# Patient Record
Sex: FEMALE | Race: BLACK OR AFRICAN AMERICAN | NOT HISPANIC OR LATINO
[De-identification: names, ages, dates, MRNs, and addresses within clinical notes are randomized per-mention and may not be internally consistent; named-entity substitution may affect disease eponyms.]

---

## 2017-01-10 ENCOUNTER — APPOINTMENT (OUTPATIENT)
Dept: WOUND CARE | Facility: HOSPITAL | Age: 67
End: 2017-01-10

## 2017-01-12 ENCOUNTER — OUTPATIENT (OUTPATIENT)
Dept: OUTPATIENT SERVICES | Facility: HOSPITAL | Age: 67
LOS: 1 days | Discharge: HOME | End: 2017-01-12

## 2017-01-12 ENCOUNTER — APPOINTMENT (OUTPATIENT)
Dept: WOUND CARE | Facility: HOSPITAL | Age: 67
End: 2017-01-12

## 2017-01-12 DIAGNOSIS — L89.90 PRESSURE ULCER OF UNSPECIFIED SITE, UNSPECIFIED STAGE: ICD-10-CM

## 2017-01-12 DIAGNOSIS — Z98.89 OTHER SPECIFIED POSTPROCEDURAL STATES: Chronic | ICD-10-CM

## 2017-01-12 DIAGNOSIS — Z98.84 BARIATRIC SURGERY STATUS: Chronic | ICD-10-CM

## 2017-01-13 DIAGNOSIS — Z95.820 PERIPHERAL VASCULAR ANGIOPLASTY STATUS WITH IMPLANTS AND GRAFTS: ICD-10-CM

## 2017-01-13 DIAGNOSIS — E11.621 TYPE 2 DIABETES MELLITUS WITH FOOT ULCER: ICD-10-CM

## 2017-01-13 DIAGNOSIS — I73.9 PERIPHERAL VASCULAR DISEASE, UNSPECIFIED: ICD-10-CM

## 2017-01-13 DIAGNOSIS — I10 ESSENTIAL (PRIMARY) HYPERTENSION: ICD-10-CM

## 2017-01-13 DIAGNOSIS — L89.90 PRESSURE ULCER OF UNSPECIFIED SITE, UNSPECIFIED STAGE: ICD-10-CM

## 2017-01-13 DIAGNOSIS — Z79.899 OTHER LONG TERM (CURRENT) DRUG THERAPY: ICD-10-CM

## 2017-01-13 DIAGNOSIS — I70.239 ATHEROSCLEROSIS OF NATIVE ARTERIES OF RIGHT LEG WITH ULCERATION OF UNSPECIFIED SITE: ICD-10-CM

## 2017-01-13 DIAGNOSIS — M79.674 PAIN IN RIGHT TOE(S): ICD-10-CM

## 2017-01-13 DIAGNOSIS — L97.514 NON-PRESSURE CHRONIC ULCER OF OTHER PART OF RIGHT FOOT WITH NECROSIS OF BONE: ICD-10-CM

## 2017-01-13 DIAGNOSIS — Z88.1 ALLERGY STATUS TO OTHER ANTIBIOTIC AGENTS STATUS: ICD-10-CM

## 2017-01-13 DIAGNOSIS — Z79.01 LONG TERM (CURRENT) USE OF ANTICOAGULANTS: ICD-10-CM

## 2017-01-13 DIAGNOSIS — D57.1 SICKLE-CELL DISEASE WITHOUT CRISIS: ICD-10-CM

## 2017-01-13 DIAGNOSIS — M19.90 UNSPECIFIED OSTEOARTHRITIS, UNSPECIFIED SITE: ICD-10-CM

## 2017-01-13 DIAGNOSIS — Z79.82 LONG TERM (CURRENT) USE OF ASPIRIN: ICD-10-CM

## 2017-01-30 ENCOUNTER — APPOINTMENT (OUTPATIENT)
Dept: WOUND CARE | Facility: HOSPITAL | Age: 67
End: 2017-01-30

## 2017-01-30 ENCOUNTER — OUTPATIENT (OUTPATIENT)
Dept: OUTPATIENT SERVICES | Facility: HOSPITAL | Age: 67
LOS: 1 days | Discharge: HOME | End: 2017-01-30

## 2017-01-30 DIAGNOSIS — Z98.84 BARIATRIC SURGERY STATUS: Chronic | ICD-10-CM

## 2017-01-30 DIAGNOSIS — Z98.89 OTHER SPECIFIED POSTPROCEDURAL STATES: Chronic | ICD-10-CM

## 2017-01-30 DIAGNOSIS — L89.90 PRESSURE ULCER OF UNSPECIFIED SITE, UNSPECIFIED STAGE: ICD-10-CM

## 2017-02-01 DIAGNOSIS — Z79.899 OTHER LONG TERM (CURRENT) DRUG THERAPY: ICD-10-CM

## 2017-02-01 DIAGNOSIS — M19.90 UNSPECIFIED OSTEOARTHRITIS, UNSPECIFIED SITE: ICD-10-CM

## 2017-02-01 DIAGNOSIS — Z95.820 PERIPHERAL VASCULAR ANGIOPLASTY STATUS WITH IMPLANTS AND GRAFTS: ICD-10-CM

## 2017-02-01 DIAGNOSIS — D57.1 SICKLE-CELL DISEASE WITHOUT CRISIS: ICD-10-CM

## 2017-02-01 DIAGNOSIS — M79.674 PAIN IN RIGHT TOE(S): ICD-10-CM

## 2017-02-01 DIAGNOSIS — Z79.01 LONG TERM (CURRENT) USE OF ANTICOAGULANTS: ICD-10-CM

## 2017-02-01 DIAGNOSIS — I70.239 ATHEROSCLEROSIS OF NATIVE ARTERIES OF RIGHT LEG WITH ULCERATION OF UNSPECIFIED SITE: ICD-10-CM

## 2017-02-01 DIAGNOSIS — E11.621 TYPE 2 DIABETES MELLITUS WITH FOOT ULCER: ICD-10-CM

## 2017-02-01 DIAGNOSIS — Z88.1 ALLERGY STATUS TO OTHER ANTIBIOTIC AGENTS STATUS: ICD-10-CM

## 2017-02-01 DIAGNOSIS — L97.514 NON-PRESSURE CHRONIC ULCER OF OTHER PART OF RIGHT FOOT WITH NECROSIS OF BONE: ICD-10-CM

## 2017-02-01 DIAGNOSIS — I73.9 PERIPHERAL VASCULAR DISEASE, UNSPECIFIED: ICD-10-CM

## 2017-02-01 DIAGNOSIS — L89.90 PRESSURE ULCER OF UNSPECIFIED SITE, UNSPECIFIED STAGE: ICD-10-CM

## 2017-02-01 DIAGNOSIS — I10 ESSENTIAL (PRIMARY) HYPERTENSION: ICD-10-CM

## 2017-02-01 DIAGNOSIS — Z79.82 LONG TERM (CURRENT) USE OF ASPIRIN: ICD-10-CM

## 2017-02-06 ENCOUNTER — APPOINTMENT (OUTPATIENT)
Dept: VASCULAR SURGERY | Facility: CLINIC | Age: 67
End: 2017-02-06

## 2017-02-06 VITALS
BODY MASS INDEX: 28.32 KG/M2 | DIASTOLIC BLOOD PRESSURE: 75 MMHG | RESPIRATION RATE: 16 BRPM | HEART RATE: 82 BPM | TEMPERATURE: 98.2 F | WEIGHT: 170 LBS | SYSTOLIC BLOOD PRESSURE: 152 MMHG | HEIGHT: 65 IN

## 2017-02-06 DIAGNOSIS — M25.472 EFFUSION, RIGHT ANKLE: ICD-10-CM

## 2017-02-06 DIAGNOSIS — E11.9 TYPE 2 DIABETES MELLITUS W/OUT COMPLICATIONS: ICD-10-CM

## 2017-02-06 DIAGNOSIS — M25.471 EFFUSION, RIGHT ANKLE: ICD-10-CM

## 2017-03-22 ENCOUNTER — INPATIENT (INPATIENT)
Facility: HOSPITAL | Age: 67
LOS: 1 days | Discharge: HOME | DRG: 379 | End: 2017-03-24
Attending: INTERNAL MEDICINE | Admitting: INTERNAL MEDICINE
Payer: COMMERCIAL

## 2017-03-22 VITALS
OXYGEN SATURATION: 98 % | TEMPERATURE: 98 F | DIASTOLIC BLOOD PRESSURE: 85 MMHG | HEIGHT: 65 IN | HEART RATE: 86 BPM | SYSTOLIC BLOOD PRESSURE: 193 MMHG | RESPIRATION RATE: 20 BRPM | WEIGHT: 169.98 LBS

## 2017-03-22 DIAGNOSIS — Z98.84 BARIATRIC SURGERY STATUS: Chronic | ICD-10-CM

## 2017-03-22 DIAGNOSIS — Z98.891 HISTORY OF UTERINE SCAR FROM PREVIOUS SURGERY: Chronic | ICD-10-CM

## 2017-03-22 DIAGNOSIS — Z98.89 OTHER SPECIFIED POSTPROCEDURAL STATES: Chronic | ICD-10-CM

## 2017-03-22 DIAGNOSIS — Z95.828 PRESENCE OF OTHER VASCULAR IMPLANTS AND GRAFTS: Chronic | ICD-10-CM

## 2017-03-22 DIAGNOSIS — Z98.890 OTHER SPECIFIED POSTPROCEDURAL STATES: Chronic | ICD-10-CM

## 2017-03-22 PROCEDURE — 99285 EMERGENCY DEPT VISIT HI MDM: CPT | Mod: 25

## 2017-03-22 NOTE — ED ADULT NURSE NOTE - OBJECTIVE STATEMENT
Pt is a 67 yo female coming in c.o having 4 episodes of bloody BM. Pt states that at first it started off as a small amount but the more she went she noticed the blood increasing to clots. Pt denies any pain with urination or BM. No CP or SOB no N/V/D. Pt denies dizziness and is able to ambulate with a steady gait. Pt has a pmh of HTN and HLD.

## 2017-03-23 DIAGNOSIS — K92.2 GASTROINTESTINAL HEMORRHAGE, UNSPECIFIED: ICD-10-CM

## 2017-03-23 DIAGNOSIS — K92.1 MELENA: ICD-10-CM

## 2017-03-23 DIAGNOSIS — I10 ESSENTIAL (PRIMARY) HYPERTENSION: ICD-10-CM

## 2017-03-23 DIAGNOSIS — I73.9 PERIPHERAL VASCULAR DISEASE, UNSPECIFIED: ICD-10-CM

## 2017-03-23 LAB
ALBUMIN SERPL ELPH-MCNC: 3.8 G/DL — SIGNIFICANT CHANGE UP (ref 3.3–5)
ALP SERPL-CCNC: 122 U/L — HIGH (ref 40–120)
ALT FLD-CCNC: 12 U/L RC — SIGNIFICANT CHANGE UP (ref 10–45)
ANION GAP SERPL CALC-SCNC: 12 MMOL/L — SIGNIFICANT CHANGE UP (ref 5–17)
APTT BLD: 31 SEC — SIGNIFICANT CHANGE UP (ref 27.5–37.4)
AST SERPL-CCNC: 19 U/L — SIGNIFICANT CHANGE UP (ref 10–40)
BASE EXCESS BLDV CALC-SCNC: 2 MMOL/L — SIGNIFICANT CHANGE UP (ref -2–2)
BASOPHILS # BLD AUTO: 0 K/UL — SIGNIFICANT CHANGE UP (ref 0–0.2)
BASOPHILS NFR BLD AUTO: 0.6 % — SIGNIFICANT CHANGE UP (ref 0–2)
BILIRUB SERPL-MCNC: 0.4 MG/DL — SIGNIFICANT CHANGE UP (ref 0.2–1.2)
BLD GP AB SCN SERPL QL: NEGATIVE — SIGNIFICANT CHANGE UP
BUN SERPL-MCNC: 14 MG/DL — SIGNIFICANT CHANGE UP (ref 7–23)
CA-I SERPL-SCNC: 1.24 MMOL/L — SIGNIFICANT CHANGE UP (ref 1.12–1.3)
CALCIUM SERPL-MCNC: 8.6 MG/DL — SIGNIFICANT CHANGE UP (ref 8.4–10.5)
CHLORIDE BLDV-SCNC: 106 MMOL/L — SIGNIFICANT CHANGE UP (ref 96–108)
CHLORIDE SERPL-SCNC: 108 MMOL/L — SIGNIFICANT CHANGE UP (ref 96–108)
CO2 BLDV-SCNC: 30 MMOL/L — SIGNIFICANT CHANGE UP (ref 22–30)
CO2 SERPL-SCNC: 27 MMOL/L — SIGNIFICANT CHANGE UP (ref 22–31)
CREAT SERPL-MCNC: 0.74 MG/DL — SIGNIFICANT CHANGE UP (ref 0.5–1.3)
EOSINOPHIL # BLD AUTO: 0.3 K/UL — SIGNIFICANT CHANGE UP (ref 0–0.5)
EOSINOPHIL NFR BLD AUTO: 3.6 % — SIGNIFICANT CHANGE UP (ref 0–6)
GAS PNL BLDV: 144 MMOL/L — SIGNIFICANT CHANGE UP (ref 136–145)
GAS PNL BLDV: SIGNIFICANT CHANGE UP
GAS PNL BLDV: SIGNIFICANT CHANGE UP
GLUCOSE BLDV-MCNC: 146 MG/DL — HIGH (ref 70–99)
GLUCOSE SERPL-MCNC: 154 MG/DL — HIGH (ref 70–99)
HCO3 BLDV-SCNC: 28 MMOL/L — SIGNIFICANT CHANGE UP (ref 21–29)
HCT VFR BLD CALC: 30.1 % — LOW (ref 34.5–45)
HCT VFR BLD CALC: 33.2 % — LOW (ref 34.5–45)
HCT VFR BLDA CALC: 34 % — LOW (ref 39–50)
HGB BLD CALC-MCNC: 11 G/DL — LOW (ref 11.5–15.5)
HGB BLD-MCNC: 10 G/DL — LOW (ref 11.5–15.5)
HGB BLD-MCNC: 11.1 G/DL — LOW (ref 11.5–15.5)
INR BLD: 0.98 RATIO — SIGNIFICANT CHANGE UP (ref 0.88–1.16)
LACTATE BLDV-MCNC: 1.9 MMOL/L — SIGNIFICANT CHANGE UP (ref 0.7–2)
LYMPHOCYTES # BLD AUTO: 2.8 K/UL — SIGNIFICANT CHANGE UP (ref 1–3.3)
LYMPHOCYTES # BLD AUTO: 34.9 % — SIGNIFICANT CHANGE UP (ref 13–44)
MCHC RBC-ENTMCNC: 27.8 PG — SIGNIFICANT CHANGE UP (ref 27–34)
MCHC RBC-ENTMCNC: 28 PG — SIGNIFICANT CHANGE UP (ref 27–34)
MCHC RBC-ENTMCNC: 33.2 GM/DL — SIGNIFICANT CHANGE UP (ref 32–36)
MCHC RBC-ENTMCNC: 33.3 GM/DL — SIGNIFICANT CHANGE UP (ref 32–36)
MCV RBC AUTO: 83.7 FL — SIGNIFICANT CHANGE UP (ref 80–100)
MCV RBC AUTO: 83.9 FL — SIGNIFICANT CHANGE UP (ref 80–100)
MONOCYTES # BLD AUTO: 0.5 K/UL — SIGNIFICANT CHANGE UP (ref 0–0.9)
MONOCYTES NFR BLD AUTO: 6.2 % — SIGNIFICANT CHANGE UP (ref 2–14)
NEUTROPHILS # BLD AUTO: 4.4 K/UL — SIGNIFICANT CHANGE UP (ref 1.8–7.4)
NEUTROPHILS NFR BLD AUTO: 54.8 % — SIGNIFICANT CHANGE UP (ref 43–77)
OTHER CELLS CSF MANUAL: 7 ML/DL — LOW (ref 18–22)
PCO2 BLDV: 56 MMHG — HIGH (ref 35–50)
PH BLDV: 7.33 — LOW (ref 7.35–7.45)
PLATELET # BLD AUTO: 211 K/UL — SIGNIFICANT CHANGE UP (ref 150–400)
PLATELET # BLD AUTO: 256 K/UL — SIGNIFICANT CHANGE UP (ref 150–400)
PO2 BLDV: 30 MMHG — SIGNIFICANT CHANGE UP (ref 25–45)
POTASSIUM BLDV-SCNC: 3.7 MMOL/L — SIGNIFICANT CHANGE UP (ref 3.5–5)
POTASSIUM SERPL-MCNC: 4 MMOL/L — SIGNIFICANT CHANGE UP (ref 3.5–5.3)
POTASSIUM SERPL-SCNC: 4 MMOL/L — SIGNIFICANT CHANGE UP (ref 3.5–5.3)
PROT SERPL-MCNC: 6.6 G/DL — SIGNIFICANT CHANGE UP (ref 6–8.3)
PROTHROM AB SERPL-ACNC: 10.7 SEC — SIGNIFICANT CHANGE UP (ref 9.8–12.7)
RBC # BLD: 3.59 M/UL — LOW (ref 3.8–5.2)
RBC # BLD: 3.95 M/UL — SIGNIFICANT CHANGE UP (ref 3.8–5.2)
RBC # FLD: 13.9 % — SIGNIFICANT CHANGE UP (ref 10.3–14.5)
RBC # FLD: 14 % — SIGNIFICANT CHANGE UP (ref 10.3–14.5)
RH IG SCN BLD-IMP: POSITIVE — SIGNIFICANT CHANGE UP
SAO2 % BLDV: 46 % — LOW (ref 67–88)
SODIUM SERPL-SCNC: 147 MMOL/L — HIGH (ref 135–145)
WBC # BLD: 7.9 K/UL — SIGNIFICANT CHANGE UP (ref 3.8–10.5)
WBC # BLD: 8 K/UL — SIGNIFICANT CHANGE UP (ref 3.8–10.5)
WBC # FLD AUTO: 7.9 K/UL — SIGNIFICANT CHANGE UP (ref 3.8–10.5)
WBC # FLD AUTO: 8 K/UL — SIGNIFICANT CHANGE UP (ref 3.8–10.5)

## 2017-03-23 PROCEDURE — 99222 1ST HOSP IP/OBS MODERATE 55: CPT | Mod: GC

## 2017-03-23 PROCEDURE — 99223 1ST HOSP IP/OBS HIGH 75: CPT

## 2017-03-23 RX ORDER — SOD SULF/SODIUM/NAHCO3/KCL/PEG
1000 SOLUTION, RECONSTITUTED, ORAL ORAL EVERY 4 HOURS
Qty: 0 | Refills: 0 | Status: COMPLETED | OUTPATIENT
Start: 2017-03-23 | End: 2017-03-23

## 2017-03-23 RX ORDER — DEXTROSE 50 % IN WATER 50 %
12.5 SYRINGE (ML) INTRAVENOUS ONCE
Qty: 0 | Refills: 0 | Status: DISCONTINUED | OUTPATIENT
Start: 2017-03-23 | End: 2017-03-24

## 2017-03-23 RX ORDER — DEXTROSE 50 % IN WATER 50 %
1 SYRINGE (ML) INTRAVENOUS ONCE
Qty: 0 | Refills: 0 | Status: DISCONTINUED | OUTPATIENT
Start: 2017-03-23 | End: 2017-03-24

## 2017-03-23 RX ORDER — INSULIN LISPRO 100/ML
VIAL (ML) SUBCUTANEOUS AT BEDTIME
Qty: 0 | Refills: 0 | Status: DISCONTINUED | OUTPATIENT
Start: 2017-03-23 | End: 2017-03-24

## 2017-03-23 RX ORDER — SODIUM CHLORIDE 9 MG/ML
1000 INJECTION INTRAMUSCULAR; INTRAVENOUS; SUBCUTANEOUS
Qty: 0 | Refills: 0 | Status: DISCONTINUED | OUTPATIENT
Start: 2017-03-23 | End: 2017-03-23

## 2017-03-23 RX ORDER — PANTOPRAZOLE SODIUM 20 MG/1
40 TABLET, DELAYED RELEASE ORAL
Qty: 0 | Refills: 0 | Status: DISCONTINUED | OUTPATIENT
Start: 2017-03-23 | End: 2017-03-24

## 2017-03-23 RX ORDER — ACETAMINOPHEN 500 MG
650 TABLET ORAL ONCE
Qty: 0 | Refills: 0 | Status: COMPLETED | OUTPATIENT
Start: 2017-03-23 | End: 2017-03-23

## 2017-03-23 RX ORDER — AMLODIPINE BESYLATE 2.5 MG/1
10 TABLET ORAL DAILY
Qty: 0 | Refills: 0 | Status: DISCONTINUED | OUTPATIENT
Start: 2017-03-23 | End: 2017-03-24

## 2017-03-23 RX ORDER — INSULIN LISPRO 100/ML
VIAL (ML) SUBCUTANEOUS
Qty: 0 | Refills: 0 | Status: DISCONTINUED | OUTPATIENT
Start: 2017-03-23 | End: 2017-03-24

## 2017-03-23 RX ORDER — SODIUM CHLORIDE 9 MG/ML
1000 INJECTION, SOLUTION INTRAVENOUS
Qty: 0 | Refills: 0 | Status: DISCONTINUED | OUTPATIENT
Start: 2017-03-23 | End: 2017-03-24

## 2017-03-23 RX ADMIN — SODIUM CHLORIDE 100 MILLILITER(S): 9 INJECTION INTRAMUSCULAR; INTRAVENOUS; SUBCUTANEOUS at 09:59

## 2017-03-23 RX ADMIN — PANTOPRAZOLE SODIUM 40 MILLIGRAM(S): 20 TABLET, DELAYED RELEASE ORAL at 18:18

## 2017-03-23 RX ADMIN — Medication 1000 MILLILITER(S): at 23:15

## 2017-03-23 RX ADMIN — SODIUM CHLORIDE 100 MILLILITER(S): 9 INJECTION INTRAMUSCULAR; INTRAVENOUS; SUBCUTANEOUS at 06:05

## 2017-03-23 RX ADMIN — PANTOPRAZOLE SODIUM 40 MILLIGRAM(S): 20 TABLET, DELAYED RELEASE ORAL at 06:00

## 2017-03-23 RX ADMIN — Medication 1000 MILLILITER(S): at 19:59

## 2017-03-23 RX ADMIN — AMLODIPINE BESYLATE 10 MILLIGRAM(S): 2.5 TABLET ORAL at 06:00

## 2017-03-23 NOTE — ED PROVIDER NOTE - PHYSICAL EXAMINATION
Physical Exam: elderly F in NAD, AAOx3, NCAT, MMM, PERRLA, CTAB, normal rate and regular rhythm, abdomen is soft and NTND, No edema, No deformity of extremities, No rashes, CN grossly intact, No focal motor or sensory deficits. Rectal w/ external hemorrhoid not thrombosed, gross dark red blood, no melena. ~ Davon Michelle MD

## 2017-03-23 NOTE — H&P ADULT. - PROBLEM SELECTOR PLAN 1
- will need GI consult in AM  - cbc q8h  - hold off platelet txf for now  - NPO + IVF  - unlikely upperGIB despite daily diclofenac. likely a lower GIB.  - pt type and screened x1 and consent for blood - will need GI consult in AM  - cbc q8h  - hold off platelet txf for now  - NPO + IVF  - unlikely upperGIB despite daily diclofenac. likely a lower GIB.  - pt type and screened x1 and consent for blood  - start protonix 40 IV BID for now, despitely less likely UGIB

## 2017-03-23 NOTE — ED PROVIDER NOTE - ATTENDING CONTRIBUTION TO CARE
patient with pvd on asa plavix presenting with 7 episodes of bright red blood per rectum since this evening. no abdominal pain. HD stable  plan to check blood work and admit for endoscopy.

## 2017-03-23 NOTE — H&P ADULT. - HISTORY OF PRESENT ILLNESS
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    Pt states this has never happened before. She last had colonoscopy 4-5 yrs ago which was reportedly normal without polyps or diverticular. Her last EGD was in 2001 before her gastric bypass. Last night at 10PM, she started to have large bloody BM. Since then, she's had 7 bloody BM. Pt stated to me that she took baclofen every day for pain, but pt's medication list stated diclofenac 100mg daily. Denies nausea or vomiting. Only symptom is some blurry vision when walking and mild abd tenderness. Otherwise denies dizziness, lightheadedness, chest pain, SOB, fevers, chills, URI.    VS: Tm 98, P 86, /85, R 20, 98% RA

## 2017-03-23 NOTE — H&P ADULT. - ASSESSMENT
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

## 2017-03-23 NOTE — ED PROVIDER NOTE - NS ED ROS FT
No fever, no chills, no change in vision, no change in hearing, no chest pain, no shortness of breath, + abdominal pain, no vomiting, no dysuria, no muscle pain, no rashes, no loss of consciousness. ~ Davon Michelle MD

## 2017-03-23 NOTE — ED PROVIDER NOTE - OBJECTIVE STATEMENT
BRBPR since 10 pm, first time, has had 4 episodes, once in ED, passing clots now.   On plavix and ASA for PAD s/p stenting. Last scope 3 yrs ago. Also has epigastric burning. No dark tarry stool, CP, SOB, vomiting blood.    PMD: Nia

## 2017-03-24 VITALS
TEMPERATURE: 98 F | DIASTOLIC BLOOD PRESSURE: 69 MMHG | SYSTOLIC BLOOD PRESSURE: 152 MMHG | HEART RATE: 68 BPM | RESPIRATION RATE: 18 BRPM | OXYGEN SATURATION: 98 %

## 2017-03-24 LAB
CHOLEST SERPL-MCNC: 181 MG/DL — SIGNIFICANT CHANGE UP (ref 10–199)
HBA1C BLD-MCNC: 7.2 % — HIGH (ref 4–5.6)
HCT VFR BLD CALC: 27.5 % — LOW (ref 34.5–45)
HCT VFR BLD CALC: 29.8 % — LOW (ref 34.5–45)
HCT VFR BLD CALC: 30.7 % — LOW (ref 34.5–45)
HDLC SERPL-MCNC: 43 MG/DL — SIGNIFICANT CHANGE UP (ref 40–125)
HGB BLD-MCNC: 9.3 G/DL — LOW (ref 11.5–15.5)
HGB BLD-MCNC: 9.7 G/DL — LOW (ref 11.5–15.5)
HGB BLD-MCNC: 9.8 G/DL — LOW (ref 11.5–15.5)
LIPID PNL WITH DIRECT LDL SERPL: 112 MG/DL — SIGNIFICANT CHANGE UP
MCHC RBC-ENTMCNC: 26.1 PG — LOW (ref 27–34)
MCHC RBC-ENTMCNC: 27.1 PG — SIGNIFICANT CHANGE UP (ref 27–34)
MCHC RBC-ENTMCNC: 28.4 PG — SIGNIFICANT CHANGE UP (ref 27–34)
MCHC RBC-ENTMCNC: 31.9 GM/DL — LOW (ref 32–36)
MCHC RBC-ENTMCNC: 32.5 GM/DL — SIGNIFICANT CHANGE UP (ref 32–36)
MCHC RBC-ENTMCNC: 33.8 GM/DL — SIGNIFICANT CHANGE UP (ref 32–36)
MCV RBC AUTO: 81.9 FL — SIGNIFICANT CHANGE UP (ref 80–100)
MCV RBC AUTO: 83.3 FL — SIGNIFICANT CHANGE UP (ref 80–100)
MCV RBC AUTO: 83.8 FL — SIGNIFICANT CHANGE UP (ref 80–100)
PLATELET # BLD AUTO: 215 K/UL — SIGNIFICANT CHANGE UP (ref 150–400)
PLATELET # BLD AUTO: 244 K/UL — SIGNIFICANT CHANGE UP (ref 150–400)
PLATELET # BLD AUTO: 284 K/UL — SIGNIFICANT CHANGE UP (ref 150–400)
RBC # BLD: 3.28 M/UL — LOW (ref 3.8–5.2)
RBC # BLD: 3.58 M/UL — LOW (ref 3.8–5.2)
RBC # BLD: 3.75 M/UL — LOW (ref 3.8–5.2)
RBC # FLD: 13.7 % — SIGNIFICANT CHANGE UP (ref 10.3–14.5)
RBC # FLD: 13.9 % — SIGNIFICANT CHANGE UP (ref 10.3–14.5)
RBC # FLD: 15.3 % — HIGH (ref 10.3–14.5)
TOTAL CHOLESTEROL/HDL RATIO MEASUREMENT: 4.2 RATIO — SIGNIFICANT CHANGE UP (ref 3.3–7.1)
TRIGL SERPL-MCNC: 132 MG/DL — SIGNIFICANT CHANGE UP (ref 10–149)
TSH SERPL-MCNC: 3.59 UIU/ML — SIGNIFICANT CHANGE UP (ref 0.27–4.2)
WBC # BLD: 5.9 K/UL — SIGNIFICANT CHANGE UP (ref 3.8–10.5)
WBC # BLD: 6.3 K/UL — SIGNIFICANT CHANGE UP (ref 3.8–10.5)
WBC # BLD: 7.47 K/UL — SIGNIFICANT CHANGE UP (ref 3.8–10.5)
WBC # FLD AUTO: 5.9 K/UL — SIGNIFICANT CHANGE UP (ref 3.8–10.5)
WBC # FLD AUTO: 6.3 K/UL — SIGNIFICANT CHANGE UP (ref 3.8–10.5)
WBC # FLD AUTO: 7.47 K/UL — SIGNIFICANT CHANGE UP (ref 3.8–10.5)

## 2017-03-24 PROCEDURE — 84132 ASSAY OF SERUM POTASSIUM: CPT

## 2017-03-24 PROCEDURE — 86901 BLOOD TYPING SEROLOGIC RH(D): CPT

## 2017-03-24 PROCEDURE — 85610 PROTHROMBIN TIME: CPT

## 2017-03-24 PROCEDURE — 85027 COMPLETE CBC AUTOMATED: CPT

## 2017-03-24 PROCEDURE — 86850 RBC ANTIBODY SCREEN: CPT

## 2017-03-24 PROCEDURE — 82435 ASSAY OF BLOOD CHLORIDE: CPT

## 2017-03-24 PROCEDURE — 99285 EMERGENCY DEPT VISIT HI MDM: CPT

## 2017-03-24 PROCEDURE — 85730 THROMBOPLASTIN TIME PARTIAL: CPT

## 2017-03-24 PROCEDURE — 83036 HEMOGLOBIN GLYCOSYLATED A1C: CPT

## 2017-03-24 PROCEDURE — 82947 ASSAY GLUCOSE BLOOD QUANT: CPT

## 2017-03-24 PROCEDURE — 84295 ASSAY OF SERUM SODIUM: CPT

## 2017-03-24 PROCEDURE — 84443 ASSAY THYROID STIM HORMONE: CPT

## 2017-03-24 PROCEDURE — 86900 BLOOD TYPING SEROLOGIC ABO: CPT

## 2017-03-24 PROCEDURE — 85014 HEMATOCRIT: CPT

## 2017-03-24 PROCEDURE — 83605 ASSAY OF LACTIC ACID: CPT

## 2017-03-24 PROCEDURE — 80061 LIPID PANEL: CPT

## 2017-03-24 PROCEDURE — 80053 COMPREHEN METABOLIC PANEL: CPT

## 2017-03-24 PROCEDURE — 82330 ASSAY OF CALCIUM: CPT

## 2017-03-24 PROCEDURE — 82803 BLOOD GASES ANY COMBINATION: CPT

## 2017-03-24 RX ORDER — PANTOPRAZOLE SODIUM 20 MG/1
1 TABLET, DELAYED RELEASE ORAL
Qty: 14 | Refills: 0 | OUTPATIENT
Start: 2017-03-24 | End: 2017-04-07

## 2017-03-24 RX ADMIN — SODIUM CHLORIDE 50 MILLILITER(S): 9 INJECTION, SOLUTION INTRAVENOUS at 08:26

## 2017-03-24 RX ADMIN — SODIUM CHLORIDE 50 MILLILITER(S): 9 INJECTION, SOLUTION INTRAVENOUS at 06:07

## 2017-03-24 RX ADMIN — PANTOPRAZOLE SODIUM 40 MILLIGRAM(S): 20 TABLET, DELAYED RELEASE ORAL at 06:09

## 2017-03-24 RX ADMIN — SODIUM CHLORIDE 50 MILLILITER(S): 9 INJECTION, SOLUTION INTRAVENOUS at 00:05

## 2017-03-24 NOTE — DISCHARGE NOTE ADULT - MEDICATION SUMMARY - MEDICATIONS TO TAKE
I will START or STAY ON the medications listed below when I get home from the hospital:    aspirin 81 mg oral delayed release tablet  -- 1 tab(s) by mouth once a day  -- Indication: For antiplatelet    ramipril 10 mg oral capsule  -- 1 cap(s) by mouth once a day  -- Indication: For Hypertension    metFORMIN 500 mg oral tablet  -- 1 tab(s) by mouth 2 times a day  -- Indication: For Diabetes    Plavix 75 mg oral tablet  -- 1 tab(s) by mouth once a day  -- Indication: For antiplatelet    amLODIPine 10 mg oral tablet  -- 1 tab(s) by mouth once a day  -- Indication: For Hypertension    hydroCHLOROthiazide 50 mg oral tablet  -- 1 tab(s) by mouth once a day  -- Indication: For Hypertension    pantoprazole 40 mg oral delayed release tablet  -- 1 tab(s) by mouth once a day  -- It is very important that you take or use this exactly as directed.  Do not skip doses or discontinue unless directed by your doctor.  Obtain medical advice before taking any non-prescription drugs as some may affect the action of this medication.  Swallow whole.  Do not crush.    -- Indication: For Gastric reflux

## 2017-03-24 NOTE — DISCHARGE NOTE ADULT - PATIENT PORTAL LINK FT
“You can access the [employer] Patient Portal, offered by [facility], by registering with the following website: [email or web address]”

## 2017-03-24 NOTE — DISCHARGE NOTE ADULT - MEDICATION SUMMARY - MEDICATIONS TO STOP TAKING
I will STOP taking the medications listed below when I get home from the hospital:    isosorbide  --    diclofenac sodium 100 mg oral tablet, extended release  -- 1 tab(s) by mouth once a day

## 2017-03-24 NOTE — DISCHARGE NOTE ADULT - CARE PLAN
Principal Discharge DX:	Gastrointestinal hemorrhage, unspecified gastrointestinal hemorrhage type  Goal:	Resolved  Instructions for follow-up, activity and diet:	You had colonoscopy while in the hospital    Colonoscopy :  Diverticulosis in the entire examined colon.                       - No specimens collected.  Encouraged to eat high fiber diet .  Your hemoglobin is stable enough for discharge.  Secondary Diagnosis:	Diabetes  Instructions for follow-up, activity and diet:	Continue with prescribed home medication  Your next appointment with endocrinologist ([facility] endocrine ph: [phone])/PMD is -   HgA1C this admission -7.2  Make sure you get your HgA1c checked every three months.  If you take oral diabetes medications, check your blood glucose two times a day.  If you take insulin, check your blood glucose before meals and at bedtime.  It's important not to skip any meals.  Keep a log of your blood glucose results and always take it with you to your doctor appointments.  Keep a list of your current medications including injectables and over the counter medications and bring this medication list with you to all your doctor appointments.  If you have not seen your ophthalmologist this year call for appointment.  Check your feet daily for redness, sores, or openings. Do not self treat. If no improvement in two days call your primary care physician for an appointment.  Low blood sugar (hypoglycemia) is a blood sugar below 70mg/dl. Check your blood sugar if you feel signs/symptoms of hypoglycemia. If your blood sugar is below 70 take 15 grams of carbohydrates (ex 4 oz of apple juice, 3-4 glucose tablets, or 4-6 oz of regular soda) wait 15 minutes and repeat blood sugar to make sure it comes up above 70.  If your blood sugar is above 70 and you are due for a meal, have a meal.  If you are not due for a meal have a snack.  This snack helps keeps your blood sugar at a safe range.  Secondary Diagnosis:	HTN (hypertension)  Instructions for follow-up, activity and diet:	Take medications for your blood pressure as recommended.  Eat a heart healthy diet that is low in saturated fats and salt, and includes whole grains, fruits, vegetables and lean protein   Exercise regularly (consult with your physician or cardiologist first); maintain a heart healthy weight.   If you smoke - quit (A resource to help you stop smoking is the [facility] – phone number [phone].). Continue to follow with your primary physician or cardiologist.   Seek medical help for dizziness, Lightheadedness, Blurry vision, Headache, Chest pain, Shortness of breath  Follow up with your medical doctor to establish long term blood pressure treatment goals. Principal Discharge DX:	Gastrointestinal hemorrhage, unspecified gastrointestinal hemorrhage type  Goal:	Resolved  Instructions for follow-up, activity and diet:	You had colonoscopy while in the hospital    Colonoscopy :  Diverticulosis in the entire examined colon.                       - No specimens collected.  Encouraged to eat high fiber diet .  Your hemoglobin is stable enough for discharge.  Secondary Diagnosis:	Diabetes  Instructions for follow-up, activity and diet:	Continue with prescribed home medication  Your next appointment with endocrinologist ([facility] endocrine ph: [phone])/PMD is -   HgA1C this admission -7.2  Make sure you get your HgA1c checked every three months.  If you take oral diabetes medications, check your blood glucose two times a day.  If you take insulin, check your blood glucose before meals and at bedtime.  It's important not to skip any meals.  Keep a log of your blood glucose results and always take it with you to your doctor appointments.  Keep a list of your current medications including injectables and over the counter medications and bring this medication list with you to all your doctor appointments.  If you have not seen your ophthalmologist this year call for appointment.  Check your feet daily for redness, sores, or openings. Do not self treat. If no improvement in two days call your primary care physician for an appointment.  Low blood sugar (hypoglycemia) is a blood sugar below 70mg/dl. Check your blood sugar if you feel signs/symptoms of hypoglycemia. If your blood sugar is below 70 take 15 grams of carbohydrates (ex 4 oz of apple juice, 3-4 glucose tablets, or 4-6 oz of regular soda) wait 15 minutes and repeat blood sugar to make sure it comes up above 70.  If your blood sugar is above 70 and you are due for a meal, have a meal.  If you are not due for a meal have a snack.  This snack helps keeps your blood sugar at a safe range.  Secondary Diagnosis:	HTN (hypertension)  Instructions for follow-up, activity and diet:	Take medications for your blood pressure as recommended.  Eat a heart healthy diet that is low in saturated fats and salt, and includes whole grains, fruits, vegetables and lean protein   Exercise regularly (consult with your physician or cardiologist first); maintain a heart healthy weight.   If you smoke - quit (A resource to help you stop smoking is the [facility] Tobacco [facility] – phone number [phone].). Continue to follow with your primary physician or cardiologist.   Seek medical help for dizziness, Lightheadedness, Blurry vision, Headache, Chest pain, Shortness of breath  Follow up with your medical doctor to establish long term blood pressure treatment goals. Principal Discharge DX:	Gastrointestinal hemorrhage, unspecified gastrointestinal hemorrhage type  Goal:	Resolved  Instructions for follow-up, activity and diet:	You had colonoscopy while in the hospital    Colonoscopy :  Diverticulosis in the entire examined colon.                       - No specimens collected.  Encouraged to eat high fiber diet .  Your hemoglobin is stable enough for discharge.  Secondary Diagnosis:	Diabetes  Instructions for follow-up, activity and diet:	Continue with prescribed home medication  Your next appointment with endocrinologist ([facility] endocrine ph: [phone])/PMD is -   HgA1C this admission -7.2  Make sure you get your HgA1c checked every three months.  If you take oral diabetes medications, check your blood glucose two times a day.  If you take insulin, check your blood glucose before meals and at bedtime.  It's important not to skip any meals.  Keep a log of your blood glucose results and always take it with you to your doctor appointments.  Keep a list of your current medications including injectables and over the counter medications and bring this medication list with you to all your doctor appointments.  If you have not seen your ophthalmologist this year call for appointment.  Check your feet daily for redness, sores, or openings. Do not self treat. If no improvement in two days call your primary care physician for an appointment.  Low blood sugar (hypoglycemia) is a blood sugar below 70mg/dl. Check your blood sugar if you feel signs/symptoms of hypoglycemia. If your blood sugar is below 70 take 15 grams of carbohydrates (ex 4 oz of apple juice, 3-4 glucose tablets, or 4-6 oz of regular soda) wait 15 minutes and repeat blood sugar to make sure it comes up above 70.  If your blood sugar is above 70 and you are due for a meal, have a meal.  If you are not due for a meal have a snack.  This snack helps keeps your blood sugar at a safe range.  Secondary Diagnosis:	HTN (hypertension)  Instructions for follow-up, activity and diet:	Take medications for your blood pressure as recommended.  Eat a heart healthy diet that is low in saturated fats and salt, and includes whole grains, fruits, vegetables and lean protein   Exercise regularly (consult with your physician or cardiologist first); maintain a heart healthy weight.   If you smoke - quit (A resource to help you stop smoking is the [facility] Tobacco [facility] – phone number [phone].). Continue to follow with your primary physician or cardiologist.   Seek medical help for dizziness, Lightheadedness, Blurry vision, Headache, Chest pain, Shortness of breath  Follow up with your medical doctor to establish long term blood pressure treatment goals.

## 2017-03-24 NOTE — DISCHARGE NOTE ADULT - HOSPITAL COURSE
To be completed by attending MD . 66F with  hx htn, dm2, hld, gastric bypass (2001), abdominoplasty (1997), PAD s/p right LE stent and R 2nd toe amputation, pw sudden onset brbpr  DX: GIB . hbg 11.1  hold off Plavix / ASA    3/24 : S/p  colonoscopy today .monitor H/H .    3/24 : Colonoscopy :  Diverticulosis in the entire examined colon.   No specimens collected.  D/C to home.

## 2017-03-24 NOTE — DISCHARGE NOTE ADULT - PLAN OF CARE
Resolved You had colonoscopy while in the hospital    Colonoscopy :  Diverticulosis in the entire examined colon.                       - No specimens collected.  Encouraged to eat high fiber diet .  Your hemoglobin is stable enough for discharge. Continue with prescribed home medication  Your next appointment with endocrinologist ([facility] endocrine ph: [phone])/PMD is -   HgA1C this admission -7.2  Make sure you get your HgA1c checked every three months.  If you take oral diabetes medications, check your blood glucose two times a day.  If you take insulin, check your blood glucose before meals and at bedtime.  It's important not to skip any meals.  Keep a log of your blood glucose results and always take it with you to your doctor appointments.  Keep a list of your current medications including injectables and over the counter medications and bring this medication list with you to all your doctor appointments.  If you have not seen your ophthalmologist this year call for appointment.  Check your feet daily for redness, sores, or openings. Do not self treat. If no improvement in two days call your primary care physician for an appointment.  Low blood sugar (hypoglycemia) is a blood sugar below 70mg/dl. Check your blood sugar if you feel signs/symptoms of hypoglycemia. If your blood sugar is below 70 take 15 grams of carbohydrates (ex 4 oz of apple juice, 3-4 glucose tablets, or 4-6 oz of regular soda) wait 15 minutes and repeat blood sugar to make sure it comes up above 70.  If your blood sugar is above 70 and you are due for a meal, have a meal.  If you are not due for a meal have a snack.  This snack helps keeps your blood sugar at a safe range. Take medications for your blood pressure as recommended.  Eat a heart healthy diet that is low in saturated fats and salt, and includes whole grains, fruits, vegetables and lean protein   Exercise regularly (consult with your physician or cardiologist first); maintain a heart healthy weight.   If you smoke - quit (A resource to help you stop smoking is the [facility] – phone number [phone].). Continue to follow with your primary physician or cardiologist.   Seek medical help for dizziness, Lightheadedness, Blurry vision, Headache, Chest pain, Shortness of breath  Follow up with your medical doctor to establish long term blood pressure treatment goals.

## 2017-05-08 ENCOUNTER — APPOINTMENT (OUTPATIENT)
Dept: VASCULAR SURGERY | Facility: CLINIC | Age: 67
End: 2017-05-08

## 2017-05-09 RX ORDER — DICLOFENAC SODIUM 75 MG/1
1 TABLET, DELAYED RELEASE ORAL
Qty: 0 | Refills: 0 | COMMUNITY

## 2017-05-09 RX ORDER — ASPIRIN/CALCIUM CARB/MAGNESIUM 324 MG
0 TABLET ORAL
Qty: 0 | Refills: 0 | COMMUNITY

## 2017-05-09 RX ORDER — AMLODIPINE BESYLATE 2.5 MG/1
0 TABLET ORAL
Qty: 0 | Refills: 0 | COMMUNITY

## 2017-05-09 RX ORDER — METFORMIN HYDROCHLORIDE 850 MG/1
0 TABLET ORAL
Qty: 0 | Refills: 0 | COMMUNITY

## 2017-05-09 RX ORDER — CLOPIDOGREL BISULFATE 75 MG/1
0 TABLET, FILM COATED ORAL
Qty: 0 | Refills: 0 | COMMUNITY

## 2017-05-09 RX ORDER — HYDROCHLOROTHIAZIDE 25 MG
0 TABLET ORAL
Qty: 0 | Refills: 0 | COMMUNITY

## 2017-05-09 RX ORDER — RAMIPRIL 5 MG
0 CAPSULE ORAL
Qty: 0 | Refills: 0 | COMMUNITY

## 2017-05-09 RX ORDER — ISOSORBIDE DINITRATE 5 MG/1
0 TABLET ORAL
Qty: 0 | Refills: 0 | COMMUNITY

## 2017-05-30 ENCOUNTER — APPOINTMENT (OUTPATIENT)
Age: 67
End: 2017-05-30

## 2017-05-30 PROBLEM — E78.00 PURE HYPERCHOLESTEROLEMIA, UNSPECIFIED: Chronic | Status: ACTIVE | Noted: 2017-03-22

## 2017-05-30 PROBLEM — E11.9 TYPE 2 DIABETES MELLITUS WITHOUT COMPLICATIONS: Chronic | Status: ACTIVE | Noted: 2017-03-22

## 2017-05-30 PROBLEM — I10 ESSENTIAL (PRIMARY) HYPERTENSION: Chronic | Status: ACTIVE | Noted: 2017-03-22

## 2017-06-12 ENCOUNTER — APPOINTMENT (OUTPATIENT)
Dept: VASCULAR SURGERY | Facility: CLINIC | Age: 67
End: 2017-06-12

## 2017-06-12 VITALS
RESPIRATION RATE: 16 BRPM | BODY MASS INDEX: 28.32 KG/M2 | HEART RATE: 80 BPM | SYSTOLIC BLOOD PRESSURE: 167 MMHG | WEIGHT: 170 LBS | TEMPERATURE: 98.6 F | DIASTOLIC BLOOD PRESSURE: 83 MMHG | HEIGHT: 65 IN

## 2017-07-24 ENCOUNTER — APPOINTMENT (OUTPATIENT)
Dept: VASCULAR SURGERY | Facility: CLINIC | Age: 67
End: 2017-07-24

## 2017-12-18 ENCOUNTER — APPOINTMENT (OUTPATIENT)
Dept: VASCULAR SURGERY | Facility: CLINIC | Age: 67
End: 2017-12-18
Payer: MEDICARE

## 2017-12-18 ENCOUNTER — APPOINTMENT (OUTPATIENT)
Dept: VASCULAR SURGERY | Facility: CLINIC | Age: 67
End: 2017-12-18

## 2017-12-18 DIAGNOSIS — I70.222 ATHEROSCLEROSIS OF NATIVE ARTERIES OF EXTREMITIES WITH REST PAIN, LEFT LEG: ICD-10-CM

## 2017-12-18 PROCEDURE — 99214 OFFICE O/P EST MOD 30 MIN: CPT

## 2017-12-18 PROCEDURE — 93922 UPR/L XTREMITY ART 2 LEVELS: CPT

## 2017-12-18 PROCEDURE — 93926 LOWER EXTREMITY STUDY: CPT

## 2017-12-18 PROCEDURE — 93971 EXTREMITY STUDY: CPT

## 2017-12-22 PROBLEM — I70.222 ATHEROSCLEROSIS OF NATIVE ARTERY OF LEFT LEG WITH REST PAIN: Status: ACTIVE | Noted: 2017-12-22

## 2018-03-09 ENCOUNTER — APPOINTMENT (OUTPATIENT)
Dept: CARDIOLOGY | Facility: CLINIC | Age: 68
End: 2018-03-09
Payer: MEDICARE

## 2018-03-09 VITALS
HEART RATE: 84 BPM | DIASTOLIC BLOOD PRESSURE: 82 MMHG | WEIGHT: 165 LBS | OXYGEN SATURATION: 99 % | HEIGHT: 65 IN | BODY MASS INDEX: 27.49 KG/M2 | SYSTOLIC BLOOD PRESSURE: 169 MMHG

## 2018-03-09 PROCEDURE — 99214 OFFICE O/P EST MOD 30 MIN: CPT

## 2018-03-12 ENCOUNTER — APPOINTMENT (OUTPATIENT)
Dept: VASCULAR SURGERY | Facility: CLINIC | Age: 68
End: 2018-03-12

## 2018-03-19 ENCOUNTER — FORM ENCOUNTER (OUTPATIENT)
Age: 68
End: 2018-03-19

## 2018-03-20 ENCOUNTER — APPOINTMENT (OUTPATIENT)
Dept: ULTRASOUND IMAGING | Facility: HOSPITAL | Age: 68
End: 2018-03-20
Payer: MEDICARE

## 2018-03-20 ENCOUNTER — OUTPATIENT (OUTPATIENT)
Dept: OUTPATIENT SERVICES | Facility: HOSPITAL | Age: 68
LOS: 1 days | End: 2018-03-20
Payer: COMMERCIAL

## 2018-03-20 DIAGNOSIS — Z95.828 PRESENCE OF OTHER VASCULAR IMPLANTS AND GRAFTS: Chronic | ICD-10-CM

## 2018-03-20 DIAGNOSIS — Z98.891 HISTORY OF UTERINE SCAR FROM PREVIOUS SURGERY: Chronic | ICD-10-CM

## 2018-03-20 DIAGNOSIS — Z00.00 ENCOUNTER FOR GENERAL ADULT MEDICAL EXAMINATION WITHOUT ABNORMAL FINDINGS: ICD-10-CM

## 2018-03-20 DIAGNOSIS — Z98.890 OTHER SPECIFIED POSTPROCEDURAL STATES: Chronic | ICD-10-CM

## 2018-03-20 DIAGNOSIS — Z98.89 OTHER SPECIFIED POSTPROCEDURAL STATES: Chronic | ICD-10-CM

## 2018-03-20 DIAGNOSIS — Z98.84 BARIATRIC SURGERY STATUS: Chronic | ICD-10-CM

## 2018-03-20 DIAGNOSIS — I73.9 PERIPHERAL VASCULAR DISEASE, UNSPECIFIED: ICD-10-CM

## 2018-03-20 PROCEDURE — 93925 LOWER EXTREMITY STUDY: CPT

## 2018-03-20 PROCEDURE — 93925 LOWER EXTREMITY STUDY: CPT | Mod: 26

## 2018-03-22 ENCOUNTER — INPATIENT (INPATIENT)
Facility: HOSPITAL | Age: 68
LOS: 0 days | Discharge: HOME | DRG: 254 | End: 2018-03-23
Attending: INTERNAL MEDICINE | Admitting: INTERNAL MEDICINE
Payer: COMMERCIAL

## 2018-03-22 ENCOUNTER — TRANSCRIPTION ENCOUNTER (OUTPATIENT)
Age: 68
End: 2018-03-22

## 2018-03-22 VITALS
HEART RATE: 85 BPM | OXYGEN SATURATION: 100 % | RESPIRATION RATE: 16 BRPM | HEIGHT: 65 IN | DIASTOLIC BLOOD PRESSURE: 65 MMHG | WEIGHT: 162.04 LBS | SYSTOLIC BLOOD PRESSURE: 140 MMHG | TEMPERATURE: 98 F

## 2018-03-22 DIAGNOSIS — Z98.84 BARIATRIC SURGERY STATUS: Chronic | ICD-10-CM

## 2018-03-22 DIAGNOSIS — I73.9 PERIPHERAL VASCULAR DISEASE, UNSPECIFIED: Chronic | ICD-10-CM

## 2018-03-22 DIAGNOSIS — Z95.828 PRESENCE OF OTHER VASCULAR IMPLANTS AND GRAFTS: Chronic | ICD-10-CM

## 2018-03-22 DIAGNOSIS — Z98.89 OTHER SPECIFIED POSTPROCEDURAL STATES: Chronic | ICD-10-CM

## 2018-03-22 DIAGNOSIS — Z98.891 HISTORY OF UTERINE SCAR FROM PREVIOUS SURGERY: Chronic | ICD-10-CM

## 2018-03-22 DIAGNOSIS — I73.9 PERIPHERAL VASCULAR DISEASE, UNSPECIFIED: ICD-10-CM

## 2018-03-22 DIAGNOSIS — Z98.890 OTHER SPECIFIED POSTPROCEDURAL STATES: Chronic | ICD-10-CM

## 2018-03-22 LAB
ALBUMIN SERPL ELPH-MCNC: 4.2 G/DL — SIGNIFICANT CHANGE UP (ref 3.3–5)
ALP SERPL-CCNC: 139 U/L — HIGH (ref 40–120)
ALT FLD-CCNC: 12 U/L RC — SIGNIFICANT CHANGE UP (ref 10–45)
ANION GAP SERPL CALC-SCNC: 12 MMOL/L — SIGNIFICANT CHANGE UP (ref 5–17)
AST SERPL-CCNC: 19 U/L — SIGNIFICANT CHANGE UP (ref 10–40)
BILIRUB SERPL-MCNC: 0.8 MG/DL — SIGNIFICANT CHANGE UP (ref 0.2–1.2)
BUN SERPL-MCNC: 13 MG/DL — SIGNIFICANT CHANGE UP (ref 7–23)
CALCIUM SERPL-MCNC: 10 MG/DL — SIGNIFICANT CHANGE UP (ref 8.4–10.5)
CHLORIDE SERPL-SCNC: 99 MMOL/L — SIGNIFICANT CHANGE UP (ref 96–108)
CO2 SERPL-SCNC: 28 MMOL/L — SIGNIFICANT CHANGE UP (ref 22–31)
CREAT SERPL-MCNC: 0.92 MG/DL — SIGNIFICANT CHANGE UP (ref 0.5–1.3)
GLUCOSE SERPL-MCNC: 145 MG/DL — HIGH (ref 70–99)
HCT VFR BLD CALC: 35.3 % — SIGNIFICANT CHANGE UP (ref 34.5–45)
HGB BLD-MCNC: 11.4 G/DL — LOW (ref 11.5–15.5)
MCHC RBC-ENTMCNC: 25.6 PG — LOW (ref 27–34)
MCHC RBC-ENTMCNC: 32.3 GM/DL — SIGNIFICANT CHANGE UP (ref 32–36)
MCV RBC AUTO: 79.2 FL — LOW (ref 80–100)
PLATELET # BLD AUTO: 346 K/UL — SIGNIFICANT CHANGE UP (ref 150–400)
POTASSIUM SERPL-MCNC: 3.7 MMOL/L — SIGNIFICANT CHANGE UP (ref 3.5–5.3)
POTASSIUM SERPL-SCNC: 3.7 MMOL/L — SIGNIFICANT CHANGE UP (ref 3.5–5.3)
PROT SERPL-MCNC: 7.9 G/DL — SIGNIFICANT CHANGE UP (ref 6–8.3)
RBC # BLD: 4.45 M/UL — SIGNIFICANT CHANGE UP (ref 3.8–5.2)
RBC # FLD: 17.1 % — HIGH (ref 10.3–14.5)
SODIUM SERPL-SCNC: 139 MMOL/L — SIGNIFICANT CHANGE UP (ref 135–145)
WBC # BLD: 9.1 K/UL — SIGNIFICANT CHANGE UP (ref 3.8–10.5)
WBC # FLD AUTO: 9.1 K/UL — SIGNIFICANT CHANGE UP (ref 3.8–10.5)

## 2018-03-22 PROCEDURE — 93010 ELECTROCARDIOGRAM REPORT: CPT

## 2018-03-22 RX ORDER — METFORMIN HYDROCHLORIDE 850 MG/1
1 TABLET ORAL
Qty: 0 | Refills: 0 | COMMUNITY

## 2018-03-22 RX ORDER — CILOSTAZOL 100 MG/1
50 TABLET ORAL
Qty: 0 | Refills: 0 | Status: DISCONTINUED | OUTPATIENT
Start: 2018-03-22 | End: 2018-03-23

## 2018-03-22 RX ORDER — TICAGRELOR 90 MG/1
1 TABLET ORAL
Qty: 60 | Refills: 0 | OUTPATIENT
Start: 2018-03-22 | End: 2018-04-20

## 2018-03-22 RX ORDER — BACLOFEN 100 %
10 POWDER (GRAM) MISCELLANEOUS DAILY
Qty: 0 | Refills: 0 | Status: DISCONTINUED | OUTPATIENT
Start: 2018-03-22 | End: 2018-03-23

## 2018-03-22 RX ORDER — INSULIN LISPRO 100/ML
VIAL (ML) SUBCUTANEOUS
Qty: 0 | Refills: 0 | Status: DISCONTINUED | OUTPATIENT
Start: 2018-03-22 | End: 2018-03-23

## 2018-03-22 RX ORDER — DEXTROSE 50 % IN WATER 50 %
1 SYRINGE (ML) INTRAVENOUS ONCE
Qty: 0 | Refills: 0 | Status: DISCONTINUED | OUTPATIENT
Start: 2018-03-22 | End: 2018-03-23

## 2018-03-22 RX ORDER — DEXTROSE 50 % IN WATER 50 %
25 SYRINGE (ML) INTRAVENOUS ONCE
Qty: 0 | Refills: 0 | Status: DISCONTINUED | OUTPATIENT
Start: 2018-03-22 | End: 2018-03-23

## 2018-03-22 RX ORDER — POTASSIUM CHLORIDE 20 MEQ
40 PACKET (EA) ORAL ONCE
Qty: 0 | Refills: 0 | Status: COMPLETED | OUTPATIENT
Start: 2018-03-22 | End: 2018-03-22

## 2018-03-22 RX ORDER — TICAGRELOR 90 MG/1
90 TABLET ORAL
Qty: 0 | Refills: 0 | Status: DISCONTINUED | OUTPATIENT
Start: 2018-03-22 | End: 2018-03-23

## 2018-03-22 RX ORDER — ATORVASTATIN CALCIUM 80 MG/1
40 TABLET, FILM COATED ORAL AT BEDTIME
Qty: 0 | Refills: 0 | Status: DISCONTINUED | OUTPATIENT
Start: 2018-03-22 | End: 2018-03-23

## 2018-03-22 RX ORDER — INSULIN LISPRO 100/ML
VIAL (ML) SUBCUTANEOUS AT BEDTIME
Qty: 0 | Refills: 0 | Status: DISCONTINUED | OUTPATIENT
Start: 2018-03-22 | End: 2018-03-23

## 2018-03-22 RX ORDER — SODIUM CHLORIDE 9 MG/ML
400 INJECTION, SOLUTION INTRAVENOUS
Qty: 0 | Refills: 0 | Status: DISCONTINUED | OUTPATIENT
Start: 2018-03-22 | End: 2018-03-23

## 2018-03-22 RX ORDER — HYDROCHLOROTHIAZIDE 25 MG
25 TABLET ORAL DAILY
Qty: 0 | Refills: 0 | Status: DISCONTINUED | OUTPATIENT
Start: 2018-03-22 | End: 2018-03-23

## 2018-03-22 RX ORDER — OXYCODONE AND ACETAMINOPHEN 5; 325 MG/1; MG/1
1 TABLET ORAL ONCE
Qty: 0 | Refills: 0 | Status: DISCONTINUED | OUTPATIENT
Start: 2018-03-22 | End: 2018-03-23

## 2018-03-22 RX ORDER — TICAGRELOR 90 MG/1
1 TABLET ORAL
Qty: 60 | Refills: 10 | OUTPATIENT
Start: 2018-03-22 | End: 2019-02-14

## 2018-03-22 RX ORDER — ATORVASTATIN CALCIUM 80 MG/1
1 TABLET, FILM COATED ORAL
Qty: 30 | Refills: 0 | OUTPATIENT
Start: 2018-03-22 | End: 2018-04-20

## 2018-03-22 RX ORDER — RAMIPRIL 5 MG
1 CAPSULE ORAL
Qty: 0 | Refills: 0 | COMMUNITY

## 2018-03-22 RX ORDER — LISINOPRIL 2.5 MG/1
40 TABLET ORAL DAILY
Qty: 0 | Refills: 0 | Status: DISCONTINUED | OUTPATIENT
Start: 2018-03-22 | End: 2018-03-23

## 2018-03-22 RX ORDER — AMLODIPINE BESYLATE 2.5 MG/1
10 TABLET ORAL DAILY
Qty: 0 | Refills: 0 | Status: DISCONTINUED | OUTPATIENT
Start: 2018-03-22 | End: 2018-03-23

## 2018-03-22 RX ORDER — DEXTROSE 50 % IN WATER 50 %
12.5 SYRINGE (ML) INTRAVENOUS ONCE
Qty: 0 | Refills: 0 | Status: DISCONTINUED | OUTPATIENT
Start: 2018-03-22 | End: 2018-03-23

## 2018-03-22 RX ORDER — SODIUM CHLORIDE 9 MG/ML
1000 INJECTION INTRAMUSCULAR; INTRAVENOUS; SUBCUTANEOUS
Qty: 0 | Refills: 0 | Status: DISCONTINUED | OUTPATIENT
Start: 2018-03-22 | End: 2018-03-23

## 2018-03-22 RX ORDER — CLOPIDOGREL BISULFATE 75 MG/1
1 TABLET, FILM COATED ORAL
Qty: 0 | Refills: 0 | COMMUNITY

## 2018-03-22 RX ORDER — ASPIRIN/CALCIUM CARB/MAGNESIUM 324 MG
81 TABLET ORAL DAILY
Qty: 0 | Refills: 0 | Status: DISCONTINUED | OUTPATIENT
Start: 2018-03-22 | End: 2018-03-23

## 2018-03-22 RX ORDER — GLUCAGON INJECTION, SOLUTION 0.5 MG/.1ML
1 INJECTION, SOLUTION SUBCUTANEOUS ONCE
Qty: 0 | Refills: 0 | Status: DISCONTINUED | OUTPATIENT
Start: 2018-03-22 | End: 2018-03-23

## 2018-03-22 RX ORDER — SODIUM CHLORIDE 9 MG/ML
1000 INJECTION, SOLUTION INTRAVENOUS
Qty: 0 | Refills: 0 | Status: DISCONTINUED | OUTPATIENT
Start: 2018-03-22 | End: 2018-03-23

## 2018-03-22 RX ADMIN — Medication 40 MILLIEQUIVALENT(S): at 21:52

## 2018-03-22 RX ADMIN — TICAGRELOR 90 MILLIGRAM(S): 90 TABLET ORAL at 22:01

## 2018-03-22 RX ADMIN — SODIUM CHLORIDE 75 MILLILITER(S): 9 INJECTION INTRAMUSCULAR; INTRAVENOUS; SUBCUTANEOUS at 19:15

## 2018-03-22 RX ADMIN — ATORVASTATIN CALCIUM 40 MILLIGRAM(S): 80 TABLET, FILM COATED ORAL at 21:52

## 2018-03-22 NOTE — DISCHARGE NOTE ADULT - SECONDARY DIAGNOSIS.
High cholesterol Type 2 diabetes mellitus with other circulatory complication, unspecified long term insulin use status

## 2018-03-22 NOTE — H&P CARDIOLOGY - HISTORY OF PRESENT ILLNESS
66 y/o AA female with PMHx of PAD, s/p RT. SFA stent, HTN, HLD, DM2, presents with c/o worsening left leg claudication. Patient states she has B/L leg pain, but left is much worse than right. The pain usually begins at night and interferes with her sleep. DP pulse is absent on left leg, PT is audible with Doppler. Denies cheat pian, SON, palpitation dizziness/ syncope. Patient had [facility] duplex in 3/18 ( result below). Seen & evaluated by Dr. Vogt ( podiatrist ) and referred for Peripheral angiogram.       [facility] Duplex on 3/30/18: Impression: There is multisegmental arterial vascular disease bilaterally.  On the left, the superficial femoral artery popliteal artery   posterotibial peroneal trunk and the anterior tibial artery are occluded.  The left posterior tibial artery is occluded in the proximal calf.  Although occluded at its origin, the peroneal artery is then intact   through the calf. 68 y/o AA female with PMHx of PAD, s/p RT. SFA stent, HTN, HLD, DM2, presents with c/o worsening left leg claudication. Patient states she has B/L leg pain, but left is much worse than right. The pain usually begins at night and interferes with her sleep. DP pulse is absent on left leg, PT is audible with Doppler. Denies cheat pian, SOB, palpitation dizziness/ syncope. Patient had VA duplex on 3/18 ( result below). Seen & evaluated by Dr. Vogt ( podiatrist ) and referred for Peripheral angiogram.       VA Duplex on 3/30/18: Impression: There is multisegmental arterial vascular disease bilaterally.  On the left, the superficial femoral artery popliteal artery   posterotibial peroneal trunk and the anterior tibial artery are occluded.  The left posterior tibial artery is occluded in the proximal calf.  Although occluded at its origin, the peroneal artery is then intact   through the calf. 68 y/o AA female with PMHx of PAD, s/p RT. SFA stent, HTN, HLD, DM2 ( Controlled, Uncomplicated), presents with c/o worsening left leg claudication. Patient states she has B/L leg pain, but left is much worse than right. The pain usually begins at night and interferes with her sleep. DP pulse is absent on left leg, PT is audible with Doppler. Denies cheat pian, SOB, palpitation dizziness/ syncope. Patient had VA duplex on 3/18 ( result below). Seen & evaluated by Dr. Vogt ( podiatrist ) and referred for Peripheral angiogram.       VA Duplex on 3/30/18: Impression: There is multisegmental arterial vascular disease bilaterally.  On the left, the superficial femoral artery popliteal artery   posterotibial peroneal trunk and the anterior tibial artery are occluded.  The left posterior tibial artery is occluded in the proximal calf.  Although occluded at its origin, the peroneal artery is then intact   through the calf. 66 y/o AA female with PMHx of PAD, s/p RT. SFA stent, HTN, HLD, DM2 ( Controlled, Uncomplicated, A1c:.2), presents with c/o worsening left leg claudication. Patient states she has B/L leg pain, but left is much worse than right. The pain usually begins at night and interferes with her sleep. DP pulse is absent on left leg, PT is audible with Doppler. Denies cheat pian, SOB, palpitation dizziness/ syncope. Patient had VA duplex on 3/18 ( result below). Seen & evaluated by Dr. Vogt ( podiatrist ) and referred for Peripheral angiogram.       VA Duplex on 3/30/18: Impression: There is multisegmental arterial vascular disease bilaterally.  On the left, the superficial femoral artery popliteal artery   posterotibial peroneal trunk and the anterior tibial artery are occluded.  The left posterior tibial artery is occluded in the proximal calf.  Although occluded at its origin, the peroneal artery is then intact   through the calf.

## 2018-03-22 NOTE — DISCHARGE NOTE ADULT - HOSPITAL COURSE
68 y/o AA female with PMHx of PAD, s/p RT. SFA stent, HTN, HLD, DM2 ( Controlled, Uncomplicated, A1c:.2), presents with c/o worsening left leg claudication. Patient states she has B/L leg pain, but left is much worse than right. The pain usually begins at night and interferes with her sleep. DP pulse is absent on left leg, PT is audible with Doppler. Denies cheat pian, SOB, palpitation dizziness/ syncope. Patient had VA duplex on 3/18 ( result below). Seen & evaluated by Dr. Vogt ( podiatrist ) and referred for Peripheral angiogram.    VA Duplex on 3/30/18: Impression: There is multisegmental arterial vascular disease bilaterally.  On the left, the superficial femoral artery popliteal artery posterotibial peroneal trunk and the anterior tibial artery are occluded.  The left posterior tibial artery is occluded in the proximal calf. Although occluded at its origin, the peroneal artery is then intact through the calf.  Pt s/p PIC: PANTERA x1 to Lt SFA and PANTERA x2 to Lt peroneal via L groin and L tibial. Pt tolerated procedure well and overnight remained uneventful. No c/o chest pain or SOB. Pt is hemodynamically stable, EKG and all lab results reviewed. Insertion/incision site benign, no bleeding or hematoma, and cath site dressing changed. Discharge teaching provided to Pt/caretaker and verbalized understanding the instruction. Pt is stable for discharge home as per attending.

## 2018-03-22 NOTE — H&P CARDIOLOGY - PMH
Diabetes    DM (diabetes mellitus)    High cholesterol    HTN (hypertension)    HTN (hypertension)    LEILANI (obstructive sleep apnea)  does not use CPAP since gastric bypass surgery  Ulcer of toe of right foot  2nd

## 2018-03-22 NOTE — DISCHARGE NOTE ADULT - PATIENT PORTAL LINK FT
You can access the [facility] Patient Portal, offered by [facility], by registering with the following website: [email or web address]

## 2018-03-22 NOTE — DISCHARGE NOTE ADULT - CARE PLAN
Principal Discharge DX:	Peripheral arterial disease  Goal:	Your leg pain will be decreased.  Assessment and plan of treatment:	Continue your medications. Do not stop Aspirin or Plavix unless instructed by your cardiologist. No heavy lifting, strenuous activity, bending, straining or unnecessary stair climbing for 2 weeks. No sex for 1 week. No driving for 2 days. You may shower 24 hours following procedure but avoid baths and swimming for 1 week. Check groin site for bleeding and/or swelling daily following procedure. Call your doctor/cardiologist immediately for bleeding or swelling or if you have increased/persistent pain, fever/chills, or drainage at the site. Follow up with your cardiologist in 1- 2 weeks. You may call [facility] at [phone] or [phone] after office hours and weekends with any questions or concerns following our procedure.  Secondary Diagnosis:	High cholesterol  Goal:	Your LDL cholesterol will be less than 70mg/dL  Assessment and plan of treatment:	Continue with your cholesterol medications. Eat a heart healthy diet that is low in saturated fats and salt, and includes whole grains, fruits, vegetables and lean protein; exercise regularly (consult with your physician or cardiologist first); maintain a heart healthy weight; if you smoke - quit (A resource to help you stop smoking is the [facility] – phone number [phone].). Continue to follow with your primary physician or cardiologist.  Secondary Diagnosis:	Type 2 diabetes mellitus with other circulatory complication, unspecified long term insulin use status  Goal:	Your hemoglobin A1C will be less than 7  Assessment and plan of treatment:	Continue to follow with your primary care MD or your endocrinologist.  Follow a heart healthy diabetic diet. If you check your fingerstick glucose at home, call your MD if it is greater than 250mg/dL on 2 occasions or less than 100mg/dL on 2 occasions. Know signs of low blood sugar, such as: dizziness, shakiness, sweating, confusion, hunger, nervousness-drink 4 ounces apple juice if occurs and call your doctor. Know early signs of high blood sugar, such as: frequent urination, increased thirst, blurry vision, fatigue, headache - call your doctor if this occurs. Follow with other practitioners to care for your diabetes, such as ophthamologist and podiatrist.

## 2018-03-22 NOTE — H&P CARDIOLOGY - PSH
H/O abdominoplasty    H/O bilateral breast reduction surgery    H/O  section    H/O gastric bypass    History of intravascular stent placement    Peripheral arterial disease  s/p Right leg stent  S/P  section    S/P gastric bypass

## 2018-03-22 NOTE — DISCHARGE NOTE ADULT - CARE PROVIDER_API CALL
Mark Anthony rBooke (MD), Cardiovascular Disease; Endovascular Medicine; Internal Medicine; Interventional Cardiology; Vascular Medicine  [address] [place] [address]  Phone: [phone]  Fax: [phone]

## 2018-03-22 NOTE — DISCHARGE NOTE ADULT - PLAN OF CARE
Your leg pain will be decreased. Continue your medications. Do not stop Aspirin or Plavix unless instructed by your cardiologist. No heavy lifting, strenuous activity, bending, straining or unnecessary stair climbing for 2 weeks. No sex for 1 week. No driving for 2 days. You may shower 24 hours following procedure but avoid baths and swimming for 1 week. Check groin site for bleeding and/or swelling daily following procedure. Call your doctor/cardiologist immediately for bleeding or swelling or if you have increased/persistent pain, fever/chills, or drainage at the site. Follow up with your cardiologist in 1- 2 weeks. You may call [facility] at [phone] or [phone] after office hours and weekends with any questions or concerns following our procedure. Your LDL cholesterol will be less than 70mg/dL Continue with your cholesterol medications. Eat a heart healthy diet that is low in saturated fats and salt, and includes whole grains, fruits, vegetables and lean protein; exercise regularly (consult with your physician or cardiologist first); maintain a heart healthy weight; if you smoke - quit (A resource to help you stop smoking is the [facility] – phone number [phone].). Continue to follow with your primary physician or cardiologist. Your hemoglobin A1C will be less than 7 Continue to follow with your primary care MD or your endocrinologist.  Follow a heart healthy diabetic diet. If you check your fingerstick glucose at home, call your MD if it is greater than 250mg/dL on 2 occasions or less than 100mg/dL on 2 occasions. Know signs of low blood sugar, such as: dizziness, shakiness, sweating, confusion, hunger, nervousness-drink 4 ounces apple juice if occurs and call your doctor. Know early signs of high blood sugar, such as: frequent urination, increased thirst, blurry vision, fatigue, headache - call your doctor if this occurs. Follow with other practitioners to care for your diabetes, such as ophthamologist and podiatrist.

## 2018-03-22 NOTE — DISCHARGE NOTE ADULT - MEDICATION SUMMARY - MEDICATIONS TO TAKE
I will START or STAY ON the medications listed below when I get home from the hospital:    aspirin 81 mg oral delayed release tablet  -- 1 tab(s) by mouth once a day  -- Indication: For Peripheral arterial disease    ramipril 10 mg oral capsule  -- 1 cap(s) by mouth once a day  -- Indication: For hypertension    metFORMIN 500 mg oral tablet  -- 1 tab(s) by mouth 2 times a day  Restart on 3/25/18  -- Indication: For diabetes    atorvastatin 40 mg oral tablet  -- 1 tab(s) by mouth once a day (at bedtime) MDD:1  -- Indication: For high lipids    Brilinta (ticagrelor) 90 mg oral tablet  -- 1 tab(s) by mouth 2 times a day MDD:2  -- It is very important that you take or use this exactly as directed.  Do not skip doses or discontinue unless directed by your doctor.  Obtain medical advice before taking any non-prescription drugs as some may affect the action of this medication.    -- Indication: For Peripheral arterial disease    amLODIPine 10 mg oral tablet  -- 1 tab(s) by mouth once a day  -- Indication: For hypertension    hydroCHLOROthiazide 25 mg oral tablet  -- 1 tab(s) by mouth once a day  -- Indication: For hypertension    cilostazol 50 mg oral tablet  -- 1 tab(s) by mouth 2 times a day  -- Indication: For Peripheral arterial disease    baclofen  -- 1 tab(s) by mouth once a day  -- Indication: For Peripheral arterial disease

## 2018-03-23 VITALS
OXYGEN SATURATION: 97 % | DIASTOLIC BLOOD PRESSURE: 59 MMHG | SYSTOLIC BLOOD PRESSURE: 124 MMHG | HEART RATE: 80 BPM | TEMPERATURE: 98 F | RESPIRATION RATE: 16 BRPM

## 2018-03-23 LAB
ANION GAP SERPL CALC-SCNC: 12 MMOL/L — SIGNIFICANT CHANGE UP (ref 5–17)
BASOPHILS # BLD AUTO: 0 K/UL — SIGNIFICANT CHANGE UP (ref 0–0.2)
BASOPHILS NFR BLD AUTO: 0.1 % — SIGNIFICANT CHANGE UP (ref 0–2)
BUN SERPL-MCNC: 14 MG/DL — SIGNIFICANT CHANGE UP (ref 7–23)
CALCIUM SERPL-MCNC: 9.2 MG/DL — SIGNIFICANT CHANGE UP (ref 8.4–10.5)
CHLORIDE SERPL-SCNC: 105 MMOL/L — SIGNIFICANT CHANGE UP (ref 96–108)
CHOLEST SERPL-MCNC: 201 MG/DL — HIGH (ref 10–199)
CO2 SERPL-SCNC: 26 MMOL/L — SIGNIFICANT CHANGE UP (ref 22–31)
CREAT SERPL-MCNC: 0.85 MG/DL — SIGNIFICANT CHANGE UP (ref 0.5–1.3)
EOSINOPHIL # BLD AUTO: 0.3 K/UL — SIGNIFICANT CHANGE UP (ref 0–0.5)
EOSINOPHIL NFR BLD AUTO: 2.8 % — SIGNIFICANT CHANGE UP (ref 0–6)
GLUCOSE SERPL-MCNC: 102 MG/DL — HIGH (ref 70–99)
HCT VFR BLD CALC: 26.8 % — LOW (ref 34.5–45)
HCT VFR BLD CALC: 27.7 % — LOW (ref 34.5–45)
HCT VFR BLD CALC: 28.7 % — LOW (ref 34.5–45)
HDLC SERPL-MCNC: 52 MG/DL — SIGNIFICANT CHANGE UP (ref 40–125)
HGB BLD-MCNC: 8.8 G/DL — LOW (ref 11.5–15.5)
HGB BLD-MCNC: 9 G/DL — LOW (ref 11.5–15.5)
HGB BLD-MCNC: 9.5 G/DL — LOW (ref 11.5–15.5)
LIPID PNL WITH DIRECT LDL SERPL: 128 MG/DL — SIGNIFICANT CHANGE UP
LYMPHOCYTES # BLD AUTO: 2.3 K/UL — SIGNIFICANT CHANGE UP (ref 1–3.3)
LYMPHOCYTES # BLD AUTO: 24.1 % — SIGNIFICANT CHANGE UP (ref 13–44)
MCHC RBC-ENTMCNC: 25.7 PG — LOW (ref 27–34)
MCHC RBC-ENTMCNC: 25.9 PG — LOW (ref 27–34)
MCHC RBC-ENTMCNC: 26.1 PG — LOW (ref 27–34)
MCHC RBC-ENTMCNC: 32.6 GM/DL — SIGNIFICANT CHANGE UP (ref 32–36)
MCHC RBC-ENTMCNC: 33 GM/DL — SIGNIFICANT CHANGE UP (ref 32–36)
MCHC RBC-ENTMCNC: 33.1 GM/DL — SIGNIFICANT CHANGE UP (ref 32–36)
MCV RBC AUTO: 78.5 FL — LOW (ref 80–100)
MCV RBC AUTO: 78.7 FL — LOW (ref 80–100)
MCV RBC AUTO: 78.8 FL — LOW (ref 80–100)
MONOCYTES # BLD AUTO: 0.7 K/UL — SIGNIFICANT CHANGE UP (ref 0–0.9)
MONOCYTES NFR BLD AUTO: 7.3 % — SIGNIFICANT CHANGE UP (ref 2–14)
NEUTROPHILS # BLD AUTO: 6.2 K/UL — SIGNIFICANT CHANGE UP (ref 1.8–7.4)
NEUTROPHILS NFR BLD AUTO: 65.8 % — SIGNIFICANT CHANGE UP (ref 43–77)
PLATELET # BLD AUTO: 270 K/UL — SIGNIFICANT CHANGE UP (ref 150–400)
PLATELET # BLD AUTO: 278 K/UL — SIGNIFICANT CHANGE UP (ref 150–400)
PLATELET # BLD AUTO: 294 K/UL — SIGNIFICANT CHANGE UP (ref 150–400)
POTASSIUM SERPL-MCNC: 4.1 MMOL/L — SIGNIFICANT CHANGE UP (ref 3.5–5.3)
POTASSIUM SERPL-SCNC: 4.1 MMOL/L — SIGNIFICANT CHANGE UP (ref 3.5–5.3)
RBC # BLD: 3.41 M/UL — LOW (ref 3.8–5.2)
RBC # BLD: 3.52 M/UL — LOW (ref 3.8–5.2)
RBC # BLD: 3.64 M/UL — LOW (ref 3.8–5.2)
RBC # FLD: 17 % — HIGH (ref 10.3–14.5)
RBC # FLD: 17.1 % — HIGH (ref 10.3–14.5)
RBC # FLD: 17.1 % — HIGH (ref 10.3–14.5)
SODIUM SERPL-SCNC: 143 MMOL/L — SIGNIFICANT CHANGE UP (ref 135–145)
TOTAL CHOLESTEROL/HDL RATIO MEASUREMENT: 3.9 RATIO — SIGNIFICANT CHANGE UP (ref 3.3–7.1)
TRIGL SERPL-MCNC: 106 MG/DL — SIGNIFICANT CHANGE UP (ref 10–149)
WBC # BLD: 9.2 K/UL — SIGNIFICANT CHANGE UP (ref 3.8–10.5)
WBC # BLD: 9.4 K/UL — SIGNIFICANT CHANGE UP (ref 3.8–10.5)
WBC # BLD: 9.4 K/UL — SIGNIFICANT CHANGE UP (ref 3.8–10.5)
WBC # FLD AUTO: 9.2 K/UL — SIGNIFICANT CHANGE UP (ref 3.8–10.5)
WBC # FLD AUTO: 9.4 K/UL — SIGNIFICANT CHANGE UP (ref 3.8–10.5)
WBC # FLD AUTO: 9.4 K/UL — SIGNIFICANT CHANGE UP (ref 3.8–10.5)

## 2018-03-23 PROCEDURE — C1894: CPT

## 2018-03-23 PROCEDURE — C1876: CPT

## 2018-03-23 PROCEDURE — 80061 LIPID PANEL: CPT

## 2018-03-23 PROCEDURE — C1725: CPT

## 2018-03-23 PROCEDURE — C1887: CPT

## 2018-03-23 PROCEDURE — 37230: CPT

## 2018-03-23 PROCEDURE — 99232 SBSQ HOSP IP/OBS MODERATE 35: CPT

## 2018-03-23 PROCEDURE — 80048 BASIC METABOLIC PNL TOTAL CA: CPT

## 2018-03-23 PROCEDURE — 80053 COMPREHEN METABOLIC PANEL: CPT

## 2018-03-23 PROCEDURE — 85027 COMPLETE CBC AUTOMATED: CPT

## 2018-03-23 PROCEDURE — 75710 ARTERY X-RAYS ARM/LEG: CPT | Mod: XU

## 2018-03-23 PROCEDURE — 82962 GLUCOSE BLOOD TEST: CPT

## 2018-03-23 PROCEDURE — 93005 ELECTROCARDIOGRAM TRACING: CPT

## 2018-03-23 PROCEDURE — 37226: CPT

## 2018-03-23 PROCEDURE — C2623: CPT

## 2018-03-23 PROCEDURE — C1874: CPT

## 2018-03-23 PROCEDURE — 83036 HEMOGLOBIN GLYCOSYLATED A1C: CPT

## 2018-03-23 PROCEDURE — C1769: CPT

## 2018-03-23 RX ORDER — ATORVASTATIN CALCIUM 80 MG/1
1 TABLET, FILM COATED ORAL
Qty: 30 | Refills: 0 | OUTPATIENT
Start: 2018-03-23 | End: 2018-04-21

## 2018-03-23 RX ORDER — TICAGRELOR 90 MG/1
1 TABLET ORAL
Qty: 60 | Refills: 10 | OUTPATIENT
Start: 2018-03-23 | End: 2019-02-15

## 2018-03-23 RX ADMIN — Medication 25 MILLIGRAM(S): at 07:24

## 2018-03-23 RX ADMIN — Medication 81 MILLIGRAM(S): at 07:24

## 2018-03-23 RX ADMIN — CILOSTAZOL 50 MILLIGRAM(S): 100 TABLET ORAL at 07:24

## 2018-03-23 RX ADMIN — LISINOPRIL 40 MILLIGRAM(S): 2.5 TABLET ORAL at 07:24

## 2018-03-23 RX ADMIN — TICAGRELOR 90 MILLIGRAM(S): 90 TABLET ORAL at 07:24

## 2018-03-23 RX ADMIN — AMLODIPINE BESYLATE 10 MILLIGRAM(S): 2.5 TABLET ORAL at 07:24

## 2018-03-23 NOTE — CHART NOTE - NSCHARTNOTEFT_GEN_A_CORE
[facility] NP (LATE ENTRY)    CC: drop in HGB and urinary retention    HPI:  66 y/o AA female with PMHx of PAD, s/p RT. SFA stent, HTN, HLD, DM2 ( Controlled, Uncomplicated, A1c:.2), presents with c/o worsening left leg claudication. Patient states she has B/L leg pain, but left is much worse than right. The pain usually begins at night and interferes with her sleep. DP pulse is absent on left leg, PT is audible with Doppler. Denies cheat pian, SOB, palpitation dizziness/ syncope. Patient had [facility] on 3/18 ( result below). Seen & evaluated by Dr. Vogt ( podiatrist ) and referred for Peripheral angiogram.       [facility] Duplex on 3/30/18: Impression: There is multisegmental arterial vascular disease bilaterally.  On the left, the superficial femoral artery popliteal artery   posterotibial peroneal trunk and the anterior tibial artery are occluded.  The left posterior tibial artery is occluded in the proximal calf.  Although occluded at its origin, the peroneal artery is then intact   through the calf. (22 Mar 2018 07:37)                          9.0    9.4   )-----------( 270      ( 23 Mar 2018 09:48 )             27.7   03-23    143  |  105  |  14  ----------------------------<  102<H>  4.1   |  26  |  0.85    Ca    9.2      23 Mar 2018 02:32    TPro  7.9  /  Alb  4.2  /  TBili  0.8  /  DBili  x   /  AST  19  /  ALT  12  /  AlkPhos  139<H>  03-22      Plan:    D/W Dr. Brooke  repeat CBC in 1 hour from morning CBC with HGB improved.    Pt DTV s/p hitchcock removal at 630 for urine retention  Site with good hemostasis   c/w Brilinta as d/w Dr. Brooke Meds to [facility] (Pt states family picked it up)  For d/c today if voids and HGB stable and asymptomatic.    Ariadna Mata, NP-C  Cardiology

## 2018-03-23 NOTE — PROGRESS NOTE ADULT - ASSESSMENT
66 y/o AA female with PMHx of PAD, s/p RT. SFA stent, HTN, HLD, DM2 ( Controlled, Uncomplicated, A1c:.2), presents with c/o worsening left leg claudication. Patient states she has B/L leg pain, but left is much worse than right.    - continue DAPT with aspirin and brillinta  - please recheck CBC in 1 hour to trend hemoglobin  - continue high intensity statin therapy  - if CBC is stable/improved then plan for discharge with close followup with Dr. Brooke.       Please call me with any questions at [phone]    Thanks    Blaine Garcia

## 2018-03-23 NOTE — PROGRESS NOTE ADULT - SUBJECTIVE AND OBJECTIVE BOX
Subjective/Objective:  Patient is a 67y old  Female who presents with a chief complaint of Peripheral angiogram (22 Mar 2018 19:59)    Allergies    Cipro (Headache)  Cipro (Other)    Intolerances      Medications:  amLODIPine   Tablet 10 milliGRAM(s) Oral daily  aspirin enteric coated 81 milliGRAM(s) Oral daily  atorvastatin 40 milliGRAM(s) Oral at bedtime  baclofen 10 milliGRAM(s) Oral daily  cilostazol 50 milliGRAM(s) Oral two times a day  dextrose 5%. 400 milliLiter(s) IV Continuous <Continuous>  dextrose 5%. 1000 milliLiter(s) IV Continuous <Continuous>  dextrose 50% Injectable 12.5 Gram(s) IV Push once  dextrose 50% Injectable 25 Gram(s) IV Push once  dextrose 50% Injectable 25 Gram(s) IV Push once  dextrose Gel 1 Dose(s) Oral once PRN  glucagon  Injectable 1 milliGRAM(s) IntraMuscular once PRN  hydrochlorothiazide 25 milliGRAM(s) Oral daily  insulin lispro (HumaLOG) corrective regimen sliding scale   SubCutaneous three times a day before meals  insulin lispro (HumaLOG) corrective regimen sliding scale   SubCutaneous at bedtime  lisinopril 40 milliGRAM(s) Oral daily  oxyCODONE    5 mG/acetaminophen 325 mG 1 Tablet(s) Oral once  sodium chloride 0.9%. 1000 milliLiter(s) IV Continuous <Continuous>  ticagrelor 90 milliGRAM(s) Oral two times a day      Review of Systems:   No c/o chest pain or SOB, and all others negative.    Vitals:  T(C): 36.9 (18 @ 05:30), Max: 36.9 (18 @ 05:30)  HR: 82 (18 @ 05:30) (57 - 85)  BP: 128/59 (18 @ 05:30) (110/86 - 177/64)  BP(mean): 90 (18 @ 07:37) (90 - 90)  RR: 16 (18 @ 05:30) (16 - 18)  SpO2: 93% (18 @ 05:30) (93% - 100%)  Wt(kg): --  Daily Height in cm: 165.1 (22 Mar 2018 07:37)    Daily Weight in k.5 (22 Mar 2018 07:37)  I&O's Summary    22 Mar 2018 07:01  -  23 Mar 2018 06:59  --------------------------------------------------------  IN: 380 mL / OUT: 2600 mL / NET: -2220 mL      Physical Exam:  Appearance: Pt in NAD, non-toxic  Cardiovascular: S1 S2  Cath Site: No evidence of bleeding or hematoma, Non-tender to palpation, 2+ distal pulses  Respiratory: Clear to auscultation bilaterally  Gastrointestinal: Soft, NT/ND, Bowel Sounds +  Neurologic: Non-focal                          9.5    9.4   )-----------( 294      ( 23 Mar 2018 02:32 )             28.7         143  |  105  |  14  ----------------------------<  102<H>  4.1   |  26  |  0.85    Ca    9.2      23 Mar 2018 02:32    TPro  7.9  /  Alb  4.2  /  TBili  0.8  /  DBili  x   /  AST  19  /  ALT  12  /  AlkPhos  139<H>              Lipid panel Total Cholesterol: 201  LDL: 128  HDL: 52  T      Hgb A1c Hemoglobin A1C, Whole Blood: 6.5 % ( @ 18:57)      Interpretation of Telemetry: SR at HR 70-90's with PVC's.  No special event over night.    Assessment/Plan:   S/P PCI: PANTERA x1 to Lt SFA and PANTERA x2 to Lt peroneal via L groin. Pt tolerated procedure well and overnight remained uneventful. No c/o chest pain or SOB. Pt is hemodynamically stable, EKG and all lab results reviewed. Insertion/incision site benign, no bleeding or hematoma, and cath site dressing changed. Discharge teaching provided to Pt/caretaker and verbalized understanding the instruction. Pt is stable for discharge home as per attending.   F/U with cardiologist in 1-2 weeks.  Plan to d/c home in Am if stable. Subjective/Objective:  Patient is a 67y old  Female who presents with a chief complaint of Peripheral angiogram (22 Mar 2018 19:59)    Allergies    Cipro (Headache)  Cipro (Other)    Intolerances      Medications:  amLODIPine   Tablet 10 milliGRAM(s) Oral daily  aspirin enteric coated 81 milliGRAM(s) Oral daily  atorvastatin 40 milliGRAM(s) Oral at bedtime  baclofen 10 milliGRAM(s) Oral daily  cilostazol 50 milliGRAM(s) Oral two times a day  dextrose 5%. 400 milliLiter(s) IV Continuous <Continuous>  dextrose 5%. 1000 milliLiter(s) IV Continuous <Continuous>  dextrose 50% Injectable 12.5 Gram(s) IV Push once  dextrose 50% Injectable 25 Gram(s) IV Push once  dextrose 50% Injectable 25 Gram(s) IV Push once  dextrose Gel 1 Dose(s) Oral once PRN  glucagon  Injectable 1 milliGRAM(s) IntraMuscular once PRN  hydrochlorothiazide 25 milliGRAM(s) Oral daily  insulin lispro (HumaLOG) corrective regimen sliding scale   SubCutaneous three times a day before meals  insulin lispro (HumaLOG) corrective regimen sliding scale   SubCutaneous at bedtime  lisinopril 40 milliGRAM(s) Oral daily  oxyCODONE    5 mG/acetaminophen 325 mG 1 Tablet(s) Oral once  sodium chloride 0.9%. 1000 milliLiter(s) IV Continuous <Continuous>  ticagrelor 90 milliGRAM(s) Oral two times a day      Review of Systems:   No c/o chest pain or SOB, and all others negative.    Vitals:  T(C): 36.9 (18 @ 05:30), Max: 36.9 (18 @ 05:30)  HR: 82 (18 @ 05:30) (57 - 85)  BP: 128/59 (18 @ 05:30) (110/86 - 177/64)  BP(mean): 90 (18 @ 07:37) (90 - 90)  RR: 16 (18 @ 05:30) (16 - 18)  SpO2: 93% (18 @ 05:30) (93% - 100%)  Wt(kg): --  Daily Height in cm: 165.1 (22 Mar 2018 07:37)    Daily Weight in k.5 (22 Mar 2018 07:37)  I&O's Summary    22 Mar 2018 07:01  -  23 Mar 2018 06:59  --------------------------------------------------------  IN: 380 mL / OUT: 2600 mL / NET: -2220 mL      Physical Exam:  Appearance: Pt in NAD, non-toxic  Cardiovascular: S1 S2  Cath Site: No evidence of bleeding or hematoma, Non-tender to palpation, 2+ distal pulses  Respiratory: Clear to auscultation bilaterally  Gastrointestinal: Soft, NT/ND, Bowel Sounds +  Neurologic: Non-focal                          9.5    9.4   )-----------( 294      ( 23 Mar 2018 02:32 )             28.7         143  |  105  |  14  ----------------------------<  102<H>  4.1   |  26  |  0.85    Ca    9.2      23 Mar 2018 02:32    TPro  7.9  /  Alb  4.2  /  TBili  0.8  /  DBili  x   /  AST  19  /  ALT  12  /  AlkPhos  139<H>              Lipid panel Total Cholesterol: 201  LDL: 128  HDL: 52  T      Hgb A1c Hemoglobin A1C, Whole Blood: 6.5 % ( @ 18:57)      Interpretation of Telemetry: SR at HR 70-90's with PVC's.  No special event over night.    Assessment/Plan:   S/P PCI: PANTERA x1 to Lt SFA and PANTERA x2 to Lt peroneal via L groin. Pt tolerated procedure well and overnight remained uneventful. No c/o chest pain or SOB. Pt is hemodynamically stable, EKG and all lab results reviewed. Noted Hgb down to 9.5 from 11.4, but no s/s of active bleeding. Recheck H/H this morning. Insertion/incision site benign, no bleeding or hematoma, and cath site dressing changed. Discharge teaching provided to Pt/caretaker and verbalized understanding the instruction. Pt is stable.   F/U with cardiologist in 1-2 weeks.  D/C'd Walter at 6Am, but no U.O. yet.  need to F/U with H/H result.  Plan to see attending this morning, then may d/c home if stable.  cont assess and monitor.

## 2018-03-23 NOTE — PROGRESS NOTE ADULT - SUBJECTIVE AND OBJECTIVE BOX
PAGER:  [phone]               [facility] [address]              EMAIL [email or web address]   OFFICE [phone]                              ********VASCULAR MEDICINE & CARDIOLOGY PROGRESS NOTE********                            CC:  PAD    INTERVAL HISTORY:  s/p L sided intervention with PANTERA x1 to Lt SFA and PANTERA x2 to Lt peroneal via L groin.  No CP or SOB.  L groin mild tender but stable.  Hemoglobin decreased this morning - awaiting repeat in 1 hour.      HISTORY OF PRESENT ILLNESS:  HPI:  68 y/o AA female with PMHx of PAD, s/p RT. SFA stent, HTN, HLD, DM2 ( Controlled, Uncomplicated, A1c:.2), presents with c/o worsening left leg claudication. Patient states she has B/L leg pain, but left is much worse than right. The pain usually begins at night and interferes with her sleep. DP pulse is absent on left leg, PT is audible with Doppler. Denies cheat pian, SOB, palpitation dizziness/ syncope. Patient had [facility] duplex on 3/18 ( result below). Seen & evaluated by Dr. Vogt ( podiatrist ) and referred for Peripheral angiogram.       [facility] Duplex on 3/30/18: Impression: There is multisegmental arterial vascular disease bilaterally.  On the left, the superficial femoral artery popliteal artery   posterotibial peroneal trunk and the anterior tibial artery are occluded.  The left posterior tibial artery is occluded in the proximal calf.  Although occluded at its origin, the peroneal artery is then intact   through the calf. (22 Mar 2018 07:37)          Allergies    Cipro (Headache)  Cipro (Other)    Intolerances    	    MEDICATIONS:  amLODIPine   Tablet 10 milliGRAM(s) Oral daily  aspirin enteric coated 81 milliGRAM(s) Oral daily  cilostazol 50 milliGRAM(s) Oral two times a day  hydrochlorothiazide 25 milliGRAM(s) Oral daily  lisinopril 40 milliGRAM(s) Oral daily  ticagrelor 90 milliGRAM(s) Oral two times a day        baclofen 10 milliGRAM(s) Oral daily  oxyCODONE    5 mG/acetaminophen 325 mG 1 Tablet(s) Oral once      atorvastatin 40 milliGRAM(s) Oral at bedtime  dextrose 50% Injectable 12.5 Gram(s) IV Push once  dextrose 50% Injectable 25 Gram(s) IV Push once  dextrose 50% Injectable 25 Gram(s) IV Push once  dextrose Gel 1 Dose(s) Oral once PRN  glucagon  Injectable 1 milliGRAM(s) IntraMuscular once PRN  insulin lispro (HumaLOG) corrective regimen sliding scale   SubCutaneous three times a day before meals  insulin lispro (HumaLOG) corrective regimen sliding scale   SubCutaneous at bedtime    dextrose 5%. 400 milliLiter(s) IV Continuous <Continuous>  dextrose 5%. 1000 milliLiter(s) IV Continuous <Continuous>  sodium chloride 0.9%. 1000 milliLiter(s) IV Continuous <Continuous>      PAST MEDICAL & SURGICAL HISTORY:  High cholesterol  HTN (hypertension)  Diabetes  LEILANI (obstructive sleep apnea): does not use CPAP since gastric bypass surgery  Ulcer of toe of right foot: 2nd  DM (diabetes mellitus)  HTN (hypertension)  Peripheral arterial disease: s/p Right leg stent  H/O gastric bypass  H/O  section  History of intravascular stent placement  S/P  section  H/O abdominoplasty  H/O bilateral breast reduction surgery  S/P gastric bypass      FAMILY HISTORY:  No pertinent family history in first degree relatives  No pertinent family history in first degree relatives      SOCIAL HISTORY:  unchanged    REVIEW OF SYSTEMS:  CONSTITUTIONAL: No fever, weight loss, or fatigue  EYES: No eye pain, visual disturbances, or discharge  ENMT:  No difficulty hearing, tinnitus, vertigo; No sinus or throat pain  NECK: No pain or stiffness  RESPIRATORY: No cough, wheezing, chills or hemoptysis; No Shortness of Breath  CARDIOVASCULAR: No chest pain, palpitations, passing out, dizziness, or leg swelling  GASTROINTESTINAL: No abdominal or epigastric pain. No nausea, vomiting, or hematemesis; No diarrhea or constipation. No melena or hematochezia.  GENITOURINARY: No dysuria, frequency, hematuria, or incontinence  NEUROLOGICAL: No headaches, memory loss, loss of strength, numbness, or tremors  SKIN: No itching, burning, rashes, or lesions   LYMPH Nodes: No enlarged glands  ENDOCRINE: No heat or cold intolerance; No hair loss  MUSCULOSKELETAL: No joint pain or swelling; No muscle, back, or extremity pain  PSYCHIATRIC: No depression, anxiety, mood swings, or difficulty sleeping  HEME/LYMPH: No easy bruising, or bleeding gums  ALLERY AND IMMUNOLOGIC: No hives or eczema	    [ x] All others negative	  [ ] Unable to obtain    PHYSICAL EXAM:  T(C): 36.9 (18 @ 05:30), Max: 36.9 (18 @ 05:30)  HR: 87 (18 @ 07:22) (57 - 87)  BP: 145/77 (18 @ 07:22) (110/86 - 177/64)  RR: 16 (18 @ 05:30) (16 - 18)  SpO2: 93% (18 @ 05:30) (93% - 100%)  Wt(kg): --  I&O's Summary    22 Mar 2018 07:01  -  23 Mar 2018 07:00  --------------------------------------------------------  IN: 380 mL / OUT: 2600 mL / NET: -2220 mL    23 Mar 2018 07:  -  23 Mar 2018 09:04  --------------------------------------------------------  IN: 360 mL / OUT: 0 mL / NET: 360 mL        Appearance: Normal	  HEENT:   Normal oral mucosa, PERRL, EOMI	  Lymphatic: No lymphadenopathy  Cardiovascular: Normal S1 S2, No JVD, No murmurs, No edema  Respiratory: Lungs clear to auscultation	  Psychiatry: A & O x 3, Mood & affect appropriate  Gastrointestinal:  Soft, Non-tender, + BS	  Skin: No rashes, No ecchymoses, No cyanosis	  Neurologic: Non-focal  Extremities: Normal range of motion, No clubbing, cyanosis or edema  Vascular: L groin no hematoma, no eccymosis.  L DP and PT audible.  Foot is warm.       LABS:	 	    CBC Full  -  ( 23 Mar 2018 07:38 )  WBC Count : 9.2 K/uL  Hemoglobin : 8.8 g/dL  Hematocrit : 26.8 %  Platelet Count - Automated : 278 K/uL  Mean Cell Volume : 78.5 fl  Mean Cell Hemoglobin : 25.9 pg  Mean Cell Hemoglobin Concentration : 33.0 gm/dL  Auto Neutrophil # : x  Auto Lymphocyte # : x  Auto Monocyte # : x  Auto Eosinophil # : x  Auto Basophil # : x  Auto Neutrophil % : x  Auto Lymphocyte % : x  Auto Monocyte % : x  Auto Eosinophil % : x  Auto Basophil % : x        143  |  105  |  14  ----------------------------<  102<H>  4.1   |  26  |  0.85      139  |  99  |  13  ----------------------------<  145<H>  3.7   |  28  |  0.92    Ca    9.2      23 Mar 2018 02:32  Ca    10.0      22 Mar 2018 07:57    TPro  7.9  /  Alb  4.2  /  TBili  0.8  /  DBili  x   /  AST  19  /  ALT  12  /  AlkPhos  139<H>

## 2018-03-29 ENCOUNTER — APPOINTMENT (OUTPATIENT)
Dept: CARDIOLOGY | Facility: CLINIC | Age: 68
End: 2018-03-29
Payer: MEDICARE

## 2018-03-29 VITALS
HEART RATE: 102 BPM | SYSTOLIC BLOOD PRESSURE: 126 MMHG | DIASTOLIC BLOOD PRESSURE: 74 MMHG | HEIGHT: 65 IN | WEIGHT: 157 LBS | OXYGEN SATURATION: 99 % | BODY MASS INDEX: 26.16 KG/M2

## 2018-03-29 PROCEDURE — 99214 OFFICE O/P EST MOD 30 MIN: CPT

## 2018-03-30 ENCOUNTER — INPATIENT (INPATIENT)
Facility: HOSPITAL | Age: 68
LOS: 6 days | Discharge: HOME | DRG: 254 | End: 2018-04-06
Attending: STUDENT IN AN ORGANIZED HEALTH CARE EDUCATION/TRAINING PROGRAM | Admitting: STUDENT IN AN ORGANIZED HEALTH CARE EDUCATION/TRAINING PROGRAM
Payer: COMMERCIAL

## 2018-03-30 VITALS
RESPIRATION RATE: 16 BRPM | HEART RATE: 112 BPM | SYSTOLIC BLOOD PRESSURE: 191 MMHG | OXYGEN SATURATION: 99 % | WEIGHT: 154.1 LBS | TEMPERATURE: 99 F | DIASTOLIC BLOOD PRESSURE: 82 MMHG

## 2018-03-30 DIAGNOSIS — Z95.828 PRESENCE OF OTHER VASCULAR IMPLANTS AND GRAFTS: Chronic | ICD-10-CM

## 2018-03-30 DIAGNOSIS — I99.8 OTHER DISORDER OF CIRCULATORY SYSTEM: ICD-10-CM

## 2018-03-30 DIAGNOSIS — Z98.89 OTHER SPECIFIED POSTPROCEDURAL STATES: Chronic | ICD-10-CM

## 2018-03-30 DIAGNOSIS — Z98.890 OTHER SPECIFIED POSTPROCEDURAL STATES: Chronic | ICD-10-CM

## 2018-03-30 DIAGNOSIS — Z98.891 HISTORY OF UTERINE SCAR FROM PREVIOUS SURGERY: Chronic | ICD-10-CM

## 2018-03-30 DIAGNOSIS — I73.9 PERIPHERAL VASCULAR DISEASE, UNSPECIFIED: Chronic | ICD-10-CM

## 2018-03-30 DIAGNOSIS — Z98.84 BARIATRIC SURGERY STATUS: Chronic | ICD-10-CM

## 2018-03-30 LAB
ALBUMIN SERPL ELPH-MCNC: 4.2 G/DL — SIGNIFICANT CHANGE UP (ref 3.3–5)
ALP SERPL-CCNC: 136 U/L — HIGH (ref 40–120)
ALT FLD-CCNC: 11 U/L RC — SIGNIFICANT CHANGE UP (ref 10–45)
ANION GAP SERPL CALC-SCNC: 15 MMOL/L — SIGNIFICANT CHANGE UP (ref 5–17)
APTT BLD: 30.7 SEC — SIGNIFICANT CHANGE UP (ref 27.5–37.4)
AST SERPL-CCNC: 14 U/L — SIGNIFICANT CHANGE UP (ref 10–40)
BASOPHILS # BLD AUTO: 0.1 K/UL — SIGNIFICANT CHANGE UP (ref 0–0.2)
BASOPHILS NFR BLD AUTO: 0.7 % — SIGNIFICANT CHANGE UP (ref 0–2)
BILIRUB SERPL-MCNC: 0.7 MG/DL — SIGNIFICANT CHANGE UP (ref 0.2–1.2)
BLD GP AB SCN SERPL QL: NEGATIVE — SIGNIFICANT CHANGE UP
BUN SERPL-MCNC: 19 MG/DL — SIGNIFICANT CHANGE UP (ref 7–23)
CALCIUM SERPL-MCNC: 10 MG/DL — SIGNIFICANT CHANGE UP (ref 8.4–10.5)
CHLORIDE SERPL-SCNC: 104 MMOL/L — SIGNIFICANT CHANGE UP (ref 96–108)
CO2 SERPL-SCNC: 26 MMOL/L — SIGNIFICANT CHANGE UP (ref 22–31)
CREAT SERPL-MCNC: 1.28 MG/DL — SIGNIFICANT CHANGE UP (ref 0.5–1.3)
EOSINOPHIL # BLD AUTO: 0.3 K/UL — SIGNIFICANT CHANGE UP (ref 0–0.5)
EOSINOPHIL NFR BLD AUTO: 2.9 % — SIGNIFICANT CHANGE UP (ref 0–6)
GAS PNL BLDV: SIGNIFICANT CHANGE UP
GLUCOSE SERPL-MCNC: 127 MG/DL — HIGH (ref 70–99)
HCT VFR BLD CALC: 29.8 % — LOW (ref 34.5–45)
HGB BLD-MCNC: 9.6 G/DL — LOW (ref 11.5–15.5)
INR BLD: 1.02 RATIO — SIGNIFICANT CHANGE UP (ref 0.88–1.16)
LYMPHOCYTES # BLD AUTO: 2.2 K/UL — SIGNIFICANT CHANGE UP (ref 1–3.3)
LYMPHOCYTES # BLD AUTO: 23.8 % — SIGNIFICANT CHANGE UP (ref 13–44)
MCHC RBC-ENTMCNC: 25.7 PG — LOW (ref 27–34)
MCHC RBC-ENTMCNC: 32.4 GM/DL — SIGNIFICANT CHANGE UP (ref 32–36)
MCV RBC AUTO: 79.3 FL — LOW (ref 80–100)
MONOCYTES # BLD AUTO: 0.7 K/UL — SIGNIFICANT CHANGE UP (ref 0–0.9)
MONOCYTES NFR BLD AUTO: 7.4 % — SIGNIFICANT CHANGE UP (ref 2–14)
NEUTROPHILS # BLD AUTO: 6.1 K/UL — SIGNIFICANT CHANGE UP (ref 1.8–7.4)
NEUTROPHILS NFR BLD AUTO: 65.2 % — SIGNIFICANT CHANGE UP (ref 43–77)
PLATELET # BLD AUTO: 417 K/UL — HIGH (ref 150–400)
POTASSIUM SERPL-MCNC: 4 MMOL/L — SIGNIFICANT CHANGE UP (ref 3.5–5.3)
POTASSIUM SERPL-SCNC: 4 MMOL/L — SIGNIFICANT CHANGE UP (ref 3.5–5.3)
PROT SERPL-MCNC: 8 G/DL — SIGNIFICANT CHANGE UP (ref 6–8.3)
PROTHROM AB SERPL-ACNC: 11.1 SEC — SIGNIFICANT CHANGE UP (ref 9.8–12.7)
RBC # BLD: 3.76 M/UL — LOW (ref 3.8–5.2)
RBC # FLD: 17.4 % — HIGH (ref 10.3–14.5)
RH IG SCN BLD-IMP: POSITIVE — SIGNIFICANT CHANGE UP
SODIUM SERPL-SCNC: 145 MMOL/L — SIGNIFICANT CHANGE UP (ref 135–145)
WBC # BLD: 9.3 K/UL — SIGNIFICANT CHANGE UP (ref 3.8–10.5)
WBC # FLD AUTO: 9.3 K/UL — SIGNIFICANT CHANGE UP (ref 3.8–10.5)

## 2018-03-30 PROCEDURE — 99285 EMERGENCY DEPT VISIT HI MDM: CPT

## 2018-03-30 RX ORDER — INSULIN LISPRO 100/ML
VIAL (ML) SUBCUTANEOUS AT BEDTIME
Qty: 0 | Refills: 0 | Status: DISCONTINUED | OUTPATIENT
Start: 2018-03-30 | End: 2018-04-06

## 2018-03-30 RX ORDER — GLUCAGON INJECTION, SOLUTION 0.5 MG/.1ML
1 INJECTION, SOLUTION SUBCUTANEOUS ONCE
Qty: 0 | Refills: 0 | Status: DISCONTINUED | OUTPATIENT
Start: 2018-03-30 | End: 2018-04-06

## 2018-03-30 RX ORDER — OXYCODONE HYDROCHLORIDE 5 MG/1
5 TABLET ORAL ONCE
Qty: 0 | Refills: 0 | Status: DISCONTINUED | OUTPATIENT
Start: 2018-03-30 | End: 2018-03-30

## 2018-03-30 RX ORDER — DEXTROSE 50 % IN WATER 50 %
25 SYRINGE (ML) INTRAVENOUS ONCE
Qty: 0 | Refills: 0 | Status: DISCONTINUED | OUTPATIENT
Start: 2018-03-30 | End: 2018-04-06

## 2018-03-30 RX ORDER — HEPARIN SODIUM 5000 [USP'U]/ML
5500 INJECTION INTRAVENOUS; SUBCUTANEOUS EVERY 6 HOURS
Qty: 0 | Refills: 0 | Status: DISCONTINUED | OUTPATIENT
Start: 2018-03-30 | End: 2018-04-02

## 2018-03-30 RX ORDER — DEXTROSE 50 % IN WATER 50 %
1 SYRINGE (ML) INTRAVENOUS ONCE
Qty: 0 | Refills: 0 | Status: DISCONTINUED | OUTPATIENT
Start: 2018-03-30 | End: 2018-04-06

## 2018-03-30 RX ORDER — ACETAMINOPHEN 500 MG
325 TABLET ORAL ONCE
Qty: 0 | Refills: 0 | Status: COMPLETED | OUTPATIENT
Start: 2018-03-30 | End: 2018-03-30

## 2018-03-30 RX ORDER — OXYCODONE AND ACETAMINOPHEN 5; 325 MG/1; MG/1
2 TABLET ORAL ONCE
Qty: 0 | Refills: 0 | Status: DISCONTINUED | OUTPATIENT
Start: 2018-03-30 | End: 2018-03-30

## 2018-03-30 RX ORDER — DEXTROSE 50 % IN WATER 50 %
12.5 SYRINGE (ML) INTRAVENOUS ONCE
Qty: 0 | Refills: 0 | Status: DISCONTINUED | OUTPATIENT
Start: 2018-03-30 | End: 2018-04-06

## 2018-03-30 RX ORDER — HEPARIN SODIUM 5000 [USP'U]/ML
5500 INJECTION INTRAVENOUS; SUBCUTANEOUS ONCE
Qty: 0 | Refills: 0 | Status: COMPLETED | OUTPATIENT
Start: 2018-03-30 | End: 2018-03-30

## 2018-03-30 RX ORDER — HEPARIN SODIUM 5000 [USP'U]/ML
INJECTION INTRAVENOUS; SUBCUTANEOUS
Qty: 25000 | Refills: 0 | Status: DISCONTINUED | OUTPATIENT
Start: 2018-03-30 | End: 2018-03-31

## 2018-03-30 RX ORDER — INSULIN LISPRO 100/ML
VIAL (ML) SUBCUTANEOUS
Qty: 0 | Refills: 0 | Status: DISCONTINUED | OUTPATIENT
Start: 2018-03-30 | End: 2018-04-06

## 2018-03-30 RX ORDER — HEPARIN SODIUM 5000 [USP'U]/ML
2500 INJECTION INTRAVENOUS; SUBCUTANEOUS EVERY 6 HOURS
Qty: 0 | Refills: 0 | Status: DISCONTINUED | OUTPATIENT
Start: 2018-03-30 | End: 2018-04-02

## 2018-03-30 RX ORDER — SODIUM CHLORIDE 9 MG/ML
1000 INJECTION, SOLUTION INTRAVENOUS
Qty: 0 | Refills: 0 | Status: DISCONTINUED | OUTPATIENT
Start: 2018-03-30 | End: 2018-04-06

## 2018-03-30 RX ADMIN — OXYCODONE HYDROCHLORIDE 5 MILLIGRAM(S): 5 TABLET ORAL at 22:00

## 2018-03-30 RX ADMIN — HEPARIN SODIUM 5500 UNIT(S): 5000 INJECTION INTRAVENOUS; SUBCUTANEOUS at 21:20

## 2018-03-30 RX ADMIN — OXYCODONE HYDROCHLORIDE 5 MILLIGRAM(S): 5 TABLET ORAL at 20:33

## 2018-03-30 RX ADMIN — Medication 325 MILLIGRAM(S): at 20:33

## 2018-03-30 RX ADMIN — HEPARIN SODIUM 1200 UNIT(S)/HR: 5000 INJECTION INTRAVENOUS; SUBCUTANEOUS at 21:21

## 2018-03-30 NOTE — ED PROVIDER NOTE - LOWER EXTREMITY EXAM, LEFT
left foot and ankle cold to touch, no palpable pulses to dorsalis pedis or tibalis posterior and no pulses on that level with doppler. sensation and strength intact to lle

## 2018-03-30 NOTE — ED ADULT NURSE NOTE - CHIEF COMPLAINT QUOTE
left foot/leg pain and cold sensation s/p stent March 22, 2018 here at [facility].

## 2018-03-30 NOTE — H&P ADULT - PROBLEM SELECTOR PLAN 1
Patient is s/p RSFA stent and recent LSFA stent, popliteal (balloon angioplasty), and peroneal (stent)  - Continue ASA, Brilinta, Pletal  - Continue Heparin drip  - ABR/PVR and LE artery duplex in am  - Follow Vascular recs Patient is s/p RSFA stent and recent LSFA stent, popliteal (balloon angioplasty), and peroneal (stent)  - Continue ASA, Brilinta, Pletal  - Continue Heparin drip  - Neurovascular checks every hour x 4 hours, then every 2 hours, then every 4 hours  - ABR/PVR and LE artery duplex in am  - Follow Vascular recs Patient is s/p RSFA stent and recent LSFA stent, popliteal (balloon angioplasty), and peroneal (stent)  - Continue ASA, Brilinta, Pletal  - Continue Heparin drip  - Neurovascular checks every hour x 4 hours, then every 2 hours, then every 4 hours  - DAVONTE/PVR and LE artery duplex in am  - Follow Vascular recs

## 2018-03-30 NOTE — H&P ADULT - ASSESSMENT
This is a 67 y/o female with HTN, DM2, severed PAD s/p RLE PCI and recent PCI on LLE (see HPI) presented to the ED with neurovascular symptoms of LLE.

## 2018-03-30 NOTE — ED PROVIDER NOTE - MEDICAL DECISION MAKING DETAILS
pt is a 68 yo f with pmhx of pad, htn, hld, dm type 2, s/p recent griselda to left sfa and griselda x2 to left peroneal artery by Dr. Brooke who presents with sudden onset of left foot pain starting at 2:30 pm today and cold left foot. pt has been compliant with aspirin and Brilinta denies cp, sob, fevers, chills or trauma. ddx includes thromboembolic event, worsening peripheral arterial disease, in stent stenosis. plan: stat vascular/cardiology consultation, pre-op labs, EKG.

## 2018-03-30 NOTE — ED ADULT NURSE NOTE - OBJECTIVE STATEMENT
66 y/o female presented to the ED c/o pain in L leg with numbness and tingling that started today at 1400. pt had stent placement x1 week ago in L leg. pt stated that she started having pain and felt L foot was cold. pt has history of HTN DM.  pt on assessment is A&Ox3. on room air with no signs of distress. S1S2. Lungs CTAB. BS +, no pain on palpation pt has sensation in bilateral feet. able to move all extremities without difficulties. + pulses felt, L foot cold, unable to fell pulse. pt c/o numbness and tingling. pt has skin intact. will cont to monitor. 68 y/o female presented to the ED c/o pain in L leg with numbness and tingling that started today at 1400. pt had stent placement x1 week ago in L leg. pt stated that she started having pain and felt L foot was cold. pt has history of HTN DM.  pt on assessment is A&Ox3. on room air with no signs of distress. S1S2. Lungs CTAB. BS +, no pain on palpation pt has sensation in bilateral feet. able to move all extremities without difficulties. + pulses felt in R foot, unable to feel pulse in L foot. L foot cold. pt c/o numbness and tingling In L foot. pt has skin intact. has + sensations. pt undressed and fully assessed head to toe. Daughter next to bedside.

## 2018-03-30 NOTE — H&P ADULT - RS GEN PE MLT RESP DETAILS PC
good air movement/breath sounds equal/no rhonchi/respirations non-labored/no rales/clear to auscultation bilaterally

## 2018-03-30 NOTE — ED PROVIDER NOTE - PROGRESS NOTE DETAILS
Received phone call from dr Brooke, pt's vascular surgeon who recommends starting heparin drip with bolus. he states no cta necessary at this time given that pt sensation and motor function ar intact. symptoms likely due to mild peripheral arterial disease vs thromboembolic event. he recommends admitting to his service and a member of his team will be down shortly to see pt.

## 2018-03-30 NOTE — ED PROVIDER NOTE - OBJECTIVE STATEMENT
66 yo f with pmhx of htn, hdl, and type 2 dm s/p stents (placed by Dr. Brooke on 3/22/18) presents with left foot/leg pain and cold sensation in left foot. Pt states her leg was warm and she could feel her pulse bounding in her leg right after placement of stents. Pain in leg started all of a sudden earlier today and is worse when walking. Also has some sob, but denies chest pain and abdominal pain. Pt is taking aspirin and Brilinta.

## 2018-03-30 NOTE — H&P ADULT - HISTORY OF PRESENT ILLNESS
68 y/o AA female with PMHx of PAD, s/p RT. SFA stent, HTN, HLD, DM2, LEILANI (not on CPAP) s/p recent LLE (PANTERA to Lt SFA, balloon angioplasty on Lt pop, & PANTERA to Lt peroneal artery (3/22) presented to the ED c/o worsening leg symptoms.  Patient states that at 230 pm yesterday, she felt "pins and needles" and pain 10/10 on her left leg.  claims to have been taking all her antiplatelets (ASA, Brilinta, Celostazol) as prescribed.    In ED, Heparin drip started.  Given Oxycodone IR 5 mg with total relief of symptoms. 66 y/o AA female with PMHx of PAD, s/p RT. SFA stent, HTN, HLD, DM2, LEILANI (not on CPAP) s/p recent LLE (PANTERA to Lt SFA, balloon angioplasty on Lt pop, & PANTERA to Lt peroneal artery (3/22) presented to the ED c/o worsening leg symptoms.  Patient states that at 230 pm yesterday, she felt "pins and needles" and pain 10/10 on her left leg.  Claims to have been taking all her antiplatelets (ASA, Brilinta, Celostazol) as prescribed.  Denies numbness or loss of sensation.    In ED, Heparin drip started.  Given Oxycodone IR 5 mg with total relief of symptoms.

## 2018-03-31 DIAGNOSIS — I73.9 PERIPHERAL VASCULAR DISEASE, UNSPECIFIED: ICD-10-CM

## 2018-03-31 DIAGNOSIS — I99.8 OTHER DISORDER OF CIRCULATORY SYSTEM: ICD-10-CM

## 2018-03-31 DIAGNOSIS — Z29.9 ENCOUNTER FOR PROPHYLACTIC MEASURES, UNSPECIFIED: ICD-10-CM

## 2018-03-31 DIAGNOSIS — I10 ESSENTIAL (PRIMARY) HYPERTENSION: ICD-10-CM

## 2018-03-31 DIAGNOSIS — E78.00 PURE HYPERCHOLESTEROLEMIA, UNSPECIFIED: ICD-10-CM

## 2018-03-31 DIAGNOSIS — E11.59 TYPE 2 DIABETES MELLITUS WITH OTHER CIRCULATORY COMPLICATIONS: ICD-10-CM

## 2018-03-31 LAB
ALBUMIN SERPL ELPH-MCNC: 3.7 G/DL — SIGNIFICANT CHANGE UP (ref 3.3–5)
ALP SERPL-CCNC: 123 U/L — HIGH (ref 40–120)
ALT FLD-CCNC: 10 U/L RC — SIGNIFICANT CHANGE UP (ref 10–45)
ANION GAP SERPL CALC-SCNC: 11 MMOL/L — SIGNIFICANT CHANGE UP (ref 5–17)
APTT BLD: 126.3 SEC — CRITICAL HIGH (ref 27.5–37.4)
APTT BLD: 81 SEC — HIGH (ref 27.5–37.4)
APTT BLD: > 200 SEC (ref 27.5–37.4)
AST SERPL-CCNC: 17 U/L — SIGNIFICANT CHANGE UP (ref 10–40)
BILIRUB SERPL-MCNC: 0.7 MG/DL — SIGNIFICANT CHANGE UP (ref 0.2–1.2)
BUN SERPL-MCNC: 17 MG/DL — SIGNIFICANT CHANGE UP (ref 7–23)
CALCIUM SERPL-MCNC: 9.6 MG/DL — SIGNIFICANT CHANGE UP (ref 8.4–10.5)
CHLORIDE SERPL-SCNC: 105 MMOL/L — SIGNIFICANT CHANGE UP (ref 96–108)
CHOLEST SERPL-MCNC: 221 MG/DL — HIGH (ref 10–199)
CK MB CFR SERPL CALC: 1.7 NG/ML — SIGNIFICANT CHANGE UP (ref 0–3.8)
CK SERPL-CCNC: 75 U/L — SIGNIFICANT CHANGE UP (ref 25–170)
CO2 SERPL-SCNC: 26 MMOL/L — SIGNIFICANT CHANGE UP (ref 22–31)
CREAT SERPL-MCNC: 0.93 MG/DL — SIGNIFICANT CHANGE UP (ref 0.5–1.3)
GLUCOSE BLDC GLUCOMTR-MCNC: 134 MG/DL — HIGH (ref 70–99)
GLUCOSE SERPL-MCNC: 143 MG/DL — HIGH (ref 70–99)
GLUCOSE SERPL-MCNC: 152 MG/DL — HIGH (ref 70–99)
HBA1C BLD-MCNC: 5.8 % — HIGH (ref 4–5.6)
HCT VFR BLD CALC: 27.7 % — LOW (ref 34.5–45)
HDLC SERPL-MCNC: 54 MG/DL — SIGNIFICANT CHANGE UP (ref 40–125)
HGB BLD-MCNC: 9.1 G/DL — LOW (ref 11.5–15.5)
LACTATE SERPL-SCNC: 1.1 MMOL/L — SIGNIFICANT CHANGE UP (ref 0.7–2)
LIPID PNL WITH DIRECT LDL SERPL: 151 MG/DL — HIGH
MAGNESIUM SERPL-MCNC: 1.5 MG/DL — LOW (ref 1.6–2.6)
MCHC RBC-ENTMCNC: 26 PG — LOW (ref 27–34)
MCHC RBC-ENTMCNC: 32.8 GM/DL — SIGNIFICANT CHANGE UP (ref 32–36)
MCV RBC AUTO: 79.2 FL — LOW (ref 80–100)
PHOSPHATE SERPL-MCNC: 3.7 MG/DL — SIGNIFICANT CHANGE UP (ref 2.5–4.5)
PLATELET # BLD AUTO: 380 K/UL — SIGNIFICANT CHANGE UP (ref 150–400)
POTASSIUM SERPL-MCNC: 3.7 MMOL/L — SIGNIFICANT CHANGE UP (ref 3.5–5.3)
POTASSIUM SERPL-SCNC: 3.7 MMOL/L — SIGNIFICANT CHANGE UP (ref 3.5–5.3)
PROT SERPL-MCNC: 7.4 G/DL — SIGNIFICANT CHANGE UP (ref 6–8.3)
RBC # BLD: 3.49 M/UL — LOW (ref 3.8–5.2)
RBC # FLD: 17.2 % — HIGH (ref 10.3–14.5)
SODIUM SERPL-SCNC: 142 MMOL/L — SIGNIFICANT CHANGE UP (ref 135–145)
TOTAL CHOLESTEROL/HDL RATIO MEASUREMENT: 4.1 RATIO — SIGNIFICANT CHANGE UP (ref 3.3–7.1)
TRIGL SERPL-MCNC: 79 MG/DL — SIGNIFICANT CHANGE UP (ref 10–149)
TROPONIN T SERPL-MCNC: <0.01 NG/ML — SIGNIFICANT CHANGE UP (ref 0–0.06)
TSH SERPL-MCNC: 4.76 UIU/ML — HIGH (ref 0.27–4.2)
WBC # BLD: 9.6 K/UL — SIGNIFICANT CHANGE UP (ref 3.8–10.5)
WBC # FLD AUTO: 9.6 K/UL — SIGNIFICANT CHANGE UP (ref 3.8–10.5)

## 2018-03-31 PROCEDURE — 93010 ELECTROCARDIOGRAM REPORT: CPT

## 2018-03-31 PROCEDURE — 99233 SBSQ HOSP IP/OBS HIGH 50: CPT | Mod: GC

## 2018-03-31 PROCEDURE — 93926 LOWER EXTREMITY STUDY: CPT | Mod: 26,LT

## 2018-03-31 PROCEDURE — 93306 TTE W/DOPPLER COMPLETE: CPT | Mod: 26

## 2018-03-31 PROCEDURE — 93923 UPR/LXTR ART STDY 3+ LVLS: CPT | Mod: 26

## 2018-03-31 PROCEDURE — 99223 1ST HOSP IP/OBS HIGH 75: CPT

## 2018-03-31 RX ORDER — CILOSTAZOL 100 MG/1
50 TABLET ORAL
Qty: 0 | Refills: 0 | Status: DISCONTINUED | OUTPATIENT
Start: 2018-03-31 | End: 2018-04-03

## 2018-03-31 RX ORDER — ACETAMINOPHEN 500 MG
650 TABLET ORAL ONCE
Qty: 0 | Refills: 0 | Status: COMPLETED | OUTPATIENT
Start: 2018-03-31 | End: 2018-03-31

## 2018-03-31 RX ORDER — LISINOPRIL 2.5 MG/1
20 TABLET ORAL DAILY
Qty: 0 | Refills: 0 | Status: DISCONTINUED | OUTPATIENT
Start: 2018-03-31 | End: 2018-03-31

## 2018-03-31 RX ORDER — OXYCODONE AND ACETAMINOPHEN 5; 325 MG/1; MG/1
1 TABLET ORAL ONCE
Qty: 0 | Refills: 0 | Status: DISCONTINUED | OUTPATIENT
Start: 2018-03-31 | End: 2018-03-31

## 2018-03-31 RX ORDER — HYDROCHLOROTHIAZIDE 25 MG
25 TABLET ORAL DAILY
Qty: 0 | Refills: 0 | Status: DISCONTINUED | OUTPATIENT
Start: 2018-03-31 | End: 2018-03-31

## 2018-03-31 RX ORDER — OXYCODONE HYDROCHLORIDE 5 MG/1
5 TABLET ORAL ONCE
Qty: 0 | Refills: 0 | Status: DISCONTINUED | OUTPATIENT
Start: 2018-03-31 | End: 2018-03-31

## 2018-03-31 RX ORDER — HEPARIN SODIUM 5000 [USP'U]/ML
1000 INJECTION INTRAVENOUS; SUBCUTANEOUS
Qty: 25000 | Refills: 0 | Status: DISCONTINUED | OUTPATIENT
Start: 2018-03-31 | End: 2018-04-02

## 2018-03-31 RX ORDER — ASPIRIN/CALCIUM CARB/MAGNESIUM 324 MG
81 TABLET ORAL DAILY
Qty: 0 | Refills: 0 | Status: DISCONTINUED | OUTPATIENT
Start: 2018-03-30 | End: 2018-04-03

## 2018-03-31 RX ORDER — MAGNESIUM SULFATE 500 MG/ML
2 VIAL (ML) INJECTION ONCE
Qty: 0 | Refills: 0 | Status: COMPLETED | OUTPATIENT
Start: 2018-03-31 | End: 2018-03-31

## 2018-03-31 RX ORDER — LISINOPRIL 2.5 MG/1
40 TABLET ORAL DAILY
Qty: 0 | Refills: 0 | Status: DISCONTINUED | OUTPATIENT
Start: 2018-03-31 | End: 2018-03-31

## 2018-03-31 RX ORDER — ATORVASTATIN CALCIUM 80 MG/1
20 TABLET, FILM COATED ORAL AT BEDTIME
Qty: 0 | Refills: 0 | Status: DISCONTINUED | OUTPATIENT
Start: 2018-03-31 | End: 2018-04-01

## 2018-03-31 RX ORDER — LISINOPRIL 2.5 MG/1
20 TABLET ORAL DAILY
Qty: 0 | Refills: 0 | Status: DISCONTINUED | OUTPATIENT
Start: 2018-03-31 | End: 2018-04-06

## 2018-03-31 RX ORDER — POTASSIUM CHLORIDE 20 MEQ
40 PACKET (EA) ORAL ONCE
Qty: 0 | Refills: 0 | Status: COMPLETED | OUTPATIENT
Start: 2018-03-31 | End: 2018-03-31

## 2018-03-31 RX ORDER — TICAGRELOR 90 MG/1
90 TABLET ORAL
Qty: 0 | Refills: 0 | Status: DISCONTINUED | OUTPATIENT
Start: 2018-03-31 | End: 2018-04-03

## 2018-03-31 RX ORDER — ATORVASTATIN CALCIUM 80 MG/1
10 TABLET, FILM COATED ORAL AT BEDTIME
Qty: 0 | Refills: 0 | Status: DISCONTINUED | OUTPATIENT
Start: 2018-03-31 | End: 2018-03-31

## 2018-03-31 RX ORDER — AMLODIPINE BESYLATE 2.5 MG/1
10 TABLET ORAL DAILY
Qty: 0 | Refills: 0 | Status: DISCONTINUED | OUTPATIENT
Start: 2018-03-31 | End: 2018-04-06

## 2018-03-31 RX ADMIN — OXYCODONE HYDROCHLORIDE 5 MILLIGRAM(S): 5 TABLET ORAL at 20:00

## 2018-03-31 RX ADMIN — OXYCODONE HYDROCHLORIDE 5 MILLIGRAM(S): 5 TABLET ORAL at 19:30

## 2018-03-31 RX ADMIN — HEPARIN SODIUM 1000 UNIT(S)/HR: 5000 INJECTION INTRAVENOUS; SUBCUTANEOUS at 06:02

## 2018-03-31 RX ADMIN — OXYCODONE AND ACETAMINOPHEN 1 TABLET(S): 5; 325 TABLET ORAL at 04:04

## 2018-03-31 RX ADMIN — HEPARIN SODIUM 800 UNIT(S)/HR: 5000 INJECTION INTRAVENOUS; SUBCUTANEOUS at 20:29

## 2018-03-31 RX ADMIN — OXYCODONE AND ACETAMINOPHEN 1 TABLET(S): 5; 325 TABLET ORAL at 04:37

## 2018-03-31 RX ADMIN — Medication 25 MILLIGRAM(S): at 08:35

## 2018-03-31 RX ADMIN — HEPARIN SODIUM 800 UNIT(S)/HR: 5000 INJECTION INTRAVENOUS; SUBCUTANEOUS at 14:00

## 2018-03-31 RX ADMIN — AMLODIPINE BESYLATE 10 MILLIGRAM(S): 2.5 TABLET ORAL at 21:42

## 2018-03-31 RX ADMIN — Medication 650 MILLIGRAM(S): at 19:30

## 2018-03-31 RX ADMIN — CILOSTAZOL 50 MILLIGRAM(S): 100 TABLET ORAL at 12:03

## 2018-03-31 RX ADMIN — Medication 50 GRAM(S): at 05:46

## 2018-03-31 RX ADMIN — CILOSTAZOL 50 MILLIGRAM(S): 100 TABLET ORAL at 21:42

## 2018-03-31 RX ADMIN — LISINOPRIL 20 MILLIGRAM(S): 2.5 TABLET ORAL at 00:55

## 2018-03-31 RX ADMIN — TICAGRELOR 90 MILLIGRAM(S): 90 TABLET ORAL at 17:18

## 2018-03-31 RX ADMIN — ATORVASTATIN CALCIUM 20 MILLIGRAM(S): 80 TABLET, FILM COATED ORAL at 21:42

## 2018-03-31 RX ADMIN — AMLODIPINE BESYLATE 10 MILLIGRAM(S): 2.5 TABLET ORAL at 00:55

## 2018-03-31 RX ADMIN — Medication 81 MILLIGRAM(S): at 12:03

## 2018-03-31 RX ADMIN — Medication 40 MILLIEQUIVALENT(S): at 08:35

## 2018-03-31 RX ADMIN — HEPARIN SODIUM 0 UNIT(S)/HR: 5000 INJECTION INTRAVENOUS; SUBCUTANEOUS at 04:43

## 2018-03-31 RX ADMIN — TICAGRELOR 90 MILLIGRAM(S): 90 TABLET ORAL at 08:35

## 2018-03-31 NOTE — CONSULT NOTE ADULT - ASSESSMENT
Assessment:  1.  Acute Limb Ischemia  - improved symptoms currently   - on heparin gtt and antiplatelet  thereapy  - mid SFA/pop and TPT occlusion  2.  PAD  3.  HTN  4.  HLD    Plan  1.  Continue CCU monitoring.    2.  Continue with heparin gtt for full dose anticoagulation  3.  Continue antiplatelet therapy and statin  4.   D/C Hydrochlorothiazide.  Want her BP slightly higher than normal to increase perfusion to leg  5.  Limit activity to laying or sitting for now  6.  Will discuss with Dr. Brooke re: timing/need for intervention of left leg.  For now continue heparin.      Will follow closely    Thanks    Blaine Garcia  [id number] Assessment:  1.  Acute Limb Ischemia  - improved symptoms currently   - on heparin gtt and antiplatelet  thereapy  - mid SFA/pop and TPT occlusion  2.  PAD  3.  HTN  4.  HLD    Plan  1.  Continue CCU monitoring.    2.  Continue with heparin gtt for full dose anticoagulation  3.  Continue antiplatelet therapy and statin  4.   D/C Hydrochlorothiazide.  Want her BP slightly higher than normal to increase perfusion to leg  5.  Limit activity to laying or sitting for now  6.  Will discuss with Dr. Brooke re: timing/need for intervention of left leg.  For now continue heparin.        Will follow closely    Thanks    Blaine Garcia  [id number]

## 2018-03-31 NOTE — PROGRESS NOTE ADULT - ASSESSMENT
HPI:  66 y/o AA female with PMHx of PAD, s/p RT. SFA stent, HTN, HLD, DM2, LEILANI (not on CPAP) s/p recent LLE (PANTERA to Lt SFA, balloon angioplasty on Lt pop, & PANTERA to Lt peroneal artery (3/22) presented to the ED LLE PAD now awaiting urgent DAVONTE/PVR

## 2018-03-31 NOTE — PROGRESS NOTE ADULT - SUBJECTIVE AND OBJECTIVE BOX
Patient is a 67y old  Female who presents with a chief complaint of left leg pain with feeling of pins and needles (30 Mar 2018 23:50)      Overnight Event:    REVIEW OF SYSTEMS:  	    MEDICATIONS  (STANDING):  amLODIPine   Tablet 10 milliGRAM(s) Oral daily  aspirin enteric coated 81 milliGRAM(s) Oral daily  atorvastatin 10 milliGRAM(s) Oral at bedtime  cilostazol 50 milliGRAM(s) Oral two times a day  dextrose 5%. 1000 milliLiter(s) (50 mL/Hr) IV Continuous <Continuous>  dextrose 50% Injectable 12.5 Gram(s) IV Push once  dextrose 50% Injectable 25 Gram(s) IV Push once  dextrose 50% Injectable 25 Gram(s) IV Push once  heparin  Infusion. 1000 Unit(s)/Hr (10 mL/Hr) IV Continuous <Continuous>  hydrochlorothiazide 25 milliGRAM(s) Oral daily  insulin lispro (HumaLOG) corrective regimen sliding scale   SubCutaneous three times a day before meals  insulin lispro (HumaLOG) corrective regimen sliding scale   SubCutaneous at bedtime  lisinopril 20 milliGRAM(s) Oral daily  potassium chloride    Tablet ER 40 milliEquivalent(s) Oral once  ticagrelor 90 milliGRAM(s) Oral two times a day    MEDICATIONS  (PRN):  dextrose Gel 1 Dose(s) Oral once PRN Blood Glucose LESS THAN 70 milliGRAM(s)/deciliter  glucagon  Injectable 1 milliGRAM(s) IntraMuscular once PRN Glucose LESS THAN 70 milligrams/deciliter  heparin  Injectable 5500 Unit(s) IV Push every 6 hours PRN For aPTT less than 40  heparin  Injectable 2500 Unit(s) IV Push every 6 hours PRN For aPTT between 40 - 57        PHYSICAL EXAM:  Vital Signs Last 24 Hrs  T(C): 36.7 (31 Mar 2018 05:00), Max: 37.2 (30 Mar 2018 19:08)  T(F): 98 (31 Mar 2018 05:00), Max: 98.9 (30 Mar 2018 19:08)  HR: 72 (31 Mar 2018 06:00) (72 - 112)  BP: 160/59 (31 Mar 2018 06:00) (127/56 - 191/82)  BP(mean): 86 (31 Mar 2018 06:00) (78 - 114)  RR: 14 (31 Mar 2018 06:00) (10 - 26)  SpO2: 97% (31 Mar 2018 06:00) (97% - 100%)  I&O's Summary    30 Mar 2018 07:01  -  31 Mar 2018 07:00  --------------------------------------------------------  IN: 190 mL / OUT: 100 mL / NET: 90 mL        Appearance: Normal	  HEENT:   Normal oral mucosa, PERRL, EOMI	  Lymphatic: No lymphadenopathy  Cardiovascular: Normal S1 S2, No JVD, No murmurs, No edema  Respiratory: Lungs clear to auscultation	  Psychiatry: A & O x 3, Mood & affect appropriate  Gastrointestinal:  Soft, Non-tender, + BS	  Skin: No rashes, No ecchymoses, No cyanosis	  Neurologic: Non-focal  Extremities: Normal range of motion, No clubbing, cyanosis or edema  Vascular: Peripheral pulses palpable 2+ bilaterally    LABS:	 	                        9.1    9.6   )-----------( 380      ( 31 Mar 2018 04:06 )             27.7     Auto Eosinophil # x     / Auto Eosinophil % x     / Auto Neutrophil # x     / Auto Neutrophil % x     / BANDS % x                            9.6    9.3   )-----------( 417      ( 30 Mar 2018 21:20 )             29.8     Auto Eosinophil # 0.3   / Auto Eosinophil % 2.9   / Auto Neutrophil # 6.1   / Auto Neutrophil % 65.2  / BANDS % x        INR: 1.02 ratio (03-30 @ 21:20)    03-31    142  |  105  |  17  ----------------------------<  143<H>  3.7   |  26  |  0.93  03-30    145  |  104  |  19  ----------------------------<  127<H>  4.0   |  26  |  1.28    Ca    9.6      31 Mar 2018 04:06  Mg     1.5     03-31  Phos  3.7     03-31  TPro  7.4  /  Alb  3.7  /  TBili  0.7  /  DBili  x   /  AST  17  /  ALT  10  /  AlkPhos  123<H>  03-31  TPro  8.0  /  Alb  4.2  /  TBili  0.7  /  DBili  x   /  AST  14  /  ALT  11  /  AlkPhos  136<H>  03-30        proBNP:   Lipid Profile: 03-31 Chol 221<H> <H> HDL 54 Trig 79, 03-23 Chol 201<H>  HDL 52 Trig 106  HgA1c: 5.8 % (03-31 @ 05:21)    TSH: Thyroid Stimulating Hormone, Serum: 4.76 uIU/mL (03-31 @ 05:21)      CARDIAC MARKERS:        TELEMETRY: 	    ECG:  	  RADIOLOGY:  OTHER: 	    PREVIOUS DIAGNOSTIC TESTING:    [ ] Echocardiogram:  [ ]  Catheterization:  [ ] Stress Test:  	  	  THOMPSON Dupree  Contact #

## 2018-03-31 NOTE — PROGRESS NOTE ADULT - SUBJECTIVE AND OBJECTIVE BOX
HPI:  66 y/o AA female with PMHx of PAD, s/p RT. SFA stent, HTN, HLD, DM2, LEILANI (not on CPAP) s/p recent LLE (PANTERA to Lt SFA, balloon angioplasty on Lt pop, & PANTERA to Lt peroneal artery (3/22) presented to the ED c/o worsening leg symptoms.  Patient states that at 230 pm yesterday, she felt "pins and needles" and pain 10/10 on her left leg.  Claims to have been taking all her antiplatelets (ASA, Brilinta, Celostazol) as prescribed.  Denies numbness or loss of sensation.    Overnight Event: No complaints slept well sitting in bed     REVIEW OF SYSTEMS:    CONSTITUTIONAL: No weakness, fevers or chills  Eyes/ENT: No visual changes: No vertigo or throat pain  NECK: No pain or stiffness  Resp: No cough, wheezing, hemoptysis; No shortness of breath  Cardiovascular: No chest pain or palpitations  GI: No abdominal or epigastric pain. NO nausea, vomiting, or hematemesis: No diarrhea or constipation. No melena or hematochezia.    : No dysuria, frequent or hematuria.  Neuro: No numbness or weakness  Skin: No itching or rashes	  	    MEDICATIONS  (STANDING):  amLODIPine   Tablet 10 milliGRAM(s) Oral daily  aspirin enteric coated 81 milliGRAM(s) Oral daily  atorvastatin 10 milliGRAM(s) Oral at bedtime  cilostazol 50 milliGRAM(s) Oral two times a day  dextrose 5%. 1000 milliLiter(s) (50 mL/Hr) IV Continuous <Continuous>  dextrose 50% Injectable 12.5 Gram(s) IV Push once  dextrose 50% Injectable 25 Gram(s) IV Push once  dextrose 50% Injectable 25 Gram(s) IV Push once  heparin  Infusion. 1000 Unit(s)/Hr (10 mL/Hr) IV Continuous <Continuous>  hydrochlorothiazide 25 milliGRAM(s) Oral daily  insulin lispro (HumaLOG) corrective regimen sliding scale   SubCutaneous three times a day before meals  insulin lispro (HumaLOG) corrective regimen sliding scale   SubCutaneous at bedtime  lisinopril 20 milliGRAM(s) Oral daily  potassium chloride    Tablet ER 40 milliEquivalent(s) Oral once  ticagrelor 90 milliGRAM(s) Oral two times a day    MEDICATIONS  (PRN):  dextrose Gel 1 Dose(s) Oral once PRN Blood Glucose LESS THAN 70 milliGRAM(s)/deciliter  glucagon  Injectable 1 milliGRAM(s) IntraMuscular once PRN Glucose LESS THAN 70 milligrams/deciliter  heparin  Injectable 5500 Unit(s) IV Push every 6 hours PRN For aPTT less than 40  heparin  Injectable 2500 Unit(s) IV Push every 6 hours PRN For aPTT between 40 - 57        PHYSICAL EXAM:  Vital Signs Last 24 Hrs  T(C): 36.6 (31 Mar 2018 07:00), Max: 37.2 (30 Mar 2018 19:08)  T(F): 97.8 (31 Mar 2018 07:00), Max: 98.9 (30 Mar 2018 19:08)  HR: 73 (31 Mar 2018 07:00) (72 - 112)  BP: 154/65 (31 Mar 2018 07:00) (127/56 - 191/82)  BP(mean): 88 (31 Mar 2018 07:00) (78 - 114)  RR: 15 (31 Mar 2018 07:00) (10 - 26)  SpO2: 98% (31 Mar 2018 07:00) (97% - 100%)  I&O's Summary    30 Mar 2018 07:01  -  31 Mar 2018 07:00  --------------------------------------------------------  IN: 190 mL / OUT: 100 mL / NET: 90 mL        Appearance: Normal	  HEENT:   Normal oral mucosa, PERRL, EOMI	  Lymphatic: No lymphadenopathy  Cardiovascular: Normal S1 S2, No JVD, No murmurs, No edema  Respiratory: Lungs clear to auscultation	  Psychiatry: A & O x 3, Mood & affect appropriate  Gastrointestinal:  Soft, Non-tender, + BS	  Skin: No rashes, No ecchymoses, No cyanosis	  Neurologic: Non-focal  Extremities: Normal range of motion, No clubbing, cyanosis or edema  Vascular: Peripheral pulses palpable 2+DP/ PT RLE  + doppler signal PT LLE no signal DP    LABS:	 	                        9.1    9.6   )-----------( 380      ( 31 Mar 2018 04:06 )             27.7     Auto Eosinophil # x     / Auto Eosinophil % x     / Auto Neutrophil # x     / Auto Neutrophil % x     / BANDS % x                            9.6    9.3   )-----------( 417      ( 30 Mar 2018 21:20 )             29.8     Auto Eosinophil # 0.3   / Auto Eosinophil % 2.9   / Auto Neutrophil # 6.1   / Auto Neutrophil % 65.2  / BANDS % x        INR: 1.02 ratio (03-30 @ 21:20)    03-31    142  |  105  |  17  ----------------------------<  143<H>  3.7   |  26  |  0.93  03-30    145  |  104  |  19  ----------------------------<  127<H>  4.0   |  26  |  1.28    Ca    9.6      31 Mar 2018 04:06  Mg     1.5     03-31  Phos  3.7     03-31  TPro  7.4  /  Alb  3.7  /  TBili  0.7  /  DBili  x   /  AST  17  /  ALT  10  /  AlkPhos  123<H>  03-31  TPro  8.0  /  Alb  4.2  /  TBili  0.7  /  DBili  x   /  AST  14  /  ALT  11  /  AlkPhos  136<H>  03-30        proBNP:   Lipid Profile: 03-31 Chol 221<H> <H> HDL 54 Trig 79, 03-23 Chol 201<H>  HDL 52 Trig 106  HgA1c: 5.8 % (03-31 @ 05:21)    TSH: Thyroid Stimulating Hormone, Serum: 4.76 uIU/mL (03-31 @ 05:21)      CARDIAC MARKERS: P    Lactate: p        TELEMETRY: 	  NSR  ECG:  	NSR  RADIOLOGY:  OTHER: 	    PREVIOUS DIAGNOSTIC TESTING:    [ ] Echocardiogram: P  [ ]  Catheterization:  [ ] Stress Test:  	  	  Amanda Navas ANP-c  Contact #

## 2018-03-31 NOTE — CHART NOTE - NSCHARTNOTEFT_GEN_A_CORE
====================  CCU MIDNIGHT ROUNDS  ====================    HARVEY RIZZO  [id number]  Patient is a 67y old  Female who presents with a chief complaint of left leg pain with feeling of pins and needles (30 Mar 2018 23:50)  ====================  SUMMARY:  ====================  66 y/o AA female with PMHx of PAD, s/p Rt. SFA stent, HTN, HLD, DM2, LEILANI (not on CPAP) s/p recent LLE (PANTERA to Lt SFA, balloon angioplasty on Lt pop, & PANTERA to Lt peroneal artery) 3/22 presented to the ED c/o worsening leg symptoms.  Started on Heparin.    ====================  NEW EVENTS:  ====================  LLE arterial doppler revealed occlusion of previously intervened LLE.  c/o left leg pain, medicated with Tylenol and Oxycodone.  Claimed Percocet did not agree with her.    MEDICATIONS  (STANDING):  amLODIPine   Tablet 10 milliGRAM(s) Oral daily  aspirin enteric coated 81 milliGRAM(s) Oral daily  atorvastatin 20 milliGRAM(s) Oral at bedtime  cilostazol 50 milliGRAM(s) Oral two times a day  dextrose 5%. 1000 milliLiter(s) (50 mL/Hr) IV Continuous <Continuous>  dextrose 50% Injectable 12.5 Gram(s) IV Push once  dextrose 50% Injectable 25 Gram(s) IV Push once  dextrose 50% Injectable 25 Gram(s) IV Push once  heparin  Infusion. 1000 Unit(s)/Hr (10 mL/Hr) IV Continuous <Continuous>  insulin lispro (HumaLOG) corrective regimen sliding scale   SubCutaneous three times a day before meals  insulin lispro (HumaLOG) corrective regimen sliding scale   SubCutaneous at bedtime  lisinopril 20 milliGRAM(s) Oral daily  ticagrelor 90 milliGRAM(s) Oral two times a day    MEDICATIONS  (PRN):  dextrose Gel 1 Dose(s) Oral once PRN Blood Glucose LESS THAN 70 milliGRAM(s)/deciliter  glucagon  Injectable 1 milliGRAM(s) IntraMuscular once PRN Glucose LESS THAN 70 milligrams/deciliter  heparin  Injectable 5500 Unit(s) IV Push every 6 hours PRN For aPTT less than 40  heparin  Injectable 2500 Unit(s) IV Push every 6 hours PRN For aPTT between 40 - 57    ====================  VITALS (Last 12 hrs):  ====================    T(C): 36.8 (03-31-18 @ 16:00), Max: 37.2 (03-31-18 @ 13:00)  T(F): 98.3 (03-31-18 @ 16:00), Max: 99 (03-31-18 @ 13:00)  HR: 89 (03-31-18 @ 21:05) (78 - 94)  BP: 154/80 (03-31-18 @ 21:05) (128/56 - 165/84)  BP(mean): 96 (03-31-18 @ 21:05) (76 - 104)  ABP: --  ABP(mean): --  RR: 18 (03-31-18 @ 18:00) (15 - 18)  SpO2: 99% (03-31-18 @ 21:05) (98% - 100%)  Wt(kg): --  CVP(mm Hg): --  CVP(cm H2O): --  CO: --  CI: --  PA: --  PA(mean): --  PCWP: --  SVR: --  PVR: --    I&O's Summary    30 Mar 2018 07:01  -  31 Mar 2018 07:00  --------------------------------------------------------  IN: 200 mL / OUT: 450 mL / NET: -250 mL    31 Mar 2018 07:01  -  31 Mar 2018 22:09  --------------------------------------------------------  IN: 484 mL / OUT: 600 mL / NET: -116 mL    ====================  NEW LABS:  ====================    03-31    x   |  x   |  x   ----------------------------<  152<H>  x    |  x   |  x     Ca    9.6      31 Mar 2018 04:06  Phos  3.7     03-31  Mg     1.5     03-31    TPro  7.4  /  Alb  3.7  /  TBili  0.7  /  DBili  x   /  AST  17  /  ALT  10  /  AlkPhos  123<H>  03-31    PT/INR - ( 30 Mar 2018 21:20 )   PT: 11.1 sec;   INR: 1.02 ratio      PTT - ( 31 Mar 2018 19:58 )  PTT:81.0 sec  Creatine Kinase, Serum: 75 U/L (03-31-18 @ 08:25)  Troponin T, Serum: <0.01 ng/mL (03-31-18 @ 08:25)    CKMB Units: 1.7 ng/mL (03-31 @ 08:25)  ====================  PLAN:  ====================  66 y/o AA female with PMHx of PAD, s/p RT. SFA stent, HTN, HLD, DM2, LEILANI (not on CPAP) s/p recent LLE (PANTERA to Lt SFA, balloon angioplasty on Lt pop, & PANTERA to Lt peroneal artery (3/22) presented with acute limb ischemia of LLE  - LLE arterial doppler revealed occlusion of prior intervention  - DAVONTE/PVR showed markedly reduced flow of the left lower extremity at femoral-popliteal level   - TTE showed mitral annular calcification, otherwise normal mitral valve; concentric LV remodeling; hyperdynamic LV; mild diastolic dysfunction   - Continue Heparin drip  - Continue ASA, Brilinta, and Pletal  - Continue Lipitor and titrate up to home dose of 40 mg.  Patient claimed to have stopped taking statin for a long time stating, she did not like the way she felt with it  - Off Hydralazine.  Will keep lower dose of ACEI to allow higher BP for adequate peripheral circulation  - Continue to monitor neurovascular status every 3-4 hours  - Limit activity up to standing at bedside only  - Await Vascular recs as to timing of intervention    Lila Smith CCU NP  Beeper #[id number]  Spectra # [id number]

## 2018-03-31 NOTE — CONSULT NOTE ADULT - SUBJECTIVE AND OBJECTIVE BOX
PAGER:  [phone]               [facility] [id number]              EMAIL [email or web address]   OFFICE [phone]                              ********VASCULAR MEDICINE CONSULT NOTE********                         CC:  Left leg pain      HISTORY OF PRESENT ILLNESS:  HPI:  68 y/o AA female with PMHx of PAD, s/p RT. SFA stent, HTN, HLD, DM2, LEILANI (not on CPAP) s/p recent LLE (PANTERA to Lt SFA, balloon angioplasty on Lt pop, & PANTERA to Lt peroneal artery (3/22) presented to the ED c/o worsening leg symptoms.  Patient states that at 230 pm yesterday, she felt "pins and needles" and pain 10/10 on her left leg.  Claims to have been taking all her antiplatelets (ASA, Brilinta, Celostazol) as prescribed.  Denies numbness or loss of sensation.  In ED, Heparin drip started.  Given Oxycodone IR 5 mg with total relief of symptoms. (30 Mar 2018 23:50).  The recently had intervention with Dr. Brooke.  Is presenting with 10/10 leg pain yesterday.  Was started on heparin gtt yesterday and now her pain today is improved to 5/10.  Duplex today shows occluded mid SFA, pop and TPT segments with flow in the peroneal and DP.  Her DAVONTE is reduced.  Lactate is normal.  No pain in her right leg.  No CP or SOB.  Accompanied by her .      Allergies    Cipro (Headache)  Cipro (Other)    Intolerances    	    MEDICATIONS:  amLODIPine   Tablet 10 milliGRAM(s) Oral daily  aspirin enteric coated 81 milliGRAM(s) Oral daily  cilostazol 50 milliGRAM(s) Oral two times a day  heparin  Infusion. 1000 Unit(s)/Hr IV Continuous <Continuous>  heparin  Injectable 5500 Unit(s) IV Push every 6 hours PRN  heparin  Injectable 2500 Unit(s) IV Push every 6 hours PRN  hydrochlorothiazide 25 milliGRAM(s) Oral daily  lisinopril 20 milliGRAM(s) Oral daily  ticagrelor 90 milliGRAM(s) Oral two times a day            atorvastatin 10 milliGRAM(s) Oral at bedtime  dextrose 50% Injectable 12.5 Gram(s) IV Push once  dextrose 50% Injectable 25 Gram(s) IV Push once  dextrose 50% Injectable 25 Gram(s) IV Push once  dextrose Gel 1 Dose(s) Oral once PRN  glucagon  Injectable 1 milliGRAM(s) IntraMuscular once PRN  insulin lispro (HumaLOG) corrective regimen sliding scale   SubCutaneous three times a day before meals  insulin lispro (HumaLOG) corrective regimen sliding scale   SubCutaneous at bedtime    dextrose 5%. 1000 milliLiter(s) IV Continuous <Continuous>      PAST MEDICAL & SURGICAL HISTORY:  High cholesterol  HTN (hypertension)  Diabetes  LEILANI (obstructive sleep apnea): does not use CPAP since gastric bypass surgery  Ulcer of toe of right foot: 2nd  DM (diabetes mellitus)  HTN (hypertension)  Peripheral arterial disease: s/p Right leg stent  H/O gastric bypass  H/O  section  History of intravascular stent placement  S/P  section  H/O abdominoplasty  H/O bilateral breast reduction surgery  S/P gastric bypass      FAMILY HISTORY:  No pertinent family history in first degree relatives  No pertinent family history in first degree relatives      SOCIAL HISTORY:  unchanged    REVIEW OF SYSTEMS:  CONSTITUTIONAL: No fever, weight loss, or fatigue  EYES: No eye pain, visual disturbances, or discharge  ENMT:  No difficulty hearing, tinnitus, vertigo; No sinus or throat pain  NECK: No pain or stiffness  RESPIRATORY: No cough, wheezing, chills or hemoptysis; No Shortness of Breath  CARDIOVASCULAR: No chest pain, palpitations, passing out, dizziness, or leg swelling  GASTROINTESTINAL: No abdominal or epigastric pain. No nausea, vomiting, or hematemesis; No diarrhea or constipation. No melena or hematochezia.  GENITOURINARY: No dysuria, frequency, hematuria, or incontinence  NEUROLOGICAL: No headaches, memory loss, loss of strength, numbness, or tremors  SKIN: No itching, burning, rashes, or lesions   LYMPH Nodes: No enlarged glands  ENDOCRINE: No heat or cold intolerance; No hair loss  MUSCULOSKELETAL: No joint pain or swelling; No muscle, back, or extremity pain  PSYCHIATRIC: No depression, anxiety, mood swings, or difficulty sleeping  HEME/LYMPH: No easy bruising, or bleeding gums  ALLERY AND IMMUNOLOGIC: No hives or eczema	    [x] All others negative	  [ ] Unable to obtain    PHYSICAL EXAM:  T(C): 36.6 (18 @ 07:00), Max: 37.2 (18 @ 19:08)  HR: 82 (18 @ 09:00) (72 - 112)  BP: 119/78 (18 @ 09:00) (119/78 - 191/82)  RR: 13 (18 @ 09:00) (10 - 26)  SpO2: 99% (18 @ 09:00) (97% - 100%)  Wt(kg): --  I&O's Summary    30 Mar 2018 07:  -  31 Mar 2018 07:00  --------------------------------------------------------  IN: 200 mL / OUT: 450 mL / NET: -250 mL    31 Mar 2018 07:  -  31 Mar 2018 12:01  --------------------------------------------------------  IN: 90 mL / OUT: 200 mL / NET: -110 mL        Appearance: Normal	  HEENT:   Normal oral mucosa, PERRL, EOMI	  Lymphatic: No lymphadenopathy  Cardiovascular: Normal S1 S2, No JVD, No murmurs, No edema  Respiratory: Lungs clear to auscultation	  Psychiatry: A & O x 3, Mood & affect appropriate  Gastrointestinal:  Soft, Non-tender, + BS	  Skin: No rashes, No ecchymoses, No cyanosis	  Neurologic: Non-focal  Extremities: Normal range of motion, No clubbing, cyanosis or edema.  Sensation is intact. There is motion intact.  no ulceration or gangrene.  DP and PT non palp.  DP is faint audible       LABS:	 	    CBC Full  -  ( 31 Mar 2018 04:06 )  WBC Count : 9.6 K/uL  Hemoglobin : 9.1 g/dL  Hematocrit : 27.7 %  Platelet Count - Automated : 380 K/uL  Mean Cell Volume : 79.2 fl  Mean Cell Hemoglobin : 26.0 pg  Mean Cell Hemoglobin Concentration : 32.8 gm/dL  Auto Neutrophil # : x  Auto Lymphocyte # : x  Auto Monocyte # : x  Auto Eosinophil # : x  Auto Basophil # : x  Auto Neutrophil % : x  Auto Lymphocyte % : x  Auto Monocyte % : x  Auto Eosinophil % : x  Auto Basophil % : x        x   |  x   |  x   ----------------------------<  152<H>  x    |  x   |  x       142  |  105  |  17  ----------------------------<  143<H>  3.7   |  26  |  0.93    Ca    9.6      31 Mar 2018 04:06  Ca    10.0      30 Mar 2018 21:20  Phos  3.7       Mg     1.5         TPro  7.4  /  Alb  3.7  /  TBili  0.7  /  DBili  x   /  AST  17  /  ALT  10  /  AlkPhos  123<H>    TPro  8.0  /  Alb  4.2  /  TBili  0.7  /  DBili  x   /  AST  14  /  ALT  11  /  AlkPhos  136<H>

## 2018-04-01 LAB
ALBUMIN SERPL ELPH-MCNC: 3.8 G/DL — SIGNIFICANT CHANGE UP (ref 3.3–5)
ALP SERPL-CCNC: 110 U/L — SIGNIFICANT CHANGE UP (ref 40–120)
ALT FLD-CCNC: 9 U/L RC — LOW (ref 10–45)
ANION GAP SERPL CALC-SCNC: 14 MMOL/L — SIGNIFICANT CHANGE UP (ref 5–17)
APTT BLD: 80.3 SEC — HIGH (ref 27.5–37.4)
AST SERPL-CCNC: 19 U/L — SIGNIFICANT CHANGE UP (ref 10–40)
BILIRUB SERPL-MCNC: 0.8 MG/DL — SIGNIFICANT CHANGE UP (ref 0.2–1.2)
BUN SERPL-MCNC: 16 MG/DL — SIGNIFICANT CHANGE UP (ref 7–23)
CALCIUM SERPL-MCNC: 9.3 MG/DL — SIGNIFICANT CHANGE UP (ref 8.4–10.5)
CHLORIDE SERPL-SCNC: 102 MMOL/L — SIGNIFICANT CHANGE UP (ref 96–108)
CK SERPL-CCNC: 72 U/L — SIGNIFICANT CHANGE UP (ref 25–170)
CO2 SERPL-SCNC: 24 MMOL/L — SIGNIFICANT CHANGE UP (ref 22–31)
CREAT SERPL-MCNC: 0.89 MG/DL — SIGNIFICANT CHANGE UP (ref 0.5–1.3)
GLUCOSE BLDC GLUCOMTR-MCNC: 166 MG/DL — HIGH (ref 70–99)
GLUCOSE BLDC GLUCOMTR-MCNC: 235 MG/DL — HIGH (ref 70–99)
GLUCOSE SERPL-MCNC: 144 MG/DL — HIGH (ref 70–99)
HCT VFR BLD CALC: 28.1 % — LOW (ref 34.5–45)
HGB BLD-MCNC: 9.3 G/DL — LOW (ref 11.5–15.5)
INR BLD: 1.12 RATIO — SIGNIFICANT CHANGE UP (ref 0.88–1.16)
MAGNESIUM SERPL-MCNC: 1.6 MG/DL — SIGNIFICANT CHANGE UP (ref 1.6–2.6)
MCHC RBC-ENTMCNC: 26 PG — LOW (ref 27–34)
MCHC RBC-ENTMCNC: 33 GM/DL — SIGNIFICANT CHANGE UP (ref 32–36)
MCV RBC AUTO: 78.9 FL — LOW (ref 80–100)
PHOSPHATE SERPL-MCNC: 3.3 MG/DL — SIGNIFICANT CHANGE UP (ref 2.5–4.5)
PLATELET # BLD AUTO: 359 K/UL — SIGNIFICANT CHANGE UP (ref 150–400)
POTASSIUM SERPL-MCNC: 3.9 MMOL/L — SIGNIFICANT CHANGE UP (ref 3.5–5.3)
POTASSIUM SERPL-SCNC: 3.9 MMOL/L — SIGNIFICANT CHANGE UP (ref 3.5–5.3)
PROT SERPL-MCNC: 7.1 G/DL — SIGNIFICANT CHANGE UP (ref 6–8.3)
PROTHROM AB SERPL-ACNC: 12.1 SEC — SIGNIFICANT CHANGE UP (ref 9.8–12.7)
RBC # BLD: 3.56 M/UL — LOW (ref 3.8–5.2)
RBC # FLD: 17.5 % — HIGH (ref 10.3–14.5)
SODIUM SERPL-SCNC: 140 MMOL/L — SIGNIFICANT CHANGE UP (ref 135–145)
WBC # BLD: 8.9 K/UL — SIGNIFICANT CHANGE UP (ref 3.8–10.5)
WBC # FLD AUTO: 8.9 K/UL — SIGNIFICANT CHANGE UP (ref 3.8–10.5)

## 2018-04-01 PROCEDURE — 93010 ELECTROCARDIOGRAM REPORT: CPT

## 2018-04-01 PROCEDURE — 99233 SBSQ HOSP IP/OBS HIGH 50: CPT | Mod: GC

## 2018-04-01 RX ORDER — POTASSIUM CHLORIDE 20 MEQ
20 PACKET (EA) ORAL ONCE
Qty: 0 | Refills: 0 | Status: COMPLETED | OUTPATIENT
Start: 2018-04-01 | End: 2018-04-01

## 2018-04-01 RX ORDER — MAGNESIUM SULFATE 500 MG/ML
2 VIAL (ML) INJECTION ONCE
Qty: 0 | Refills: 0 | Status: COMPLETED | OUTPATIENT
Start: 2018-04-01 | End: 2018-04-01

## 2018-04-01 RX ORDER — OXYCODONE HYDROCHLORIDE 5 MG/1
5 TABLET ORAL ONCE
Qty: 0 | Refills: 0 | Status: DISCONTINUED | OUTPATIENT
Start: 2018-04-01 | End: 2018-04-01

## 2018-04-01 RX ORDER — SENNA PLUS 8.6 MG/1
2 TABLET ORAL AT BEDTIME
Qty: 0 | Refills: 0 | Status: DISCONTINUED | OUTPATIENT
Start: 2018-04-01 | End: 2018-04-03

## 2018-04-01 RX ORDER — ACETAMINOPHEN 500 MG
650 TABLET ORAL ONCE
Qty: 0 | Refills: 0 | Status: COMPLETED | OUTPATIENT
Start: 2018-04-01 | End: 2018-04-02

## 2018-04-01 RX ORDER — ACETAMINOPHEN 500 MG
650 TABLET ORAL ONCE
Qty: 0 | Refills: 0 | Status: DISCONTINUED | OUTPATIENT
Start: 2018-04-01 | End: 2018-04-01

## 2018-04-01 RX ORDER — OXYCODONE AND ACETAMINOPHEN 5; 325 MG/1; MG/1
1 TABLET ORAL ONCE
Qty: 0 | Refills: 0 | Status: DISCONTINUED | OUTPATIENT
Start: 2018-04-01 | End: 2018-04-01

## 2018-04-01 RX ORDER — ATORVASTATIN CALCIUM 80 MG/1
40 TABLET, FILM COATED ORAL AT BEDTIME
Qty: 0 | Refills: 0 | Status: DISCONTINUED | OUTPATIENT
Start: 2018-04-01 | End: 2018-04-06

## 2018-04-01 RX ORDER — DOCUSATE SODIUM 100 MG
100 CAPSULE ORAL THREE TIMES A DAY
Qty: 0 | Refills: 0 | Status: DISCONTINUED | OUTPATIENT
Start: 2018-04-01 | End: 2018-04-03

## 2018-04-01 RX ORDER — ACETAMINOPHEN 500 MG
650 TABLET ORAL ONCE
Qty: 0 | Refills: 0 | Status: COMPLETED | OUTPATIENT
Start: 2018-04-01 | End: 2018-04-01

## 2018-04-01 RX ADMIN — LISINOPRIL 20 MILLIGRAM(S): 2.5 TABLET ORAL at 09:21

## 2018-04-01 RX ADMIN — Medication 50 GRAM(S): at 06:26

## 2018-04-01 RX ADMIN — TICAGRELOR 90 MILLIGRAM(S): 90 TABLET ORAL at 09:21

## 2018-04-01 RX ADMIN — OXYCODONE HYDROCHLORIDE 5 MILLIGRAM(S): 5 TABLET ORAL at 21:00

## 2018-04-01 RX ADMIN — Medication 650 MILLIGRAM(S): at 03:12

## 2018-04-01 RX ADMIN — CILOSTAZOL 50 MILLIGRAM(S): 100 TABLET ORAL at 18:49

## 2018-04-01 RX ADMIN — CILOSTAZOL 50 MILLIGRAM(S): 100 TABLET ORAL at 12:47

## 2018-04-01 RX ADMIN — TICAGRELOR 90 MILLIGRAM(S): 90 TABLET ORAL at 18:49

## 2018-04-01 RX ADMIN — AMLODIPINE BESYLATE 10 MILLIGRAM(S): 2.5 TABLET ORAL at 20:31

## 2018-04-01 RX ADMIN — OXYCODONE HYDROCHLORIDE 5 MILLIGRAM(S): 5 TABLET ORAL at 20:30

## 2018-04-01 RX ADMIN — HEPARIN SODIUM 800 UNIT(S)/HR: 5000 INJECTION INTRAVENOUS; SUBCUTANEOUS at 03:47

## 2018-04-01 RX ADMIN — OXYCODONE AND ACETAMINOPHEN 1 TABLET(S): 5; 325 TABLET ORAL at 16:48

## 2018-04-01 RX ADMIN — OXYCODONE AND ACETAMINOPHEN 1 TABLET(S): 5; 325 TABLET ORAL at 18:46

## 2018-04-01 RX ADMIN — Medication 20 MILLIEQUIVALENT(S): at 09:21

## 2018-04-01 RX ADMIN — Medication 81 MILLIGRAM(S): at 12:47

## 2018-04-01 RX ADMIN — ATORVASTATIN CALCIUM 40 MILLIGRAM(S): 80 TABLET, FILM COATED ORAL at 20:31

## 2018-04-01 NOTE — CHART NOTE - NSCHARTNOTEFT_GEN_A_CORE
====================  CCU MIDNIGHT ROUNDS  ====================    HARVEY RIZZO  [id number]  Patient is a 67y old  Female who presents with a chief complaint of left leg pain with feeling of pins and needles (30 Mar 2018 23:50)    ====================  SUMMARY:  ====================  66 y/o AA female with PMHx of PAD, s/p Rt. SFA stent, HTN, HLD, DM2, LEILANI (not on CPAP) s/p recent LLE (PANTERA to Lt SFA, balloon angioplasty on Lt pop, & PANETRA to Lt peroneal artery) 3/22 presented to the ED c/o worsening leg symptoms.  Started on Heparin.  ====================  NEW EVENTS:  ====================  No neurovascular changes on LLE.  For angiogram with intervention in am    MEDICATIONS  (STANDING):  acetaminophen   Tablet 650 milliGRAM(s) Oral once  amLODIPine   Tablet 10 milliGRAM(s) Oral daily  aspirin enteric coated 81 milliGRAM(s) Oral daily  atorvastatin 40 milliGRAM(s) Oral at bedtime  cilostazol 50 milliGRAM(s) Oral two times a day  dextrose 5%. 1000 milliLiter(s) (50 mL/Hr) IV Continuous <Continuous>  dextrose 50% Injectable 12.5 Gram(s) IV Push once  dextrose 50% Injectable 25 Gram(s) IV Push once  dextrose 50% Injectable 25 Gram(s) IV Push once  docusate sodium 100 milliGRAM(s) Oral three times a day  heparin  Infusion. 1000 Unit(s)/Hr (10 mL/Hr) IV Continuous <Continuous>  insulin lispro (HumaLOG) corrective regimen sliding scale   SubCutaneous three times a day before meals  insulin lispro (HumaLOG) corrective regimen sliding scale   SubCutaneous at bedtime  lisinopril 20 milliGRAM(s) Oral daily  senna 2 Tablet(s) Oral at bedtime  ticagrelor 90 milliGRAM(s) Oral two times a day    MEDICATIONS  (PRN):  dextrose Gel 1 Dose(s) Oral once PRN Blood Glucose LESS THAN 70 milliGRAM(s)/deciliter  glucagon  Injectable 1 milliGRAM(s) IntraMuscular once PRN Glucose LESS THAN 70 milligrams/deciliter  heparin  Injectable 5500 Unit(s) IV Push every 6 hours PRN For aPTT less than 40  heparin  Injectable 2500 Unit(s) IV Push every 6 hours PRN For aPTT between 40 - 57    ====================  VITALS (Last 12 hrs):  ====================    T(C): 36.8 (04-01-18 @ 16:00), Max: 36.8 (04-01-18 @ 16:00)  T(F): 98.2 (04-01-18 @ 16:00), Max: 98.2 (04-01-18 @ 16:00)  HR: 83 (04-01-18 @ 21:00) (83 - 105)  BP: 166/76 (04-01-18 @ 21:00) (99/49 - 166/76)  BP(mean): 102 (04-01-18 @ 21:00) (62 - 148)  ABP: --  ABP(mean): --  RR: 21 (04-01-18 @ 21:00) (18 - 21)  SpO2: 97% (04-01-18 @ 21:00) (96% - 100%)  Wt(kg): --  CVP(mm Hg): --  CVP(cm H2O): --  CO: --  CI: --  PA: --  PA(mean): --  PCWP: --  SVR: --  PVR: --    I&O's Summary    31 Mar 2018 07:01  -  01 Apr 2018 07:00  --------------------------------------------------------  IN: 556 mL / OUT: 1800 mL / NET: -1244 mL    01 Apr 2018 07:01  -  01 Apr 2018 21:10  --------------------------------------------------------  IN: 619 mL / OUT: 0 mL / NET: 619 mL    ====================  NEW LABS:  ====================    04-01    140  |  102  |  16  ----------------------------<  144<H>  3.9   |  24  |  0.89    Ca    9.3      01 Apr 2018 02:36  Phos  3.3     04-01  Mg     1.6     04-01    TPro  7.1  /  Alb  3.8  /  TBili  0.8  /  DBili  x   /  AST  19  /  ALT  9<L>  /  AlkPhos  110  04-01    PT/INR - ( 01 Apr 2018 02:35 )   PT: 12.1 sec;   INR: 1.12 ratio      PTT - ( 01 Apr 2018 02:35 )  PTT:80.3 sec  Creatine Kinase, Serum: 72 U/L (04-01-18 @ 02:36)    ====================  PLAN:  ====================  -       Lila Smith [facility] NP  Beeper #[id number]  Spectra # [phone] ====================  CCU MIDNIGHT ROUNDS  ====================    HARVEY RIZZO  [id number]  Patient is a 67y old  Female who presents with a chief complaint of left leg pain with feeling of pins and needles (30 Mar 2018 23:50)    ====================  SUMMARY:  ====================  66 y/o AA female with PMHx of PAD, s/p Rt. SFA stent, HTN, HLD, DM2, LEILANI (not on CPAP) s/p recent LLE (PANTERA to Lt SFA, balloon angioplasty on Lt pop, & PANTERA to Lt peroneal artery) 3/22 presented to the ED c/o worsening leg symptoms.  Started on Heparin.  ====================  NEW EVENTS:  ====================  No neurovascular changes on LLE.  For angiogram with intervention in am    MEDICATIONS  (STANDING):  acetaminophen   Tablet 650 milliGRAM(s) Oral once  amLODIPine   Tablet 10 milliGRAM(s) Oral daily  aspirin enteric coated 81 milliGRAM(s) Oral daily  atorvastatin 40 milliGRAM(s) Oral at bedtime  cilostazol 50 milliGRAM(s) Oral two times a day  dextrose 5%. 1000 milliLiter(s) (50 mL/Hr) IV Continuous <Continuous>  dextrose 50% Injectable 12.5 Gram(s) IV Push once  dextrose 50% Injectable 25 Gram(s) IV Push once  dextrose 50% Injectable 25 Gram(s) IV Push once  docusate sodium 100 milliGRAM(s) Oral three times a day  heparin  Infusion. 1000 Unit(s)/Hr (10 mL/Hr) IV Continuous <Continuous>  insulin lispro (HumaLOG) corrective regimen sliding scale   SubCutaneous three times a day before meals  insulin lispro (HumaLOG) corrective regimen sliding scale   SubCutaneous at bedtime  lisinopril 20 milliGRAM(s) Oral daily  senna 2 Tablet(s) Oral at bedtime  ticagrelor 90 milliGRAM(s) Oral two times a day    MEDICATIONS  (PRN):  dextrose Gel 1 Dose(s) Oral once PRN Blood Glucose LESS THAN 70 milliGRAM(s)/deciliter  glucagon  Injectable 1 milliGRAM(s) IntraMuscular once PRN Glucose LESS THAN 70 milligrams/deciliter  heparin  Injectable 5500 Unit(s) IV Push every 6 hours PRN For aPTT less than 40  heparin  Injectable 2500 Unit(s) IV Push every 6 hours PRN For aPTT between 40 - 57    ====================  VITALS (Last 12 hrs):  ====================    T(C): 36.8 (04-01-18 @ 16:00), Max: 36.8 (04-01-18 @ 16:00)  T(F): 98.2 (04-01-18 @ 16:00), Max: 98.2 (04-01-18 @ 16:00)  HR: 83 (04-01-18 @ 21:00) (83 - 105)  BP: 166/76 (04-01-18 @ 21:00) (99/49 - 166/76)  BP(mean): 102 (04-01-18 @ 21:00) (62 - 148)  ABP: --  ABP(mean): --  RR: 21 (04-01-18 @ 21:00) (18 - 21)  SpO2: 97% (04-01-18 @ 21:00) (96% - 100%)  Wt(kg): --  CVP(mm Hg): --  CVP(cm H2O): --  CO: --  CI: --  PA: --  PA(mean): --  PCWP: --  SVR: --  PVR: --    I&O's Summary    31 Mar 2018 07:01  -  01 Apr 2018 07:00  --------------------------------------------------------  IN: 556 mL / OUT: 1800 mL / NET: -1244 mL    01 Apr 2018 07:01  -  01 Apr 2018 21:10  --------------------------------------------------------  IN: 619 mL / OUT: 0 mL / NET: 619 mL    ====================  NEW LABS:  ====================    04-01    140  |  102  |  16  ----------------------------<  144<H>  3.9   |  24  |  0.89    Ca    9.3      01 Apr 2018 02:36  Phos  3.3     04-01  Mg     1.6     04-01    TPro  7.1  /  Alb  3.8  /  TBili  0.8  /  DBili  x   /  AST  19  /  ALT  9<L>  /  AlkPhos  110  04-01    PT/INR - ( 01 Apr 2018 02:35 )   PT: 12.1 sec;   INR: 1.12 ratio      PTT - ( 01 Apr 2018 02:35 )  PTT:80.3 sec  Creatine Kinase, Serum: 72 U/L (04-01-18 @ 02:36)    ====================  PLAN:  ====================  66 y/o AA female with PMHx of PAD, s/p RT. SFA stent, HTN, HLD, DM2, LEILANI (not on CPAP) s/p recent LLE (PANTERA to Lt SFA, balloon angioplasty on Lt pop, & PANTERA to Lt peroneal artery (3/22) presented with acute limb ischemia of LLE  - LLE arterial doppler revealed occlusion of prior intervention  - DAVONTE/PVR showed markedly reduced flow of the left lower extremity at femoral-popliteal level   - TTE showed mitral annular calcification, otherwise normal mitral valve; concentric LV remodeling; hyperdynamic LV; mild diastolic dysfunction   - Continue Heparin drip  - Continue ASA, Brilinta, and Pletal  - Continue Lipitor and titrate up to home dose of 40 mg.  Patient claimed to have stopped taking statin for a long time stating, she did not like the way she felt with it  - Off Hydralazine.  Will keep lower dose of ACEI to allow higher BP (SBP of 160 per Vascular) for adequate peripheral circulation  - Continue to monitor neurovascular status every 3-4 hours  - Limit activity up to standing at bedside only  - For LLE PCI in am.  Keep NPO after breakfast    Lila Smith [facility] NP  Beeper #[id number]  Spectra # [phone]

## 2018-04-01 NOTE — PROGRESS NOTE ADULT - ASSESSMENT
Assessment:  1.  Acute Limb Ischemia  - improved symptoms currently   - on heparin gtt and antiplatelet  thereapy  - mid SFA/pop and TPT occlusion  2.  PAD  3.  HTN  4.  HLD    Plan  1.  Continue CCU monitoring.    2.  Continue with heparin gtt for full dose anticoagulation  3.  Continue antiplatelet therapy and statin  4.   Continue off HCTZ Want her BP slightly higher than normal to increase perfusion to leg  5.  Limit activity to laying or sitting for now  6.  Plan for peripheral angiogram late afternoon tomorrow.  She may have breakfast, then NPO      Will follow closely    Thanks    Blaine Garcia  [id number]

## 2018-04-01 NOTE — PROGRESS NOTE ADULT - SUBJECTIVE AND OBJECTIVE BOX
PAGER:  [phone]               [facility] [id number]              EMAIL [email or web address]   OFFICE [phone]                              ********VASCULAR MEDICINE PROGRESS NOTE********                            CC:  Left leg pain      INTERVAL HISTORY:  with 5/10 left leg pain.  sitting in chair.  no other complaints.        HISTORY OF PRESENT ILLNESS:  HPI:  66 y/o AA female with PMHx of PAD, s/p RT. SFA stent, HTN, HLD, DM2, LEILANI (not on CPAP) s/p recent LLE (PANTERA to Lt SFA, balloon angioplasty on Lt pop, & PANTERA to Lt peroneal artery (3/22) presented to the ED c/o worsening leg symptoms.  Patient states that at 230 pm yesterday, she felt "pins and needles" and pain 10/10 on her left leg.  Claims to have been taking all her antiplatelets (ASA, Brilinta, Celostazol) as prescribed.  Denies numbness or loss of sensation.    In ED, Heparin drip started.  Given Oxycodone IR 5 mg with total relief of symptoms. (30 Mar 2018 23:50)          Allergies    Cipro (Headache)  Cipro (Other)    Intolerances    	    MEDICATIONS:  amLODIPine   Tablet 10 milliGRAM(s) Oral daily  aspirin enteric coated 81 milliGRAM(s) Oral daily  cilostazol 50 milliGRAM(s) Oral two times a day  heparin  Infusion. 1000 Unit(s)/Hr IV Continuous <Continuous>  heparin  Injectable 5500 Unit(s) IV Push every 6 hours PRN  heparin  Injectable 2500 Unit(s) IV Push every 6 hours PRN  lisinopril 20 milliGRAM(s) Oral daily  ticagrelor 90 milliGRAM(s) Oral two times a day          docusate sodium 100 milliGRAM(s) Oral three times a day  senna 2 Tablet(s) Oral at bedtime    atorvastatin 20 milliGRAM(s) Oral at bedtime  dextrose 50% Injectable 12.5 Gram(s) IV Push once  dextrose 50% Injectable 25 Gram(s) IV Push once  dextrose 50% Injectable 25 Gram(s) IV Push once  dextrose Gel 1 Dose(s) Oral once PRN  glucagon  Injectable 1 milliGRAM(s) IntraMuscular once PRN  insulin lispro (HumaLOG) corrective regimen sliding scale   SubCutaneous three times a day before meals  insulin lispro (HumaLOG) corrective regimen sliding scale   SubCutaneous at bedtime    dextrose 5%. 1000 milliLiter(s) IV Continuous <Continuous>      PAST MEDICAL & SURGICAL HISTORY:  High cholesterol  HTN (hypertension)  Diabetes  LEILANI (obstructive sleep apnea): does not use CPAP since gastric bypass surgery  Ulcer of toe of right foot: 2nd  DM (diabetes mellitus)  HTN (hypertension)  Peripheral arterial disease: s/p Right leg stent  H/O gastric bypass  H/O  section  History of intravascular stent placement  S/P  section  H/O abdominoplasty  H/O bilateral breast reduction surgery  S/P gastric bypass      FAMILY HISTORY:  No pertinent family history in first degree relatives  No pertinent family history in first degree relatives      SOCIAL HISTORY:  unchanged    REVIEW OF SYSTEMS:  CONSTITUTIONAL: No fever, weight loss, or fatigue  EYES: No eye pain, visual disturbances, or discharge  ENMT:  No difficulty hearing, tinnitus, vertigo; No sinus or throat pain  NECK: No pain or stiffness  RESPIRATORY: No cough, wheezing, chills or hemoptysis; No Shortness of Breath  CARDIOVASCULAR: No chest pain, palpitations, passing out, dizziness, or leg swelling  GASTROINTESTINAL: No abdominal or epigastric pain. No nausea, vomiting, or hematemesis; No diarrhea or constipation. No melena or hematochezia.  GENITOURINARY: No dysuria, frequency, hematuria, or incontinence  NEUROLOGICAL: No headaches, memory loss, loss of strength, numbness, or tremors  SKIN: No itching, burning, rashes, or lesions   LYMPH Nodes: No enlarged glands  ENDOCRINE: No heat or cold intolerance; No hair loss  MUSCULOSKELETAL: Left leg pain   PSYCHIATRIC: No depression, anxiety, mood swings, or difficulty sleeping  HEME/LYMPH: No easy bruising, or bleeding gums  ALLERY AND IMMUNOLOGIC: No hives or eczema	    [x] All others negative	  [ ] Unable to obtain    PHYSICAL EXAM:  T(C): 36.9 (18 @ 08:00), Max: 37.2 (18 @ 13:00)  HR: 92 (18 @ 11:00) (69 - 99)  BP: 99/49 (18 @ 11:00) (99/49 - 181/68)  RR: 20 (18 @ 08:00) (15 - 20)  SpO2: 99% (18 @ 11:00) (96% - 100%)  Wt(kg): --  I&O's Summary    31 Mar 2018 07:  -  2018 07:00  --------------------------------------------------------  IN: 556 mL / OUT: 1800 mL / NET: -1244 mL    2018 07:  -  2018 12:05  --------------------------------------------------------  IN: 24 mL / OUT: 0 mL / NET: 24 mL        Appearance: Normal	  HEENT:   Normal oral mucosa, PERRL, EOMI	  Lymphatic: No lymphadenopathy  Cardiovascular: Normal S1 S2, No JVD, No murmurs, No edema  Respiratory: Lungs clear to auscultation	  Psychiatry: A & O x 3, Mood & affect appropriate  Gastrointestinal:  Soft, Non-tender, + BS	  Skin: No rashes, No ecchymoses, No cyanosis	  Neurologic: Non-focal  Extremities: Normal range of motion, No clubbing, cyanosis or edema  Vascular:Left  leg ROM intact.  Sensation intact.  Non  palp pedal pulses.       LABS:	 	    CBC Full  -  ( 2018 02:35 )  WBC Count : 8.9 K/uL  Hemoglobin : 9.3 g/dL  Hematocrit : 28.1 %  Platelet Count - Automated : 359 K/uL  Mean Cell Volume : 78.9 fl  Mean Cell Hemoglobin : 26.0 pg  Mean Cell Hemoglobin Concentration : 33.0 gm/dL  Auto Neutrophil # : x  Auto Lymphocyte # : x  Auto Monocyte # : x  Auto Eosinophil # : x  Auto Basophil # : x  Auto Neutrophil % : x  Auto Lymphocyte % : x  Auto Monocyte % : x  Auto Eosinophil % : x  Auto Basophil % : x    04    140  |  102  |  16  ----------------------------<  144<H>  3.9   |  24  |  0.89  03-31    x   |  x   |  x   ----------------------------<  152<H>  x    |  x   |  x     Ca    9.3      2018 02:36  Ca    9.6      31 Mar 2018 04:06  Phos  3.3       Phos  3.7       Mg     1.6       Mg     1.5         TPro  7.1  /  Alb  3.8  /  TBili  0.8  /  DBili  x   /  AST  19  /  ALT  9<L>  /  AlkPhos  110    TPro  7.4  /  Alb  3.7  /  TBili  0.7  /  DBili  x   /  AST  17  /  ALT  10  /  AlkPhos  123<H>

## 2018-04-01 NOTE — PROGRESS NOTE ADULT - SUBJECTIVE AND OBJECTIVE BOX
CHIEF COMPLAINT::  ====================  INTERVAL HISTORY:  ====================  No acute events O/N    ====================  REVIEW OF SYSTEMS: all others negative  ====================    ====================  MEDICATIONS:  ====================  MEDICATIONS  (STANDING):  amLODIPine   Tablet 10 milliGRAM(s) Oral daily  aspirin enteric coated 81 milliGRAM(s) Oral daily  atorvastatin 20 milliGRAM(s) Oral at bedtime  cilostazol 50 milliGRAM(s) Oral two times a day  dextrose 5%. 1000 milliLiter(s) (50 mL/Hr) IV Continuous <Continuous>  dextrose 50% Injectable 12.5 Gram(s) IV Push once  dextrose 50% Injectable 25 Gram(s) IV Push once  dextrose 50% Injectable 25 Gram(s) IV Push once  docusate sodium 100 milliGRAM(s) Oral three times a day  heparin  Infusion. 1000 Unit(s)/Hr (10 mL/Hr) IV Continuous <Continuous>  insulin lispro (HumaLOG) corrective regimen sliding scale   SubCutaneous three times a day before meals  insulin lispro (HumaLOG) corrective regimen sliding scale   SubCutaneous at bedtime  lisinopril 20 milliGRAM(s) Oral daily  potassium chloride    Tablet ER 20 milliEquivalent(s) Oral once  senna 2 Tablet(s) Oral at bedtime  ticagrelor 90 milliGRAM(s) Oral two times a day    MEDICATIONS  (PRN):  dextrose Gel 1 Dose(s) Oral once PRN Blood Glucose LESS THAN 70 milliGRAM(s)/deciliter  glucagon  Injectable 1 milliGRAM(s) IntraMuscular once PRN Glucose LESS THAN 70 milligrams/deciliter  heparin  Injectable 5500 Unit(s) IV Push every 6 hours PRN For aPTT less than 40  heparin  Injectable 2500 Unit(s) IV Push every 6 hours PRN For aPTT between 40 - 57      ====================  OBJECTIVE:  ====================  Vital Signs Last 24 Hrs  T(C): 36.7 (31 Mar 2018 19:00), Max: 37.2 (31 Mar 2018 13:00)  T(F): 98.1 (31 Mar 2018 19:00), Max: 99 (31 Mar 2018 13:00)  HR: 87 (2018 06:00) (69 - 94)  BP: 151/77 (2018 06:00) (119/78 - 181/68)  BP(mean): 95 (2018 06:00) (76 - 108)  RR: 20 (2018 06:00) (13 - 20)  SpO2: 100% (:00) (98% - 100%)  ICU Vital Signs Last 24 Hrs  T(C): 36.7 (31 Mar 2018 19:00), Max: 37.2 (31 Mar 2018 13:00)  T(F): 98.1 (31 Mar 2018 19:00), Max: 99 (31 Mar 2018 13:00)  HR: 87 (2018 06:00) (69 - 94)  BP: 151/77 (2018 06:00) (119/78 - 181/68)  BP(mean): 95 (2018 06:00) (76 - 108)  ABP: --  ABP(mean): --  RR: 20 (2018 06:00) (13 - 20)  SpO2: 100% (2018 06:00) (98% - 100%)      Adult Advanced Hemodynamics Last 24 Hrs  CVP(mm Hg): --  CVP(cm H2O): --  CO: --  CI: --  PA: --  PA(mean): --  PCWP: --  SVR: --  SVRI: --  PVR: --  PVRI: --       @ :  -   @ 07:00  --------------------------------------------------------  IN: 200 mL / OUT: 450 mL / NET: -250 mL    03-31 @ 07:01  -   @ 06:42  --------------------------------------------------------  IN: 532 mL / OUT: 1600 mL / NET: -1068 mL      Daily     Daily     PHYSICAL EXAM:      Constitutional:    Eyes:    ENMT:    Neck:    Breasts:    Back:    Respiratory:    Cardiovascular:    Gastrointestinal:    Genitourinary:    Rectal:    Extremities:    Vascular:    Neurological:    Skin:    Lymph Nodes:    Musculoskeletal:    Psychiatric:        TELEMETRY:     EKG:     IMAGIN.3    8.9   )-----------( 359      ( 2018 02:35 )             28.1         140  |  102  |  16  ----------------------------<  144<H>  3.9   |  24  |  0.89    Ca    9.3      2018 02:36  Phos  3.3       Mg     1.6         TPro  7.1  /  Alb  3.8  /  TBili  0.8  /  DBili  x   /  AST  19  /  ALT  9<L>  /  AlkPhos  110  01    LIVER FUNCTIONS - ( 2018 02:36 )  Alb: 3.8 g/dL / Pro: 7.1 g/dL / ALK PHOS: 110 U/L / ALT: 9 U/L RC / AST: 19 U/L / GGT: x           PT/INR - ( 2018 02:35 )   PT: 12.1 sec;   INR: 1.12 ratio         PTT - ( 2018 02:35 )  PTT:80.3 sec  Creatine Kinase, Serum: 72 U/L ( @ 02:36)  Creatine Kinase, Serum: 75 U/L ( @ 08:25)  CKMB Units: 1.7 ng/mL ( @ 08:25)  Troponin T, Serum: <0.01 ng/mL ( @ 08:25)        HEALTH ISSUES - PROBLEM Dx:  High cholesterol: High cholesterol  Limb ischemia: Limb ischemia  Need for prophylactic measure: Need for prophylactic measure  Essential hypertension: Essential hypertension  Type 2 diabetes mellitus with other circulatory complication, unspecified long term insulin use status: Type 2 diabetes mellitus with other circulatory complication, unspecified long term insulin use status  PAD (peripheral artery disease): PAD (peripheral artery disease)        HEALTH ISSUES - R/O PROBLEM Dx: CHIEF COMPLAINT::  ====================  INTERVAL HISTORY:  ====================  No acute events O/N. Patient complains of headache, lack of sleep and restlessness 2/2 disturbances from other patient in room    ====================  REVIEW OF SYSTEMS:   ====================  Constitutional: [ ] Fever, [ ] Chills, [ ] Fatigue, [ ]Weight change   HEENT: [ ]Blurred vision,  [x]Headache, [ ] Vision Changes    Respiratory: [ ]Cough, [ ]Wheezing, [ ] Shortness of breath, [ ] Hemoptysis   Cardiovascular: [ ]Chest Pain, [ ]Palpitations, [ ]DOUGLAS, [ ]PND, [ ] Orthopnea  Gastrointestinal: [ ]Abdominal Pain, [ ]Diarrhea, [ ]Constipation, [ ] Nausea, [ ] Vomiting  Extremities: [] Swelling, [x] Leg Pain.  Neurologic: [ ]Focal deficit, [ ]Paresthesias, [ ]Syncope  Skin: [ ]Rash, [ ]Ecchymoses, [ ] Wounds, [ ]Lesions  [x] All others negative    ====================  MEDICATIONS:  ====================  MEDICATIONS  (STANDING):  amLODIPine   Tablet 10 milliGRAM(s) Oral daily  aspirin enteric coated 81 milliGRAM(s) Oral daily  atorvastatin 20 milliGRAM(s) Oral at bedtime  cilostazol 50 milliGRAM(s) Oral two times a day  dextrose 5%. 1000 milliLiter(s) (50 mL/Hr) IV Continuous <Continuous>  dextrose 50% Injectable 12.5 Gram(s) IV Push once  dextrose 50% Injectable 25 Gram(s) IV Push once  dextrose 50% Injectable 25 Gram(s) IV Push once  docusate sodium 100 milliGRAM(s) Oral three times a day  heparin  Infusion. 1000 Unit(s)/Hr (10 mL/Hr) IV Continuous <Continuous>  insulin lispro (HumaLOG) corrective regimen sliding scale   SubCutaneous three times a day before meals  insulin lispro (HumaLOG) corrective regimen sliding scale   SubCutaneous at bedtime  lisinopril 20 milliGRAM(s) Oral daily  potassium chloride    Tablet ER 20 milliEquivalent(s) Oral once  senna 2 Tablet(s) Oral at bedtime  ticagrelor 90 milliGRAM(s) Oral two times a day    MEDICATIONS  (PRN):  dextrose Gel 1 Dose(s) Oral once PRN Blood Glucose LESS THAN 70 milliGRAM(s)/deciliter  glucagon  Injectable 1 milliGRAM(s) IntraMuscular once PRN Glucose LESS THAN 70 milligrams/deciliter  heparin  Injectable 5500 Unit(s) IV Push every 6 hours PRN For aPTT less than 40  heparin  Injectable 2500 Unit(s) IV Push every 6 hours PRN For aPTT between 40 - 57      ====================  OBJECTIVE:  ====================  Vital Signs Last 24 Hrs  T(C): 36.7 (31 Mar 2018 19:00), Max: 37.2 (31 Mar 2018 13:00)  T(F): 98.1 (31 Mar 2018 19:00), Max: 99 (31 Mar 2018 13:00)  HR: 87 (2018 06:00) (69 - 94)  BP: 151/77 (2018 06:00) (119/78 - 181/68)  BP(mean): 95 (2018 06:00) (76 - 108)  RR: 20 (2018 06:00) (13 - 20)  SpO2: 100% (2018 06:00) (98% - 100%)  ICU Vital Signs Last 24 Hrs  T(C): 36.7 (31 Mar 2018 19:00), Max: 37.2 (31 Mar 2018 13:00)  T(F): 98.1 (31 Mar 2018 19:00), Max: 99 (31 Mar 2018 13:00)  HR: 87 (2018 06:00) (69 - 94)  BP: 151/77 (2018 06:00) (119/78 - 181/68)  BP(mean): 95 (2018 06:00) (76 - 108)  ABP: --  ABP(mean): --  RR: 20 (2018 06:00) (13 - 20)  SpO2: 100% (2018 06:00) (98% - 100%)      Adult Advanced Hemodynamics Last 24 Hrs  CVP(mm Hg): --  CVP(cm H2O): --  CO: --  CI: --  PA: --  PA(mean): --  PCWP: --  SVR: --  SVRI: --  PVR: --  PVRI: --       @ 07:01  -   @ 07:00  --------------------------------------------------------  IN: 200 mL / OUT: 450 mL / NET: -250 mL     @ 07:01  -  0401 @ 06:42  --------------------------------------------------------  IN: 532 mL / OUT: 1600 mL / NET: -1068 mL      Daily     Daily     PHYSICAL EXAM  Appearance: Normal, NAD  Eyes: PERRL, EOMI  HENT: Normal oral muscosa, NC/AT  Cardiovascular:  S1/S2, RRR, No edema, JVP, Murmur  Respiratory: Clear to auscultation bilaterally  Gastrointestinal:  Soft, Non-tender, Non-distended, BS+  Musculoskeletal:  No clubbing No joint deformity   Vascular: No palpable Right DP, PT. Doppler Right PT  Neurologic: Non-focal  Lymphatic: No lymphadenopathy  Psychiatry: AAOx3, Mood & affect appropriate  Skin: No rashes, No ecchymoses, No cyanosis      TELEMETRY: NSR 80s,   EKG: NSR  IMAGIN.3    8.9   )-----------( 359      ( 2018 02:35 )             28.1     04-01    140  |  102  |  16  ----------------------------<  144<H>  3.9   |  24  |  0.89    Ca    9.3      2018 02:36  Phos  3.3     04-01  Mg     1.6         TPro  7.1  /  Alb  3.8  /  TBili  0.8  /  DBili  x   /  AST  19  /  ALT  9<L>  /  AlkPhos  110      LIVER FUNCTIONS - ( 2018 02:36 )  Alb: 3.8 g/dL / Pro: 7.1 g/dL / ALK PHOS: 110 U/L / ALT: 9 U/L RC / AST: 19 U/L / GGT: x           PT/INR - ( 2018 02:35 )   PT: 12.1 sec;   INR: 1.12 ratio         PTT - ( 2018 02:35 )  PTT:80.3 sec  Creatine Kinase, Serum: 72 U/L ( @ 02:36)  Creatine Kinase, Serum: 75 U/L ( @ 08:25)  CKMB Units: 1.7 ng/mL ( @ 08:25)  Troponin T, Serum: <0.01 ng/mL ( @ 08:25)        HEALTH ISSUES - PROBLEM Dx:  High cholesterol: High cholesterol  Limb ischemia: Limb ischemia  Need for prophylactic measure: Need for prophylactic measure  Essential hypertension: Essential hypertension  Type 2 diabetes mellitus with other circulatory complication, unspecified long term insulin use status: Type 2 diabetes mellitus with other circulatory complication, unspecified long term insulin use status  PAD (peripheral artery disease): PAD (peripheral artery disease)        HEALTH ISSUES - R/O PROBLEM Dx: CHIEF COMPLAINT::  ====================  INTERVAL HISTORY:  ====================  No acute events O/N. Patient complains of headache, lack of sleep and restlessness 2/2 disturbances from other patient in room    ====================  HOSPITAL STAY:  ====================  68 y/o AA female with PMHx of PAD, s/p Rt. SFA stent, HTN, HLD, DM2, LEILANI (not on CPAP) s/p recent LLE (PANTERA to Lt SFA, balloon angioplasty on Lt pop, & PANTERA to Lt peroneal artery) 3/22 presented to the ED c/o worsening leg symptoms, pending vascular intervention.    ====================  REVIEW OF SYSTEMS:   ====================  Constitutional: [ ] Fever, [ ] Chills, [ ] Fatigue, [ ]Weight change   HEENT: [ ]Blurred vision,  [x]Headache, [ ] Vision Changes    Respiratory: [ ]Cough, [ ]Wheezing, [ ] Shortness of breath, [ ] Hemoptysis   Cardiovascular: [ ]Chest Pain, [ ]Palpitations, [ ]DOUGLAS, [ ]PND, [ ] Orthopnea  Gastrointestinal: [ ]Abdominal Pain, [ ]Diarrhea, [ ]Constipation, [ ] Nausea, [ ] Vomiting  Extremities: [] Swelling, [x] Leg Pain.  Neurologic: [ ]Focal deficit, [ ]Paresthesias, [ ]Syncope  Skin: [ ]Rash, [ ]Ecchymoses, [ ] Wounds, [ ]Lesions  [x] All others negative    ====================  MEDICATIONS:  ====================  MEDICATIONS  (STANDING):  amLODIPine   Tablet 10 milliGRAM(s) Oral daily  aspirin enteric coated 81 milliGRAM(s) Oral daily  atorvastatin 20 milliGRAM(s) Oral at bedtime  cilostazol 50 milliGRAM(s) Oral two times a day  dextrose 5%. 1000 milliLiter(s) (50 mL/Hr) IV Continuous <Continuous>  dextrose 50% Injectable 12.5 Gram(s) IV Push once  dextrose 50% Injectable 25 Gram(s) IV Push once  dextrose 50% Injectable 25 Gram(s) IV Push once  docusate sodium 100 milliGRAM(s) Oral three times a day  heparin  Infusion. 1000 Unit(s)/Hr (10 mL/Hr) IV Continuous <Continuous>  insulin lispro (HumaLOG) corrective regimen sliding scale   SubCutaneous three times a day before meals  insulin lispro (HumaLOG) corrective regimen sliding scale   SubCutaneous at bedtime  lisinopril 20 milliGRAM(s) Oral daily  potassium chloride    Tablet ER 20 milliEquivalent(s) Oral once  senna 2 Tablet(s) Oral at bedtime  ticagrelor 90 milliGRAM(s) Oral two times a day    MEDICATIONS  (PRN):  dextrose Gel 1 Dose(s) Oral once PRN Blood Glucose LESS THAN 70 milliGRAM(s)/deciliter  glucagon  Injectable 1 milliGRAM(s) IntraMuscular once PRN Glucose LESS THAN 70 milligrams/deciliter  heparin  Injectable 5500 Unit(s) IV Push every 6 hours PRN For aPTT less than 40  heparin  Injectable 2500 Unit(s) IV Push every 6 hours PRN For aPTT between 40 - 57      ====================  OBJECTIVE:  ====================  Vital Signs Last 24 Hrs  T(C): 36.7 (31 Mar 2018 19:00), Max: 37.2 (31 Mar 2018 13:00)  T(F): 98.1 (31 Mar 2018 19:00), Max: 99 (31 Mar 2018 13:00)  HR: 87 (2018 06:00) (69 - 94)  BP: 151/77 (2018 06:00) (119/78 - 181/68)  BP(mean): 95 (2018 06:00) (76 - 108)  RR: 20 (2018 06:00) (13 - 20)  SpO2: 100% (2018 06:00) (98% - 100%)  ICU Vital Signs Last 24 Hrs  T(C): 36.7 (31 Mar 2018 19:00), Max: 37.2 (31 Mar 2018 13:00)  T(F): 98.1 (31 Mar 2018 19:00), Max: 99 (31 Mar 2018 13:00)  HR: 87 (2018 06:00) (69 - 94)  BP: 151/77 (2018 06:00) (119/78 - 181/68)  BP(mean): 95 (2018 06:00) (76 - 108)  ABP: --  ABP(mean): --  RR: 20 (2018 06:00) (13 - 20)  SpO2: 100% (2018 06:00) (98% - 100%)      Adult Advanced Hemodynamics Last 24 Hrs  CVP(mm Hg): --  CVP(cm H2O): --  CO: --  CI: --  PA: --  PA(mean): --  PCWP: --  SVR: --  SVRI: --  PVR: --  PVRI: --       @ 07:01  -   @ 07:00  --------------------------------------------------------  IN: 200 mL / OUT: 450 mL / NET: -250 mL    0331 @ 07:01  -  04-01 @ 06:42  --------------------------------------------------------  IN: 532 mL / OUT: 1600 mL / NET: -1068 mL      Daily     Daily     PHYSICAL EXAM  Appearance: Normal, NAD  Eyes: PERRL, EOMI  HENT: Normal oral muscosa, NC/AT  Cardiovascular:  S1/S2, RRR, No edema, JVP, Murmur  Respiratory: Clear to auscultation bilaterally  Gastrointestinal:  Soft, Non-tender, Non-distended, BS+  Musculoskeletal:  No clubbing No joint deformity   Vascular: No palpable Right DP, PT. Doppler Right PT  Neurologic: Non-focal  Lymphatic: No lymphadenopathy  Psychiatry: AAOx3, Mood & affect appropriate  Skin: No rashes, No ecchymoses, No cyanosis      TELEMETRY: NSR 80s,   EKG: NSR  IMAGIN.3    8.9   )-----------( 359      ( 2018 02:35 )             28.1     -    140  |  102  |  16  ----------------------------<  144<H>  3.9   |  24  |  0.89    Ca    9.3      2018 02:36  Phos  3.3     -  Mg     1.6         TPro  7.1  /  Alb  3.8  /  TBili  0.8  /  DBili  x   /  AST  19  /  ALT  9<L>  /  AlkPhos  110      LIVER FUNCTIONS - ( 2018 02:36 )  Alb: 3.8 g/dL / Pro: 7.1 g/dL / ALK PHOS: 110 U/L / ALT: 9 U/L RC / AST: 19 U/L / GGT: x           PT/INR - ( 2018 02:35 )   PT: 12.1 sec;   INR: 1.12 ratio         PTT - ( 2018 02:35 )  PTT:80.3 sec  Creatine Kinase, Serum: 72 U/L ( @ 02:36)  Creatine Kinase, Serum: 75 U/L ( @ 08:25)  CKMB Units: 1.7 ng/mL ( @ 08:25)  Troponin T, Serum: <0.01 ng/mL ( @ 08:25)        HEALTH ISSUES - PROBLEM Dx:  High cholesterol: High cholesterol  Limb ischemia: Limb ischemia  Need for prophylactic measure: Need for prophylactic measure  Essential hypertension: Essential hypertension  Type 2 diabetes mellitus with other circulatory complication, unspecified long term insulin use status: Type 2 diabetes mellitus with other circulatory complication, unspecified long term insulin use status  PAD (peripheral artery disease): PAD (peripheral artery disease)        HEALTH ISSUES - R/O PROBLEM Dx:

## 2018-04-01 NOTE — PROGRESS NOTE ADULT - ASSESSMENT
68 y/o AA female with PMHx of PAD, s/p RT. SFA stent, HTN, HLD, DM2, LEILANI (not on CPAP) s/p recent LLE (PANTERA to Lt SFA, balloon angioplasty on Lt pop, & PANTERA to Lt peroneal artery (3/22) presented with acute limb ischemia of LLE    CARDS  #PVD  - c/w hep gtt, ASA, Brillina, [place]  - f/u w/ Vascular re: Intervention     #CAD  -c/w ASA, Brillinta,     #HTN  - allow permissive HTN to allow for limb perfusion 68 y/o AA female with PMHx of PAD, s/p RT. SFA stent, HTN, HLD, DM2, LEILANI (not on CPAP) s/p recent LLE (PANTERA to Lt SFA, balloon angioplasty on Lt pop, & PANTERA to Lt peroneal artery (3/22) presented with acute limb ischemia of LLE    NEURO  No acute issues    CARDS  #PVD  - c/w hep gtt, ASA, Brillina, Petal, Statin, Lisinopril  - f/u w/ Vascular re: Intervention     #CAD  -c/w ASA, Brillinta, Lipitor    #HTN  - allow slight HTN to allow for limb perfusion.     PULM  No acute issues    RENAL  No acute issues  #I/O: -1200    GI  #Diet- DASH      DISPO  Post intervention

## 2018-04-02 LAB
ALBUMIN SERPL ELPH-MCNC: 3.8 G/DL — SIGNIFICANT CHANGE UP (ref 3.3–5)
ALP SERPL-CCNC: 124 U/L — HIGH (ref 40–120)
ALT FLD-CCNC: 12 U/L RC — SIGNIFICANT CHANGE UP (ref 10–45)
ANION GAP SERPL CALC-SCNC: 14 MMOL/L — SIGNIFICANT CHANGE UP (ref 5–17)
APTT BLD: 37.9 SEC — HIGH (ref 27.5–37.4)
APTT BLD: 72.1 SEC — HIGH (ref 27.5–37.4)
AST SERPL-CCNC: 16 U/L — SIGNIFICANT CHANGE UP (ref 10–40)
BILIRUB SERPL-MCNC: 0.9 MG/DL — SIGNIFICANT CHANGE UP (ref 0.2–1.2)
BLD GP AB SCN SERPL QL: NEGATIVE — SIGNIFICANT CHANGE UP
BUN SERPL-MCNC: 17 MG/DL — SIGNIFICANT CHANGE UP (ref 7–23)
CALCIUM SERPL-MCNC: 9.3 MG/DL — SIGNIFICANT CHANGE UP (ref 8.4–10.5)
CHLORIDE SERPL-SCNC: 102 MMOL/L — SIGNIFICANT CHANGE UP (ref 96–108)
CO2 SERPL-SCNC: 22 MMOL/L — SIGNIFICANT CHANGE UP (ref 22–31)
CREAT SERPL-MCNC: 0.89 MG/DL — SIGNIFICANT CHANGE UP (ref 0.5–1.3)
FIBRINOGEN PPP-MCNC: 413 MG/DL — SIGNIFICANT CHANGE UP (ref 310–510)
GLUCOSE BLDC GLUCOMTR-MCNC: 119 MG/DL — HIGH (ref 70–99)
GLUCOSE BLDC GLUCOMTR-MCNC: 164 MG/DL — HIGH (ref 70–99)
GLUCOSE BLDC GLUCOMTR-MCNC: 170 MG/DL — HIGH (ref 70–99)
GLUCOSE BLDC GLUCOMTR-MCNC: 311 MG/DL — HIGH (ref 70–99)
GLUCOSE SERPL-MCNC: 271 MG/DL — HIGH (ref 70–99)
HCT VFR BLD CALC: 26 % — LOW (ref 34.5–45)
HCT VFR BLD CALC: 27.6 % — LOW (ref 34.5–45)
HGB BLD-MCNC: 8.6 G/DL — LOW (ref 11.5–15.5)
HGB BLD-MCNC: 9.4 G/DL — LOW (ref 11.5–15.5)
INR BLD: 1.11 RATIO — SIGNIFICANT CHANGE UP (ref 0.88–1.16)
INR BLD: 1.15 RATIO — SIGNIFICANT CHANGE UP (ref 0.88–1.16)
MAGNESIUM SERPL-MCNC: 1.6 MG/DL — SIGNIFICANT CHANGE UP (ref 1.6–2.6)
MCHC RBC-ENTMCNC: 26.2 PG — LOW (ref 27–34)
MCHC RBC-ENTMCNC: 26.7 PG — LOW (ref 27–34)
MCHC RBC-ENTMCNC: 33 GM/DL — SIGNIFICANT CHANGE UP (ref 32–36)
MCHC RBC-ENTMCNC: 33.9 GM/DL — SIGNIFICANT CHANGE UP (ref 32–36)
MCV RBC AUTO: 78.7 FL — LOW (ref 80–100)
MCV RBC AUTO: 79.5 FL — LOW (ref 80–100)
PHOSPHATE SERPL-MCNC: 3.4 MG/DL — SIGNIFICANT CHANGE UP (ref 2.5–4.5)
PLATELET # BLD AUTO: 296 K/UL — SIGNIFICANT CHANGE UP (ref 150–400)
PLATELET # BLD AUTO: 403 K/UL — HIGH (ref 150–400)
POTASSIUM SERPL-MCNC: 3.9 MMOL/L — SIGNIFICANT CHANGE UP (ref 3.5–5.3)
POTASSIUM SERPL-SCNC: 3.9 MMOL/L — SIGNIFICANT CHANGE UP (ref 3.5–5.3)
PROT SERPL-MCNC: 7.4 G/DL — SIGNIFICANT CHANGE UP (ref 6–8.3)
PROTHROM AB SERPL-ACNC: 12 SEC — SIGNIFICANT CHANGE UP (ref 9.8–12.7)
PROTHROM AB SERPL-ACNC: 12.6 SEC — SIGNIFICANT CHANGE UP (ref 9.8–12.7)
RBC # BLD: 3.28 M/UL — LOW (ref 3.8–5.2)
RBC # BLD: 3.5 M/UL — LOW (ref 3.8–5.2)
RBC # FLD: 17.7 % — HIGH (ref 10.3–14.5)
RBC # FLD: 18 % — HIGH (ref 10.3–14.5)
RH IG SCN BLD-IMP: POSITIVE — SIGNIFICANT CHANGE UP
SODIUM SERPL-SCNC: 138 MMOL/L — SIGNIFICANT CHANGE UP (ref 135–145)
WBC # BLD: 13.7 K/UL — HIGH (ref 3.8–10.5)
WBC # BLD: 9.3 K/UL — SIGNIFICANT CHANGE UP (ref 3.8–10.5)
WBC # FLD AUTO: 13.7 K/UL — HIGH (ref 3.8–10.5)
WBC # FLD AUTO: 9.3 K/UL — SIGNIFICANT CHANGE UP (ref 3.8–10.5)

## 2018-04-02 PROCEDURE — 99233 SBSQ HOSP IP/OBS HIGH 50: CPT

## 2018-04-02 PROCEDURE — 37211 THROMBOLYTIC ART THERAPY: CPT

## 2018-04-02 PROCEDURE — 75716 ARTERY X-RAYS ARMS/LEGS: CPT | Mod: 26,XU

## 2018-04-02 PROCEDURE — 36247 INS CATH ABD/L-EXT ART 3RD: CPT

## 2018-04-02 PROCEDURE — 93010 ELECTROCARDIOGRAM REPORT: CPT

## 2018-04-02 RX ORDER — POTASSIUM CHLORIDE 20 MEQ
20 PACKET (EA) ORAL ONCE
Qty: 0 | Refills: 0 | Status: COMPLETED | OUTPATIENT
Start: 2018-04-02 | End: 2018-04-02

## 2018-04-02 RX ORDER — HEPARIN SODIUM 5000 [USP'U]/ML
300 INJECTION INTRAVENOUS; SUBCUTANEOUS
Qty: 25000 | Refills: 0 | Status: DISCONTINUED | OUTPATIENT
Start: 2018-04-02 | End: 2018-04-04

## 2018-04-02 RX ORDER — NITROGLYCERIN 6.5 MG
5 CAPSULE, EXTENDED RELEASE ORAL
Qty: 50 | Refills: 0 | Status: DISCONTINUED | OUTPATIENT
Start: 2018-04-02 | End: 2018-04-02

## 2018-04-02 RX ORDER — ALTEPLASE 100 MG
0.5 KIT INTRAVENOUS
Qty: 10 | Refills: 0 | Status: DISCONTINUED | OUTPATIENT
Start: 2018-04-02 | End: 2018-04-03

## 2018-04-02 RX ORDER — HYDROMORPHONE HYDROCHLORIDE 2 MG/ML
0.5 INJECTION INTRAMUSCULAR; INTRAVENOUS; SUBCUTANEOUS
Qty: 0 | Refills: 0 | Status: DISCONTINUED | OUTPATIENT
Start: 2018-04-02 | End: 2018-04-06

## 2018-04-02 RX ORDER — ACETAMINOPHEN 500 MG
650 TABLET ORAL EVERY 6 HOURS
Qty: 0 | Refills: 0 | Status: DISCONTINUED | OUTPATIENT
Start: 2018-04-02 | End: 2018-04-06

## 2018-04-02 RX ORDER — SODIUM CHLORIDE 9 MG/ML
1000 INJECTION, SOLUTION INTRAVENOUS
Qty: 0 | Refills: 0 | Status: DISCONTINUED | OUTPATIENT
Start: 2018-04-02 | End: 2018-04-03

## 2018-04-02 RX ORDER — FENTANYL CITRATE 50 UG/ML
50 INJECTION INTRAVENOUS EVERY 4 HOURS
Qty: 0 | Refills: 0 | Status: DISCONTINUED | OUTPATIENT
Start: 2018-04-02 | End: 2018-04-02

## 2018-04-02 RX ORDER — LABETALOL HCL 100 MG
10 TABLET ORAL ONCE
Qty: 0 | Refills: 0 | Status: COMPLETED | OUTPATIENT
Start: 2018-04-02 | End: 2018-04-02

## 2018-04-02 RX ORDER — OXYCODONE HYDROCHLORIDE 5 MG/1
5 TABLET ORAL EVERY 4 HOURS
Qty: 0 | Refills: 0 | Status: DISCONTINUED | OUTPATIENT
Start: 2018-04-02 | End: 2018-04-06

## 2018-04-02 RX ORDER — FENTANYL CITRATE 50 UG/ML
50 INJECTION INTRAVENOUS ONCE
Qty: 0 | Refills: 0 | Status: DISCONTINUED | OUTPATIENT
Start: 2018-04-02 | End: 2018-04-02

## 2018-04-02 RX ORDER — NITROGLYCERIN 6.5 MG
5 CAPSULE, EXTENDED RELEASE ORAL
Qty: 50 | Refills: 0 | Status: DISCONTINUED | OUTPATIENT
Start: 2018-04-02 | End: 2018-04-03

## 2018-04-02 RX ORDER — MAGNESIUM SULFATE 500 MG/ML
2 VIAL (ML) INJECTION ONCE
Qty: 0 | Refills: 0 | Status: COMPLETED | OUTPATIENT
Start: 2018-04-02 | End: 2018-04-02

## 2018-04-02 RX ORDER — ALTEPLASE 100 MG
1 KIT INTRAVENOUS
Qty: 10 | Refills: 0 | Status: DISCONTINUED | OUTPATIENT
Start: 2018-04-02 | End: 2018-04-02

## 2018-04-02 RX ORDER — ALPRAZOLAM 0.25 MG
0.5 TABLET ORAL EVERY 6 HOURS
Qty: 0 | Refills: 0 | Status: DISCONTINUED | OUTPATIENT
Start: 2018-04-02 | End: 2018-04-06

## 2018-04-02 RX ORDER — CEFAZOLIN SODIUM 1 G
1000 VIAL (EA) INJECTION EVERY 8 HOURS
Qty: 0 | Refills: 0 | Status: DISCONTINUED | OUTPATIENT
Start: 2018-04-02 | End: 2018-04-04

## 2018-04-02 RX ORDER — HYDROMORPHONE HYDROCHLORIDE 2 MG/ML
0.5 INJECTION INTRAMUSCULAR; INTRAVENOUS; SUBCUTANEOUS ONCE
Qty: 0 | Refills: 0 | Status: DISCONTINUED | OUTPATIENT
Start: 2018-04-02 | End: 2018-04-02

## 2018-04-02 RX ORDER — SODIUM CHLORIDE 9 MG/ML
200 INJECTION INTRAMUSCULAR; INTRAVENOUS; SUBCUTANEOUS ONCE
Qty: 0 | Refills: 0 | Status: COMPLETED | OUTPATIENT
Start: 2018-04-02 | End: 2018-04-02

## 2018-04-02 RX ORDER — FENTANYL CITRATE 50 UG/ML
25 INJECTION INTRAVENOUS ONCE
Qty: 0 | Refills: 0 | Status: DISCONTINUED | OUTPATIENT
Start: 2018-04-02 | End: 2018-04-02

## 2018-04-02 RX ADMIN — FENTANYL CITRATE 25 MICROGRAM(S): 50 INJECTION INTRAVENOUS at 18:37

## 2018-04-02 RX ADMIN — OXYCODONE HYDROCHLORIDE 5 MILLIGRAM(S): 5 TABLET ORAL at 10:29

## 2018-04-02 RX ADMIN — HYDROMORPHONE HYDROCHLORIDE 0.5 MILLIGRAM(S): 2 INJECTION INTRAMUSCULAR; INTRAVENOUS; SUBCUTANEOUS at 20:35

## 2018-04-02 RX ADMIN — Medication 2: at 22:08

## 2018-04-02 RX ADMIN — Medication 100 MILLIGRAM(S): at 21:49

## 2018-04-02 RX ADMIN — Medication 10 MILLIGRAM(S): at 19:08

## 2018-04-02 RX ADMIN — Medication 50 GRAM(S): at 06:23

## 2018-04-02 RX ADMIN — HYDROMORPHONE HYDROCHLORIDE 0.5 MILLIGRAM(S): 2 INJECTION INTRAMUSCULAR; INTRAVENOUS; SUBCUTANEOUS at 19:23

## 2018-04-02 RX ADMIN — SODIUM CHLORIDE 50 MILLILITER(S): 9 INJECTION, SOLUTION INTRAVENOUS at 21:30

## 2018-04-02 RX ADMIN — Medication 20 MILLIEQUIVALENT(S): at 06:22

## 2018-04-02 RX ADMIN — Medication 1.5 MICROGRAM(S)/MIN: at 22:05

## 2018-04-02 RX ADMIN — ATORVASTATIN CALCIUM 40 MILLIGRAM(S): 80 TABLET, FILM COATED ORAL at 21:49

## 2018-04-02 RX ADMIN — HEPARIN SODIUM 800 UNIT(S)/HR: 5000 INJECTION INTRAVENOUS; SUBCUTANEOUS at 06:32

## 2018-04-02 RX ADMIN — Medication 650 MILLIGRAM(S): at 02:30

## 2018-04-02 RX ADMIN — HYDROMORPHONE HYDROCHLORIDE 0.5 MILLIGRAM(S): 2 INJECTION INTRAMUSCULAR; INTRAVENOUS; SUBCUTANEOUS at 20:50

## 2018-04-02 RX ADMIN — SENNA PLUS 2 TABLET(S): 8.6 TABLET ORAL at 21:49

## 2018-04-02 RX ADMIN — FENTANYL CITRATE 50 MICROGRAM(S): 50 INJECTION INTRAVENOUS at 18:37

## 2018-04-02 RX ADMIN — FENTANYL CITRATE 25 MICROGRAM(S): 50 INJECTION INTRAVENOUS at 18:22

## 2018-04-02 RX ADMIN — ALTEPLASE 25 MG/HR: KIT at 22:00

## 2018-04-02 RX ADMIN — HYDROMORPHONE HYDROCHLORIDE 0.5 MILLIGRAM(S): 2 INJECTION INTRAMUSCULAR; INTRAVENOUS; SUBCUTANEOUS at 19:08

## 2018-04-02 RX ADMIN — SODIUM CHLORIDE 100 MILLILITER(S): 9 INJECTION INTRAMUSCULAR; INTRAVENOUS; SUBCUTANEOUS at 23:27

## 2018-04-02 RX ADMIN — ALTEPLASE 50 MG/HR: KIT at 20:00

## 2018-04-02 RX ADMIN — TICAGRELOR 90 MILLIGRAM(S): 90 TABLET ORAL at 05:30

## 2018-04-02 RX ADMIN — OXYCODONE HYDROCHLORIDE 5 MILLIGRAM(S): 5 TABLET ORAL at 10:59

## 2018-04-02 RX ADMIN — CILOSTAZOL 50 MILLIGRAM(S): 100 TABLET ORAL at 05:30

## 2018-04-02 RX ADMIN — LISINOPRIL 20 MILLIGRAM(S): 2.5 TABLET ORAL at 05:30

## 2018-04-02 RX ADMIN — CILOSTAZOL 50 MILLIGRAM(S): 100 TABLET ORAL at 19:06

## 2018-04-02 RX ADMIN — OXYCODONE HYDROCHLORIDE 5 MILLIGRAM(S): 5 TABLET ORAL at 18:01

## 2018-04-02 RX ADMIN — OXYCODONE HYDROCHLORIDE 5 MILLIGRAM(S): 5 TABLET ORAL at 17:31

## 2018-04-02 RX ADMIN — FENTANYL CITRATE 50 MICROGRAM(S): 50 INJECTION INTRAVENOUS at 18:22

## 2018-04-02 RX ADMIN — TICAGRELOR 90 MILLIGRAM(S): 90 TABLET ORAL at 19:06

## 2018-04-02 RX ADMIN — Medication 81 MILLIGRAM(S): at 12:36

## 2018-04-02 NOTE — PROGRESS NOTE ADULT - SUBJECTIVE AND OBJECTIVE BOX
Vascular Medicine and Peripheral Intervention Progress Note    S/24 Events: No acute events overnight.     O:  T(C): 36.4 (04-02-18 @ 08:00), Max: 36.8 (04-01-18 @ 16:00)  HR: 93 (04-02-18 @ 08:00) (77 - 112)  BP: 123/88 (04-02-18 @ 08:00) (99/49 - 174/68)  RR: 18 (04-02-18 @ 08:00) (18 - 22)  SpO2: 97% (04-02-18 @ 08:00) (96% - 100%)      O/E:  Gen: NAD  HEENT: EOMI  CV: RRR, normal S1 + S2, no m/r/g  Lungs: CTAB  Abd: soft, non-tender  Ext: No edema    Labs:                        9.4    9.3   )-----------( 403      ( 02 Apr 2018 05:30 )             27.6     04-02    138  |  102  |  17  ----------------------------<  271<H>  3.9   |  22  |  0.89    Ca    9.3      02 Apr 2018 05:30  Phos  3.4     04-02  Mg     1.6     04-02    TPro  7.4  /  Alb  3.8  /  TBili  0.9  /  DBili  x   /  AST  16  /  ALT  12  /  AlkPhos  124<H>  04-02    PT/INR - ( 02 Apr 2018 05:30 )   PT: 12.0 sec;   INR: 1.11 ratio         PTT - ( 02 Apr 2018 05:30 )  PTT:72.1 sec  CARDIAC MARKERS ( 01 Apr 2018 02:36 )  x     / x     / 72 U/L / x     / x              Meds:  MEDICATIONS  (STANDING):  amLODIPine   Tablet 10 milliGRAM(s) Oral daily  aspirin enteric coated 81 milliGRAM(s) Oral daily  atorvastatin 40 milliGRAM(s) Oral at bedtime  cilostazol 50 milliGRAM(s) Oral two times a day  dextrose 5%. 1000 milliLiter(s) (50 mL/Hr) IV Continuous <Continuous>  dextrose 50% Injectable 12.5 Gram(s) IV Push once  dextrose 50% Injectable 25 Gram(s) IV Push once  dextrose 50% Injectable 25 Gram(s) IV Push once  docusate sodium 100 milliGRAM(s) Oral three times a day  heparin  Infusion. 1000 Unit(s)/Hr (10 mL/Hr) IV Continuous <Continuous>  insulin lispro (HumaLOG) corrective regimen sliding scale   SubCutaneous three times a day before meals  insulin lispro (HumaLOG) corrective regimen sliding scale   SubCutaneous at bedtime  lisinopril 20 milliGRAM(s) Oral daily  senna 2 Tablet(s) Oral at bedtime  ticagrelor 90 milliGRAM(s) Oral two times a day    Peripheral Angiogram 3/22/18  Diagnostic Summary:  Left distal SFA occlusion.  Left popliteal artery occlusion.  Left AT, TPT, PT occlusion.  The peroneal artery reconstitutes distally via faint collaterals and provides distal run off.    Interventional Summary:  1. Complex intervention requiring dual access via antegrade sheath and retrograde peroneal access.  2. PTA, drug eluting balloon and Supera stenting of the left superficialfemoral artery.  3. PTA and drug eluting balloon of the left popliteal artery with 30%recoil. This was not stented due to location.  4. Successful PTA and stenting of the left tibioperoneal trunk (single PANTERA placed).  5. Successful PTA and stenting of the left proximal peroneal artery (single PANTERA placed-overlapping with the TPT stent).    A/P:  68 y/o AA female with PMHx of HTN, HLD, DM2, LEILANI (not on CPAP), PAD/CLI s/p complex LLE intervention as detailed above on 3/22/2018 now admitted with acute LLE limb ischemia (mid SFA/pop and TPT occlusion).    - Continue asa, ticagrelor, cilostazol, heparin gtt  - Continue statin  - Will discuss timing of angiogram with Dr. Garcia and Dr. Brooke      [facility]  Cardiology Fellow

## 2018-04-02 NOTE — PROGRESS NOTE ADULT - ASSESSMENT
68 y/o AA female with PMHx of PAD, s/p RT. SFA stent, HTN, HLD, DM2, LEILANI (not on CPAP) s/p recent LLE (PANTERA to Lt SFA, balloon angioplasty on Lt pop, & PANTERA to Lt peroneal artery (3/22) presented with acute limb ischemia of LLE.  Plan for PCI today.     Neuro: pain control  -Tylenol prn  -Oxycodone prn    Pulm: h/o LEILANI not on CPAP  No acute issues    CV:  #PVD  - c/w hep gtt, ASA, Brillina, Petal, Statin, Lisinopril  - Plan for PCI today     #CAD  -c/w ASA, Brillinta, Lipitor    #HTN  - allow slight HTN to allow for limb perfusion.     GI  - DASH diet, NPO after lunch   - Colace, Senna    Renal: Monitor BUN/CR    ID: No acute issues, culture if febrile    Heme: limb ischemia  - Hep gtt     Endo: h/o DM  -ISS    Dispo: CCU monitoring, plan for PCI today. Case discussed with Dr. Garcia.    Barbara Greenwood PA-C #[id number]

## 2018-04-02 NOTE — PROGRESS NOTE ADULT - SUBJECTIVE AND OBJECTIVE BOX
Admission date: 3/30/18  CC: LLE pain  HPI:  68 y/o AA female with PMHx of PAD, s/p RT. SFA stent, HTN, HLD, DM2, LEILANI (not on CPAP) s/p recent LLE (PANTERA to Lt SFA, balloon angioplasty on Lt pop, & PANTERA to Lt peroneal artery (3/22) presented to the ED c/o worsening leg symptoms.  Patient states that at 230 pm yesterday, she felt "pins and needles" and pain 10/10 on her left leg.  Claims to have been taking all her antiplatelets (ASA, Brilinta, Celostazol) as prescribed.  Denies numbness or loss of sensation.  In ED, Heparin drip started.  Given Oxycodone IR 5 mg with total relief of symptoms. (30 Mar 2018 23:50)    24hr events: No acute events. Pt continues on anticoagulation with Heparin gtt.  Plan for PCI this afternoon.       OBJECTIVE:  Review Of Systems:   Constitutional: [ ] Fever [ ] Chills [ ] Fatigue [ ] Weight change   HEENT: [ ] Blurred vision [ ] Eye Pain [ ] Headache [ ] Runny nose [ ] Sore Throat   Respiratory: [ ] Cough [ ] Wheezing [ ] Shortness of breath  Cardiovascular: [ ] Chest Pain [ ] Palpitations [ ] DOUGLAS [ ] PND [ ] Orthopnea  Gastrointestinal: [ ] Abdominal Pain [ ] Diarrhea [ ] Constipation [ ] Hemorrhoids [ ] Nausea [ ] Vomiting  Genitourinary: [ ] Nocturia [ ] Dysuria [ ] Incontinence  Extremities: [ ] Swelling [ ] Joint Pain +5/10 LLE "dull, achy" pain, worse with movement  Neurologic: [ ] Focal deficit [ ] Paresthesias [ ] Syncope  Lymphatic: [ ] Swelling [ ] Lymphadenopathy   Skin: [ ] Rash [ ] Ecchymoses [ ] Wounds [ ] Lesions  Psychiatry: [ ] Depression [ ] Suicidal/Homicidal Ideation [ ] Anxiety [ ] Sleep Disturbances  [x ] 10 point review of systems is otherwise negative except as mentioned above            [ ]Unable to obtain    Allergy: Cipro      Medications:  MEDICATIONS  (STANDING):  amLODIPine   Tablet 10 milliGRAM(s) Oral daily  aspirin enteric coated 81 milliGRAM(s) Oral daily  atorvastatin 40 milliGRAM(s) Oral at bedtime  cilostazol 50 milliGRAM(s) Oral two times a day  dextrose 5%. 1000 milliLiter(s) (50 mL/Hr) IV Continuous <Continuous>  dextrose 50% Injectable 12.5 Gram(s) IV Push once  dextrose 50% Injectable 25 Gram(s) IV Push once  dextrose 50% Injectable 25 Gram(s) IV Push once  docusate sodium 100 milliGRAM(s) Oral three times a day  heparin  Infusion. 1000 Unit(s)/Hr (10 mL/Hr) IV Continuous <Continuous>  insulin lispro (HumaLOG) corrective regimen sliding scale   SubCutaneous three times a day before meals  insulin lispro (HumaLOG) corrective regimen sliding scale   SubCutaneous at bedtime  lisinopril 20 milliGRAM(s) Oral daily  senna 2 Tablet(s) Oral at bedtime  ticagrelor 90 milliGRAM(s) Oral two times a day    MEDICATIONS  (PRN):  dextrose Gel 1 Dose(s) Oral once PRN Blood Glucose LESS THAN 70 milliGRAM(s)/deciliter  glucagon  Injectable 1 milliGRAM(s) IntraMuscular once PRN Glucose LESS THAN 70 milligrams/deciliter  heparin  Injectable 5500 Unit(s) IV Push every 6 hours PRN For aPTT less than 40  heparin  Injectable 2500 Unit(s) IV Push every 6 hours PRN For aPTT between 40 - 57      PMH/PSH/FH/SH: [x ] Unchanged    Vitals:  T(C): 36.4 (04-02-18 @ 08:00), Max: 36.8 (04-01-18 @ 16:00)  HR: 117 (04-02-18 @ 09:00) (77 - 117)  BP: 144/60 (04-02-18 @ 09:00) (99/49 - 174/68)  BP(mean): 84 (04-02-18 @ 09:00) (62 - 148)  RR: 20 (04-02-18 @ 09:00) (18 - 22)  SpO2: 100% (04-02-18 @ 09:00) (96% - 100%)  Wt(kg): --  Daily     Daily   I&O's Summary    01 Apr 2018 07:01  -  02 Apr 2018 07:00  --------------------------------------------------------  IN: 683 mL / OUT: 0 mL / NET: 683 mL    Labs:                        9.4    9.3   )-----------( 403      ( 02 Apr 2018 05:30 )             27.6     04-02    138  |  102  |  17  ----------------------------<  271<H>  3.9   |  22  |  0.89    Ca    9.3      02 Apr 2018 05:30  Phos  3.4     04-02  Mg     1.6     04-02    TPro  7.4  /  Alb  3.8  /  TBili  0.9  /  DBili  x   /  AST  16  /  ALT  12  /  AlkPhos  124<H>  04-02    PT/INR - ( 02 Apr 2018 05:30 )   PT: 12.0 sec;   INR: 1.11 ratio         PTT - ( 02 Apr 2018 05:30 )  PTT:72.1 sec  CARDIAC MARKERS ( 01 Apr 2018 02:36 )  x     / x     / 72 U/L / x     / x          Magnesium, Serum: 1.6 mg/dL (04-02 @ 05:30)  Phosphorus Level, Serum: 3.4 mg/dL (04-02 @ 05:30)      ECG: NSR @ 85 bpm. Flipped T-waves in leads I, aVL, unchanged from previous study. No other acute changes.    Echo: 3/31/18: EF 68% Hyperdynamic LV. Stage I diastolic dysfunction. Normal RV size and function.    Interpretation of Telemetry: Sinus       Physical Exam:  Appearance: [x ] Normal  [ ] abnormal [ ] NAD   Eyes: [x ] PERRL [ ] EOMI  HENT: [x ] Normal [ ] Abnormal oral muscosa [ ]NC/AT  Cardiovascular: [x ] S1 [x ] S2 [x ] RRR [ ] m/r/g [ ]edema [ ] JVP  Procedural Access Site: [ ]  hematoma [ ] tender to palpation [ ] 2+ pulse [ ] bruit [ ] Ecchymosis  Respiratory: [x ] Clear to auscultation bilaterally  Gastrointestinal: [x ] Soft [ ] tenderness[ ] distension [ ] BS  Neurologic: [x ] Non-focal  Lymphatic: [ ] lymphadenopathy  Psychiatry: [x ] AAOx3  [ ] confused [ ] disoriented [ ] Mood & affect appropriate  Skin: [ ]  rashes [ ] ecchymoses [ ] cyanosis  Ext: RLE +1 DP/PT, LLE PT via doppler. Warm, sensation to light touch intact. +5/5 strength B/L.

## 2018-04-03 ENCOUNTER — TRANSCRIPTION ENCOUNTER (OUTPATIENT)
Age: 68
End: 2018-04-03

## 2018-04-03 DIAGNOSIS — I10 ESSENTIAL (PRIMARY) HYPERTENSION: ICD-10-CM

## 2018-04-03 LAB
ALBUMIN SERPL ELPH-MCNC: 3.6 G/DL — SIGNIFICANT CHANGE UP (ref 3.3–5)
ALP SERPL-CCNC: 106 U/L — SIGNIFICANT CHANGE UP (ref 40–120)
ALT FLD-CCNC: 8 U/L RC — LOW (ref 10–45)
ANION GAP SERPL CALC-SCNC: 14 MMOL/L — SIGNIFICANT CHANGE UP (ref 5–17)
APTT BLD: 29.3 SEC — SIGNIFICANT CHANGE UP (ref 27.5–37.4)
APTT BLD: 52.1 SEC — HIGH (ref 27.5–37.4)
AST SERPL-CCNC: 14 U/L — SIGNIFICANT CHANGE UP (ref 10–40)
BILIRUB SERPL-MCNC: 0.9 MG/DL — SIGNIFICANT CHANGE UP (ref 0.2–1.2)
BUN SERPL-MCNC: 16 MG/DL — SIGNIFICANT CHANGE UP (ref 7–23)
CALCIUM SERPL-MCNC: 9 MG/DL — SIGNIFICANT CHANGE UP (ref 8.4–10.5)
CHLORIDE SERPL-SCNC: 104 MMOL/L — SIGNIFICANT CHANGE UP (ref 96–108)
CO2 SERPL-SCNC: 21 MMOL/L — LOW (ref 22–31)
CREAT SERPL-MCNC: 0.69 MG/DL — SIGNIFICANT CHANGE UP (ref 0.5–1.3)
FIBRINOGEN PPP-MCNC: 427 MG/DL — SIGNIFICANT CHANGE UP (ref 310–510)
GLUCOSE BLDC GLUCOMTR-MCNC: 145 MG/DL — HIGH (ref 70–99)
GLUCOSE BLDC GLUCOMTR-MCNC: 282 MG/DL — HIGH (ref 70–99)
GLUCOSE SERPL-MCNC: 136 MG/DL — HIGH (ref 70–99)
HCT VFR BLD CALC: 24.1 % — LOW (ref 34.5–45)
HGB BLD-MCNC: 8.2 G/DL — LOW (ref 11.5–15.5)
INR BLD: 1.2 RATIO — HIGH (ref 0.88–1.16)
MAGNESIUM SERPL-MCNC: 1.4 MG/DL — LOW (ref 1.6–2.6)
MCHC RBC-ENTMCNC: 26.8 PG — LOW (ref 27–34)
MCHC RBC-ENTMCNC: 33.8 GM/DL — SIGNIFICANT CHANGE UP (ref 32–36)
MCV RBC AUTO: 79.4 FL — LOW (ref 80–100)
PHOSPHATE SERPL-MCNC: 3.4 MG/DL — SIGNIFICANT CHANGE UP (ref 2.5–4.5)
PLATELET # BLD AUTO: 309 K/UL — SIGNIFICANT CHANGE UP (ref 150–400)
POTASSIUM SERPL-MCNC: 3.9 MMOL/L — SIGNIFICANT CHANGE UP (ref 3.5–5.3)
POTASSIUM SERPL-SCNC: 3.9 MMOL/L — SIGNIFICANT CHANGE UP (ref 3.5–5.3)
PROT SERPL-MCNC: 6.7 G/DL — SIGNIFICANT CHANGE UP (ref 6–8.3)
PROTHROM AB SERPL-ACNC: 13.1 SEC — HIGH (ref 9.8–12.7)
RBC # BLD: 3.04 M/UL — LOW (ref 3.8–5.2)
RBC # FLD: 17.9 % — HIGH (ref 10.3–14.5)
SODIUM SERPL-SCNC: 139 MMOL/L — SIGNIFICANT CHANGE UP (ref 135–145)
WBC # BLD: 11.7 K/UL — HIGH (ref 3.8–10.5)
WBC # FLD AUTO: 11.7 K/UL — HIGH (ref 3.8–10.5)

## 2018-04-03 PROCEDURE — 93010 ELECTROCARDIOGRAM REPORT: CPT

## 2018-04-03 PROCEDURE — 37213 THROMBLYTIC ART/VEN THERAPY: CPT

## 2018-04-03 PROCEDURE — 37226: CPT

## 2018-04-03 PROCEDURE — 99233 SBSQ HOSP IP/OBS HIGH 50: CPT

## 2018-04-03 PROCEDURE — 37228: CPT

## 2018-04-03 PROCEDURE — 93321 DOPPLER ECHO F-UP/LMTD STD: CPT | Mod: 26

## 2018-04-03 PROCEDURE — 93308 TTE F-UP OR LMTD: CPT | Mod: 26

## 2018-04-03 RX ORDER — NITROGLYCERIN 6.5 MG
5 CAPSULE, EXTENDED RELEASE ORAL
Qty: 50 | Refills: 0 | Status: DISCONTINUED | OUTPATIENT
Start: 2018-04-03 | End: 2018-04-03

## 2018-04-03 RX ORDER — CLOPIDOGREL BISULFATE 75 MG/1
300 TABLET, FILM COATED ORAL ONCE
Qty: 0 | Refills: 0 | Status: COMPLETED | OUTPATIENT
Start: 2018-04-03 | End: 2018-04-03

## 2018-04-03 RX ORDER — MAGNESIUM SULFATE 500 MG/ML
2 VIAL (ML) INJECTION ONCE
Qty: 0 | Refills: 0 | Status: COMPLETED | OUTPATIENT
Start: 2018-04-03 | End: 2018-04-03

## 2018-04-03 RX ORDER — CLOPIDOGREL BISULFATE 75 MG/1
75 TABLET, FILM COATED ORAL DAILY
Qty: 0 | Refills: 0 | Status: DISCONTINUED | OUTPATIENT
Start: 2018-04-04 | End: 2018-04-06

## 2018-04-03 RX ADMIN — TICAGRELOR 90 MILLIGRAM(S): 90 TABLET ORAL at 05:15

## 2018-04-03 RX ADMIN — AMLODIPINE BESYLATE 10 MILLIGRAM(S): 2.5 TABLET ORAL at 05:15

## 2018-04-03 RX ADMIN — LISINOPRIL 20 MILLIGRAM(S): 2.5 TABLET ORAL at 05:15

## 2018-04-03 RX ADMIN — Medication 0.5 MILLIGRAM(S): at 21:57

## 2018-04-03 RX ADMIN — CLOPIDOGREL BISULFATE 300 MILLIGRAM(S): 75 TABLET, FILM COATED ORAL at 11:42

## 2018-04-03 RX ADMIN — ATORVASTATIN CALCIUM 40 MILLIGRAM(S): 80 TABLET, FILM COATED ORAL at 21:57

## 2018-04-03 RX ADMIN — Medication 50 GRAM(S): at 06:27

## 2018-04-03 RX ADMIN — Medication 1.5 MICROGRAM(S)/MIN: at 05:23

## 2018-04-03 RX ADMIN — HEPARIN SODIUM 8 UNIT(S)/HR: 5000 INJECTION INTRAVENOUS; SUBCUTANEOUS at 11:43

## 2018-04-03 RX ADMIN — CILOSTAZOL 50 MILLIGRAM(S): 100 TABLET ORAL at 05:21

## 2018-04-03 RX ADMIN — Medication 100 MILLIGRAM(S): at 15:04

## 2018-04-03 RX ADMIN — OXYCODONE HYDROCHLORIDE 5 MILLIGRAM(S): 5 TABLET ORAL at 21:00

## 2018-04-03 RX ADMIN — OXYCODONE HYDROCHLORIDE 5 MILLIGRAM(S): 5 TABLET ORAL at 20:00

## 2018-04-03 RX ADMIN — Medication 100 MILLIGRAM(S): at 21:57

## 2018-04-03 RX ADMIN — HEPARIN SODIUM 7 UNIT(S)/HR: 5000 INJECTION INTRAVENOUS; SUBCUTANEOUS at 21:58

## 2018-04-03 RX ADMIN — Medication 100 MILLIGRAM(S): at 05:16

## 2018-04-03 RX ADMIN — Medication 81 MILLIGRAM(S): at 07:45

## 2018-04-03 NOTE — CHART NOTE - NSCHARTNOTEFT_GEN_A_CORE
====================  CCU MIDNIGHT ROUNDS  ====================    HARVEY RIZZO  [id number]    ====================  SUMMARY: HPI:  68 y/o AA female with PMHx of PAD, s/p RT. SFA stent, HTN, HLD, DM2, LEILANI (not on CPAP) s/p recent LLE (PANTERA to Lt SFA, balloon angioplasty on Lt pop, & PANTERA to Lt peroneal artery (3/22) presented to the ED c/o worsening leg symptoms.  Patient states that at 230 pm yesterday, she felt "pins and needles" and pain 10/10 on her left leg.  Claims to have been taking all her antiplatelets (ASA, Brilinta, Celostazol) as prescribed.  Denies numbness or loss of sensation.    In ED, Heparin drip started.  Given Oxycodone IR 5 mg with total relief of symptoms. (30 Mar 2018 23:50)    ====================        ====================  NEW EVENTS: s/p catheter directed lysis, on tpa via sheath and systemic heparin.   ====================        ====================  VITALS (Last 12 hrs):  ====================    T(C): 37.2 (04-03-18 @ 01:00), Max: 37.2 (04-03-18 @ 01:00)  HR: 97 (04-03-18 @ 01:00) (82 - 109)  BP: 204/152 (04-02-18 @ 18:30) (131/58 - 204/152)  BP(mean): 164 (04-02-18 @ 18:30) (78 - 164)  ABP: 146/90 (04-03-18 @ 01:00) (102/61 - 210/97)  ABP(mean): 115 (04-03-18 @ 01:00) (77 - 156)  RR: 15 (04-03-18 @ 01:00) (10 - 20)  SpO2: 100% (04-03-18 @ 01:00) (94% - 100%)      TELEMETRY: sinus        I&O's Summary    01 Apr 2018 07:01  -  02 Apr 2018 07:00  --------------------------------------------------------  IN: 683 mL / OUT: 0 mL / NET: 683 mL    02 Apr 2018 07:01  -  03 Apr 2018 01:42  --------------------------------------------------------  IN: 884 mL / OUT: 630 mL / NET: 254 mL        ====================  PLAN:  #Neuro: Pain control w/ dilaudid, oxycodone, tylenol, xanax PRN.  #PAD: Reoccluded distal SFA and popliteal artery now s/p angiogram w/ catheter directed lysis. On tpa @ .5 via sheath and systemic heparin @300U w/ PTT goal 30-40. 11PM CBC, coags, fibrinogen repeated and at goal, no changed made, will repeat w/ AM labs. SBP goal is <140, pt initially on nitro gtt which has since been weened off w/ BP now 120s. NPO after MN for repeat angiogram in AM. On DAPT, pletal, statin, and home BP meds. Periop abx. Plan discussed w/ Dr. Brooke overnight.   ====================    HEALTH ISSUES - PROBLEM Dx:  High cholesterol: High cholesterol  Limb ischemia: Limb ischemia  Need for prophylactic measure: Need for prophylactic measure  Essential hypertension: Essential hypertension  Type 2 diabetes mellitus with other circulatory complication, unspecified long term insulin use status: Type 2 diabetes mellitus with other circulatory complication, unspecified long term insulin use status  PAD (peripheral artery disease): PAD (peripheral artery disease)        Fabiola Dill, CCU PA #[id number] ====================  CCU MIDNIGHT ROUNDS  ====================    HARVEY RIZZO  [id number]    ====================  SUMMARY: HPI:  66 y/o AA female with PMHx of PAD, s/p RT. SFA stent, HTN, HLD, DM2, LEILANI (not on CPAP) s/p recent LLE (PANTERA to Lt SFA, balloon angioplasty on Lt pop, & PANTERA to Lt peroneal artery (3/22) presented to the ED c/o worsening leg symptoms.  Patient states that at 230 pm yesterday, she felt "pins and needles" and pain 10/10 on her left leg.  Claims to have been taking all her antiplatelets (ASA, Brilinta, Celostazol) as prescribed.  Denies numbness or loss of sensation.    In ED, Heparin drip started.  Given Oxycodone IR 5 mg with total relief of symptoms. (30 Mar 2018 23:50)    ====================        ====================  NEW EVENTS: s/p catheter directed lysis, on tpa via sheath and systemic heparin. Nitro gtt weened off BP 110s-120s systolic.     ADD: Overnight pt became hypertensive approx 215AM upon awakening from sleep to 210 systolic. Nitro gtt put back on at 50 with good response. Will actively ween as tolerated. Pt c/o 0/10 pain overnight. LLE is now warm with audible PT and DP pulses w/ doppler.  ====================        ====================  VITALS (Last 12 hrs):  ====================    T(C): 37.2 (04-03-18 @ 01:00), Max: 37.2 (04-03-18 @ 01:00)  HR: 97 (04-03-18 @ 01:00) (82 - 109)  BP: 204/152 (04-02-18 @ 18:30) (131/58 - 204/152)  BP(mean): 164 (04-02-18 @ 18:30) (78 - 164)  ABP: 146/90 (04-03-18 @ 01:00) (102/61 - 210/97)  ABP(mean): 115 (04-03-18 @ 01:00) (77 - 156)  RR: 15 (04-03-18 @ 01:00) (10 - 20)  SpO2: 100% (04-03-18 @ 01:00) (94% - 100%)      TELEMETRY: sinus        I&O's Summary    01 Apr 2018 07:01  -  02 Apr 2018 07:00  --------------------------------------------------------  IN: 683 mL / OUT: 0 mL / NET: 683 mL    02 Apr 2018 07:01  -  03 Apr 2018 01:42  --------------------------------------------------------  IN: 884 mL / OUT: 630 mL / NET: 254 mL        ====================  PLAN:  #Neuro: Pain control w/ dilaudid, oxycodone, tylenol, xanax PRN.  #PAD: Reoccluded distal SFA and popliteal artery now s/p angiogram w/ catheter directed lysis. On tpa @ .5 via sheath and systemic heparin @300U w/ PTT goal 30-40. 11PM CBC, coags, fibrinogen repeated and at goal, no changed made, will repeat w/ AM labs. SBP goal is <140, pt initially on nitro gtt which has since been weened off w/ BP now 120s. NPO after MN for repeat angiogram in AM. On DAPT, pletal, statin, and home BP meds. Periop abx. Plan discussed w/ Dr. Brooke overnight.   ====================    HEALTH ISSUES - PROBLEM Dx:  High cholesterol: High cholesterol  Limb ischemia: Limb ischemia  Need for prophylactic measure: Need for prophylactic measure  Essential hypertension: Essential hypertension  Type 2 diabetes mellitus with other circulatory complication, unspecified long term insulin use status: Type 2 diabetes mellitus with other circulatory complication, unspecified long term insulin use status  PAD (peripheral artery disease): PAD (peripheral artery disease)        Fabiola Dill CCU PA #[id number]

## 2018-04-03 NOTE — CHART NOTE - NSCHARTNOTEFT_GEN_A_CORE
====================  CCU MIDNIGHT ROUNDS  ====================    HARVEY RIZZO  [id number]    ====================  SUMMARY: HPI:  66 y/o AA female with PMHx of PAD, s/p RT. SFA stent, HTN, HLD, DM2, LEILANI (not on CPAP) s/p recent LLE (PANTERA to Lt SFA, balloon angioplasty on Lt pop, & PANTERA to Lt peroneal artery (3/22) presented to the ED c/o worsening leg symptoms.  Patient states that at 230 pm yesterday, she felt "pins and needles" and pain 10/10 on her left leg.  Claims to have been taking all her antiplatelets (ASA, Brilinta, Celostazol) as prescribed.  Denies numbness or loss of sensation.    In ED, Heparin drip started.  Given Oxycodone IR 5 mg with total relief of symptoms. (30 Mar 2018 23:50)    ====================        ====================  NEW EVENTS: s/p stents to L SFA and popliteal, POBA to dorsalis pedis. Loaded w/ plavix today and ambulated. L peripheral pulses palpable and biphasic w/ doppler.  ====================        ====================  VITALS (Last 12 hrs):  ====================    T(C): 37.8 (04-03-18 @ 19:00), Max: 37.8 (04-03-18 @ 19:00)  HR: 129 (04-03-18 @ 21:00) (78 - 129)  BP: 127/59 (04-03-18 @ 21:00) (106/86 - 155/62)  BP(mean): 78 (04-03-18 @ 21:00) (75 - 118)  RR: 19 (04-03-18 @ 21:00) (13 - 24)  SpO2: 97% (04-03-18 @ 21:00) (95% - 99%)      TELEMETRY: sinus        I&O's Summary    02 Apr 2018 07:01  -  03 Apr 2018 07:00  --------------------------------------------------------  IN: 1548 mL / OUT: 835 mL / NET: 713 mL    03 Apr 2018 07:01  -  03 Apr 2018 23:20  --------------------------------------------------------  IN: 501 mL / OUT: 1195 mL / NET: -694 mL        ====================  PLAN:  #Neuro: Pain control w/ dilaudid, oxycodone, tylenol, xanax PRN.  #PAD: Reoccluded distal SFA and popliteal artery now s/p angiogram w/ catheter directed lysis and s/p stenting today. On heparin gtt with PTT goal 30-40. Pt loaded w/ plavix today and will start eliquis 5mg BID in AM and will d/c heparin gtt. C/w statin and BP control. TTE done. Pt tachycardic on ambulation. Plan discussed w/ Dr. Brooke overnight.   ====================    HEALTH ISSUES - PROBLEM Dx:  HTN (hypertension): HTN (hypertension)  High cholesterol: High cholesterol  Limb ischemia: Limb ischemia  Need for prophylactic measure: Need for prophylactic measure  Essential hypertension: Essential hypertension  Type 2 diabetes mellitus with other circulatory complication, unspecified long term insulin use status: Type 2 diabetes mellitus with other circulatory complication, unspecified long term insulin use status  PAD (peripheral artery disease): PAD (peripheral artery disease)        Fabiola Dill, CCU PA #[phone]

## 2018-04-03 NOTE — PROGRESS NOTE ADULT - ASSESSMENT
This is a 67 year old woman with past medical history of PAD, s/p RT. SFA stent, HTN, HLD, DM2, LEILANI (not on CPAP) s/p recent LLE (PANTERA to Lt SFA, balloon angioplasty on Lt pop, & PANTERA to Lt peroneal artery (3/22) presented to the ED c/o worsening leg symptoms.  Patient states that at 230 pm yesterday, she felt "pins and needles" and pain 10/10 on her left leg.  Claims to have been taking all her antiplatelets (ASA, Brilinta, Celostazol) as prescribed. S/P direct thrombolytic therapy to left leg, tPA now complete.

## 2018-04-03 NOTE — PROGRESS NOTE ADULT - SUBJECTIVE AND OBJECTIVE BOX
Vascular Medicine and Peripheral Intervention Progress Note    S/24 Events: Overnight pt became hypertensive approx 215AM upon awakening from sleep to 210 systolic. Restarted on nitro gtt.     O:  T(C): 37.4 (04-03-18 @ 07:00), Max: 37.4 (04-03-18 @ 04:00)  HR: 87 (04-03-18 @ 07:00) (82 - 109)  BP: 204/152 (04-02-18 @ 18:30) (118/55 - 204/152)  RR: 17 (04-03-18 @ 07:00) (10 - 23)  SpO2: 95% (04-03-18 @ 07:00) (94% - 100%)  Wt(kg): --      Tele:    O/E:  Gen: NAD  HEENT: EOMI  CV: RRR, normal S1 + S2, no m/r/g  Lungs: CTAB  Abd: soft, non-tender  Ext:       Labs:                        8.2    11.7  )-----------( 309      ( 03 Apr 2018 05:03 )             24.1     04-03    139  |  104  |  16  ----------------------------<  136<H>  3.9   |  21<L>  |  0.69    Ca    9.0      03 Apr 2018 05:03  Phos  3.4     04-03  Mg     1.4     04-03    TPro  6.7  /  Alb  3.6  /  TBili  0.9  /  DBili  x   /  AST  14  /  ALT  8<L>  /  AlkPhos  106  04-03    PT/INR - ( 03 Apr 2018 05:03 )   PT: 13.1 sec;   INR: 1.20 ratio         PTT - ( 03 Apr 2018 05:03 )  PTT:29.3 sec          Meds:  MEDICATIONS  (STANDING):  alteplase    Infusion 0.5 mG/Hr (25 mL/Hr) IV Continuous <Continuous>  amLODIPine   Tablet 10 milliGRAM(s) Oral daily  aspirin enteric coated 81 milliGRAM(s) Oral daily  atorvastatin 40 milliGRAM(s) Oral at bedtime  ceFAZolin   IVPB 1000 milliGRAM(s) IV Intermittent every 8 hours  cilostazol 50 milliGRAM(s) Oral two times a day  dextrose 5%. 1000 milliLiter(s) (50 mL/Hr) IV Continuous <Continuous>  dextrose 50% Injectable 12.5 Gram(s) IV Push once  dextrose 50% Injectable 25 Gram(s) IV Push once  dextrose 50% Injectable 25 Gram(s) IV Push once  docusate sodium 100 milliGRAM(s) Oral three times a day  heparin  Infusion 300 Unit(s)/Hr (8 mL/Hr) IV Continuous <Continuous>  insulin lispro (HumaLOG) corrective regimen sliding scale   SubCutaneous three times a day before meals  insulin lispro (HumaLOG) corrective regimen sliding scale   SubCutaneous at bedtime  lactated ringers. 1000 milliLiter(s) (50 mL/Hr) IV Continuous <Continuous>  lisinopril 20 milliGRAM(s) Oral daily  nitroglycerin  Infusion 5 MICROgram(s)/Min (1.5 mL/Hr) IV Continuous <Continuous>  senna 2 Tablet(s) Oral at bedtime  ticagrelor 90 milliGRAM(s) Oral two times a day        A/P:  68 y/o AA female with PMHx of HTN, HLD, DM2, LEILANI (not on CPAP), PAD/CLI s/p complex LLE intervention as detailed above on 3/22/2018 now admitted with acute LLE limb ischemia (mid SFA/pop and TPT occlusion) s/p catheter directed lytics 4/2.     - continue aspirin, ticagrelor, cilostazol  - continue heparin gtt at 300 U/Hr  - keep SBP <140  - keep NPO  - analgesics and anxiolytics PRN.  - relook angiogram today - if failure to recanalize will need to discuss with Dr. Joe's regarding bypass surgery.       Corey Ed  Cardiology Fellow Vascular Medicine and Peripheral Intervention Progress Note    S/24 Events: Overnight pt became hypertensive approx 215AM upon awakening from sleep to 210 systolic. Restarted on nitro gtt.     O:  T(C): 37.4 (04-03-18 @ 07:00), Max: 37.4 (04-03-18 @ 04:00)  HR: 87 (04-03-18 @ 07:00) (82 - 109)  BP: 204/152 (04-02-18 @ 18:30) (118/55 - 204/152)  RR: 17 (04-03-18 @ 07:00) (10 - 23)  SpO2: 95% (04-03-18 @ 07:00) (94% - 100%)      O/E:  Gen: NAD  HEENT: EOMI  CV: RRR, normal S1 + S2, no m/r/g  Lungs: CTAB  Abd: soft, non-tender  Ext: LLE warm after reintervention, palpable DP, dopplerable PT      Labs:                        8.2    11.7  )-----------( 309      ( 03 Apr 2018 05:03 )             24.1     04-03    139  |  104  |  16  ----------------------------<  136<H>  3.9   |  21<L>  |  0.69    Ca    9.0      03 Apr 2018 05:03  Phos  3.4     04-03  Mg     1.4     04-03    TPro  6.7  /  Alb  3.6  /  TBili  0.9  /  DBili  x   /  AST  14  /  ALT  8<L>  /  AlkPhos  106  04-03    PT/INR - ( 03 Apr 2018 05:03 )   PT: 13.1 sec;   INR: 1.20 ratio         PTT - ( 03 Apr 2018 05:03 )  PTT:29.3 sec          Meds:  MEDICATIONS  (STANDING):  alteplase    Infusion 0.5 mG/Hr (25 mL/Hr) IV Continuous <Continuous>  amLODIPine   Tablet 10 milliGRAM(s) Oral daily  aspirin enteric coated 81 milliGRAM(s) Oral daily  atorvastatin 40 milliGRAM(s) Oral at bedtime  ceFAZolin   IVPB 1000 milliGRAM(s) IV Intermittent every 8 hours  cilostazol 50 milliGRAM(s) Oral two times a day  dextrose 5%. 1000 milliLiter(s) (50 mL/Hr) IV Continuous <Continuous>  dextrose 50% Injectable 12.5 Gram(s) IV Push once  dextrose 50% Injectable 25 Gram(s) IV Push once  dextrose 50% Injectable 25 Gram(s) IV Push once  docusate sodium 100 milliGRAM(s) Oral three times a day  heparin  Infusion 300 Unit(s)/Hr (8 mL/Hr) IV Continuous <Continuous>  insulin lispro (HumaLOG) corrective regimen sliding scale   SubCutaneous three times a day before meals  insulin lispro (HumaLOG) corrective regimen sliding scale   SubCutaneous at bedtime  lactated ringers. 1000 milliLiter(s) (50 mL/Hr) IV Continuous <Continuous>  lisinopril 20 milliGRAM(s) Oral daily  nitroglycerin  Infusion 5 MICROgram(s)/Min (1.5 mL/Hr) IV Continuous <Continuous>  senna 2 Tablet(s) Oral at bedtime  ticagrelor 90 milliGRAM(s) Oral two times a day        A/P:  66 y/o AA female with PMHx of HTN, HLD, DM2, LEILANI (not on CPAP), PAD/CLI s/p complex LLE intervention as detailed above on 3/22/2018 now admitted with acute LLE limb ischemia (mid SFA/pop and TPT occlusion) s/p catheter directed lytics 4/2 and reintervention on SFA/pop/peroneal 4/3.     - load with plavix 300mg today-->75mg daily  - continue heparin gtt today  - plan to switch eliquis 5mg BID tomorrow   - discontinue ticagrelor, cilostazol  - analgesics and anxiolytics PRN.      Corey Ward  Cardiology Fellow

## 2018-04-03 NOTE — PROGRESS NOTE ADULT - SUBJECTIVE AND OBJECTIVE BOX
CHIEF COMPLAINT::    INTERVAL HISTORY:    REVIEW OF SYSTEMS: all others negative    MEDICATIONS  (STANDING):  alteplase    Infusion 0.5 mG/Hr (25 mL/Hr) IV Continuous <Continuous>  amLODIPine   Tablet 10 milliGRAM(s) Oral daily  aspirin enteric coated 81 milliGRAM(s) Oral daily  atorvastatin 40 milliGRAM(s) Oral at bedtime  ceFAZolin   IVPB 1000 milliGRAM(s) IV Intermittent every 8 hours  cilostazol 50 milliGRAM(s) Oral two times a day  dextrose 5%. 1000 milliLiter(s) (50 mL/Hr) IV Continuous <Continuous>  dextrose 50% Injectable 12.5 Gram(s) IV Push once  dextrose 50% Injectable 25 Gram(s) IV Push once  dextrose 50% Injectable 25 Gram(s) IV Push once  docusate sodium 100 milliGRAM(s) Oral three times a day  heparin  Infusion 300 Unit(s)/Hr (3 mL/Hr) IV Continuous <Continuous>  insulin lispro (HumaLOG) corrective regimen sliding scale   SubCutaneous three times a day before meals  insulin lispro (HumaLOG) corrective regimen sliding scale   SubCutaneous at bedtime  lactated ringers. 1000 milliLiter(s) (50 mL/Hr) IV Continuous <Continuous>  lisinopril 20 milliGRAM(s) Oral daily  nitroglycerin  Infusion 5 MICROgram(s)/Min (1.5 mL/Hr) IV Continuous <Continuous>  senna 2 Tablet(s) Oral at bedtime  ticagrelor 90 milliGRAM(s) Oral two times a day    MEDICATIONS  (PRN):  acetaminophen   Tablet. 650 milliGRAM(s) Oral every 6 hours PRN Mild Pain (1 - 3)  ALPRAZolam 0.5 milliGRAM(s) Oral every 6 hours PRN anxiety  dextrose Gel 1 Dose(s) Oral once PRN Blood Glucose LESS THAN 70 milliGRAM(s)/deciliter  glucagon  Injectable 1 milliGRAM(s) IntraMuscular once PRN Glucose LESS THAN 70 milligrams/deciliter  HYDROmorphone  Injectable 0.5 milliGRAM(s) IV Push every 3 hours PRN Severe Pain (7 - 10)  oxyCODONE    IR 5 milliGRAM(s) Oral every 4 hours PRN Moderate Pain (4 - 6)      Objective:  Vital Signs Last 24 Hrs  T(C): 37.4 (2018 04:00), Max: 37.4 (2018 04:00)  T(F): 99.4 (2018 04:00), Max: 99.4 (2018 04:00)  HR: 90 (2018 06:00) (77 - 117)  BP: 204/152 (2018 18:30) (118/55 - 204/152)  BP(mean): 164 (2018 18:30) (75 - 164)  RR: 16 (2018 06:00) (10 - 23)  SpO2: 97% (2018 06:00) (94% - 100%)  ICU Vital Signs Last 24 Hrs  T(C): 37.4 (2018 04:00), Max: 37.4 (2018 04:00)  T(F): 99.4 (2018 04:00), Max: 99.4 (2018 04:00)  HR: 90 (2018 06:00) (77 - 117)  BP: 204/152 (2018 18:30) (118/55 - 204/152)  BP(mean): 164 (2018 18:30) (75 - 164)  ABP: 160/70 (2018 06:00) (102/61 - 210/97)  ABP(mean): 101 (2018 06:00) (77 - 156)  RR: 16 (2018 06:00) (10 - 23)  SpO2: 97% (2018 06:00) (94% - 100%)      Adult Advanced Hemodynamics Last 24 Hrs  CVP(mm Hg): --  CVP(cm H2O): --  CO: --  CI: --  PA: --  PA(mean): --  PCWP: --  SVR: --  SVRI: --  PVR: --  PVRI: --       @ 07:  -  -02 @ 07:00  --------------------------------------------------------  IN: 683 mL / OUT: 0 mL / NET: 683 mL     @ 07:01  -  04-03 @ 06:37  --------------------------------------------------------  IN: 1467 mL / OUT: 835 mL / NET: 632 mL      Daily     Daily     PHYSICAL EXAM:      Constitutional:    Eyes:    ENMT:    Neck:    Breasts:    Back:    Respiratory:    Cardiovascular:    Gastrointestinal:    Genitourinary:    Rectal:    Extremities:    Vascular:    Neurological:    Skin:    Lymph Nodes:    Musculoskeletal:    Psychiatric:        TELEMETRY:     EKG:     CARDIAC CATH:     ECHO:    IMAGIN.2    11.7  )-----------( 309      ( 2018 05:03 )             24.1     04-03    139  |  104  |  16  ----------------------------<  136<H>  3.9   |  21<L>  |  0.69    Ca    9.0      2018 05:03  Phos  3.4     04-03  Mg     1.4     04-03    TPro  6.7  /  Alb  3.6  /  TBili  0.9  /  DBili  x   /  AST  14  /  ALT  8<L>  /  AlkPhos  106  04-03    LIVER FUNCTIONS - ( 2018 05:03 )  Alb: 3.6 g/dL / Pro: 6.7 g/dL / ALK PHOS: 106 U/L / ALT: 8 U/L RC / AST: 14 U/L / GGT: x           PT/INR - ( 2018 05:03 )   PT: 13.1 sec;   INR: 1.20 ratio         PTT - ( 2018 05:03 )  PTT:29.3 sec      *BLOOD GAS/ARTERIAL/MIXED/VENOUS  *LACTATE      HEALTH ISSUES - PROBLEM Dx:  High cholesterol: High cholesterol  Limb ischemia: Limb ischemia  Need for prophylactic measure: Need for prophylactic measure  Essential hypertension: Essential hypertension  Type 2 diabetes mellitus with other circulatory complication, unspecified long term insulin use status: Type 2 diabetes mellitus with other circulatory complication, unspecified long term insulin use status  PAD (peripheral artery disease): PAD (peripheral artery disease)        HEALTH ISSUES - R/O PROBLEM Dx:      CHERI NickU NP   # CHIEF COMPLAINT: Acute Limb Ischemia    INTERVAL HISTORY:  This is a 67 year old woman with past medical history of PAD, s/p RT. SFA stent, HTN, HLD, DM2, LEILANI (not on CPAP) s/p recent LLE (PANTERA to Lt SFA, balloon angioplasty on Lt pop, & PANTERA to Lt peroneal artery (3/22) presented to the ED c/o worsening leg symptoms.  Patient states that at 230 pm yesterday, she felt "pins and needles" and pain 10/10 on her left leg.  Claims to have been taking all her antiplatelets (ASA, Brilinta, Celostazol) as prescribed. S/P direct thrombolytic therapy to left leg, tPA now complete.    REVIEW OF SYSTEMS: all others negative    MEDICATIONS  (STANDING):  alteplase    Infusion 0.5 mG/Hr (25 mL/Hr) IV Continuous <Continuous>  amLODIPine   Tablet 10 milliGRAM(s) Oral daily  aspirin enteric coated 81 milliGRAM(s) Oral daily  atorvastatin 40 milliGRAM(s) Oral at bedtime  ceFAZolin   IVPB 1000 milliGRAM(s) IV Intermittent every 8 hours  cilostazol 50 milliGRAM(s) Oral two times a day  dextrose 5%. 1000 milliLiter(s) (50 mL/Hr) IV Continuous <Continuous>  dextrose 50% Injectable 12.5 Gram(s) IV Push once  dextrose 50% Injectable 25 Gram(s) IV Push once  dextrose 50% Injectable 25 Gram(s) IV Push once  docusate sodium 100 milliGRAM(s) Oral three times a day  heparin  Infusion 300 Unit(s)/Hr (3 mL/Hr) IV Continuous <Continuous>  insulin lispro (HumaLOG) corrective regimen sliding scale   SubCutaneous three times a day before meals  insulin lispro (HumaLOG) corrective regimen sliding scale   SubCutaneous at bedtime  lactated ringers. 1000 milliLiter(s) (50 mL/Hr) IV Continuous <Continuous>  lisinopril 20 milliGRAM(s) Oral daily  nitroglycerin  Infusion 5 MICROgram(s)/Min (1.5 mL/Hr) IV Continuous <Continuous>  senna 2 Tablet(s) Oral at bedtime  ticagrelor 90 milliGRAM(s) Oral two times a day    MEDICATIONS  (PRN):  acetaminophen   Tablet. 650 milliGRAM(s) Oral every 6 hours PRN Mild Pain (1 - 3)  ALPRAZolam 0.5 milliGRAM(s) Oral every 6 hours PRN anxiety  dextrose Gel 1 Dose(s) Oral once PRN Blood Glucose LESS THAN 70 milliGRAM(s)/deciliter  glucagon  Injectable 1 milliGRAM(s) IntraMuscular once PRN Glucose LESS THAN 70 milligrams/deciliter  HYDROmorphone  Injectable 0.5 milliGRAM(s) IV Push every 3 hours PRN Severe Pain (7 - 10)  oxyCODONE    IR 5 milliGRAM(s) Oral every 4 hours PRN Moderate Pain (4 - 6)      Objective:  Vital Signs Last 24 Hrs  T(C): 37.4 (2018 04:00), Max: 37.4 (2018 04:00)  T(F): 99.4 (2018 04:00), Max: 99.4 (2018 04:00)  HR: 90 (2018 06:00) (77 - 117)  BP: 204/152 (2018 18:30) (118/55 - 204/152)  BP(mean): 164 (2018 18:30) (75 - 164)  RR: 16 (2018 06:00) (10 - 23)  SpO2: 97% (2018 06:00) (94% - 100%)  ICU Vital Signs Last 24 Hrs  T(C): 37.4 (2018 04:00), Max: 37.4 (2018 04:00)  T(F): 99.4 (2018 04:00), Max: 99.4 (2018 04:00)  HR: 90 (2018 06:00) (77 - 117)  BP: 204/152 (2018 18:30) (118/55 - 204/152)  BP(mean): 164 (2018 18:30) (75 - 164)  ABP: 160/70 (2018 06:00) (102/61 - 210/97)  ABP(mean): 101 (2018 06:00) (77 - 156)  RR: 16 (2018 06:00) (10 - 23)  SpO2: 97% (2018 06:00) (94% - 100%)      Adult Advanced Hemodynamics Last 24 Hrs  CVP(mm Hg): --  CVP(cm H2O): --  CO: --  CI: --  PA: --  PA(mean): --  PCWP: --  SVR: --  SVRI: --  PVR: --  PVRI: --       @ 07:01   @ 07:00  --------------------------------------------------------  IN: 683 mL / OUT: 0 mL / NET: 683 mL     @ 07:01   @ 06:37  --------------------------------------------------------  IN: 1467 mL / OUT: 835 mL / NET: 632 mL      Daily     Daily     PHYSICAL EXAM:      Constitutional:    Eyes:    ENMT:    Neck:    Breasts:    Back:    Respiratory:    Cardiovascular:    Gastrointestinal:    Genitourinary:    Rectal:    Extremities:    Vascular:    Neurological:    Skin:    Lymph Nodes:    Musculoskeletal:    Psychiatric:        TELEMETRY:     EKG:     CARDIAC CATH:     ECHO:    IMAGIN.2    11.7  )-----------( 309      ( 2018 05:03 )             24.1     04-03    139  |  104  |  16  ----------------------------<  136<H>  3.9   |  21<L>  |  0.69    Ca    9.0      2018 05:03  Phos  3.4     04-03  Mg     1.4     04-03    TPro  6.7  /  Alb  3.6  /  TBili  0.9  /  DBili  x   /  AST  14  /  ALT  8<L>  /  AlkPhos  106  04-03    LIVER FUNCTIONS - ( 2018 05:03 )  Alb: 3.6 g/dL / Pro: 6.7 g/dL / ALK PHOS: 106 U/L / ALT: 8 U/L RC / AST: 14 U/L / GGT: x           PT/INR - ( 2018 05:03 )   PT: 13.1 sec;   INR: 1.20 ratio         PTT - ( 2018 05:03 )  PTT:29.3 sec      *BLOOD GAS/ARTERIAL/MIXED/VENOUS  *LACTATE      HEALTH ISSUES - PROBLEM Dx:  High cholesterol: High cholesterol  Limb ischemia: Limb ischemia  Need for prophylactic measure: Need for prophylactic measure  Essential hypertension: Essential hypertension  Type 2 diabetes mellitus with other circulatory complication, unspecified long term insulin use status: Type 2 diabetes mellitus with other circulatory complication, unspecified long term insulin use status  PAD (peripheral artery disease): PAD (peripheral artery disease)        HEALTH ISSUES - R/O PROBLEM Dx:      SHANTAL Nick NP   # CHIEF COMPLAINT: Acute Limb Ischemia    INTERVAL HISTORY:  This is a 67 year old woman with past medical history of PAD, s/p RT. SFA stent, HTN, HLD, DM2, LEILANI (not on CPAP) s/p recent LLE (PANTERA to Lt SFA, balloon angioplasty on Lt pop, & PANTERA to Lt peroneal artery (3/22) presented to the ED c/o worsening leg symptoms.  Patient states that at 230 pm yesterday, she felt "pins and needles" and pain 10/10 on her left leg.  Claims to have been taking all her antiplatelets (ASA, Brilinta, Celostazol) as prescribed. S/P direct thrombolytic therapy to left leg, tPA now complete.    REVIEW OF SYSTEMS: all others negative    MEDICATIONS  (STANDING):  alteplase    Infusion 0.5 mG/Hr (25 mL/Hr) IV Continuous <Continuous>  amLODIPine   Tablet 10 milliGRAM(s) Oral daily  aspirin enteric coated 81 milliGRAM(s) Oral daily  atorvastatin 40 milliGRAM(s) Oral at bedtime  ceFAZolin   IVPB 1000 milliGRAM(s) IV Intermittent every 8 hours  cilostazol 50 milliGRAM(s) Oral two times a day  dextrose 5%. 1000 milliLiter(s) (50 mL/Hr) IV Continuous <Continuous>  dextrose 50% Injectable 12.5 Gram(s) IV Push once  dextrose 50% Injectable 25 Gram(s) IV Push once  dextrose 50% Injectable 25 Gram(s) IV Push once  docusate sodium 100 milliGRAM(s) Oral three times a day  heparin  Infusion 300 Unit(s)/Hr (3 mL/Hr) IV Continuous <Continuous>  insulin lispro (HumaLOG) corrective regimen sliding scale   SubCutaneous three times a day before meals  insulin lispro (HumaLOG) corrective regimen sliding scale   SubCutaneous at bedtime  lactated ringers. 1000 milliLiter(s) (50 mL/Hr) IV Continuous <Continuous>  lisinopril 20 milliGRAM(s) Oral daily  nitroglycerin  Infusion 5 MICROgram(s)/Min (1.5 mL/Hr) IV Continuous <Continuous>  senna 2 Tablet(s) Oral at bedtime  ticagrelor 90 milliGRAM(s) Oral two times a day    MEDICATIONS  (PRN):  acetaminophen   Tablet. 650 milliGRAM(s) Oral every 6 hours PRN Mild Pain (1 - 3)  ALPRAZolam 0.5 milliGRAM(s) Oral every 6 hours PRN anxiety  dextrose Gel 1 Dose(s) Oral once PRN Blood Glucose LESS THAN 70 milliGRAM(s)/deciliter  glucagon  Injectable 1 milliGRAM(s) IntraMuscular once PRN Glucose LESS THAN 70 milligrams/deciliter  HYDROmorphone  Injectable 0.5 milliGRAM(s) IV Push every 3 hours PRN Severe Pain (7 - 10)  oxyCODONE    IR 5 milliGRAM(s) Oral every 4 hours PRN Moderate Pain (4 - 6)      Objective:  Vital Signs Last 24 Hrs  T(C): 37.4 (2018 04:00), Max: 37.4 (2018 04:00)  T(F): 99.4 (2018 04:00), Max: 99.4 (2018 04:00)  HR: 90 (2018 06:00) (77 - 117)  BP: 204/152 (2018 18:30) (118/55 - 204/152)  BP(mean): 164 (2018 18:30) (75 - 164)  RR: 16 (2018 06:00) (10 - 23)  SpO2: 97% (2018 06:00) (94% - 100%)  ICU Vital Signs Last 24 Hrs  T(C): 37.4 (2018 04:00), Max: 37.4 (2018 04:00)  T(F): 99.4 (2018 04:00), Max: 99.4 (2018 04:00)  HR: 90 (2018 06:00) (77 - 117)  BP: 204/152 (2018 18:30) (118/55 - 204/152)  BP(mean): 164 (2018 18:30) (75 - 164)  ABP: 160/70 (2018 06:00) (102/61 - 210/97)  ABP(mean): 101 (2018 06:00) (77 - 156)  RR: 16 (2018 06:00) (10 - 23)  SpO2: 97% (2018 06:00) (94% - 100%)      Adult Advanced Hemodynamics Last 24 Hrs  CVP(mm Hg): --  CVP(cm H2O): --  CO: --  CI: --  PA: --  PA(mean): --  PCWP: --  SVR: --  SVRI: --  PVR: --  PVRI: --       @ 07:01  -   @ 07:00  --------------------------------------------------------  IN: 683 mL / OUT: 0 mL / NET: 683 mL     @ 07:01   @ 06:37  --------------------------------------------------------  IN: 1467 mL / OUT: 835 mL / NET: 632 mL      Daily     Daily     PHYSICAL EXAM:    Appearance: [x ] Normal  [ ] abnormal [ ] NAD   Eyes: [x ] PERRL [ ] EOMI  HENT: [x ] Normal [ ] Abnormal oral muscosa [ ]NC/AT  Cardiovascular: [x ] S1 [x ] S2 [x ] RRR [ ] m/r/g [ ]edema [ ] JVP  Procedural Access Site: [ ]  hematoma [ ] tender to palpation [ ] 2+ pulse [ ] bruit [ ] Ecchymosis  Respiratory: [x ] Clear to auscultation bilaterally  Gastrointestinal: [x ] Soft [ ] tenderness[ ] distension [ ] BS  Neurologic: [x ] Non-focal  Lymphatic: [ ] lymphadenopathy  Psychiatry: [x ] AAOx3  [ ] confused [ ] disoriented [ ] Mood & affect appropriate  Skin: [ ]  rashes [ ] ecchymoses [ ] cyanosis  Ext: RLE +1 DP/PT, LLE PT via doppler. Warm, sensation to light touch intact. +5/5 strength B/L.      TELEMETRY:     EKG:     CARDIAC CATH:     ECHO:    IMAGIN.2    11.7  )-----------( 309      ( 2018 05:03 )             24.1     04-03    139  |  104  |  16  ----------------------------<  136<H>  3.9   |  21<L>  |  0.69    Ca    9.0      2018 05:03  Phos  3.4     04-03  Mg     1.4     04-03    TPro  6.7  /  Alb  3.6  /  TBili  0.9  /  DBili  x   /  AST  14  /  ALT  8<L>  /  AlkPhos  106  04-03    LIVER FUNCTIONS - ( 2018 05:03 )  Alb: 3.6 g/dL / Pro: 6.7 g/dL / ALK PHOS: 106 U/L / ALT: 8 U/L RC / AST: 14 U/L / GGT: x           PT/INR - ( 2018 05:03 )   PT: 13.1 sec;   INR: 1.20 ratio         PTT - ( 2018 05:03 )  PTT:29.3 sec      *BLOOD GAS/ARTERIAL/MIXED/VENOUS  *LACTATE      HEALTH ISSUES - PROBLEM Dx:  High cholesterol: High cholesterol  Limb ischemia: Limb ischemia  Need for prophylactic measure: Need for prophylactic measure  Essential hypertension: Essential hypertension  Type 2 diabetes mellitus with other circulatory complication, unspecified long term insulin use status: Type 2 diabetes mellitus with other circulatory complication, unspecified long term insulin use status  PAD (peripheral artery disease): PAD (peripheral artery disease)        HEALTH ISSUES - R/O PROBLEM Dx:      SHANTAL Nick NP   #

## 2018-04-04 LAB
ALBUMIN SERPL ELPH-MCNC: 3.1 G/DL — LOW (ref 3.3–5)
ALP SERPL-CCNC: 96 U/L — SIGNIFICANT CHANGE UP (ref 40–120)
ALT FLD-CCNC: 10 U/L RC — SIGNIFICANT CHANGE UP (ref 10–45)
ANION GAP SERPL CALC-SCNC: 14 MMOL/L — SIGNIFICANT CHANGE UP (ref 5–17)
APTT BLD: 43.8 SEC — HIGH (ref 27.5–37.4)
AST SERPL-CCNC: 16 U/L — SIGNIFICANT CHANGE UP (ref 10–40)
BILIRUB SERPL-MCNC: 0.9 MG/DL — SIGNIFICANT CHANGE UP (ref 0.2–1.2)
BUN SERPL-MCNC: 9 MG/DL — SIGNIFICANT CHANGE UP (ref 7–23)
CALCIUM SERPL-MCNC: 8.9 MG/DL — SIGNIFICANT CHANGE UP (ref 8.4–10.5)
CHLORIDE SERPL-SCNC: 103 MMOL/L — SIGNIFICANT CHANGE UP (ref 96–108)
CO2 SERPL-SCNC: 23 MMOL/L — SIGNIFICANT CHANGE UP (ref 22–31)
CREAT SERPL-MCNC: 0.74 MG/DL — SIGNIFICANT CHANGE UP (ref 0.5–1.3)
GLUCOSE BLDC GLUCOMTR-MCNC: 124 MG/DL — HIGH (ref 70–99)
GLUCOSE BLDC GLUCOMTR-MCNC: 146 MG/DL — HIGH (ref 70–99)
GLUCOSE BLDC GLUCOMTR-MCNC: 181 MG/DL — HIGH (ref 70–99)
GLUCOSE BLDC GLUCOMTR-MCNC: 188 MG/DL — HIGH (ref 70–99)
GLUCOSE SERPL-MCNC: 143 MG/DL — HIGH (ref 70–99)
HCT VFR BLD CALC: 23.3 % — LOW (ref 34.5–45)
HGB BLD-MCNC: 7.7 G/DL — LOW (ref 11.5–15.5)
MAGNESIUM SERPL-MCNC: 1.5 MG/DL — LOW (ref 1.6–2.6)
MCHC RBC-ENTMCNC: 26.2 PG — LOW (ref 27–34)
MCHC RBC-ENTMCNC: 33.1 GM/DL — SIGNIFICANT CHANGE UP (ref 32–36)
MCV RBC AUTO: 79.3 FL — LOW (ref 80–100)
PHOSPHATE SERPL-MCNC: 3.3 MG/DL — SIGNIFICANT CHANGE UP (ref 2.5–4.5)
PLATELET # BLD AUTO: 255 K/UL — SIGNIFICANT CHANGE UP (ref 150–400)
POTASSIUM SERPL-MCNC: 3.8 MMOL/L — SIGNIFICANT CHANGE UP (ref 3.5–5.3)
POTASSIUM SERPL-SCNC: 3.8 MMOL/L — SIGNIFICANT CHANGE UP (ref 3.5–5.3)
PROT SERPL-MCNC: 6.2 G/DL — SIGNIFICANT CHANGE UP (ref 6–8.3)
RBC # BLD: 2.93 M/UL — LOW (ref 3.8–5.2)
RBC # FLD: 17.6 % — HIGH (ref 10.3–14.5)
SODIUM SERPL-SCNC: 140 MMOL/L — SIGNIFICANT CHANGE UP (ref 135–145)
WBC # BLD: 9.8 K/UL — SIGNIFICANT CHANGE UP (ref 3.8–10.5)
WBC # FLD AUTO: 9.8 K/UL — SIGNIFICANT CHANGE UP (ref 3.8–10.5)

## 2018-04-04 PROCEDURE — 99233 SBSQ HOSP IP/OBS HIGH 50: CPT

## 2018-04-04 RX ORDER — MAGNESIUM SULFATE 500 MG/ML
2 VIAL (ML) INJECTION ONCE
Qty: 0 | Refills: 0 | Status: COMPLETED | OUTPATIENT
Start: 2018-04-04 | End: 2018-04-04

## 2018-04-04 RX ORDER — APIXABAN 2.5 MG/1
5 TABLET, FILM COATED ORAL EVERY 12 HOURS
Qty: 0 | Refills: 0 | Status: DISCONTINUED | OUTPATIENT
Start: 2018-04-04 | End: 2018-04-06

## 2018-04-04 RX ORDER — CARVEDILOL PHOSPHATE 80 MG/1
3.12 CAPSULE, EXTENDED RELEASE ORAL EVERY 12 HOURS
Qty: 0 | Refills: 0 | Status: DISCONTINUED | OUTPATIENT
Start: 2018-04-04 | End: 2018-04-06

## 2018-04-04 RX ORDER — RIVAROXABAN 15 MG-20MG
20 KIT ORAL
Qty: 0 | Refills: 0 | Status: DISCONTINUED | OUTPATIENT
Start: 2018-04-04 | End: 2018-04-04

## 2018-04-04 RX ORDER — RIVAROXABAN 15 MG-20MG
5 KIT ORAL
Qty: 0 | Refills: 0 | Status: DISCONTINUED | OUTPATIENT
Start: 2018-04-04 | End: 2018-04-04

## 2018-04-04 RX ORDER — SODIUM CHLORIDE 9 MG/ML
1000 INJECTION INTRAMUSCULAR; INTRAVENOUS; SUBCUTANEOUS
Qty: 0 | Refills: 0 | Status: DISCONTINUED | OUTPATIENT
Start: 2018-04-04 | End: 2018-04-04

## 2018-04-04 RX ADMIN — LISINOPRIL 20 MILLIGRAM(S): 2.5 TABLET ORAL at 08:38

## 2018-04-04 RX ADMIN — APIXABAN 5 MILLIGRAM(S): 2.5 TABLET, FILM COATED ORAL at 17:54

## 2018-04-04 RX ADMIN — Medication 100 MILLIGRAM(S): at 05:33

## 2018-04-04 RX ADMIN — Medication 50 GRAM(S): at 06:39

## 2018-04-04 RX ADMIN — Medication 100 MILLIGRAM(S): at 14:25

## 2018-04-04 RX ADMIN — APIXABAN 5 MILLIGRAM(S): 2.5 TABLET, FILM COATED ORAL at 08:39

## 2018-04-04 RX ADMIN — CLOPIDOGREL BISULFATE 75 MILLIGRAM(S): 75 TABLET, FILM COATED ORAL at 08:39

## 2018-04-04 RX ADMIN — OXYCODONE HYDROCHLORIDE 5 MILLIGRAM(S): 5 TABLET ORAL at 09:20

## 2018-04-04 RX ADMIN — OXYCODONE HYDROCHLORIDE 5 MILLIGRAM(S): 5 TABLET ORAL at 08:38

## 2018-04-04 RX ADMIN — OXYCODONE HYDROCHLORIDE 5 MILLIGRAM(S): 5 TABLET ORAL at 22:10

## 2018-04-04 RX ADMIN — ATORVASTATIN CALCIUM 40 MILLIGRAM(S): 80 TABLET, FILM COATED ORAL at 22:11

## 2018-04-04 RX ADMIN — CARVEDILOL PHOSPHATE 3.12 MILLIGRAM(S): 80 CAPSULE, EXTENDED RELEASE ORAL at 17:54

## 2018-04-04 RX ADMIN — SODIUM CHLORIDE 75 MILLILITER(S): 9 INJECTION INTRAMUSCULAR; INTRAVENOUS; SUBCUTANEOUS at 01:49

## 2018-04-04 RX ADMIN — AMLODIPINE BESYLATE 10 MILLIGRAM(S): 2.5 TABLET ORAL at 08:39

## 2018-04-04 RX ADMIN — OXYCODONE HYDROCHLORIDE 5 MILLIGRAM(S): 5 TABLET ORAL at 22:58

## 2018-04-04 NOTE — DISCHARGE NOTE ADULT - MEDICATION SUMMARY - MEDICATIONS TO STOP TAKING
I will STOP taking the medications listed below when I get home from the hospital:    aspirin 81 mg oral delayed release tablet  -- 1 tab(s) by mouth once a day    cilostazol 50 mg oral tablet  -- 1 tab(s) by mouth 2 times a day    Brilinta (ticagrelor) 90 mg oral tablet  -- 1 tab(s) by mouth 2 times a day MDD:2  -- It is very important that you take or use this exactly as directed.  Do not skip doses or discontinue unless directed by your doctor.  Obtain medical advice before taking any non-prescription drugs as some may affect the action of this medication.

## 2018-04-04 NOTE — DIETITIAN INITIAL EVALUATION ADULT. - OTHER INFO
Pt seen for LOS. Pt seen, reports a fair appetite in house, but states she is always hungry as she can only eat small amounts of food at a time. RD reviewed possible snack options, and encouraged to keep snack in unit fridge to consume at her leisure. Pt willing to try. Pt denies need for PO supplements. RD provided menu selection assistance. Pt states she checks her BG levels a few times a week, states results are always good. Pt's Hgba1c was 5.8% indicating good control. Pt takes Metformin for BG control. Pt denies a consistent CHO diet in house. Pt was amenable to limited low NA low cholesterol diet education. Pt takes MVI PTA. Denies chewing/swallowing difficulty. Denies GI distress. NKFA

## 2018-04-04 NOTE — DIETITIAN INITIAL EVALUATION ADULT. - ADHERENCE
fair/Pt drinks regular soda often at home but states her DM is well managed. Pt does use salt and will barclay foods at times.

## 2018-04-04 NOTE — PROGRESS NOTE ADULT - SUBJECTIVE AND OBJECTIVE BOX
CHIEF COMPLAINT::  ====================  INTERVAL HISTORY:  ====================      ====================  REVIEW OF SYSTEMS: all others negative  ====================    ====================  MEDICATIONS:  ====================  MEDICATIONS  (STANDING):  amLODIPine   Tablet 10 milliGRAM(s) Oral daily  apixaban 5 milliGRAM(s) Oral every 12 hours  atorvastatin 40 milliGRAM(s) Oral at bedtime  ceFAZolin   IVPB 1000 milliGRAM(s) IV Intermittent every 8 hours  clopidogrel Tablet 75 milliGRAM(s) Oral daily  dextrose 5%. 1000 milliLiter(s) (50 mL/Hr) IV Continuous <Continuous>  dextrose 50% Injectable 12.5 Gram(s) IV Push once  dextrose 50% Injectable 25 Gram(s) IV Push once  dextrose 50% Injectable 25 Gram(s) IV Push once  heparin  Infusion 300 Unit(s)/Hr (7 mL/Hr) IV Continuous <Continuous>  insulin lispro (HumaLOG) corrective regimen sliding scale   SubCutaneous three times a day before meals  insulin lispro (HumaLOG) corrective regimen sliding scale   SubCutaneous at bedtime  lisinopril 20 milliGRAM(s) Oral daily  sodium chloride 0.9%. 1000 milliLiter(s) (75 mL/Hr) IV Continuous <Continuous>    MEDICATIONS  (PRN):  acetaminophen   Tablet. 650 milliGRAM(s) Oral every 6 hours PRN Mild Pain (1 - 3)  ALPRAZolam 0.5 milliGRAM(s) Oral every 6 hours PRN anxiety  dextrose Gel 1 Dose(s) Oral once PRN Blood Glucose LESS THAN 70 milliGRAM(s)/deciliter  glucagon  Injectable 1 milliGRAM(s) IntraMuscular once PRN Glucose LESS THAN 70 milligrams/deciliter  HYDROmorphone  Injectable 0.5 milliGRAM(s) IV Push every 3 hours PRN Severe Pain (7 - 10)  oxyCODONE    IR 5 milliGRAM(s) Oral every 4 hours PRN Moderate Pain (4 - 6)      ====================  OBJECTIVE:  ====================  Vital Signs Last 24 Hrs  T(C): 37.2 (2018 04:00), Max: 37.8 (2018 19:00)  T(F): 99 (2018 04:00), Max: 100 (2018 19:00)  HR: 103 (2018 07:00) (78 - 129)  BP: 138/58 (2018 07:00) (106/86 - 166/67)  BP(mean): 80 (2018 07:00) (75 - 118)  RR: 19 (2018 06:00) (13 - )  SpO2: 98% (:00) (95% - 100%)  ICU Vital Signs Last 24 Hrs  T(C): 37.2 (2018 04:00), Max: 37.8 (2018 19:00)  T(F): 99 (2018 04:00), Max: 100 (2018 19:00)  HR: 103 (2018 07:00) (78 - 129)  BP: 138/58 (2018 07:00) (106/86 - 166/67)  BP(mean): 80 (2018 07:00) (75 - 118)  ABP: --  ABP(mean): --  RR: 19 (2018 06:00) (13 - )  SpO2: 98% (2018 06:00) (95% - 100%)      Adult Advanced Hemodynamics Last 24 Hrs  CVP(mm Hg): --  CVP(cm H2O): --  CO: --  CI: --  PA: --  PA(mean): --  PCWP: --  SVR: --  SVRI: --  PVR: --  PVRI: --       @ 07:01  -   @ 07:00  --------------------------------------------------------  IN: 1366 mL / OUT: 1595 mL / NET: -229 mL      Daily     Daily Weight in k.2 (2018 05:00)    PHYSICAL EXAM:  Appearance: Normal, NAD  Eyes: PERRL, EOMI  HENT: Normal oral muscosa, NC/AT  Cardiovascular:  S1/S2, RRR, No edema, JVP, Murmur  Respiratory: Clear to auscultation bilaterally  Gastrointestinal:  Soft, Non-tender, Non-distended, BS+  Musculoskeletal:  No clubbing [ ] No joint deformity   Neurologic: Non-focal  Lymphatic: No lymphadenopathy  Psychiatry: AAOx3, Mood & affect appropriate  Skin: No rashes, No ecchymoses, No cyanosis        TELEMETRY:     EKG:     IMAGIN.7    9.8   )-----------( 255      ( 2018 05:45 )             23.3     04-04    140  |  103  |  9   ----------------------------<  143<H>  3.8   |  23  |  0.74    Ca    8.9      2018 05:46  Phos  3.3     04-04  Mg     1.5     04-04    TPro  6.2  /  Alb  3.1<L>  /  TBili  0.9  /  DBili  x   /  AST  16  /  ALT  10  /  AlkPhos  96  04-04    LIVER FUNCTIONS - ( 2018 05:46 )  Alb: 3.1 g/dL / Pro: 6.2 g/dL / ALK PHOS: 96 U/L / ALT: 10 U/L RC / AST: 16 U/L / GGT: x           PT/INR - ( 2018 05:03 )   PT: 13.1 sec;   INR: 1.20 ratio         PTT - ( 2018 19:22 )  PTT:52.1 sec        HEALTH ISSUES - PROBLEM Dx:  HTN (hypertension): HTN (hypertension)  High cholesterol: High cholesterol  Limb ischemia: Limb ischemia  Need for prophylactic measure: Need for prophylactic measure  Essential hypertension: Essential hypertension  Type 2 diabetes mellitus with other circulatory complication, unspecified long term insulin use status: Type 2 diabetes mellitus with other circulatory complication, unspecified long term insulin use status  PAD (peripheral artery disease): PAD (peripheral artery disease)        HEALTH ISSUES - R/O PROBLEM Dx: CHIEF COMPLAINT::  ====================  INTERVAL HISTORY:  ====================  Pt had an "okay" night. Complains of L discomfort on ambulation, L lower leg up to heel. DP, PT pulses palpable, extremities warm     ====================  REVIEW OF SYSTEMS:   ====================  Appearance: Normal, NAD  Eyes: PERRL, EOMI  HENT: Normal oral muscosa, NC/AT  Cardiovascular:  S1/S2, RRR, No edema, JVP, Murmur  Procedural Access Site: No hematoma, Non-tender to palpation, 2+ pulse, No bruit, No Ecchymosis  Respiratory: Clear to auscultation bilaterally  Gastrointestinal:  Soft, Non-tender, Non-distended, BS+  Musculoskeletal:  No clubbing [ ] No joint deformity   Neurologic: Non-focal  Lymphatic: No lymphadenopathy  Psychiatry: AAOx3, Mood & affect appropriate  Skin: No rashes, No ecchymoses, No cyanosis  ====================  MEDICATIONS:  ====================  MEDICATIONS  (STANDING):  amLODIPine   Tablet 10 milliGRAM(s) Oral daily  apixaban 5 milliGRAM(s) Oral every 12 hours  atorvastatin 40 milliGRAM(s) Oral at bedtime  ceFAZolin   IVPB 1000 milliGRAM(s) IV Intermittent every 8 hours  clopidogrel Tablet 75 milliGRAM(s) Oral daily  dextrose 5%. 1000 milliLiter(s) (50 mL/Hr) IV Continuous <Continuous>  dextrose 50% Injectable 12.5 Gram(s) IV Push once  dextrose 50% Injectable 25 Gram(s) IV Push once  dextrose 50% Injectable 25 Gram(s) IV Push once  heparin  Infusion 300 Unit(s)/Hr (7 mL/Hr) IV Continuous <Continuous>  insulin lispro (HumaLOG) corrective regimen sliding scale   SubCutaneous three times a day before meals  insulin lispro (HumaLOG) corrective regimen sliding scale   SubCutaneous at bedtime  lisinopril 20 milliGRAM(s) Oral daily  sodium chloride 0.9%. 1000 milliLiter(s) (75 mL/Hr) IV Continuous <Continuous>    MEDICATIONS  (PRN):  acetaminophen   Tablet. 650 milliGRAM(s) Oral every 6 hours PRN Mild Pain (1 - 3)  ALPRAZolam 0.5 milliGRAM(s) Oral every 6 hours PRN anxiety  dextrose Gel 1 Dose(s) Oral once PRN Blood Glucose LESS THAN 70 milliGRAM(s)/deciliter  glucagon  Injectable 1 milliGRAM(s) IntraMuscular once PRN Glucose LESS THAN 70 milligrams/deciliter  HYDROmorphone  Injectable 0.5 milliGRAM(s) IV Push every 3 hours PRN Severe Pain (7 - 10)  oxyCODONE    IR 5 milliGRAM(s) Oral every 4 hours PRN Moderate Pain (4 - 6)      ====================  OBJECTIVE:  ====================  Vital Signs Last 24 Hrs  T(C): 37.2 (2018 04:00), Max: 37.8 (2018 19:00)  T(F): 99 (2018 04:00), Max: 100 (2018 19:00)  HR: 103 (2018 07:00) (78 - 129)  BP: 138/58 (2018 07:00) (106/86 - 166/67)  BP(mean): 80 (2018 07:00) (75 - 118)  RR: 19 (2018 06:00) (13 - 27)  SpO2: 98% (2018 06:00) (95% - 100%)  ICU Vital Signs Last 24 Hrs  T(C): 37.2 (2018 04:00), Max: 37.8 (2018 19:00)  T(F): 99 (2018 04:00), Max: 100 (2018 19:00)  HR: 103 (2018 07:00) (78 - 129)  BP: 138/58 (2018 07:00) (106/86 - 166/67)  BP(mean): 80 (2018 07:00) (75 - 118)  ABP: --  ABP(mean): --  RR: 19 (2018 06:00) (13 - 27)  SpO2: 98% (2018 06:00) (95% - 100%)      Adult Advanced Hemodynamics Last 24 Hrs  CVP(mm Hg): --  CVP(cm H2O): --  CO: --  CI: --  PA: --  PA(mean): --  PCWP: --  SVR: --  SVRI: --  PVR: --  PVRI: --       @ 07:01  -   @ 07:00  --------------------------------------------------------  IN: 1366 mL / OUT: 1595 mL / NET: -229 mL      Daily     Daily Weight in k.2 (2018 05:00)    PHYSICAL EXAM:  Appearance: Normal, NAD  Eyes: PERRL, EOMI  HENT: Normal oral muscosa, NC/AT  Cardiovascular:  S1/S2, RRR, No edema, JVP, Murmur  Respiratory: Clear to auscultation bilaterally  Gastrointestinal:  Soft, Non-tender, Non-distended, BS+  Musculoskeletal:  No clubbing [ ] No joint deformity   Neurologic: Non-focal  Lymphatic: No lymphadenopathy  Psychiatry: AAOx3, Mood & affect appropriate  Skin: No rashes, No ecchymoses, No cyanosis      TELEMETRY: NSR  EKG:   IMAGIN.7    9.8   )-----------( 255      ( 2018 05:45 )             23.3     04-04    140  |  103  |  9   ----------------------------<  143<H>  3.8   |  23  |  0.74    Ca    8.9      2018 05:46  Phos  3.3     04-04  Mg     1.5     04-04    TPro  6.2  /  Alb  3.1<L>  /  TBili  0.9  /  DBili  x   /  AST  16  /  ALT  10  /  AlkPhos  96  04-04    LIVER FUNCTIONS - ( 2018 05:46 )  Alb: 3.1 g/dL / Pro: 6.2 g/dL / ALK PHOS: 96 U/L / ALT: 10 U/L RC / AST: 16 U/L / GGT: x           PT/INR - ( 2018 05:03 )   PT: 13.1 sec;   INR: 1.20 ratio         PTT - ( 2018 19:22 )  PTT:52.1 sec        HEALTH ISSUES - PROBLEM Dx:  HTN (hypertension): HTN (hypertension)  High cholesterol: High cholesterol  Limb ischemia: Limb ischemia  Need for prophylactic measure: Need for prophylactic measure  Essential hypertension: Essential hypertension  Type 2 diabetes mellitus with other circulatory complication, unspecified long term insulin use status: Type 2 diabetes mellitus with other circulatory complication, unspecified long term insulin use status  PAD (peripheral artery disease): PAD (peripheral artery disease)        HEALTH ISSUES - R/O PROBLEM Dx: CHIEF COMPLAINT::  ====================  INTERVAL HISTORY:  ====================  Pt had an "okay" night. Complains of L discomfort on ambulation, L lower leg up to heel. DP, PT pulses palpable, extremities warm     ====================  REVIEW OF SYSTEMS:   ====================  Constitutional: [ ] Fever, [ ] Chills, [ ] Fatigue, [ ]Weight change   HEENT: [ ]Blurred vision,  [ ]Headache, [ ] Vision Changes    Respiratory: [ ]Cough, [ ]Wheezing, [ ] Shortness of breath, [ ] Hemoptysis   Cardiovascular: [ ]Chest Pain, [ ]Palpitations, [ ]DOUGLAS, [ ]PND, [ ] Orthopnea  Gastrointestinal: [ ]Abdominal Pain, [ ]Diarrhea, [ ]Constipation, [ ] Nausea, [ ] Vomiting  Extremities: [ ]Swelling, [x] Left leg Pain, [x] L leg swelling,   Neurologic: [ ]Focal deficit, [ ]Paresthesias, [ ]Syncope  Skin: [ ]Rash, [ ]Ecchymoses, [ ] Wounds, [ ]Lesions  ====================  MEDICATIONS:  ====================  MEDICATIONS  (STANDING):  amLODIPine   Tablet 10 milliGRAM(s) Oral daily  apixaban 5 milliGRAM(s) Oral every 12 hours  atorvastatin 40 milliGRAM(s) Oral at bedtime  ceFAZolin   IVPB 1000 milliGRAM(s) IV Intermittent every 8 hours  clopidogrel Tablet 75 milliGRAM(s) Oral daily  dextrose 5%. 1000 milliLiter(s) (50 mL/Hr) IV Continuous <Continuous>  dextrose 50% Injectable 12.5 Gram(s) IV Push once  dextrose 50% Injectable 25 Gram(s) IV Push once  dextrose 50% Injectable 25 Gram(s) IV Push once  heparin  Infusion 300 Unit(s)/Hr (7 mL/Hr) IV Continuous <Continuous>  insulin lispro (HumaLOG) corrective regimen sliding scale   SubCutaneous three times a day before meals  insulin lispro (HumaLOG) corrective regimen sliding scale   SubCutaneous at bedtime  lisinopril 20 milliGRAM(s) Oral daily  sodium chloride 0.9%. 1000 milliLiter(s) (75 mL/Hr) IV Continuous <Continuous>    MEDICATIONS  (PRN):  acetaminophen   Tablet. 650 milliGRAM(s) Oral every 6 hours PRN Mild Pain (1 - 3)  ALPRAZolam 0.5 milliGRAM(s) Oral every 6 hours PRN anxiety  dextrose Gel 1 Dose(s) Oral once PRN Blood Glucose LESS THAN 70 milliGRAM(s)/deciliter  glucagon  Injectable 1 milliGRAM(s) IntraMuscular once PRN Glucose LESS THAN 70 milligrams/deciliter  HYDROmorphone  Injectable 0.5 milliGRAM(s) IV Push every 3 hours PRN Severe Pain (7 - 10)  oxyCODONE    IR 5 milliGRAM(s) Oral every 4 hours PRN Moderate Pain (4 - 6)      ====================  OBJECTIVE:  ====================  Vital Signs Last 24 Hrs  T(C): 37.2 (2018 04:00), Max: 37.8 (2018 19:00)  T(F): 99 (2018 04:00), Max: 100 (2018 19:00)  HR: 103 (2018 07:00) (78 - 129)  BP: 138/58 (2018 07:00) (106/86 - 166/67)  BP(mean): 80 (2018 07:00) (75 - 118)  RR: 19 (2018 06:00) (13 - 27)  SpO2: 98% (2018 06:00) (95% - 100%)  ICU Vital Signs Last 24 Hrs  T(C): 37.2 (2018 04:00), Max: 37.8 (2018 19:00)  T(F): 99 (2018 04:00), Max: 100 (2018 19:00)  HR: 103 (2018 07:00) (78 - 129)  BP: 138/58 (2018 07:00) (106/86 - 166/67)  BP(mean): 80 (2018 07:00) (75 - 118)  ABP: --  ABP(mean): --  RR: 19 (2018 06:00) (13 - 27)  SpO2: 98% (2018 06:00) (95% - 100%)      Adult Advanced Hemodynamics Last 24 Hrs  CVP(mm Hg): --  CVP(cm H2O): --  CO: --  CI: --  PA: --  PA(mean): --  PCWP: --  SVR: --  SVRI: --  PVR: --  PVRI: --       @ 07:01  -   @ 07:00  --------------------------------------------------------  IN: 1366 mL / OUT: 1595 mL / NET: -229 mL      Daily     Daily Weight in k.2 (2018 05:00)    PHYSICAL EXAM:  Appearance: Normal, NAD  Eyes: PERRL, EOMI  HENT: Normal oral muscosa, NC/AT  Cardiovascular:  S1/S2, RRR, No edema, JVP, Murmur  Respiratory: Clear to auscultation bilaterally  Gastrointestinal:  Soft, Non-tender, Non-distended, BS+  Musculoskeletal:  No clubbing. No joint deformity. DP pulses palpable, PT dopplerable. Leg warm  Neurologic: Non-focal  Lymphatic: No lymphadenopathy  Psychiatry: AAOx3, Mood & affect appropriate  Skin: No rashes, No ecchymoses, No cyanosis      TELEMETRY: NSR  EKG: NSR                             7.7    9.8   )-----------( 255      ( 2018 05:45 )             23.3     04-04    140  |  103  |  9   ----------------------------<  143<H>  3.8   |  23  |  0.74    Ca    8.9      2018 05:46  Phos  3.3     04-04  Mg     1.5     04-04    TPro  6.2  /  Alb  3.1<L>  /  TBili  0.9  /  DBili  x   /  AST  16  /  ALT  10  /  AlkPhos  96  04-04    LIVER FUNCTIONS - ( 2018 05:46 )  Alb: 3.1 g/dL / Pro: 6.2 g/dL / ALK PHOS: 96 U/L / ALT: 10 U/L RC / AST: 16 U/L / GGT: x           PT/INR - ( 2018 05:03 )   PT: 13.1 sec;   INR: 1.20 ratio         PTT - ( 2018 19:22 )  PTT:52.1 sec        HEALTH ISSUES - PROBLEM Dx:  HTN (hypertension): HTN (hypertension)  High cholesterol: High cholesterol  Limb ischemia: Limb ischemia  Need for prophylactic measure: Need for prophylactic measure  Essential hypertension: Essential hypertension  Type 2 diabetes mellitus with other circulatory complication, unspecified long term insulin use status: Type 2 diabetes mellitus with other circulatory complication, unspecified long term insulin use status  PAD (peripheral artery disease): PAD (peripheral artery disease)        HEALTH ISSUES - R/O PROBLEM Dx:

## 2018-04-04 NOTE — DISCHARGE NOTE ADULT - PLAN OF CARE
Your leg pain will be controlled. Continue your medications. Do not stop Plavix or Eliquis unless instructed by your cardiologist. No heavy lifting, strenuous activity, bending, straining or unnecessary stair climbing for 2 weeks. No sex for 1 week. No driving for 2 days. You may shower 24 hours following procedure but avoid baths and swimming for 1 week. Check groin site for bleeding and/or swelling daily following procedure. Call your doctor/cardiologist immediately for bleeding or swelling or if you have increased/persistent pain, fever/chills, or drainage at the site. Follow up with your cardiologist in 1- 2 weeks. You may call [facility] at [phone] or [phone] after office hours and weekends with any questions or concerns following our procedure. BP will be maintained within normal limits Continue with your blood pressure medications; eat a heart healthy diet with low salt diet; exercise regularly (consult with your physician or cardiologist first); maintain a heart healthy weight; include healthy ways to manage stress. Continue to follow with your primary care physician or cardiologist. Glucose will remain within normal limits Continue to follow with your primary care MD or your endocrinologist.  Follow a heart healthy diabetic diet. If you check your fingerstick glucose at home, call your MD if it is greater than 250mg/dL on 2 occasions or less than 100mg/dL on 2 occasions. Know signs of low blood sugar, such as: dizziness, shakiness, sweating, confusion, hunger, nervousness-drink 4 ounces apple juice if occurs and call your doctor. Know early signs of high blood sugar, such as: frequent urination, increased thirst, blurry vision, fatigue, headache - call your doctor if this occurs. Follow with other practitioners to care for your diabetes, such as ophthamologist and podiatrist.

## 2018-04-04 NOTE — DIETITIAN INITIAL EVALUATION ADULT. - NS AS NUTRI INTERV ED CONTENT
Nutrition relationship to health/disease/Recommended modifications/Discussed DASH diet education. Reviewed foods high in Na and cholesterol to avoid. Reviewed ways to decrease Na in your diet, discussed meal and snack options, tips for eating out. Encouraged more frequent BG monitoring and encouraged diet beverages. Pt aware RD remains available to monitor PO intake, wt, labs and diet education review/Purpose of the nutrition education

## 2018-04-04 NOTE — DIETITIAN INITIAL EVALUATION ADULT. - NS FNS REASON FOR WEIGHT CHANG
dislike of inhouse foods. Of note prior to Bypass, Pt was 225lbs and states she has maintained her weight around 165lbs/decreased po intake

## 2018-04-04 NOTE — DISCHARGE NOTE ADULT - PATIENT PORTAL LINK FT
You can access the [facility] Portal, offered by [facility], by registering with the following website: [email or web address]

## 2018-04-04 NOTE — DIETITIAN INITIAL EVALUATION ADULT. - ENERGY NEEDS
Ht: 5'5', Wt: 159.1lbs, BMI: 26.4kg/m2, IBW: 125lbs(+/-10%), 127%IBW  Pertinent information: Pt admitted for left leg pain s/p recent left leg PCI with PANTERA x3. Per chart.   Edema Skin: Ht: 5'5', Wt: 159.1lbs, BMI: 26.4kg/m2, IBW: 125lbs(+/-10%), 127%IBW  Pertinent information: Pt admitted for left leg pain s/p recent left leg PCI with PANTERA x3. Per chart Pt with essential HTN, limb ischemia s/p thrombolytic therapy.   No Edema, Skin intact

## 2018-04-04 NOTE — DISCHARGE NOTE ADULT - ADDITIONAL INSTRUCTIONS
Follow up with your cardiologist and PCP within 2 weeks from discharge. Take all medications as directed. Follow up with your cardiologist Dr Brooke call to schedule an appointment for one week from discharge.    Call to schedule appointment with your primary care physician within 2-4 weeks from discharge. Take all medications as directed.

## 2018-04-04 NOTE — DISCHARGE NOTE ADULT - SECONDARY DIAGNOSIS.
Essential hypertension Type 2 diabetes mellitus with other circulatory complication, unspecified long term insulin use status

## 2018-04-04 NOTE — DISCHARGE NOTE ADULT - HOME CARE AGENCY
[facility] [phone]  Visiting RN will contact you on 4/7/18 to schedule first visit

## 2018-04-04 NOTE — DISCHARGE NOTE ADULT - HOSPITAL COURSE
66 y/o female with PMHx of PAD, s/p Rt. SFA stent, HTN, HLD, DM2, LEILANI, s/p recent LLE (PANTERA to Lt SFA, balloon angioplasty on Lt pop, & PANTERA to Lt peroneal artery) 3/22 presented to the ED c/o worsening leg symptoms at rest found on Arterial duplex with reoccluded distal SFA and popliteal artery. 4/2 s/p catheter directed lysis w/ tpa infusion and 4/3 s/p Stents to LSFA and Pop. Pt ambulating. 68 y/o female with PMHx of PAD, s/p Rt. SFA stent, HTN, HLD, DM2, LEILANI, s/p recent LLE (PANTERA to Lt SFA, balloon angioplasty on Lt pop, & PANTERA to Lt peroneal artery) 3/22. Presented to the ED c/o worsening leg symptoms at rest found on Arterial duplex with re-occluded distal SFA and popliteal artery. 3/31 VA duplex LE: Occlusion of the LSFA  including the L SFA stent. Thrombosis of the L popliteal artery& tibial peroneal.  4/2 s/p catheter directed lysis w/ tpa infusion and 4/3 s/p Stents to LSFA and Pop. Pt ambulating with walker; PT eval home with home PT. Discharged home in stable condition to f/u with Dr Brooke within one week.

## 2018-04-04 NOTE — DIETITIAN INITIAL EVALUATION ADULT. - ORAL INTAKE PTA
Pt reports a good PO intake PTA. 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 Breakfast: coffee, bagel with cream cheese. Lunch and dinner: Chicken, vegetables. drinks water, juice and diet soda./good

## 2018-04-04 NOTE — DISCHARGE NOTE ADULT - MEDICATION SUMMARY - MEDICATIONS TO TAKE
I will START or STAY ON the medications listed below when I get home from the hospital:    oxyCODONE 5 mg oral tablet  -- 1 tab(s) by mouth once a day (at bedtime) MDD:1  -- Caution federal law prohibits the transfer of this drug to any person other  than the person for whom it was prescribed.  It is very important that you take or use this exactly as directed.  Do not skip doses or discontinue unless directed by your doctor.  May cause drowsiness.  Alcohol may intensify this effect.  Use care when operating dangerous machinery.  This prescription cannot be refilled.  Using more of this medication than prescribed may cause serious breathing problems.    -- Indication: For Left lower leg pain    ramipril 10 mg oral capsule  -- 1 cap(s) by mouth once a day  -- Indication: For Essential hypertension    apixaban 5 mg oral tablet  -- 1 tab(s) by mouth every 12 hours  -- Indication: For PAD (peripheral artery disease)    gabapentin 100 mg oral capsule  -- 1 cap(s) by mouth 3 times a day  -- Indication: For Left lower leg pain    metFORMIN 500 mg oral tablet  -- 1 tab(s) by mouth 2 times a day  Restart on 3/25/18  -- Indication: For Type 2 diabetes mellitus with other circulatory complication, unspecified long term insulin use status    atorvastatin 40 mg oral tablet  -- 1 tab(s) by mouth once a day (at bedtime) MDD:1  -- Indication: For High cholesterol    clopidogrel 75 mg oral tablet  -- 1 tab(s) by mouth once a day  -- Indication: For PAD (peripheral artery disease)    carvedilol 3.125 mg oral tablet  -- 1 tab(s) by mouth every 12 hours  -- Indication: For HTN (hypertension)    amLODIPine 10 mg oral tablet  -- 1 tab(s) by mouth once a day  -- Indication: For Essential hypertension    hydroCHLOROthiazide 25 mg oral tablet  -- 1 tab(s) by mouth once a day  -- Indication: For Essential hypertension    baclofen  -- 1 tab(s) by mouth once a day  -- Indication: For muscle relaxant

## 2018-04-04 NOTE — DISCHARGE NOTE ADULT - CARE PROVIDER_API CALL
Mark Anthony Brooke (MD), Cardiovascular Disease; Endovascular Medicine; Interventional Cardiology; Vascular Medicine  [address] [place] [address]  Phone: [phone]  Fax: [phone]

## 2018-04-04 NOTE — CHART NOTE - NSCHARTNOTEFT_GEN_A_CORE
====================  CCU MIDNIGHT ROUNDS  ====================    HARVEY RIZZO  [id number]    ====================  SUMMARY: HPI:  66 y/o AA female with PMHx of PAD, s/p RT. SFA stent, HTN, HLD, DM2, LEILANI (not on CPAP) s/p recent LLE (PANTERA to Lt SFA, balloon angioplasty on Lt pop, & PANTERA to Lt peroneal artery (3/22) presented to the ED c/o worsening leg symptoms.  Patient states that at 230 pm yesterday, she felt "pins and needles" and pain 10/10 on her left leg.  Claims to have been taking all her antiplatelets (ASA, Brilinta, Celostazol) as prescribed.  Denies numbness or loss of sensation.    In ED, Heparin drip started.  Given Oxycodone IR 5 mg with total relief of symptoms. (30 Mar 2018 23:50)    ====================        ====================  NEW EVENTS: Pt OOB ambulating, No acute events.  ====================        ====================  VITALS (Last 12 hrs):  ====================    T(C): 37.6 (04-04-18 @ 19:00), Max: 37.6 (04-04-18 @ 19:00)  HR: 89 (04-04-18 @ 22:00) (85 - 102)  BP: 141/51 (04-04-18 @ 21:00) (108/48 - 142/75)  BP(mean): 76 (04-04-18 @ 21:00) (67 - 89)  RR: 18 (04-04-18 @ 22:00) (16 - 18)  SpO2: 98% (04-04-18 @ 22:00) (97% - 99%)      TELEMETRY: sinus          I&O's Summary    03 Apr 2018 07:01  -  04 Apr 2018 07:00  --------------------------------------------------------  IN: 1366 mL / OUT: 1595 mL / NET: -229 mL    04 Apr 2018 07:01  -  04 Apr 2018 22:04  --------------------------------------------------------  IN: 894 mL / OUT: 350 mL / NET: 544 mL        ====================  PLAN:  #Neuro: Pain control w/ dilaudid, oxycodone, tylenol, xanax PRN.  #PAD: Reoccluded distal SFA and popliteal artery now s/p angiogram w/ catheter directed lysis and s/p stenting. Transitioned to eliquis BID. C/w statin and BP control. OOB ambulation as tolerated.   ====================    HEALTH ISSUES - PROBLEM Dx:  HTN (hypertension): HTN (hypertension)  High cholesterol: High cholesterol  Limb ischemia: Limb ischemia  Need for prophylactic measure: Need for prophylactic measure  Essential hypertension: Essential hypertension  Type 2 diabetes mellitus with other circulatory complication, unspecified long term insulin use status: Type 2 diabetes mellitus with other circulatory complication, unspecified long term insulin use status  PAD (peripheral artery disease): PAD (peripheral artery disease)        Fabiola Dill, CCU [facility] #[id number]

## 2018-04-04 NOTE — DIETITIAN INITIAL EVALUATION ADULT. - PROBLEM SELECTOR PLAN 1
Patient is s/p RSFA stent and recent LSFA stent, popliteal (balloon angioplasty), and peroneal (stent)  - Continue ASA, Brilinta, Pletal  - Continue Heparin drip  - Neurovascular checks every hour x 4 hours, then every 2 hours, then every 4 hours  - DAVONTE/PVR and LE artery duplex in am  - Follow Vascular recs

## 2018-04-04 NOTE — DISCHARGE NOTE ADULT - NS AS ACTIVITY OBS
No Heavy lifting/straining Walking-Outdoors allowed/Walking-Indoors allowed/Showering allowed/No Heavy lifting/straining

## 2018-04-04 NOTE — PROGRESS NOTE ADULT - ASSESSMENT
66 y/o AA female with PMHx of HTN, HLD, DM2, LEILANI (not on CPAP), PAD/CLI s/p complex LLE intervention as detailed above on 3/22/2018 now admitted with acute LLE limb ischemia (mid SFA/pop and TPT occlusion) s/p catheter directed lytics 4/2 and reintervention on SFA/pop/peroneal 4/3.     NEURO  No acute complaints    CARDS  #HTN- On Lisinopril, Norvasc. Will start patinet on Coreg 3/125 q12    VASCULAR  #Critical Limb ischemia- s/p catheter directed lytics 4/2 and reintervention on SFA/pop/peroneal 4/3. Hep gtt converted to Eliquis. c/w Plavix, Lipitor. c/w Oxycodone, Tylenol for pain    RENAL  No acute issues    DISPO  possible Friday? per vascular

## 2018-04-04 NOTE — DISCHARGE NOTE ADULT - CARE PLAN
Principal Discharge DX:	Limb ischemia  Goal:	Your leg pain will be controlled.  Assessment and plan of treatment:	Continue your medications. Do not stop Plavix or Eliquis unless instructed by your cardiologist. No heavy lifting, strenuous activity, bending, straining or unnecessary stair climbing for 2 weeks. No sex for 1 week. No driving for 2 days. You may shower 24 hours following procedure but avoid baths and swimming for 1 week. Check groin site for bleeding and/or swelling daily following procedure. Call your doctor/cardiologist immediately for bleeding or swelling or if you have increased/persistent pain, fever/chills, or drainage at the site. Follow up with your cardiologist in 1- 2 weeks. You may call [facility] at [phone] or [phone] after office hours and weekends with any questions or concerns following our procedure. Principal Discharge DX:	Limb ischemia  Goal:	Your leg pain will be controlled.  Assessment and plan of treatment:	Continue your medications. Do not stop Plavix or Eliquis unless instructed by your cardiologist. No heavy lifting, strenuous activity, bending, straining or unnecessary stair climbing for 2 weeks. No sex for 1 week. No driving for 2 days. You may shower 24 hours following procedure but avoid baths and swimming for 1 week. Check groin site for bleeding and/or swelling daily following procedure. Call your doctor/cardiologist immediately for bleeding or swelling or if you have increased/persistent pain, fever/chills, or drainage at the site. Follow up with your cardiologist in 1- 2 weeks. You may call [facility] at [phone] or [phone] after office hours and weekends with any questions or concerns following our procedure.  Secondary Diagnosis:	Essential hypertension  Goal:	BP will be maintained within normal limits  Assessment and plan of treatment:	Continue with your blood pressure medications; eat a heart healthy diet with low salt diet; exercise regularly (consult with your physician or cardiologist first); maintain a heart healthy weight; include healthy ways to manage stress. Continue to follow with your primary care physician or cardiologist.  Secondary Diagnosis:	Type 2 diabetes mellitus with other circulatory complication, unspecified long term insulin use status  Goal:	Glucose will remain within normal limits  Assessment and plan of treatment:	Continue to follow with your primary care MD or your endocrinologist.  Follow a heart healthy diabetic diet. If you check your fingerstick glucose at home, call your MD if it is greater than 250mg/dL on 2 occasions or less than 100mg/dL on 2 occasions. Know signs of low blood sugar, such as: dizziness, shakiness, sweating, confusion, hunger, nervousness-drink 4 ounces apple juice if occurs and call your doctor. Know early signs of high blood sugar, such as: frequent urination, increased thirst, blurry vision, fatigue, headache - call your doctor if this occurs. Follow with other practitioners to care for your diabetes, such as ophthamologist and podiatrist.

## 2018-04-05 LAB
ALBUMIN SERPL ELPH-MCNC: 3.1 G/DL — LOW (ref 3.3–5)
ALP SERPL-CCNC: 95 U/L — SIGNIFICANT CHANGE UP (ref 40–120)
ALT FLD-CCNC: 9 U/L RC — LOW (ref 10–45)
ANION GAP SERPL CALC-SCNC: 11 MMOL/L — SIGNIFICANT CHANGE UP (ref 5–17)
AST SERPL-CCNC: 16 U/L — SIGNIFICANT CHANGE UP (ref 10–40)
BILIRUB SERPL-MCNC: 0.9 MG/DL — SIGNIFICANT CHANGE UP (ref 0.2–1.2)
BUN SERPL-MCNC: 9 MG/DL — SIGNIFICANT CHANGE UP (ref 7–23)
CALCIUM SERPL-MCNC: 9.2 MG/DL — SIGNIFICANT CHANGE UP (ref 8.4–10.5)
CHLORIDE SERPL-SCNC: 105 MMOL/L — SIGNIFICANT CHANGE UP (ref 96–108)
CO2 SERPL-SCNC: 24 MMOL/L — SIGNIFICANT CHANGE UP (ref 22–31)
CREAT SERPL-MCNC: 0.77 MG/DL — SIGNIFICANT CHANGE UP (ref 0.5–1.3)
GLUCOSE BLDC GLUCOMTR-MCNC: 118 MG/DL — HIGH (ref 70–99)
GLUCOSE BLDC GLUCOMTR-MCNC: 141 MG/DL — HIGH (ref 70–99)
GLUCOSE BLDC GLUCOMTR-MCNC: 239 MG/DL — HIGH (ref 70–99)
GLUCOSE SERPL-MCNC: 133 MG/DL — HIGH (ref 70–99)
HCT VFR BLD CALC: 23.3 % — LOW (ref 34.5–45)
HCT VFR BLD CALC: 24 % — LOW (ref 34.5–45)
HGB BLD-MCNC: 7.6 G/DL — LOW (ref 11.5–15.5)
HGB BLD-MCNC: 7.8 G/DL — LOW (ref 11.5–15.5)
MAGNESIUM SERPL-MCNC: 1.6 MG/DL — SIGNIFICANT CHANGE UP (ref 1.6–2.6)
MCHC RBC-ENTMCNC: 26.1 PG — LOW (ref 27–34)
MCHC RBC-ENTMCNC: 26.3 PG — LOW (ref 27–34)
MCHC RBC-ENTMCNC: 32.6 GM/DL — SIGNIFICANT CHANGE UP (ref 32–36)
MCHC RBC-ENTMCNC: 32.8 GM/DL — SIGNIFICANT CHANGE UP (ref 32–36)
MCV RBC AUTO: 80 FL — SIGNIFICANT CHANGE UP (ref 80–100)
MCV RBC AUTO: 80 FL — SIGNIFICANT CHANGE UP (ref 80–100)
PHOSPHATE SERPL-MCNC: 3.5 MG/DL — SIGNIFICANT CHANGE UP (ref 2.5–4.5)
PLATELET # BLD AUTO: 219 K/UL — SIGNIFICANT CHANGE UP (ref 150–400)
PLATELET # BLD AUTO: 254 K/UL — SIGNIFICANT CHANGE UP (ref 150–400)
POTASSIUM SERPL-MCNC: 3.8 MMOL/L — SIGNIFICANT CHANGE UP (ref 3.5–5.3)
POTASSIUM SERPL-SCNC: 3.8 MMOL/L — SIGNIFICANT CHANGE UP (ref 3.5–5.3)
PROT SERPL-MCNC: 6.3 G/DL — SIGNIFICANT CHANGE UP (ref 6–8.3)
RBC # BLD: 2.91 M/UL — LOW (ref 3.8–5.2)
RBC # BLD: 3 M/UL — LOW (ref 3.8–5.2)
RBC # FLD: 17.5 % — HIGH (ref 10.3–14.5)
RBC # FLD: 17.5 % — HIGH (ref 10.3–14.5)
SODIUM SERPL-SCNC: 140 MMOL/L — SIGNIFICANT CHANGE UP (ref 135–145)
WBC # BLD: 9.4 K/UL — SIGNIFICANT CHANGE UP (ref 3.8–10.5)
WBC # BLD: 9.5 K/UL — SIGNIFICANT CHANGE UP (ref 3.8–10.5)
WBC # FLD AUTO: 9.4 K/UL — SIGNIFICANT CHANGE UP (ref 3.8–10.5)
WBC # FLD AUTO: 9.5 K/UL — SIGNIFICANT CHANGE UP (ref 3.8–10.5)

## 2018-04-05 PROCEDURE — 93010 ELECTROCARDIOGRAM REPORT: CPT

## 2018-04-05 PROCEDURE — 99233 SBSQ HOSP IP/OBS HIGH 50: CPT

## 2018-04-05 RX ORDER — MAGNESIUM SULFATE 500 MG/ML
2 VIAL (ML) INJECTION ONCE
Qty: 0 | Refills: 0 | Status: COMPLETED | OUTPATIENT
Start: 2018-04-05 | End: 2018-04-05

## 2018-04-05 RX ADMIN — APIXABAN 5 MILLIGRAM(S): 2.5 TABLET, FILM COATED ORAL at 22:15

## 2018-04-05 RX ADMIN — CLOPIDOGREL BISULFATE 75 MILLIGRAM(S): 75 TABLET, FILM COATED ORAL at 12:43

## 2018-04-05 RX ADMIN — AMLODIPINE BESYLATE 10 MILLIGRAM(S): 2.5 TABLET ORAL at 09:22

## 2018-04-05 RX ADMIN — CARVEDILOL PHOSPHATE 3.12 MILLIGRAM(S): 80 CAPSULE, EXTENDED RELEASE ORAL at 09:22

## 2018-04-05 RX ADMIN — OXYCODONE HYDROCHLORIDE 5 MILLIGRAM(S): 5 TABLET ORAL at 22:15

## 2018-04-05 RX ADMIN — LISINOPRIL 20 MILLIGRAM(S): 2.5 TABLET ORAL at 09:22

## 2018-04-05 RX ADMIN — Medication 50 GRAM(S): at 06:36

## 2018-04-05 RX ADMIN — APIXABAN 5 MILLIGRAM(S): 2.5 TABLET, FILM COATED ORAL at 09:22

## 2018-04-05 RX ADMIN — OXYCODONE HYDROCHLORIDE 5 MILLIGRAM(S): 5 TABLET ORAL at 22:45

## 2018-04-05 RX ADMIN — CARVEDILOL PHOSPHATE 3.12 MILLIGRAM(S): 80 CAPSULE, EXTENDED RELEASE ORAL at 17:11

## 2018-04-05 RX ADMIN — ATORVASTATIN CALCIUM 40 MILLIGRAM(S): 80 TABLET, FILM COATED ORAL at 22:15

## 2018-04-05 NOTE — PROGRESS NOTE ADULT - SUBJECTIVE AND OBJECTIVE BOX
Vascular Medicine and Peripheral Intervention Progress Note    S/24 Events: No acute events overnight.     O:  T(C): 36.7 (04-05-18 @ 07:00), Max: 37.6 (04-04-18 @ 19:00)  HR: 80 (04-05-18 @ 07:00) (77 - 106)  BP: 137/55 (04-05-18 @ 07:00) (108/48 - 164/60)  RR: 18 (04-05-18 @ 06:00) (15 - 20)  SpO2: 95% (04-05-18 @ 07:00) (95% - 99%)  Wt(kg): --      Tele:    O/E:  Gen: NAD  HEENT: EOMI  CV: RRR, normal S1 + S2, no m/r/g  Lungs: CTAB  Abd: soft, non-tender  Ext: LLE warm after reintervention, palpable DP, dopplerable PT    Labs:                        7.6    9.5   )-----------( 219      ( 05 Apr 2018 05:07 )             23.3     04-05    140  |  105  |  9   ----------------------------<  133<H>  3.8   |  24  |  0.77    Ca    9.2      05 Apr 2018 05:07  Phos  3.5     04-05  Mg     1.6     04-05    TPro  6.3  /  Alb  3.1<L>  /  TBili  0.9  /  DBili  x   /  AST  16  /  ALT  9<L>  /  AlkPhos  95  04-05    PTT - ( 04 Apr 2018 05:45 )  PTT:43.8 sec          Meds:  MEDICATIONS  (STANDING):  amLODIPine   Tablet 10 milliGRAM(s) Oral daily  apixaban 5 milliGRAM(s) Oral every 12 hours  atorvastatin 40 milliGRAM(s) Oral at bedtime  carvedilol 3.125 milliGRAM(s) Oral every 12 hours  clopidogrel Tablet 75 milliGRAM(s) Oral daily  dextrose 5%. 1000 milliLiter(s) (50 mL/Hr) IV Continuous <Continuous>  dextrose 50% Injectable 12.5 Gram(s) IV Push once  dextrose 50% Injectable 25 Gram(s) IV Push once  dextrose 50% Injectable 25 Gram(s) IV Push once  insulin lispro (HumaLOG) corrective regimen sliding scale   SubCutaneous three times a day before meals  insulin lispro (HumaLOG) corrective regimen sliding scale   SubCutaneous at bedtime  lisinopril 20 milliGRAM(s) Oral daily      A/P:  66 y/o AA female with PMHx of HTN, HLD, DM2, LEILANI (not on CPAP), PAD/CLI s/p complex LLE intervention as detailed above on 3/22/2018 now admitted with acute LLE limb ischemia (mid SFA/pop and TPT occlusion) s/p catheter directed lytics 4/2 and reintervention on SFA/pop/peroneal 4/3.     - continue plavix 75mg daily, eliquis 5mg BID  - analgesics and anxiolytics PRN.      Corey Ward  Cardiology Fellow Vascular Medicine and Peripheral Intervention Progress Note    S/24 Events: No acute events overnight.     O:  T(C): 36.7 (04-05-18 @ 07:00), Max: 37.6 (04-04-18 @ 19:00)  HR: 80 (04-05-18 @ 07:00) (77 - 106)  BP: 137/55 (04-05-18 @ 07:00) (108/48 - 164/60)  RR: 18 (04-05-18 @ 06:00) (15 - 20)  SpO2: 95% (04-05-18 @ 07:00) (95% - 99%)      O/E:  Gen: NAD  HEENT: EOMI  CV: RRR, normal S1 + S2, no m/r/g  Lungs: CTAB  Abd: soft, non-tender  Ext: LLE warm after reintervention, palpable DP, dopplerable PT    Labs:                        7.6    9.5   )-----------( 219      ( 05 Apr 2018 05:07 )             23.3     04-05    140  |  105  |  9   ----------------------------<  133<H>  3.8   |  24  |  0.77    Ca    9.2      05 Apr 2018 05:07  Phos  3.5     04-05  Mg     1.6     04-05    TPro  6.3  /  Alb  3.1<L>  /  TBili  0.9  /  DBili  x   /  AST  16  /  ALT  9<L>  /  AlkPhos  95  04-05    PTT - ( 04 Apr 2018 05:45 )  PTT:43.8 sec          Meds:  MEDICATIONS  (STANDING):  amLODIPine   Tablet 10 milliGRAM(s) Oral daily  apixaban 5 milliGRAM(s) Oral every 12 hours  atorvastatin 40 milliGRAM(s) Oral at bedtime  carvedilol 3.125 milliGRAM(s) Oral every 12 hours  clopidogrel Tablet 75 milliGRAM(s) Oral daily  dextrose 5%. 1000 milliLiter(s) (50 mL/Hr) IV Continuous <Continuous>  dextrose 50% Injectable 12.5 Gram(s) IV Push once  dextrose 50% Injectable 25 Gram(s) IV Push once  dextrose 50% Injectable 25 Gram(s) IV Push once  insulin lispro (HumaLOG) corrective regimen sliding scale   SubCutaneous three times a day before meals  insulin lispro (HumaLOG) corrective regimen sliding scale   SubCutaneous at bedtime  lisinopril 20 milliGRAM(s) Oral daily      A/P:  66 y/o AA female with PMHx of HTN, HLD, DM2, LEILANI (not on CPAP), PAD/CLI s/p complex LLE intervention as detailed above on 3/22/2018 now admitted with acute LLE limb ischemia (mid SFA/pop and TPT occlusion) s/p catheter directed lytics 4/2 and reintervention on SFA/pop/peroneal 4/3.     - continue plavix 75mg daily, eliquis 5mg BID  - analgesics and anxiolytics PRN.  - d/c home tomorrow.       Corey Ward  Cardiology Fellow Vascular Medicine and Peripheral Intervention Progress Note    S/24 Events: No acute events overnight.     O:  T(C): 36.7 (04-05-18 @ 07:00), Max: 37.6 (04-04-18 @ 19:00)  HR: 80 (04-05-18 @ 07:00) (77 - 106)  BP: 137/55 (04-05-18 @ 07:00) (108/48 - 164/60)  RR: 18 (04-05-18 @ 06:00) (15 - 20)  SpO2: 95% (04-05-18 @ 07:00) (95% - 99%)      O/E:  Gen: NAD  HEENT: EOMI  CV: RRR, normal S1 + S2, no m/r/g  Lungs: CTAB  Abd: soft, non-tender  Ext: LLE warm after reintervention, palpable DP, dopplerable PT    Labs:                        7.6    9.5   )-----------( 219      ( 05 Apr 2018 05:07 )             23.3     04-05    140  |  105  |  9   ----------------------------<  133<H>  3.8   |  24  |  0.77    Ca    9.2      05 Apr 2018 05:07  Phos  3.5     04-05  Mg     1.6     04-05    TPro  6.3  /  Alb  3.1<L>  /  TBili  0.9  /  DBili  x   /  AST  16  /  ALT  9<L>  /  AlkPhos  95  04-05    PTT - ( 04 Apr 2018 05:45 )  PTT:43.8 sec          Meds:  MEDICATIONS  (STANDING):  amLODIPine   Tablet 10 milliGRAM(s) Oral daily  apixaban 5 milliGRAM(s) Oral every 12 hours  atorvastatin 40 milliGRAM(s) Oral at bedtime  carvedilol 3.125 milliGRAM(s) Oral every 12 hours  clopidogrel Tablet 75 milliGRAM(s) Oral daily  dextrose 5%. 1000 milliLiter(s) (50 mL/Hr) IV Continuous <Continuous>  dextrose 50% Injectable 12.5 Gram(s) IV Push once  dextrose 50% Injectable 25 Gram(s) IV Push once  dextrose 50% Injectable 25 Gram(s) IV Push once  insulin lispro (HumaLOG) corrective regimen sliding scale   SubCutaneous three times a day before meals  insulin lispro (HumaLOG) corrective regimen sliding scale   SubCutaneous at bedtime  lisinopril 20 milliGRAM(s) Oral daily      A/P:  68 y/o AA female with PMHx of HTN, HLD, DM2, LEILANI (not on CPAP), PAD/CLI s/p complex LLE intervention as detailed above on 3/22/2018 now admitted with acute LLE limb ischemia (mid SFA/pop and TPT occlusion) s/p catheter directed lytics 4/2 and reintervention on SFA/pop/peroneal 4/3.     - continue plavix 75mg daily, eliquis 5mg BID  - analgesics and anxiolytics PRN.  - d/c home tomorrow if stable.   - continue with coreg/BP control       Corey Ed  Cardiology Fellow

## 2018-04-05 NOTE — PROGRESS NOTE ADULT - ASSESSMENT
66 y/o AA female with PMHx of HTN, HLD, DM2, LEILANI (not on CPAP), PAD/CLI s/p complex LLE intervention as detailed above on 3/22/2018 now admitted with acute LLE limb ischemia (mid SFA/pop and TPT occlusion) s/p catheter directed lytics 4/2 and reintervention on SFA/pop/peroneal 4/3.     NEURO  No acute complaints    CARDS  #HTN- On Lisinopril, Norvasc.  Coreg 3.125 q12       VASCULAR  #Critical Limb ischemia- s/p catheter directed lytics 4/2 and reintervention on SFA/pop/peroneal 4/3. Hep gtt converted to Eliquis. c/w Plavix, Lipitor. c/w Oxycodone, Tylenol for pain    RENAL  No acute issues    DISPO  possible Friday? per vascular   Pt requesting SW for help at home  PT for mobiliity

## 2018-04-05 NOTE — PROGRESS NOTE ADULT - SUBJECTIVE AND OBJECTIVE BOX
Patient is a 67y old  Female who presents with a chief complaint of left leg pain with feeling of pins and needles (04 Apr 2018 00:22)      Overnight Event:    REVIEW OF SYSTEMS:  	    MEDICATIONS  (STANDING):  amLODIPine   Tablet 10 milliGRAM(s) Oral daily  apixaban 5 milliGRAM(s) Oral every 12 hours  atorvastatin 40 milliGRAM(s) Oral at bedtime  carvedilol 3.125 milliGRAM(s) Oral every 12 hours  clopidogrel Tablet 75 milliGRAM(s) Oral daily  dextrose 5%. 1000 milliLiter(s) (50 mL/Hr) IV Continuous <Continuous>  dextrose 50% Injectable 12.5 Gram(s) IV Push once  dextrose 50% Injectable 25 Gram(s) IV Push once  dextrose 50% Injectable 25 Gram(s) IV Push once  insulin lispro (HumaLOG) corrective regimen sliding scale   SubCutaneous three times a day before meals  insulin lispro (HumaLOG) corrective regimen sliding scale   SubCutaneous at bedtime  lisinopril 20 milliGRAM(s) Oral daily    MEDICATIONS  (PRN):  acetaminophen   Tablet. 650 milliGRAM(s) Oral every 6 hours PRN Mild Pain (1 - 3)  ALPRAZolam 0.5 milliGRAM(s) Oral every 6 hours PRN anxiety  dextrose Gel 1 Dose(s) Oral once PRN Blood Glucose LESS THAN 70 milliGRAM(s)/deciliter  glucagon  Injectable 1 milliGRAM(s) IntraMuscular once PRN Glucose LESS THAN 70 milligrams/deciliter  HYDROmorphone  Injectable 0.5 milliGRAM(s) IV Push every 3 hours PRN Severe Pain (7 - 10)  oxyCODONE    IR 5 milliGRAM(s) Oral every 4 hours PRN Moderate Pain (4 - 6)        PHYSICAL EXAM:  Vital Signs Last 24 Hrs  T(C): 37.3 (04 Apr 2018 23:00), Max: 37.6 (04 Apr 2018 19:00)  T(F): 99.2 (04 Apr 2018 23:00), Max: 99.6 (04 Apr 2018 19:00)  HR: 77 (05 Apr 2018 06:00) (77 - 106)  BP: 121/53 (05 Apr 2018 06:00) (108/48 - 164/60)  BP(mean): 74 (05 Apr 2018 06:00) (67 - 89)  RR: 18 (05 Apr 2018 06:00) (15 - 20)  SpO2: 96% (05 Apr 2018 06:00) (95% - 99%)  I&O's Summary    04 Apr 2018 07:01  -  05 Apr 2018 07:00  --------------------------------------------------------  IN: 1014 mL / OUT: 600 mL / NET: 414 mL        Appearance: Normal	  HEENT:   Normal oral mucosa, PERRL, EOMI	  Lymphatic: No lymphadenopathy  Cardiovascular: Normal S1 S2, No JVD, No murmurs, No edema  Respiratory: Lungs clear to auscultation	  Psychiatry: A & O x 3, Mood & affect appropriate  Gastrointestinal:  Soft, Non-tender, + BS	  Skin: No rashes, No ecchymoses, No cyanosis	  Neurologic: Non-focal  Extremities: Normal range of motion, No clubbing, cyanosis or edema  Vascular: Peripheral pulses palpable 2+ bilaterally    LABS:	 	                        7.6    9.5   )-----------( 219      ( 05 Apr 2018 05:07 )             23.3     Auto Eosinophil # x     / Auto Eosinophil % x     / Auto Neutrophil # x     / Auto Neutrophil % x     / BANDS % x                            7.7    9.8   )-----------( 255      ( 04 Apr 2018 05:45 )             23.3     Auto Eosinophil # x     / Auto Eosinophil % x     / Auto Neutrophil # x     / Auto Neutrophil % x     / BANDS % x        INR: 1.20 ratio (04-03 @ 05:03)  INR: 1.15 ratio (04-02 @ 22:41)  INR: 1.11 ratio (04-02 @ 05:30)    04-05    140  |  105  |  9   ----------------------------<  133<H>  3.8   |  24  |  0.77  04-04    140  |  103  |  9   ----------------------------<  143<H>  3.8   |  23  |  0.74    Ca    9.2      05 Apr 2018 05:07  Mg     1.6     04-05  Phos  3.5     04-05  TPro  6.3  /  Alb  3.1<L>  /  TBili  0.9  /  DBili  x   /  AST  16  /  ALT  9<L>  /  AlkPhos  95  04-05  TPro  6.2  /  Alb  3.1<L>  /  TBili  0.9  /  DBili  x   /  AST  16  /  ALT  10  /  AlkPhos  96  04-04        proBNP:   Lipid Profile: 03-31 Chol 221<H> <H> HDL 54 Trig 79, 03-23 Chol 201<H>  HDL 52 Trig 106  HgA1c: 5.8 % (03-31 @ 05:21)    TSH:     CARDIAC MARKERS:   01 Apr 2018 02:36 Troponin x     / Creatine Kinase 72 U/L /  CKMB x     / CPK Mass Assay % x       31 Mar 2018 08:25 Troponin <0.01 ng/mL / Creatine Kinase 75 U/L /  CKMB 1.7 ng/mL / CPK Mass Assay % x            TELEMETRY: 	    ECG:  	  RADIOLOGY:  OTHER: 	    PREVIOUS DIAGNOSTIC TESTING:    [ ] Echocardiogram:  [ ]  Catheterization:  [ ] Stress Test:  	  	  Amanda Ferguson  Contact # Patient is a 67y old  Female who presents with a chief complaint of left leg pain with feeling of pins and needles (2018 00:22) PMH PAD s/p R SFA stent , HTN, Hld, DM2 LEILANI s/p recent LE griselda to [facility] and GRISELDA. Now s/p PTA griselda LSFA poba      Overnight Event:  No events opbernight resting comforatbely in sitting in bed c/o "numbness in toes"  REVIEW OF SYSTEMS:  REVIEW OF SYSTEMS:  CONSTITUTIONAL: No weakness, fevers or chills  Eyes/ENT: No visual changes: No vertigo or throat pain  NECK: No pain or stiffness  Resp: No cough, wheezing, hemoptysis; No shortness of breath  Cardiovascular: No chest pain or palpitations  GI: No abdominal or epigastric pain. NO nausea, vomiting, or hematemesis: No diarrhea or constipation. No melena or hematochezia.    : No dysuria, frequent or hematuria.  Neuro: No numbness or weakness  Skin: No itching or rashes	      MEDICATIONS  (STANDING):  amLODIPine   Tablet 10 milliGRAM(s) Oral daily  apixaban 5 milliGRAM(s) Oral every 12 hours  atorvastatin 40 milliGRAM(s) Oral at bedtime  carvedilol 3.125 milliGRAM(s) Oral every 12 hours  clopidogrel Tablet 75 milliGRAM(s) Oral daily  dextrose 5%. 1000 milliLiter(s) (50 mL/Hr) IV Continuous <Continuous>  dextrose 50% Injectable 12.5 Gram(s) IV Push once  dextrose 50% Injectable 25 Gram(s) IV Push once  dextrose 50% Injectable 25 Gram(s) IV Push once  insulin lispro (HumaLOG) corrective regimen sliding scale   SubCutaneous three times a day before meals  insulin lispro (HumaLOG) corrective regimen sliding scale   SubCutaneous at bedtime  lisinopril 20 milliGRAM(s) Oral daily    MEDICATIONS  (PRN):  acetaminophen   Tablet. 650 milliGRAM(s) Oral every 6 hours PRN Mild Pain (1 - 3)  ALPRAZolam 0.5 milliGRAM(s) Oral every 6 hours PRN anxiety  dextrose Gel 1 Dose(s) Oral once PRN Blood Glucose LESS THAN 70 milliGRAM(s)/deciliter  glucagon  Injectable 1 milliGRAM(s) IntraMuscular once PRN Glucose LESS THAN 70 milligrams/deciliter  HYDROmorphone  Injectable 0.5 milliGRAM(s) IV Push every 3 hours PRN Severe Pain (7 - 10)  oxyCODONE    IR 5 milliGRAM(s) Oral every 4 hours PRN Moderate Pain (4 - 6)        PHYSICAL EXAM:  Vital Signs Last 24 Hrs  T(C): 37.3 (2018 23:00), Max: 37.6 (2018 19:00)  T(F): 99.2 (2018 23:00), Max: 99.6 (2018 19:00)  HR: 77 (2018 06:00) (77 - 106)  BP: 121/53 (2018 06:00) (108/48 - 164/60)  BP(mean): 74 (2018 06:00) (67 - 89)  RR: 18 (2018 06:00) (15 - 20)  SpO2: 96% (2018 06:00) (95% - 99%)  I&O's Summary    2018 07:01  -  2018 07:00  --------------------------------------------------------  IN: 1014 mL / OUT: 600 mL / NET: 414 mL        Appearance: Normal	  HEENT:   Normal oral mucosa, PERRL, EOMI	  Lymphatic: No lymphadenopathy  Cardiovascular: Normal S1 S2, No JVD, No murmurs, No edema  Respiratory: Lungs clear to auscultation	  Psychiatry: A & O x 3, Mood & affect appropriate  Gastrointestinal:  Soft, Non-tender, + BS	  Skin: No rashes, No ecchymoses, No cyanosis	  Neurologic: Non-focal  Extremities: Normal range of motion, No clubbing, cyanosis or edema, L toe numbness  Vascular: Peripheral pulses palpable 2+ bilaterally    LABS:	 	                        7.6    9.5   )-----------( 219      ( 2018 05:07 )             23.3     Auto Eosinophil # x     / Auto Eosinophil % x     / Auto Neutrophil # x     / Auto Neutrophil % x     / BANDS % x                            7.7    9.8   )-----------( 255      ( 2018 05:45 )             23.3     Auto Eosinophil # x     / Auto Eosinophil % x     / Auto Neutrophil # x     / Auto Neutrophil % x     / BANDS % x        INR: 1.20 ratio ( @ 05:03)  INR: 1.15 ratio ( @ 22:41)  INR: 1.11 ratio ( @ 05:30)        140  |  105  |  9   ----------------------------<  133<H>  3.8   |  24  |  0.77      140  |  103  |  9   ----------------------------<  143<H>  3.8   |  23  |  0.74    Ca    9.2      2018 05:07  Mg     1.6       Phos  3.5       TPro  6.3  /  Alb  3.1<L>  /  TBili  0.9  /  DBili  x   /  AST  16  /  ALT  9<L>  /  AlkPhos  95    TPro  6.2  /  Alb  3.1<L>  /  TBili  0.9  /  DBili  x   /  AST  16  /  ALT  10  /  AlkPhos  96          proBNP:   Lipid Profile:  Chol 221<H> <H> HDL 54 Trig 79,  Chol 201<H>  HDL 52 Trig 106  HgA1c: 5.8 % ( @ 05:21)    TSH:     CARDIAC MARKERS:   2018 02:36 Troponin x     / Creatine Kinase 72 U/L /  CKMB x     / CPK Mass Assay % x       31 Mar 2018 08:25 Troponin <0.01 ng/mL / Creatine Kinase 75 U/L /  CKMB 1.7 ng/mL / CPK Mass Assay % x            TELEMETRY: 	  nsr  ECG:  	NSR  RADIOLOGY:  OTHER: 	    PREVIOUS DIAGNOSTIC TESTING:    [ ] Echocardiogram:< from: Limited Transthoracic Echo (18 @ 18:35) >  ROCEDURE: Limited transthoracic echocardiogram with 2-D.  M-Mode and spectral and colorflow Doppler.  INDICATION: Chest pain, unspecified (R07.9)  ------------------------------------------------------------------------  Dimensions:    Normal Values:  LA:            2.0 - 4.0 cm  Ao:            2.0 - 3.8 cm  SEPTUM: 1.2    0.6 - 1.2 cm  PWT:    1.2    0.6 - 1.1 cm  LVIDd:  3.8    3.0 - 5.6 cm  LVIDs:  2.5    1.8 - 4.0 cm  Derived variables:  LVMI: 85 g/m2  RWT: 0.63  Fractional short: 34 %  EF (Visual Estimate): 65-70 %  EF (Teicholtz): 64 %  ------------------------------------------------------------------------  Observations:  Aortic Valve/Aorta:  Normal aortic root, aortic arch and descending thoracic  aorta.  Left Ventricle: Hyperdynamic left ventricular systolic  function. Increased relative wall thickness with normal  left ventricular mass index, consistent with concentric  left ventricular remodeling.  Right Heart: The right ventricle is not well visualized;  grossly normal right ventricular systolic function.  Pericardium/Pleura: Normal pericardium with no pericardial  effusion.  ------------------------------------------------------------------------  Conclusions:  Limited TTE to evaluate LV function. Limited views.  1. Increased relative wall thickness with normal left  ventricular mass index, consistent withconcentric left  ventricular remodeling.  2. Hyperdynamic left ventricular systolic function.  3. The right ventricle is not well visualized; grossly  normal right ventricular systolic function.  4. Normal pericardium with no pericardial effusion.  ------------------------------------------------------------------------  Confirmed on  4/3/2018 - 20:53:21 by Abilio Loredo M.D.  -----------------------------    < end of copied text >    [ ]  Catheterization:< from: Cardiac Cath Lab - Adult (18 @ 08:13) >  [facility]  Department of Cardiology  [address] [place] [address]  [phone]  Cath Lab Report -- Comprehensive Report  Patient: HARVEY RIZZO  Study date: 2018  Account number: [id number]  MR number: [id number]  : 1950  Gender: Female  Race:  Amer  Case Physician(s):  LEONEL Samaniego M.D.  Referring Physician:  Domingo Joe M.D.  INDICATIONS: Planned relook after lysis yesterday evening. See yesterday's  cath lab report for full clinical details.  PROCEDURE:  --  Left leg angiography.  --  Sheath Exchange for Intervention.  --  PTA Femoral - Popliteal Arteries.  --  Intravascular Stent.  --  Femoral/ Popliteal Atherectomy.  --  Infusion for Thrombolysis.  --  Intravascular Stent.  --  Atherectomy each add'l Peripheral Artery.  --  PTA each additional Peripheral Vessel.  --  Hemostasis with Perclose-Intervention.  --  Intervention on left superficial femoral: drug-eluting stent.  --  Intervention on left popliteal: self-expanding stent.  --  Intervention on left peroneal: balloon angioplasty.  TECHNIQUE: The previously placed sheath was accessed and selective  angiography was performed. Thereafter a Navicross catheter was placed in  the popliteal artery and selective left lower extremity angiography was  performed. The catheter was then placed in the left peroneal artery and  selective angiography performed. Oxygen 2 L/min. The risks and  alternatives of the procedures and conscious sedation were explained to  the patient and informed consent was obtained. Cardiac catheterization  performed electively.  Local anesthetic given. Left leg angiography. A catheter was positioned.  RADIATION EXPOSURE: 26.2 min. A drug-eluting stent was performed on the 70  % lesion in the left superficial femoral artery. Following intervention  there was a 1 % residual stenosis. Sheath exchange. The sheath was  exchanged for a 6FR STRAIGHT DESTINATION. Vessel setup was performed. A  COMMAND 14 300CM wire was used to cross the lesion. Balloon angioplasty  was performed, using a 5 X 60 X 135 RICCI balloon, with 2 inflations  and a maximum inflation pressure of 8 unruly. Vessel setup was performed. A  ADVANTAGE .035 X 260CM wire was used to cross the lesion. A 6 X 100 ZILVER  PTX drug-eluting stent was placed across the lesion and deployed. Balloon  angioplasty was performed, using a 6 X 80 X 135CM MUSTANG balloon, with 1  inflations and a maximum inflation pressure of 6 unruly. A self-expanding  stent was performed on the 90 % lesion in the left popliteal artery.  Following intervention there was a 1 % residual stenosis. Sheath exchange.  The sheath was exchanged for a 6FR STRAIGHT DESTINATION. Vessel setup was  performed. A COMMAND 14 300CM wire was used to cross the lesion. Balloon  angioplasty was performed, using a 5 X 60 X 135 RICCI balloon, with 4  inflations and a maximum inflation pressure of 12 unruly. Cutting balloon  angioplasty was performed, using a 5.0 X 100 ANGIOSCULPT balloon, with 3  inflations and a maximum inflation pressure of 12 unruly. A stent was placed  across the lesion and deployed. Balloon angioplasty was performed, using a  4.0 X 40 AMPHIRION balloon, with 1 inflations and a maximum inflation  pressure of 18 unruly. A balloon angioplasty was performed on the 90 % lesion  in the left peroneal artery. Following intervention there was a 10 %  residual stenosis. Sheath exchange. The sheath was exchanged for a 6FR  STRAIGHT DESTINATION. Vessel setup was performed. A FIELDER  wire  was used to cross the lesion. Balloon angioplasty was performed, using a  [id number] AMPHIRION balloon, with 1 inflations and a maximum inflation  pressure of 8 unruly. Sheath Exchange for Intervention. PTA Femoral -  Popliteal Arteries. Intravascular Stent. Femoral/ Popliteal Atherectomy.  Infusion for Thrombolysis. Intravascular Stent. Atherectomy each add'l  Peripheral Artery. PTA each additional Peripheral Vessel. Hemostasis with  Perclose-Intervention.  CONTRAST GIVEN: Visipaque 115 ml.  MEDICATIONS GIVEN: Fentanyl, 25 mcg, IV. Midazolam, 2 mg, IV. Fentanyl, 25  mcg, IV. Midazolam, 1 mg, IV. Fentanyl, 50 mcg, IV. Fentanyl, 50 mcg, IV.  Nicardipine (Cardene), 250 mcg, IA. Nitroglycerin, 100 mcg, IA.  Nitroglycerin, 100 mcg, IA. Nicardipine (Cardene), 250 mcg, IA.  Nicardipine (Cardene), 250 mcg, IA. Nitroglycerin, 100 mcg, IA.  Nicardipine (Cardene), 250 mcg, IA. Nitroglycerin, 100 mcg, IA.  Nitroglycerin, 100 mcg, IA. Nicardipine (Cardene), 250 mcg, IA.  Nitroglycerin, 200 mcg, IA. Nicardipine (Cardene), 500 mcg, IA. Heparin,  4000 units, IV. Alteplase (t-PA, Activase), 4 mg, IV. Heparin, 2000 units,  IV. Normal Saline .9%, 250, IV, other. vancomycin, 1 g, IV.  LEFT LOWER EXTREMITY VESSELS: Left common femoral: Angiography showed mild  atherosclerosis. Left superficial femoral: There was a 70 % stenosis. Left  deep femoral: Angiography showed mild atherosclerosis. Left popliteal:  There was a 90 % stenosis with some underlying dissection. Left anterior  tibial: There was a 100 % stenosis. Left tibio-peroneal: Angiography  showed mild atherosclerosis. Left posterior tibial: There was a 100 %  stenosis. Reconstitutes via the peroneal. Left peroneal: There was a 90 %  stenosis.  COMPLICATIONS: There were no complications.  DIAGNOSTIC RECOMMENDATIONS: Diagnostic angiography  S/p catheter directed lysis now with patent SFA stent.  Prior to the stent, a focal, moderate lesion noted.  The popliteal artery was significantly diseased with underlying dissection  (this had been an area that had been treated with SAMUEL).  The previously placed TPT and peroneal stents were patent.  The distal peroneal was patent.  Interventional Summary:  Successful PTA and stenting of proximal SFA (Zilver PTX).  Successful PTA andstenting of popliteal artery (Supera 5.0).  Successful PTA (2.0 balloon) and catheter directed infusion (4mg TPA  injecte via distal catheter) into the peroneal artery.  Palpable pulse at conclusion of case.  Plan  -DC Brilinta  -DC cilostazol  -start plavix 75 mg po daily (after 300mg plavix load upon arrival to  floor).  -heparin gtt without bolus this evening.  -normal saline at 75 cc overnight (total of one liter).  -bed rest for 4 hours.  -Start Eliquis in AM assuming tolerates heparin gtt overnight.  -Dr. Garcia to follow in hospital as well.  INTERVENTIONAL RECOMMENDATIONS: Diagnostic angiography  S/p catheter directed lysis now with patent SFA stent.  Prior to the stent, a focal, moderate lesion noted.  The popliteal artery was significantly diseased with underlying dissection  (this had been an area that had been treated with SAMUEL).  The previously placed TPT and peroneal stents were patent.  The distal peroneal was patent.  Interventional Summary:  Successful PTA and stenting of proximal SFA (Zilver PTX).  Successful PTA and stenting of popliteal artery (Supera 5.0).  Successful PTA (2.0 balloon) and catheter directed infusion (4mg TPA  injecte via distal catheter) into the peroneal artery.  Palpable pulse at conclusion of case.  Plan  -DC Brilinta  -DC cilostazol  -start plavix 75 mg po daily (after 300mg plavix load upon arrival to  floor).  -heparin gtt without bolus this evening.  -normal saline at 75 cc overnight (total of one liter).  -bed rest for 4 hours.  -Start Eliquis in AM assuming tolerates heparin gtt overnight.  -Dr. Garcia to follow in hospital as well.  Prepared and signed by  Mark Anthony Brooke M.D.  Signed 2018 13:13:10  HEMODYNAMIC TABLES  Pressures:  Intervention  Pressures:  - HR: 94  Pressures:  - Rhythm:  Pressures:  -- Aortic Pressure (S/D/M): 158/62/92  Outputs:  Baseline  Outputs:  -- CALCULATIONS[age]  Outputs:  -- CALCULATIONS: Body Surface Area: 1.80  Outputs:  -- CALCULATIONS: Height in cm: 165.00  Outputs:  -- CALCULATIONS: Sex: Female  Outputs:  -- CALCULATIONS: Weight in k.60    < end of copied text >    [ ] Stress Test:  	  	  Amanda Navas ANP-c  Contact #

## 2018-04-06 VITALS
TEMPERATURE: 99 F | DIASTOLIC BLOOD PRESSURE: 50 MMHG | HEART RATE: 70 BPM | RESPIRATION RATE: 18 BRPM | SYSTOLIC BLOOD PRESSURE: 122 MMHG

## 2018-04-06 LAB
ALBUMIN SERPL ELPH-MCNC: 3.1 G/DL — LOW (ref 3.3–5)
ALP SERPL-CCNC: 92 U/L — SIGNIFICANT CHANGE UP (ref 40–120)
ALT FLD-CCNC: 14 U/L RC — SIGNIFICANT CHANGE UP (ref 10–45)
ANION GAP SERPL CALC-SCNC: 11 MMOL/L — SIGNIFICANT CHANGE UP (ref 5–17)
AST SERPL-CCNC: 18 U/L — SIGNIFICANT CHANGE UP (ref 10–40)
BILIRUB SERPL-MCNC: 0.6 MG/DL — SIGNIFICANT CHANGE UP (ref 0.2–1.2)
BUN SERPL-MCNC: 10 MG/DL — SIGNIFICANT CHANGE UP (ref 7–23)
CALCIUM SERPL-MCNC: 8.7 MG/DL — SIGNIFICANT CHANGE UP (ref 8.4–10.5)
CHLORIDE SERPL-SCNC: 105 MMOL/L — SIGNIFICANT CHANGE UP (ref 96–108)
CO2 SERPL-SCNC: 23 MMOL/L — SIGNIFICANT CHANGE UP (ref 22–31)
CREAT SERPL-MCNC: 0.79 MG/DL — SIGNIFICANT CHANGE UP (ref 0.5–1.3)
GLUCOSE BLDC GLUCOMTR-MCNC: 231 MG/DL — HIGH (ref 70–99)
GLUCOSE SERPL-MCNC: 147 MG/DL — HIGH (ref 70–99)
HCT VFR BLD CALC: 23.6 % — LOW (ref 34.5–45)
HGB BLD-MCNC: 7.6 G/DL — LOW (ref 11.5–15.5)
MAGNESIUM SERPL-MCNC: 1.6 MG/DL — SIGNIFICANT CHANGE UP (ref 1.6–2.6)
MCHC RBC-ENTMCNC: 26.1 PG — LOW (ref 27–34)
MCHC RBC-ENTMCNC: 32.4 GM/DL — SIGNIFICANT CHANGE UP (ref 32–36)
MCV RBC AUTO: 80.5 FL — SIGNIFICANT CHANGE UP (ref 80–100)
PHOSPHATE SERPL-MCNC: 3.2 MG/DL — SIGNIFICANT CHANGE UP (ref 2.5–4.5)
PLATELET # BLD AUTO: 253 K/UL — SIGNIFICANT CHANGE UP (ref 150–400)
POTASSIUM SERPL-MCNC: 3.6 MMOL/L — SIGNIFICANT CHANGE UP (ref 3.5–5.3)
POTASSIUM SERPL-SCNC: 3.6 MMOL/L — SIGNIFICANT CHANGE UP (ref 3.5–5.3)
PROT SERPL-MCNC: 6.4 G/DL — SIGNIFICANT CHANGE UP (ref 6–8.3)
RBC # BLD: 2.93 M/UL — LOW (ref 3.8–5.2)
RBC # FLD: 17.3 % — HIGH (ref 10.3–14.5)
SODIUM SERPL-SCNC: 139 MMOL/L — SIGNIFICANT CHANGE UP (ref 135–145)
WBC # BLD: 8.1 K/UL — SIGNIFICANT CHANGE UP (ref 3.8–10.5)
WBC # FLD AUTO: 8.1 K/UL — SIGNIFICANT CHANGE UP (ref 3.8–10.5)

## 2018-04-06 PROCEDURE — 37228: CPT

## 2018-04-06 PROCEDURE — 84484 ASSAY OF TROPONIN QUANT: CPT

## 2018-04-06 PROCEDURE — 37213 THROMBLYTIC ART/VEN THERAPY: CPT

## 2018-04-06 PROCEDURE — 82553 CREATINE MB FRACTION: CPT

## 2018-04-06 PROCEDURE — C1760: CPT

## 2018-04-06 PROCEDURE — 99233 SBSQ HOSP IP/OBS HIGH 50: CPT

## 2018-04-06 PROCEDURE — 82330 ASSAY OF CALCIUM: CPT

## 2018-04-06 PROCEDURE — C1769: CPT

## 2018-04-06 PROCEDURE — 93926 LOWER EXTREMITY STUDY: CPT

## 2018-04-06 PROCEDURE — 86850 RBC ANTIBODY SCREEN: CPT

## 2018-04-06 PROCEDURE — 80053 COMPREHEN METABOLIC PANEL: CPT

## 2018-04-06 PROCEDURE — 36247 INS CATH ABD/L-EXT ART 3RD: CPT

## 2018-04-06 PROCEDURE — 85384 FIBRINOGEN ACTIVITY: CPT

## 2018-04-06 PROCEDURE — 93308 TTE F-UP OR LMTD: CPT

## 2018-04-06 PROCEDURE — 93321 DOPPLER ECHO F-UP/LMTD STD: CPT

## 2018-04-06 PROCEDURE — 37226: CPT

## 2018-04-06 PROCEDURE — 75716 ARTERY X-RAYS ARMS/LEGS: CPT | Mod: XU

## 2018-04-06 PROCEDURE — 37211 THROMBOLYTIC ART THERAPY: CPT

## 2018-04-06 PROCEDURE — 83036 HEMOGLOBIN GLYCOSYLATED A1C: CPT

## 2018-04-06 PROCEDURE — 84100 ASSAY OF PHOSPHORUS: CPT

## 2018-04-06 PROCEDURE — 85027 COMPLETE CBC AUTOMATED: CPT

## 2018-04-06 PROCEDURE — 99152 MOD SED SAME PHYS/QHP 5/>YRS: CPT

## 2018-04-06 PROCEDURE — 84443 ASSAY THYROID STIM HORMONE: CPT

## 2018-04-06 PROCEDURE — 82803 BLOOD GASES ANY COMBINATION: CPT

## 2018-04-06 PROCEDURE — C1887: CPT

## 2018-04-06 PROCEDURE — 84132 ASSAY OF SERUM POTASSIUM: CPT

## 2018-04-06 PROCEDURE — 84295 ASSAY OF SERUM SODIUM: CPT

## 2018-04-06 PROCEDURE — 85730 THROMBOPLASTIN TIME PARTIAL: CPT

## 2018-04-06 PROCEDURE — 85610 PROTHROMBIN TIME: CPT

## 2018-04-06 PROCEDURE — 83735 ASSAY OF MAGNESIUM: CPT

## 2018-04-06 PROCEDURE — 83605 ASSAY OF LACTIC ACID: CPT

## 2018-04-06 PROCEDURE — 82435 ASSAY OF BLOOD CHLORIDE: CPT

## 2018-04-06 PROCEDURE — 96374 THER/PROPH/DIAG INJ IV PUSH: CPT

## 2018-04-06 PROCEDURE — C1876: CPT

## 2018-04-06 PROCEDURE — 82947 ASSAY GLUCOSE BLOOD QUANT: CPT

## 2018-04-06 PROCEDURE — C1725: CPT

## 2018-04-06 PROCEDURE — 86900 BLOOD TYPING SEROLOGIC ABO: CPT

## 2018-04-06 PROCEDURE — 93306 TTE W/DOPPLER COMPLETE: CPT

## 2018-04-06 PROCEDURE — 99285 EMERGENCY DEPT VISIT HI MDM: CPT | Mod: 25

## 2018-04-06 PROCEDURE — 80061 LIPID PANEL: CPT

## 2018-04-06 PROCEDURE — 85014 HEMATOCRIT: CPT

## 2018-04-06 PROCEDURE — 82550 ASSAY OF CK (CPK): CPT

## 2018-04-06 PROCEDURE — 93005 ELECTROCARDIOGRAM TRACING: CPT

## 2018-04-06 PROCEDURE — 99153 MOD SED SAME PHYS/QHP EA: CPT

## 2018-04-06 PROCEDURE — 82962 GLUCOSE BLOOD TEST: CPT

## 2018-04-06 PROCEDURE — 86901 BLOOD TYPING SEROLOGIC RH(D): CPT

## 2018-04-06 PROCEDURE — C1894: CPT

## 2018-04-06 PROCEDURE — 93010 ELECTROCARDIOGRAM REPORT: CPT

## 2018-04-06 PROCEDURE — 93923 UPR/LXTR ART STDY 3+ LVLS: CPT

## 2018-04-06 PROCEDURE — 97161 PT EVAL LOW COMPLEX 20 MIN: CPT

## 2018-04-06 RX ORDER — CILOSTAZOL 100 MG/1
1 TABLET ORAL
Qty: 0 | Refills: 0 | COMMUNITY

## 2018-04-06 RX ORDER — MAGNESIUM SULFATE 500 MG/ML
2 VIAL (ML) INJECTION
Qty: 0 | Refills: 0 | Status: COMPLETED | OUTPATIENT
Start: 2018-04-06 | End: 2018-04-06

## 2018-04-06 RX ORDER — CLOPIDOGREL BISULFATE 75 MG/1
1 TABLET, FILM COATED ORAL
Qty: 30 | Refills: 0 | OUTPATIENT
Start: 2018-04-06 | End: 2018-05-05

## 2018-04-06 RX ORDER — POTASSIUM CHLORIDE 20 MEQ
40 PACKET (EA) ORAL ONCE
Qty: 0 | Refills: 0 | Status: COMPLETED | OUTPATIENT
Start: 2018-04-06 | End: 2018-04-06

## 2018-04-06 RX ORDER — CARVEDILOL PHOSPHATE 80 MG/1
1 CAPSULE, EXTENDED RELEASE ORAL
Qty: 0 | Refills: 0 | COMMUNITY
Start: 2018-04-06

## 2018-04-06 RX ORDER — GABAPENTIN 400 MG/1
1 CAPSULE ORAL
Qty: 90 | Refills: 0 | OUTPATIENT
Start: 2018-04-06 | End: 2018-05-05

## 2018-04-06 RX ORDER — OXYCODONE HYDROCHLORIDE 5 MG/1
1 TABLET ORAL
Qty: 7 | Refills: 0 | OUTPATIENT
Start: 2018-04-06 | End: 2018-04-12

## 2018-04-06 RX ORDER — GABAPENTIN 400 MG/1
100 CAPSULE ORAL THREE TIMES A DAY
Qty: 0 | Refills: 0 | Status: DISCONTINUED | OUTPATIENT
Start: 2018-04-06 | End: 2018-04-06

## 2018-04-06 RX ORDER — APIXABAN 2.5 MG/1
1 TABLET, FILM COATED ORAL
Qty: 60 | Refills: 0 | OUTPATIENT
Start: 2018-04-06 | End: 2018-05-05

## 2018-04-06 RX ORDER — ASPIRIN/CALCIUM CARB/MAGNESIUM 324 MG
1 TABLET ORAL
Qty: 0 | Refills: 0 | COMMUNITY

## 2018-04-06 RX ADMIN — OXYCODONE HYDROCHLORIDE 5 MILLIGRAM(S): 5 TABLET ORAL at 04:45

## 2018-04-06 RX ADMIN — Medication 650 MILLIGRAM(S): at 10:05

## 2018-04-06 RX ADMIN — OXYCODONE HYDROCHLORIDE 5 MILLIGRAM(S): 5 TABLET ORAL at 04:23

## 2018-04-06 RX ADMIN — APIXABAN 5 MILLIGRAM(S): 2.5 TABLET, FILM COATED ORAL at 08:59

## 2018-04-06 RX ADMIN — Medication 50 GRAM(S): at 08:03

## 2018-04-06 RX ADMIN — Medication 50 GRAM(S): at 08:57

## 2018-04-06 RX ADMIN — GABAPENTIN 100 MILLIGRAM(S): 400 CAPSULE ORAL at 10:05

## 2018-04-06 RX ADMIN — AMLODIPINE BESYLATE 10 MILLIGRAM(S): 2.5 TABLET ORAL at 08:57

## 2018-04-06 RX ADMIN — Medication 650 MILLIGRAM(S): at 10:35

## 2018-04-06 RX ADMIN — LISINOPRIL 20 MILLIGRAM(S): 2.5 TABLET ORAL at 08:57

## 2018-04-06 RX ADMIN — CARVEDILOL PHOSPHATE 3.12 MILLIGRAM(S): 80 CAPSULE, EXTENDED RELEASE ORAL at 08:57

## 2018-04-06 RX ADMIN — Medication 40 MILLIEQUIVALENT(S): at 08:56

## 2018-04-06 RX ADMIN — CLOPIDOGREL BISULFATE 75 MILLIGRAM(S): 75 TABLET, FILM COATED ORAL at 11:45

## 2018-04-06 NOTE — PHYSICAL THERAPY INITIAL EVALUATION ADULT - PLANNED THERAPY INTERVENTIONS, PT EVAL
stair training: Goal: Improve to 10 steps I with single rail, step to pattern by 4 weeks./bed mobility training/gait training

## 2018-04-06 NOTE — PROVIDER CONTACT NOTE (MEDICATION) - SITUATION
Pt refusing medications: Norvasc, Eliquis, Coreg, Zestril, Magnesium, and Potassium. RN educated pt on the importance of medications, particularly Eliquis for her recent stent placement.

## 2018-04-06 NOTE — PHYSICAL THERAPY INITIAL EVALUATION ADULT - ADDITIONAL COMMENTS
PTA pt lived in pvt home with  (she is primary caregiver for him), 3 steps to enter +HR, flight to bedroom +HR, fully independent without AD.

## 2018-04-06 NOTE — PROGRESS NOTE ADULT - SUBJECTIVE AND OBJECTIVE BOX
Admission date:  CHIEF COMPLAINT:  HPI:  66 y/o AA female with PMHx of PAD, s/p RT. SFA stent, HTN, HLD, DM2, LEILANI (not on CPAP) s/p recent LLE (PANTERA to Lt SFA, balloon angioplasty on Lt pop, & PANTERA to Lt peroneal artery (3/22) presented to the ED c/o worsening leg symptoms.  Patient states that at 230 pm yesterday, she felt "pins and needles" and pain 10/10 on her left leg.  Claims to have been taking all her antiplatelets (ASA, Brilinta, Celostazol) as prescribed.  Denies numbness or loss of sensation.    In ED, Heparin drip started.  Given Oxycodone IR 5 mg with total relief of symptoms. (30 Mar 2018 23:50)    INTERVAL HISTORY:    REVIEW OF SYSTEMS: Denies xxxxxx; all others negative    MEDICATIONS  (STANDING):  amLODIPine   Tablet 10 milliGRAM(s) Oral daily  apixaban 5 milliGRAM(s) Oral every 12 hours  atorvastatin 40 milliGRAM(s) Oral at bedtime  carvedilol 3.125 milliGRAM(s) Oral every 12 hours  clopidogrel Tablet 75 milliGRAM(s) Oral daily  dextrose 5%. 1000 milliLiter(s) (50 mL/Hr) IV Continuous <Continuous>  dextrose 50% Injectable 12.5 Gram(s) IV Push once  dextrose 50% Injectable 25 Gram(s) IV Push once  dextrose 50% Injectable 25 Gram(s) IV Push once  insulin lispro (HumaLOG) corrective regimen sliding scale   SubCutaneous three times a day before meals  insulin lispro (HumaLOG) corrective regimen sliding scale   SubCutaneous at bedtime  lisinopril 20 milliGRAM(s) Oral daily  magnesium sulfate  IVPB 2 Gram(s) IV Intermittent every 1 hour  potassium chloride    Tablet ER 40 milliEquivalent(s) Oral once    MEDICATIONS  (PRN):  acetaminophen   Tablet. 650 milliGRAM(s) Oral every 6 hours PRN Mild Pain (1 - 3)  ALPRAZolam 0.5 milliGRAM(s) Oral every 6 hours PRN anxiety  dextrose Gel 1 Dose(s) Oral once PRN Blood Glucose LESS THAN 70 milliGRAM(s)/deciliter  glucagon  Injectable 1 milliGRAM(s) IntraMuscular once PRN Glucose LESS THAN 70 milligrams/deciliter  HYDROmorphone  Injectable 0.5 milliGRAM(s) IV Push every 3 hours PRN Severe Pain (7 - 10)  oxyCODONE    IR 5 milliGRAM(s) Oral every 4 hours PRN Moderate Pain (4 - 6)      Objective:  ICU Vital Signs Last 24 Hrs  T(C): 37.2 (2018 22:00), Max: 37.2 (2018 19:00)  T(F): 99 (2018 22:00), Max: 99 (2018 19:00)  HR: 76 (2018 08:00) (69 - 89)  BP: 145/64 (2018 08:00) (95/69 - 161/70)  BP(mean): 83 (2018 08:00) (67 - 120)  ABP: --  ABP(mean): --  RR: 18 (2018 07:00) (15 - 18)  SpO2: 98% (2018 08:00) (62% - 100%)          04-05 @ 07:01  -  04-06 @ 07:00  --------------------------------------------------------  IN: 600 mL / OUT: 650 mL / NET: -50 mL      Daily     Daily Weight in k.6 (2018 06:00)    PHYSICAL EXAM:      Constitutional:    HEENT:    Respiratory:    Cardiovascular:    Gastrointestinal:    Genitourinary:    Extremities:    Vascular:    Neurological:    Skin:      TELEMETRY:     EKG:     IMAGING:    Labs:                          7.6    8.1   )-----------( 253      ( 2018 05:42 )             23.6     04-06    139  |  105  |  10  ----------------------------<  147<H>  3.6   |  23  |  0.79    Ca    8.7      2018 05:42  Phos  3.2     04-06  Mg     1.6     04-06    TPro  6.4  /  Alb  3.1<L>  /  TBili  0.6  /  DBili  x   /  AST  18  /  ALT  14  /  AlkPhos  92  04-06    LIVER FUNCTIONS - ( 2018 05:42 )  Alb: 3.1 g/dL / Pro: 6.4 g/dL / ALK PHOS: 92 U/L / ALT: 14 U/L RC / AST: 18 U/L / GGT: x                   HEALTH ISSUES - PROBLEM Dx:  HTN (hypertension): HTN (hypertension)  High cholesterol: High cholesterol  Limb ischemia: Limb ischemia  Need for prophylactic measure: Need for prophylactic measure  Essential hypertension: Essential hypertension  Type 2 diabetes mellitus with other circulatory complication, unspecified long term insulin use status: Type 2 diabetes mellitus with other circulatory complication, unspecified long term insulin use status  PAD (peripheral artery disease): PAD (peripheral artery disease) Admission date:   CHIEF COMPLAINT:  HPI: 66 y/o AA female with PMHx of PAD, s/p RT. SFA stent, HTN, HLD, DM2, LEILANI (not on CPAP) s/p recent LLE (PANTERA to Lt SFA, balloon angioplasty on Lt pop, & PANTERA to Lt peroneal artery (3/22) presented to the ED c/o worsening leg symptoms.  Patient states that at 230 pm yesterday, she felt "pins and needles" and pain 10/10 on her left leg.  Claims to have been taking all her antiplatelets (ASA, Brilinta, Celostazol) as prescribed.  Denies numbness or loss of sensation.    In ED, Heparin drip started.  Given Oxycodone IR 5 mg with total relief of symptoms. (30 Mar 2018 23:50)    INTERVAL HISTORY: No events overnight  Sitting up in chair    REVIEW OF SYSTEMS: Denies chest pain, SOB, dizziness, NV; + numbness, tingling pain in left foot; all others negative    MEDICATIONS  (STANDING):  amLODIPine   Tablet 10 milliGRAM(s) Oral daily  apixaban 5 milliGRAM(s) Oral every 12 hours  atorvastatin 40 milliGRAM(s) Oral at bedtime  carvedilol 3.125 milliGRAM(s) Oral every 12 hours  clopidogrel Tablet 75 milliGRAM(s) Oral daily  dextrose 5%. 1000 milliLiter(s) (50 mL/Hr) IV Continuous <Continuous>  dextrose 50% Injectable 12.5 Gram(s) IV Push once  dextrose 50% Injectable 25 Gram(s) IV Push once  dextrose 50% Injectable 25 Gram(s) IV Push once  insulin lispro (HumaLOG) corrective regimen sliding scale   SubCutaneous three times a day before meals  insulin lispro (HumaLOG) corrective regimen sliding scale   SubCutaneous at bedtime  lisinopril 20 milliGRAM(s) Oral daily  magnesium sulfate  IVPB 2 Gram(s) IV Intermittent every 1 hour  potassium chloride    Tablet ER 40 milliEquivalent(s) Oral once    MEDICATIONS  (PRN):  acetaminophen   Tablet. 650 milliGRAM(s) Oral every 6 hours PRN Mild Pain (1 - 3)  ALPRAZolam 0.5 milliGRAM(s) Oral every 6 hours PRN anxiety  dextrose Gel 1 Dose(s) Oral once PRN Blood Glucose LESS THAN 70 milliGRAM(s)/deciliter  glucagon  Injectable 1 milliGRAM(s) IntraMuscular once PRN Glucose LESS THAN 70 milligrams/deciliter  HYDROmorphone  Injectable 0.5 milliGRAM(s) IV Push every 3 hours PRN Severe Pain (7 - 10)  oxyCODONE    IR 5 milliGRAM(s) Oral every 4 hours PRN Moderate Pain (4 - 6)      Objective:  ICU Vital Signs Last 24 Hrs  T(C): 37.2 (2018 22:00), Max: 37.2 (2018 19:00)  T(F): 99 (2018 22:00), Max: 99 (2018 19:00)  HR: 76 (2018 08:00) (69 - 89)  BP: 145/64 (2018 08:00) (95/69 - 161/70)  BP(mean): 83 (2018 08:00) (67 - 120)  ABP: --  ABP(mean): --  RR: 18 (2018 07:00) (15 - 18)  SpO2: 98% (2018 08:00) (62% - 100%)          04-05 @ 07:01  -  04-06 @ 07:00  --------------------------------------------------------  IN: 600 mL / OUT: 650 mL / NET: -50 mL      Daily     Daily Weight in k.6 (2018 06:00)    PHYSICAL EXAM:  Constitutional: NAD  HEENT: oral mucosa pink moist  Respiratory: Regular unlabored; CTA  Cardiovascular: RRR S1 S2, no M/R/G  Gastrointestinal: soft ND/NT; + bowel sounds, tolerating diet  Genitourinary: voiding on own  Extremities: HUSAIN equal strength; Left LE mild edema, warm + PP  Vascular: warm peripherally  Neurological: A & O x 3 mood & affect appropriate  Skin: warm dry intact, no rash      TELEMETRY: Sinus rhythm rare PVC      Labs:                          7.6    8.1   )-----------( 253      ( 2018 05:42 )             23.6     04-06    139  |  105  |  10  ----------------------------<  147<H>  3.6   |  23  |  0.79    Ca    8.7      2018 05:42  Phos  3.2     04-06  Mg     1.6     -06    TPro  6.4  /  Alb  3.1<L>  /  TBili  0.6  /  DBili  x   /  AST  18  /  ALT  14  /  AlkPhos  92  -06    LIVER FUNCTIONS - ( 2018 05:42 )  Alb: 3.1 g/dL / Pro: 6.4 g/dL / ALK PHOS: 92 U/L / ALT: 14 U/L RC / AST: 18 U/L / GGT: x                   HEALTH ISSUES - PROBLEM Dx:  HTN (hypertension): HTN (hypertension)  High cholesterol: High cholesterol  Limb ischemia: Limb ischemia  Need for prophylactic measure: Need for prophylactic measure  Essential hypertension: Essential hypertension  Type 2 diabetes mellitus with other circulatory complication, unspecified long term insulin use status: Type 2 diabetes mellitus with other circulatory complication, unspecified long term insulin use status  PAD (peripheral artery disease): PAD (peripheral artery disease)

## 2018-04-06 NOTE — PROGRESS NOTE ADULT - SUBJECTIVE AND OBJECTIVE BOX
Vascular Medicine and Peripheral Intervention Progress Note    S/24 Events: No acute events overnight. Still with c/o tingling/pain in foot with standing.     O:  T(C): 37.2 (04-05-18 @ 22:00), Max: 37.2 (04-05-18 @ 19:00)  HR: 76 (04-06-18 @ 08:00) (69 - 89)  BP: 145/64 (04-06-18 @ 08:00) (95/69 - 161/70)  RR: 18 (04-06-18 @ 07:00) (15 - 18)  SpO2: 98% (04-06-18 @ 08:00) (62% - 100%)      O/E:  Gen: NAD  HEENT: EOMI  CV: RRR, normal S1 + S2, no m/r/g  Lungs: CTAB  Abd: soft, non-tender  Ext: LLE warm after reintervention, palpable DP, dopplerable PT. 1-2+ LLE edema.     Labs:                        7.6    8.1   )-----------( 253      ( 06 Apr 2018 05:42 )             23.6     04-06    139  |  105  |  10  ----------------------------<  147<H>  3.6   |  23  |  0.79    Ca    8.7      06 Apr 2018 05:42  Phos  3.2     04-06  Mg     1.6     04-06    TPro  6.4  /  Alb  3.1<L>  /  TBili  0.6  /  DBili  x   /  AST  18  /  ALT  14  /  AlkPhos  92  04-06              Meds:  MEDICATIONS  (STANDING):  amLODIPine   Tablet 10 milliGRAM(s) Oral daily  apixaban 5 milliGRAM(s) Oral every 12 hours  atorvastatin 40 milliGRAM(s) Oral at bedtime  carvedilol 3.125 milliGRAM(s) Oral every 12 hours  clopidogrel Tablet 75 milliGRAM(s) Oral daily  dextrose 5%. 1000 milliLiter(s) (50 mL/Hr) IV Continuous <Continuous>  dextrose 50% Injectable 12.5 Gram(s) IV Push once  dextrose 50% Injectable 25 Gram(s) IV Push once  dextrose 50% Injectable 25 Gram(s) IV Push once  insulin lispro (HumaLOG) corrective regimen sliding scale   SubCutaneous three times a day before meals  insulin lispro (HumaLOG) corrective regimen sliding scale   SubCutaneous at bedtime  lisinopril 20 milliGRAM(s) Oral daily      A/P:  66 y/o AA female with PMHx of HTN, HLD, DM2, LEILANI (not on CPAP), PAD/CLI s/p complex LLE intervention as detailed above on 3/22/2018 now admitted with acute LLE limb ischemia (mid SFA/pop and TPT occlusion) s/p catheter directed lytics 4/2 and reintervention on SFA/pop/peroneal 4/3.     - continue plavix 75mg daily, eliquis 5mg BID  - analgesics and anxiolytics PRN.  - continue with coreg/BP control   - d/c home today; f/up with Dr. Brooke in 1 week      [facility]  Cardiology Fellow Vascular Medicine and Peripheral Intervention Progress Note    S/24 Events: No acute events overnight. Still with c/o tingling/pain in foot with standing.     O:  T(C): 37.2 (04-05-18 @ 22:00), Max: 37.2 (04-05-18 @ 19:00)  HR: 76 (04-06-18 @ 08:00) (69 - 89)  BP: 145/64 (04-06-18 @ 08:00) (95/69 - 161/70)  RR: 18 (04-06-18 @ 07:00) (15 - 18)  SpO2: 98% (04-06-18 @ 08:00) (62% - 100%)      O/E:  Gen: NAD  HEENT: EOMI  CV: RRR, normal S1 + S2, no m/r/g  Lungs: CTAB  Abd: soft, non-tender  Ext: LLE warm after reintervention, palpable DP, dopplerable PT, peroneal. 1-2+ LLE edema.     Labs:                        7.6    8.1   )-----------( 253      ( 06 Apr 2018 05:42 )             23.6     04-06    139  |  105  |  10  ----------------------------<  147<H>  3.6   |  23  |  0.79    Ca    8.7      06 Apr 2018 05:42  Phos  3.2     04-06  Mg     1.6     04-06    TPro  6.4  /  Alb  3.1<L>  /  TBili  0.6  /  DBili  x   /  AST  18  /  ALT  14  /  AlkPhos  92  04-06              Meds:  MEDICATIONS  (STANDING):  amLODIPine   Tablet 10 milliGRAM(s) Oral daily  apixaban 5 milliGRAM(s) Oral every 12 hours  atorvastatin 40 milliGRAM(s) Oral at bedtime  carvedilol 3.125 milliGRAM(s) Oral every 12 hours  clopidogrel Tablet 75 milliGRAM(s) Oral daily  dextrose 5%. 1000 milliLiter(s) (50 mL/Hr) IV Continuous <Continuous>  dextrose 50% Injectable 12.5 Gram(s) IV Push once  dextrose 50% Injectable 25 Gram(s) IV Push once  dextrose 50% Injectable 25 Gram(s) IV Push once  insulin lispro (HumaLOG) corrective regimen sliding scale   SubCutaneous three times a day before meals  insulin lispro (HumaLOG) corrective regimen sliding scale   SubCutaneous at bedtime  lisinopril 20 milliGRAM(s) Oral daily      A/P:  66 y/o AA female with PMHx of HTN, HLD, DM2, LEILANI (not on CPAP), PAD/CLI s/p complex LLE intervention as detailed above on 3/22/2018 now admitted with acute LLE limb ischemia (mid SFA/pop and TPT occlusion) s/p catheter directed lytics 4/2 and reintervention on SFA/pop/peroneal 4/3.     - continue plavix 75mg daily, eliquis 5mg BID  - analgesics and anxiolytics PRN.  - continue with coreg/BP control   - d/c home today; f/up with Dr. Brooke in 1 week      [facility]  Cardiology Fellow

## 2018-04-06 NOTE — PROGRESS NOTE ADULT - ATTENDING COMMENTS
Patient is seen and examined with fellow, NP and the CCU house-staff. I agree with the history, physical and the assessment and plan.  on DAPT and pletal  c/w hep gtt until possible intervention  DAVONTE results noted  would maintain SBP ~140s
Strong DP pulse on exam  Trial of Gabapentin 100mg TID  If she feels okay after PT evaluation, then D/C planning for later today  F/U with Dr. hubbard in 1 week.
For PAD:   Continue Plavix for antiplatelet;  Continue Eliquis for anticoagulation;  Continue high intensity statin.
Appreciate vascular medicine follow-up for patient with critical limb ischemia status post intervention.  Continue blood pressure control and antiplatelet therapy.
For PAD:   Continue Plavix for antiplatelet;  Continue Eliquis for anticoagulation;  Continue high intensity statin.
Patient is seen and examined with fellow, NP and the CCU house-staff. I agree with the history, physical and the assessment and plan.  s/p recent LE intervention  f/u with ABIs   check CK/MB

## 2018-04-06 NOTE — PROGRESS NOTE ADULT - PROBLEM SELECTOR PROBLEM 2
Essential hypertension
Type 2 diabetes mellitus with other circulatory complication, unspecified long term insulin use status
HTN (hypertension)

## 2018-04-06 NOTE — CHART NOTE - NSCHARTNOTEFT_GEN_A_CORE
====================  CCU MIDNIGHT ROUNDS  ====================  HARVEY RIZZO  [id number]  ====================  SUMMARY:   68 y/o AA female with PMHx of PAD, s/p RT. SFA stent, HTN, HLD, DM2, LEILANI (not on CPAP) s/p recent LLE (PANTERA to Lt SFA, balloon angioplasty on Lt pop, & PANTERA to Lt peroneal artery (3/22) presented to the ED c/o worsening leg symptoms.  Patient states that at 230 pm yesterday, she felt "pins and needles" and pain 10/10 on her left leg.  Claims to have been taking all her antiplatelets (ASA, Brilinta, Celostazol) as prescribed. Pt underwent     ====================        ====================  NEW EVENTS:  ====================        ====================  VITALS (Last 12 hrs):  ====================    T(C): 37.2 (04-05-18 @ 22:00), Max: 37.2 (04-05-18 @ 19:00)  HR: 83 (04-06-18 @ 00:00) (77 - 89)  BP: 124/53 (04-06-18 @ 00:00) (117/66 - 157/69)  BP(mean): 70 (04-06-18 @ 00:00) (68 - 120)  RR: 17 (04-06-18 @ 00:00) (16 - 17)  SpO2: 95% (04-06-18 @ 00:00) (62% - 100%)      TELEMETRY:        *BLOOD GAS/ARTERIAL/MIXED/VENOUS  *LACTATE    I&O's Summary    04 Apr 2018 07:01  -  05 Apr 2018 07:00  --------------------------------------------------------  IN: 1014 mL / OUT: 600 mL / NET: 414 mL    05 Apr 2018 07:01  -  06 Apr 2018 01:24  --------------------------------------------------------  IN: 480 mL / OUT: 0 mL / NET: 480 mL        ====================  PLAN:  ====================    HEALTH ISSUES - PROBLEM Dx:  HTN (hypertension): HTN (hypertension)  High cholesterol: High cholesterol  Limb ischemia: Limb ischemia  Need for prophylactic measure: Need for prophylactic measure  Essential hypertension: Essential hypertension  Type 2 diabetes mellitus with other circulatory complication, unspecified long term insulin use status: Type 2 diabetes mellitus with other circulatory complication, unspecified long term insulin use status  PAD (peripheral artery disease): PAD (peripheral artery disease)        HEALTH ISSUES - R/O PROBLEM Dx:      SHANTAL Alvarado NP   # ====================  CCU MIDNIGHT ROUNDS  ====================  HARVEY RIZZO  [id number]  ====================  SUMMARY:   68 y/o AA female with PMHx of PAD, s/p RT. SFA stent, HTN, HLD, DM2, LEILANI (not on CPAP) s/p recent LLE (PANTERA to Lt SFA, balloon angioplasty on Lt pop, & PANTERA to Lt peroneal artery (3/22) presented to the ED c/o worsening leg symptoms.  Patient states that at 230 pm yesterday, she felt "pins and needles" and pain 10/10 on her left leg.  Claims to have been taking all her antiplatelets (ASA, Brilinta, Celostazol) as prescribed. Pt underwent L SFA + POP stents w/ POBA to the DP now on eliquis and plavix.   ====================    ====================  NEW EVENTS: No acute events.   ====================    ====================  VITALS (Last 12 hrs):  ====================    T(C): 37.2 (04-05-18 @ 22:00), Max: 37.2 (04-05-18 @ 19:00)  HR: 83 (04-06-18 @ 00:00) (77 - 89)  BP: 124/53 (04-06-18 @ 00:00) (117/66 - 157/69)  BP(mean): 70 (04-06-18 @ 00:00) (68 - 120)  RR: 17 (04-06-18 @ 00:00) (16 - 17)  SpO2: 95% (04-06-18 @ 00:00) (62% - 100%)    TELEMETRY: NSR 80    I&O's Summary    04 Apr 2018 07:01 - 05 Apr 2018 07:00  --------------------------------------------------------  IN: 1014 mL / OUT: 600 mL / NET: 414 mL    05 Apr 2018 07:01  -  06 Apr 2018 01:24  --------------------------------------------------------  IN: 480 mL / OUT: 0 mL / NET: 480 mL    ====================  PLAN:  ====================  PVD s/p L SFA , POP stent w/ POBA to DP: Cont Eliquis, plavix. + pulses, warm.   Continue lisinopril, norvasc, lipitor, coreg.  Continue oxy prn for pain control.  Plan for dc in Am    SHANTAL Alvarado NP   #[id number]

## 2018-04-06 NOTE — PROGRESS NOTE ADULT - PROBLEM SELECTOR PLAN 1
C/W norvasc, hctz  uptitrate lisinopril as B/P tolerates
c/w Eliquis, Plavix  PT eval  ambulating with walker  start Gabapentin 100mg TID for nerve pain  c/w tylenol; percocet prn at night
LLE s/p direct thrombolytic therapy  Continue Heparin gtt  Plan for repeat angiogram

## 2018-04-06 NOTE — PHYSICAL THERAPY INITIAL EVALUATION ADULT - PERTINENT HX OF CURRENT PROBLEM, REHAB EVAL
Pt is a 68 y/o AA female with PMHx of PAD, s/p RT. SFA stent, HTN, HLD, DM2, LEILANI (not on CPAP) s/p recent LLE (PANTERA to Lt SFA, balloon angioplasty on Lt pop, & PANTERA to Lt peroneal artery (3/22) presented to the ED c/o worsening leg symptoms.  Patient states that at 230 pm yesterday, she felt "pins and needles" and pain 10/10 on her left leg. Pt now admitted with acute LLE limb ischemia (mid SFA/pop and TPT occlusion) s/p catheter directed lytics 4/2 and reintervention on SFA/pop/peroneal 4/3.

## 2018-04-06 NOTE — PROGRESS NOTE ADULT - ASSESSMENT
66 y/o AA female with PMHx of PAD, s/p RT. SFA stent, HTN, HLD, DM2, LEILANI (not on CPAP) s/p recent LLE (PANTERA to Lt SFA, balloon angioplasty on Lt pop, & PANTERA to Lt peroneal artery (3/22) presented to the ED c/o worsening leg symptoms.  Patient states that at 230 pm yesterday, she felt "pins and needles" and pain 10/10 on her left leg.  Claims to have been taking all her antiplatelets (ASA, Brilinta, Celostazol) as prescribed. Pt underwent L SFA + POP stents w/ POBA to the DP now on eliquis and plavix.

## 2018-04-13 ENCOUNTER — APPOINTMENT (OUTPATIENT)
Dept: CARDIOLOGY | Facility: CLINIC | Age: 68
End: 2018-04-13
Payer: MEDICARE

## 2018-04-13 VITALS — HEART RATE: 105 BPM | SYSTOLIC BLOOD PRESSURE: 123 MMHG | OXYGEN SATURATION: 100 % | DIASTOLIC BLOOD PRESSURE: 62 MMHG

## 2018-04-13 PROCEDURE — 99214 OFFICE O/P EST MOD 30 MIN: CPT

## 2018-04-17 ENCOUNTER — MEDICATION RENEWAL (OUTPATIENT)
Age: 68
End: 2018-04-17

## 2018-05-01 ENCOUNTER — FORM ENCOUNTER (OUTPATIENT)
Age: 68
End: 2018-05-01

## 2018-05-02 ENCOUNTER — APPOINTMENT (OUTPATIENT)
Dept: ULTRASOUND IMAGING | Facility: HOSPITAL | Age: 68
End: 2018-05-02

## 2018-05-02 ENCOUNTER — OUTPATIENT (OUTPATIENT)
Dept: OUTPATIENT SERVICES | Facility: HOSPITAL | Age: 68
LOS: 1 days | End: 2018-05-02
Payer: COMMERCIAL

## 2018-05-02 DIAGNOSIS — Z98.89 OTHER SPECIFIED POSTPROCEDURAL STATES: Chronic | ICD-10-CM

## 2018-05-02 DIAGNOSIS — Z98.84 BARIATRIC SURGERY STATUS: Chronic | ICD-10-CM

## 2018-05-02 DIAGNOSIS — Z95.828 PRESENCE OF OTHER VASCULAR IMPLANTS AND GRAFTS: Chronic | ICD-10-CM

## 2018-05-02 DIAGNOSIS — I73.9 PERIPHERAL VASCULAR DISEASE, UNSPECIFIED: ICD-10-CM

## 2018-05-02 DIAGNOSIS — I73.9 PERIPHERAL VASCULAR DISEASE, UNSPECIFIED: Chronic | ICD-10-CM

## 2018-05-02 DIAGNOSIS — Z98.891 HISTORY OF UTERINE SCAR FROM PREVIOUS SURGERY: Chronic | ICD-10-CM

## 2018-05-02 DIAGNOSIS — Z98.890 OTHER SPECIFIED POSTPROCEDURAL STATES: Chronic | ICD-10-CM

## 2018-05-02 PROCEDURE — 93923 UPR/LXTR ART STDY 3+ LVLS: CPT

## 2018-05-02 PROCEDURE — 93925 LOWER EXTREMITY STUDY: CPT

## 2018-05-02 PROCEDURE — 93925 LOWER EXTREMITY STUDY: CPT | Mod: 26

## 2018-05-02 PROCEDURE — 93923 UPR/LXTR ART STDY 3+ LVLS: CPT | Mod: 26

## 2018-05-04 ENCOUNTER — APPOINTMENT (OUTPATIENT)
Dept: CARDIOLOGY | Facility: CLINIC | Age: 68
End: 2018-05-04
Payer: MEDICARE

## 2018-05-04 VITALS
HEIGHT: 65 IN | WEIGHT: 155 LBS | DIASTOLIC BLOOD PRESSURE: 74 MMHG | SYSTOLIC BLOOD PRESSURE: 183 MMHG | OXYGEN SATURATION: 99 % | BODY MASS INDEX: 25.83 KG/M2 | HEART RATE: 83 BPM

## 2018-05-04 PROCEDURE — 99214 OFFICE O/P EST MOD 30 MIN: CPT

## 2018-05-04 RX ORDER — AMLODIPINE BESYLATE 10 MG/1
10 TABLET ORAL
Qty: 90 | Refills: 0 | Status: DISCONTINUED | COMMUNITY
Start: 2017-05-23 | End: 2018-05-04

## 2018-05-04 RX ORDER — CILOSTAZOL 50 MG/1
50 TABLET ORAL
Qty: 120 | Refills: 1 | Status: DISCONTINUED | COMMUNITY
Start: 2017-06-12 | End: 2018-05-04

## 2018-05-14 RX ORDER — GABAPENTIN 100 MG/1
100 CAPSULE ORAL
Qty: 270 | Refills: 1 | Status: ACTIVE | COMMUNITY
Start: 2018-04-06 | End: 1900-01-01

## 2018-05-18 ENCOUNTER — LABORATORY RESULT (OUTPATIENT)
Age: 68
End: 2018-05-18

## 2018-05-18 ENCOUNTER — APPOINTMENT (OUTPATIENT)
Dept: CARDIOLOGY | Facility: CLINIC | Age: 68
End: 2018-05-18
Payer: MEDICARE

## 2018-05-18 VITALS — SYSTOLIC BLOOD PRESSURE: 155 MMHG | DIASTOLIC BLOOD PRESSURE: 74 MMHG

## 2018-05-18 PROCEDURE — 99214 OFFICE O/P EST MOD 30 MIN: CPT

## 2018-05-25 LAB
ALBUMIN SERPL ELPH-MCNC: 4.1 G/DL
ALP BLD-CCNC: 132 U/L
ALT SERPL-CCNC: 4 U/L
ANION GAP SERPL CALC-SCNC: 17 MMOL/L
AST SERPL-CCNC: 19 U/L
BASOPHILS # BLD AUTO: 0 K/UL
BASOPHILS NFR BLD AUTO: 0 %
BILIRUB SERPL-MCNC: 0.6 MG/DL
BUN SERPL-MCNC: 19 MG/DL
CALCIUM SERPL-MCNC: 9.9 MG/DL
CHLORIDE SERPL-SCNC: 102 MMOL/L
CO2 SERPL-SCNC: 24 MMOL/L
CREAT SERPL-MCNC: 0.98 MG/DL
EOSINOPHIL # BLD AUTO: 0.27 K/UL
EOSINOPHIL NFR BLD AUTO: 3.6 %
GLUCOSE SERPL-MCNC: 128 MG/DL
HCT VFR BLD CALC: 31.4 %
HGB BLD-MCNC: 9.6 G/DL
LYMPHOCYTES # BLD AUTO: 2.03 K/UL
LYMPHOCYTES NFR BLD AUTO: 26.8 %
MAN DIFF?: NORMAL
MCHC RBC-ENTMCNC: 22.7 PG
MCHC RBC-ENTMCNC: 30.6 GM/DL
MCV RBC AUTO: 74.4 FL
MONOCYTES # BLD AUTO: 0.34 K/UL
MONOCYTES NFR BLD AUTO: 4.5 %
NEUTROPHILS # BLD AUTO: 4.93 K/UL
NEUTROPHILS NFR BLD AUTO: 65.1 %
PLATELET # BLD AUTO: 386 K/UL
POTASSIUM SERPL-SCNC: 4.2 MMOL/L
PROT SERPL-MCNC: 7.6 G/DL
RBC # BLD: 4.22 M/UL
RBC # FLD: 18 %
SODIUM SERPL-SCNC: 143 MMOL/L
WBC # FLD AUTO: 7.58 K/UL

## 2018-06-29 ENCOUNTER — APPOINTMENT (OUTPATIENT)
Dept: CARDIOLOGY | Facility: CLINIC | Age: 68
End: 2018-06-29
Payer: MEDICARE

## 2018-06-29 VITALS
DIASTOLIC BLOOD PRESSURE: 74 MMHG | HEIGHT: 65 IN | BODY MASS INDEX: 26.66 KG/M2 | WEIGHT: 160 LBS | HEART RATE: 88 BPM | OXYGEN SATURATION: 100 % | SYSTOLIC BLOOD PRESSURE: 154 MMHG

## 2018-06-29 PROCEDURE — 99214 OFFICE O/P EST MOD 30 MIN: CPT

## 2018-10-12 ENCOUNTER — APPOINTMENT (OUTPATIENT)
Dept: CARDIOLOGY | Facility: CLINIC | Age: 68
End: 2018-10-12
Payer: MEDICARE

## 2018-10-12 VITALS — HEART RATE: 84 BPM | DIASTOLIC BLOOD PRESSURE: 72 MMHG | SYSTOLIC BLOOD PRESSURE: 188 MMHG

## 2018-10-12 PROCEDURE — 99214 OFFICE O/P EST MOD 30 MIN: CPT

## 2018-11-12 ENCOUNTER — OTHER (OUTPATIENT)
Age: 68
End: 2018-11-12

## 2018-11-26 ENCOUNTER — OTHER (OUTPATIENT)
Age: 68
End: 2018-11-26

## 2018-12-03 ENCOUNTER — FORM ENCOUNTER (OUTPATIENT)
Age: 68
End: 2018-12-03

## 2018-12-04 ENCOUNTER — APPOINTMENT (OUTPATIENT)
Dept: ULTRASOUND IMAGING | Facility: HOSPITAL | Age: 68
End: 2018-12-04

## 2018-12-04 ENCOUNTER — OUTPATIENT (OUTPATIENT)
Dept: OUTPATIENT SERVICES | Facility: HOSPITAL | Age: 68
LOS: 1 days | End: 2018-12-04
Payer: COMMERCIAL

## 2018-12-04 DIAGNOSIS — I73.9 PERIPHERAL VASCULAR DISEASE, UNSPECIFIED: ICD-10-CM

## 2018-12-04 DIAGNOSIS — Z98.89 OTHER SPECIFIED POSTPROCEDURAL STATES: Chronic | ICD-10-CM

## 2018-12-04 DIAGNOSIS — I73.9 PERIPHERAL VASCULAR DISEASE, UNSPECIFIED: Chronic | ICD-10-CM

## 2018-12-04 DIAGNOSIS — Z95.828 PRESENCE OF OTHER VASCULAR IMPLANTS AND GRAFTS: Chronic | ICD-10-CM

## 2018-12-04 DIAGNOSIS — I70.222 ATHEROSCLEROSIS OF NATIVE ARTERIES OF EXTREMITIES WITH REST PAIN, LEFT LEG: ICD-10-CM

## 2018-12-04 DIAGNOSIS — Z98.890 OTHER SPECIFIED POSTPROCEDURAL STATES: Chronic | ICD-10-CM

## 2018-12-04 DIAGNOSIS — Z98.84 BARIATRIC SURGERY STATUS: Chronic | ICD-10-CM

## 2018-12-04 DIAGNOSIS — Z98.891 HISTORY OF UTERINE SCAR FROM PREVIOUS SURGERY: Chronic | ICD-10-CM

## 2018-12-04 PROCEDURE — 93923 UPR/LXTR ART STDY 3+ LVLS: CPT

## 2018-12-04 PROCEDURE — 93925 LOWER EXTREMITY STUDY: CPT

## 2018-12-04 PROCEDURE — 93923 UPR/LXTR ART STDY 3+ LVLS: CPT | Mod: 26

## 2018-12-04 PROCEDURE — 93925 LOWER EXTREMITY STUDY: CPT | Mod: 26

## 2018-12-13 ENCOUNTER — INPATIENT (INPATIENT)
Facility: HOSPITAL | Age: 68
LOS: 0 days | Discharge: HOME | DRG: 254 | End: 2018-12-14
Attending: INTERNAL MEDICINE | Admitting: INTERNAL MEDICINE
Payer: MEDICARE

## 2018-12-13 ENCOUNTER — TRANSCRIPTION ENCOUNTER (OUTPATIENT)
Age: 68
End: 2018-12-13

## 2018-12-13 VITALS
SYSTOLIC BLOOD PRESSURE: 145 MMHG | WEIGHT: 160.06 LBS | OXYGEN SATURATION: 99 % | HEIGHT: 65 IN | TEMPERATURE: 98 F | DIASTOLIC BLOOD PRESSURE: 63 MMHG | RESPIRATION RATE: 18 BRPM | HEART RATE: 76 BPM

## 2018-12-13 DIAGNOSIS — I73.9 PERIPHERAL VASCULAR DISEASE, UNSPECIFIED: Chronic | ICD-10-CM

## 2018-12-13 DIAGNOSIS — Z98.89 OTHER SPECIFIED POSTPROCEDURAL STATES: Chronic | ICD-10-CM

## 2018-12-13 DIAGNOSIS — I73.9 PERIPHERAL VASCULAR DISEASE, UNSPECIFIED: ICD-10-CM

## 2018-12-13 DIAGNOSIS — Z95.828 PRESENCE OF OTHER VASCULAR IMPLANTS AND GRAFTS: Chronic | ICD-10-CM

## 2018-12-13 DIAGNOSIS — Z98.890 OTHER SPECIFIED POSTPROCEDURAL STATES: Chronic | ICD-10-CM

## 2018-12-13 DIAGNOSIS — Z98.84 BARIATRIC SURGERY STATUS: Chronic | ICD-10-CM

## 2018-12-13 DIAGNOSIS — Z98.891 HISTORY OF UTERINE SCAR FROM PREVIOUS SURGERY: Chronic | ICD-10-CM

## 2018-12-13 LAB
ALBUMIN SERPL ELPH-MCNC: 4.1 G/DL — SIGNIFICANT CHANGE UP (ref 3.3–5)
ALP SERPL-CCNC: 137 U/L — HIGH (ref 40–120)
ALT FLD-CCNC: 7 U/L — LOW (ref 10–45)
ANION GAP SERPL CALC-SCNC: 14 MMOL/L — SIGNIFICANT CHANGE UP (ref 5–17)
AST SERPL-CCNC: 11 U/L — SIGNIFICANT CHANGE UP (ref 10–40)
BILIRUB SERPL-MCNC: 0.4 MG/DL — SIGNIFICANT CHANGE UP (ref 0.2–1.2)
BUN SERPL-MCNC: 17 MG/DL — SIGNIFICANT CHANGE UP (ref 7–23)
CALCIUM SERPL-MCNC: 9.1 MG/DL — SIGNIFICANT CHANGE UP (ref 8.4–10.5)
CHLORIDE SERPL-SCNC: 102 MMOL/L — SIGNIFICANT CHANGE UP (ref 96–108)
CO2 SERPL-SCNC: 25 MMOL/L — SIGNIFICANT CHANGE UP (ref 22–31)
CREAT SERPL-MCNC: 1 MG/DL — SIGNIFICANT CHANGE UP (ref 0.5–1.3)
GLUCOSE SERPL-MCNC: 130 MG/DL — HIGH (ref 70–99)
HCT VFR BLD CALC: 33.4 % — LOW (ref 34.5–45)
HGB BLD-MCNC: 10.6 G/DL — LOW (ref 11.5–15.5)
MCHC RBC-ENTMCNC: 23.1 PG — LOW (ref 27–34)
MCHC RBC-ENTMCNC: 31.7 GM/DL — LOW (ref 32–36)
MCV RBC AUTO: 72.9 FL — LOW (ref 80–100)
PLATELET # BLD AUTO: 303 K/UL — SIGNIFICANT CHANGE UP (ref 150–400)
POTASSIUM SERPL-MCNC: 3.7 MMOL/L — SIGNIFICANT CHANGE UP (ref 3.5–5.3)
POTASSIUM SERPL-SCNC: 3.7 MMOL/L — SIGNIFICANT CHANGE UP (ref 3.5–5.3)
PROT SERPL-MCNC: 6.9 G/DL — SIGNIFICANT CHANGE UP (ref 6–8.3)
RBC # BLD: 4.58 M/UL — SIGNIFICANT CHANGE UP (ref 3.8–5.2)
RBC # FLD: 18.6 % — HIGH (ref 10.3–14.5)
SODIUM SERPL-SCNC: 141 MMOL/L — SIGNIFICANT CHANGE UP (ref 135–145)
WBC # BLD: 7.9 K/UL — SIGNIFICANT CHANGE UP (ref 3.8–10.5)
WBC # FLD AUTO: 7.9 K/UL — SIGNIFICANT CHANGE UP (ref 3.8–10.5)

## 2018-12-13 PROCEDURE — 93010 ELECTROCARDIOGRAM REPORT: CPT

## 2018-12-13 PROCEDURE — 37226: CPT

## 2018-12-13 PROCEDURE — 75716 ARTERY X-RAYS ARMS/LEGS: CPT | Mod: 26,XU

## 2018-12-13 RX ORDER — GABAPENTIN 400 MG/1
100 CAPSULE ORAL THREE TIMES A DAY
Qty: 0 | Refills: 0 | Status: DISCONTINUED | OUTPATIENT
Start: 2018-12-13 | End: 2018-12-14

## 2018-12-13 RX ORDER — DEXTROSE 50 % IN WATER 50 %
25 SYRINGE (ML) INTRAVENOUS ONCE
Qty: 0 | Refills: 0 | Status: DISCONTINUED | OUTPATIENT
Start: 2018-12-13 | End: 2018-12-14

## 2018-12-13 RX ORDER — INSULIN LISPRO 100/ML
VIAL (ML) SUBCUTANEOUS AT BEDTIME
Qty: 0 | Refills: 0 | Status: DISCONTINUED | OUTPATIENT
Start: 2018-12-13 | End: 2018-12-14

## 2018-12-13 RX ORDER — ATORVASTATIN CALCIUM 80 MG/1
40 TABLET, FILM COATED ORAL AT BEDTIME
Qty: 0 | Refills: 0 | Status: DISCONTINUED | OUTPATIENT
Start: 2018-12-13 | End: 2018-12-14

## 2018-12-13 RX ORDER — INSULIN LISPRO 100/ML
VIAL (ML) SUBCUTANEOUS
Qty: 0 | Refills: 0 | Status: DISCONTINUED | OUTPATIENT
Start: 2018-12-13 | End: 2018-12-14

## 2018-12-13 RX ORDER — LOPERAMIDE HCL 2 MG
2 TABLET ORAL EVERY 6 HOURS
Qty: 0 | Refills: 0 | Status: DISCONTINUED | OUTPATIENT
Start: 2018-12-13 | End: 2018-12-14

## 2018-12-13 RX ORDER — ASPIRIN/CALCIUM CARB/MAGNESIUM 324 MG
81 TABLET ORAL DAILY
Qty: 0 | Refills: 0 | Status: DISCONTINUED | OUTPATIENT
Start: 2018-12-14 | End: 2018-12-14

## 2018-12-13 RX ORDER — LISINOPRIL 2.5 MG/1
40 TABLET ORAL DAILY
Qty: 0 | Refills: 0 | Status: DISCONTINUED | OUTPATIENT
Start: 2018-12-13 | End: 2018-12-14

## 2018-12-13 RX ORDER — DEXTROSE 50 % IN WATER 50 %
12.5 SYRINGE (ML) INTRAVENOUS ONCE
Qty: 0 | Refills: 0 | Status: DISCONTINUED | OUTPATIENT
Start: 2018-12-13 | End: 2018-12-14

## 2018-12-13 RX ORDER — AMLODIPINE BESYLATE 2.5 MG/1
10 TABLET ORAL DAILY
Qty: 0 | Refills: 0 | Status: DISCONTINUED | OUTPATIENT
Start: 2018-12-13 | End: 2018-12-14

## 2018-12-13 RX ORDER — GLUCAGON INJECTION, SOLUTION 0.5 MG/.1ML
1 INJECTION, SOLUTION SUBCUTANEOUS ONCE
Qty: 0 | Refills: 0 | Status: DISCONTINUED | OUTPATIENT
Start: 2018-12-13 | End: 2018-12-14

## 2018-12-13 RX ORDER — SODIUM CHLORIDE 9 MG/ML
1000 INJECTION INTRAMUSCULAR; INTRAVENOUS; SUBCUTANEOUS
Qty: 0 | Refills: 0 | Status: DISCONTINUED | OUTPATIENT
Start: 2018-12-13 | End: 2018-12-14

## 2018-12-13 RX ORDER — HYDROCHLOROTHIAZIDE 25 MG
25 TABLET ORAL DAILY
Qty: 0 | Refills: 0 | Status: DISCONTINUED | OUTPATIENT
Start: 2018-12-14 | End: 2018-12-14

## 2018-12-13 RX ORDER — ACETAMINOPHEN 500 MG
650 TABLET ORAL EVERY 6 HOURS
Qty: 0 | Refills: 0 | Status: DISCONTINUED | OUTPATIENT
Start: 2018-12-13 | End: 2018-12-14

## 2018-12-13 RX ORDER — APIXABAN 2.5 MG/1
5 TABLET, FILM COATED ORAL EVERY 12 HOURS
Qty: 0 | Refills: 0 | Status: DISCONTINUED | OUTPATIENT
Start: 2018-12-14 | End: 2018-12-14

## 2018-12-13 RX ORDER — CLOPIDOGREL BISULFATE 75 MG/1
75 TABLET, FILM COATED ORAL DAILY
Qty: 0 | Refills: 0 | Status: DISCONTINUED | OUTPATIENT
Start: 2018-12-14 | End: 2018-12-14

## 2018-12-13 RX ORDER — ASPIRIN/CALCIUM CARB/MAGNESIUM 324 MG
325 TABLET ORAL ONCE
Qty: 0 | Refills: 0 | Status: COMPLETED | OUTPATIENT
Start: 2018-12-13 | End: 2018-12-13

## 2018-12-13 RX ORDER — DEXTROSE 50 % IN WATER 50 %
15 SYRINGE (ML) INTRAVENOUS ONCE
Qty: 0 | Refills: 0 | Status: DISCONTINUED | OUTPATIENT
Start: 2018-12-13 | End: 2018-12-14

## 2018-12-13 RX ORDER — SODIUM CHLORIDE 9 MG/ML
1000 INJECTION, SOLUTION INTRAVENOUS
Qty: 0 | Refills: 0 | Status: DISCONTINUED | OUTPATIENT
Start: 2018-12-13 | End: 2018-12-14

## 2018-12-13 RX ADMIN — Medication 650 MILLIGRAM(S): at 11:27

## 2018-12-13 RX ADMIN — Medication 325 MILLIGRAM(S): at 11:28

## 2018-12-13 RX ADMIN — GABAPENTIN 100 MILLIGRAM(S): 400 CAPSULE ORAL at 22:18

## 2018-12-13 RX ADMIN — ATORVASTATIN CALCIUM 40 MILLIGRAM(S): 80 TABLET, FILM COATED ORAL at 22:19

## 2018-12-13 RX ADMIN — Medication 2 MILLIGRAM(S): at 20:28

## 2018-12-13 NOTE — H&P CARDIOLOGY - HISTORY OF PRESENT ILLNESS
67 y/o AA female with PMHx of PAD, s/p RT. SFA stent, HTN, HLD, DM2 (Hgba1c , managed by  ), LEILANI (not on CPAP) s/p recent LLE (PANTERA to Lt SFA, balloon angioplasty on Lt pop, & PANTERA to Lt peroneal artery (3/22),  Presented back to the hospital April 2018 with  acute limb underwent catheter directed lysis and PTA / stent of left SFA, Popliteal, peroneal presents with complains of pain (not as severe as prior to intervention) in both calves L > R. Tolerance 1-2 blocks. Pain starts when she wakes up in the morning and starts walking. Relieved with rest.When laying down gets pain on bottom of left foot. Sensation in left foot still reduced since critical limb ischemia. Pt was referred for Peripheral Angiogram. 67 y/o AA female with PMHx of PAD, s/p RT. SFA stent, HTN, HLD, DM2 (Hgba1c 5.2 in 11/2018 , managed by Dr Tung BANSAL), LEILANI (not on CPAP) s/p recent LLE (PANTERA to Lt SFA, balloon angioplasty on Lt pop, & PANTERA to Lt peroneal artery (3/22),  Presented back to the hospital April 2018 with  acute limb underwent catheter directed lysis and PTA / stent of left SFA, Popliteal, peroneal presents with complains of pain (not as severe as prior to intervention) in both calves L > R. Tolerance 1-2 blocks. Pain starts when she wakes up in the morning and starts walking. Relieved with rest.When laying down gets pain on bottom of left foot. Sensation in left foot still reduced since critical limb ischemia. Pt was referred for Peripheral Angiogram. 67 y/o AA female with PMHx of PAD (on Eliquis last dose 12/11/18), s/p RT. SFA stent, HTN, HLD, DM2 (Hgba1c 5.2 in 11/2018 , managed by Dr Tung BANSAL), LEILANI (not on CPAP) s/p recent LLE (PANTERA to Lt SFA, balloon angioplasty on Lt pop, & PANTERA to Lt peroneal artery (3/22),  Presented back to the hospital April 2018 with  acute limb underwent catheter directed lysis and PTA / stent of left SFA, Popliteal, peroneal presents with complains of pain (not as severe as prior to intervention) in both calves L > R. Tolerance 1-2 blocks. Pain starts when she wakes up in the morning and starts walking. Relieved with rest.When laying down gets pain on bottom of left foot. Sensation in left foot still reduced since critical limb ischemia. Pt was referred for Peripheral Angiogram. 69 y/o AA female with PMHx of PAD (on Eliquis last dose 12/11/18), s/p RT. SFA stent, HTN, HLD, DM2 (Hgba1c 5.2 in 11/2018 , managed by Dr Tung BANSAL), LIELANI (not on CPAP) s/p recent LLE (PANTERA to Lt SFA, balloon angioplasty on Lt pop, & PANTERA to Lt peroneal artery (3/22),  Presented back to the hospital April 2018 with  acute limb underwent catheter directed lysis and PTA / stent of left SFA, Popliteal, peroneal presents with complains of pain (not as severe as prior to intervention) in both calves L > R. Tolerance 1-2 blocks. Pain starts when she wakes up in the morning and starts walking. Relieved with rest.When laying down gets pain on bottom of left foot. Sensation in left foot still reduced since critical limb ischemia. Pt was referred for Peripheral Angiogram.       < from: [facility] Duplex Lower Extrem Arterial, Bilat (12.04.18 @ 14:21) >  Impression: Bilateral lower extremity arterial stenotic and occlusive disease is again identified, as described above.The most important new finding to account for the patient's symptomatology is a high grade in-stent stenosis in the left superficial femoral artery with PSV measuring 449 cm/s    < from: [facility] Physiol Extremity Lower 3+ Level, BI (12.04.18 @ 14:07) >  IMPRESSION: There is increased severity of the peripheral arterial vascular disease affecting the left lower extremity compared to the prior examination.The blood pressure measurements at the left ankle and the left ankle-brachial index has decreased.  Further the amplitude of the pulse volume recordings has decreased at the level of the left lower thigh.

## 2018-12-14 VITALS
DIASTOLIC BLOOD PRESSURE: 57 MMHG | HEART RATE: 63 BPM | RESPIRATION RATE: 17 BRPM | SYSTOLIC BLOOD PRESSURE: 137 MMHG | TEMPERATURE: 98 F | OXYGEN SATURATION: 98 %

## 2018-12-14 LAB
ANION GAP SERPL CALC-SCNC: 12 MMOL/L — SIGNIFICANT CHANGE UP (ref 5–17)
BASOPHILS # BLD AUTO: 0.1 K/UL — SIGNIFICANT CHANGE UP (ref 0–0.2)
BASOPHILS NFR BLD AUTO: 1.1 % — SIGNIFICANT CHANGE UP (ref 0–2)
BUN SERPL-MCNC: 15 MG/DL — SIGNIFICANT CHANGE UP (ref 7–23)
CALCIUM SERPL-MCNC: 8.6 MG/DL — SIGNIFICANT CHANGE UP (ref 8.4–10.5)
CHLORIDE SERPL-SCNC: 107 MMOL/L — SIGNIFICANT CHANGE UP (ref 96–108)
CO2 SERPL-SCNC: 26 MMOL/L — SIGNIFICANT CHANGE UP (ref 22–31)
CREAT SERPL-MCNC: 0.87 MG/DL — SIGNIFICANT CHANGE UP (ref 0.5–1.3)
EOSINOPHIL # BLD AUTO: 0.2 K/UL — SIGNIFICANT CHANGE UP (ref 0–0.5)
EOSINOPHIL NFR BLD AUTO: 3.2 % — SIGNIFICANT CHANGE UP (ref 0–6)
GLUCOSE SERPL-MCNC: 111 MG/DL — HIGH (ref 70–99)
HBA1C BLD-MCNC: 6.7 % — HIGH (ref 4–5.6)
HCT VFR BLD CALC: 28.1 % — LOW (ref 34.5–45)
HGB BLD-MCNC: 9.1 G/DL — LOW (ref 11.5–15.5)
LYMPHOCYTES # BLD AUTO: 2.1 K/UL — SIGNIFICANT CHANGE UP (ref 1–3.3)
LYMPHOCYTES # BLD AUTO: 30.7 % — SIGNIFICANT CHANGE UP (ref 13–44)
MCHC RBC-ENTMCNC: 23.5 PG — LOW (ref 27–34)
MCHC RBC-ENTMCNC: 32.3 GM/DL — SIGNIFICANT CHANGE UP (ref 32–36)
MCV RBC AUTO: 72.7 FL — LOW (ref 80–100)
MONOCYTES # BLD AUTO: 0.6 K/UL — SIGNIFICANT CHANGE UP (ref 0–0.9)
MONOCYTES NFR BLD AUTO: 8.2 % — SIGNIFICANT CHANGE UP (ref 2–14)
NEUTROPHILS # BLD AUTO: 3.9 K/UL — SIGNIFICANT CHANGE UP (ref 1.8–7.4)
NEUTROPHILS NFR BLD AUTO: 56.8 % — SIGNIFICANT CHANGE UP (ref 43–77)
PLATELET # BLD AUTO: 255 K/UL — SIGNIFICANT CHANGE UP (ref 150–400)
POTASSIUM SERPL-MCNC: 3.7 MMOL/L — SIGNIFICANT CHANGE UP (ref 3.5–5.3)
POTASSIUM SERPL-SCNC: 3.7 MMOL/L — SIGNIFICANT CHANGE UP (ref 3.5–5.3)
RBC # BLD: 3.86 M/UL — SIGNIFICANT CHANGE UP (ref 3.8–5.2)
RBC # FLD: 18.2 % — HIGH (ref 10.3–14.5)
SODIUM SERPL-SCNC: 145 MMOL/L — SIGNIFICANT CHANGE UP (ref 135–145)
WBC # BLD: 6.8 K/UL — SIGNIFICANT CHANGE UP (ref 3.8–10.5)
WBC # FLD AUTO: 6.8 K/UL — SIGNIFICANT CHANGE UP (ref 3.8–10.5)

## 2018-12-14 PROCEDURE — C1760: CPT

## 2018-12-14 PROCEDURE — 85027 COMPLETE CBC AUTOMATED: CPT

## 2018-12-14 PROCEDURE — 99232 SBSQ HOSP IP/OBS MODERATE 35: CPT

## 2018-12-14 PROCEDURE — C2623: CPT

## 2018-12-14 PROCEDURE — C1894: CPT

## 2018-12-14 PROCEDURE — C1887: CPT

## 2018-12-14 PROCEDURE — 82962 GLUCOSE BLOOD TEST: CPT

## 2018-12-14 PROCEDURE — C1874: CPT

## 2018-12-14 PROCEDURE — 80053 COMPREHEN METABOLIC PANEL: CPT

## 2018-12-14 PROCEDURE — C1725: CPT

## 2018-12-14 PROCEDURE — 80048 BASIC METABOLIC PNL TOTAL CA: CPT

## 2018-12-14 PROCEDURE — C1769: CPT

## 2018-12-14 PROCEDURE — 83036 HEMOGLOBIN GLYCOSYLATED A1C: CPT

## 2018-12-14 PROCEDURE — 93005 ELECTROCARDIOGRAM TRACING: CPT

## 2018-12-14 PROCEDURE — 75716 ARTERY X-RAYS ARMS/LEGS: CPT | Mod: XU

## 2018-12-14 PROCEDURE — 37226: CPT

## 2018-12-14 RX ORDER — METFORMIN HYDROCHLORIDE 850 MG/1
1 TABLET ORAL
Qty: 0 | Refills: 0 | COMMUNITY

## 2018-12-14 RX ADMIN — CLOPIDOGREL BISULFATE 75 MILLIGRAM(S): 75 TABLET, FILM COATED ORAL at 07:37

## 2018-12-14 RX ADMIN — GABAPENTIN 100 MILLIGRAM(S): 400 CAPSULE ORAL at 07:38

## 2018-12-14 RX ADMIN — APIXABAN 5 MILLIGRAM(S): 2.5 TABLET, FILM COATED ORAL at 07:37

## 2018-12-14 NOTE — CHART NOTE - NSCHARTNOTEFT_GEN_A_CORE
Groin site hematoma    Compressed site for 10 minutes with resolve of hematoma    Plan:   Bedrest x 30minutes  Advance to walking   monitor site  Cont tele    Ariadna Mata, NP-C  Cardiology

## 2018-12-14 NOTE — DISCHARGE NOTE ADULT - PLAN OF CARE
Pt remains free of leg pain and understands post cath discharge instructions No heavy lifting, strenuous activity, bending, straining, or unnecessary stair climbing for 2 weeks. No driving for 2 days. You may shower 24 hours following the procedure but avoid baths/swimming for 1 week. Check your groin site for bleeding and/or swelling daily following procedure and call your doctor immediately if it occurs or if you experience increased pain at the site. Follow up with your cardiologist in 1-2 weeks. You may call [facility] if you have any questions/concerns regarding your procedure [phone]    Continue your Plavix, Eliquis and atorvastatin Your LDL cholesterol will be less than 70mg/dL Continue with your cholesterol medications. Eat a heart healthy diet that is low in saturated fats and salt, and includes whole grains, fruits, vegetables and lean protein; exercise regularly (consult with your physician or cardiologist first); maintain a heart healthy weight; if you smoke - quit (A resource to help you stop smoking is the [facility] Tobacco [facility] – phone number [phone].). Continue to follow with your primary physician or cardiologist. Your blood pressure will be controlled. Continue with your blood pressure medications; eat a heart healthy diet with low salt diet; exercise regularly (consult with your physician or cardiologist first); maintain a heart healthy weight; if you smoke - quit (A resource to help you stop smoking is the [facility] for Tobacco Control – phone number [phone].); include healthy ways to manage stress. Continue to follow with your primary care physician or cardiologist. Your hemoglobin A1C will be between 7-8 Continue to follow with your primary care MD or your endocrinologist.  Follow a heart healthy diabetic diet. If you check your fingerstick glucose at home, call your MD if it is greater than 250mg/dL on 2 occasions or less than 100mg/dL on 2 occasions. Know signs of low blood sugar, such as: dizziness, shakiness, sweating, confusion, hunger, nervousness-drink 4 ounces apple juice if occurs and call your doctor. Know early signs of high blood sugar, such as: frequent urination, increased thirst, blurry vision, fatigue, headache - call your doctor if this occurs. Follow with other practitioners to care for your diabetes, such as ophthamologist and podiatrist.     Your AIC is 8.6 No heavy lifting, strenuous activity, bending, straining, or unnecessary stair climbing for 2 weeks. No driving for 2 days. You may shower 24 hours following the procedure but avoid baths/swimming for 1 week. Check your groin site for bleeding and/or swelling daily following procedure and call your doctor immediately if it occurs or if you experience increased pain at the site. Follow up with your cardiologist in 1-2 weeks. You may call [facility] if you have any questions/concerns regarding your procedure [phone]

## 2018-12-14 NOTE — DISCHARGE NOTE ADULT - MEDICATION SUMMARY - MEDICATIONS TO TAKE
I will START or STAY ON the medications listed below when I get home from the hospital:    diclofenac sodium 100 mg oral tablet, extended release  -- 1 tab(s) by mouth once a day  -- Indication: For Pain    ramipril 10 mg oral capsule  -- 1 cap(s) by mouth once a day  -- Indication: For for your blood pressure    apixaban 5 mg oral tablet  -- 1 tab(s) by mouth every 12 hours  -- Indication: For for prevention of clots in your arterys     gabapentin 100 mg oral capsule  -- 1 cap(s) by mouth 3 times a day  -- Indication: For for your nerve pain    metFORMIN 500 mg oral tablet  -- 1 tab(s) by mouth 2 times a day - may resume on 12/17/18  -- Indication: For for your diabetes    atorvastatin 40 mg oral tablet  -- 1 tab(s) by mouth once a day (at bedtime) MDD:1  -- Indication: For for your cholesterol    clopidogrel 75 mg oral tablet  -- 1 tab(s) by mouth once a day  -- Indication: For for your stents in your legs to remain open    amLODIPine 10 mg oral tablet  -- 1 tab(s) by mouth once a day  -- Indication: For for your blood pressure    hydroCHLOROthiazide 25 mg oral tablet  -- 1 tab(s) by mouth once a day  -- Indication: For for your blood pressure

## 2018-12-14 NOTE — DISCHARGE NOTE ADULT - HOSPITAL COURSE
HPI:  67 y/o AA female with PMHx of PAD (on Eliquis last dose 12/11/18), s/p RT. SFA stent, HTN, HLD, DM2 (Hgba1c 5.2 in 11/2018 , managed by Dr Tung BANSAL), LEILANI (not on CPAP) s/p recent LLE (PANTERA to Lt SFA, balloon angioplasty on Lt pop, & PANTERA to Lt peroneal artery (3/22),  Presented back to the hospital April 2018 with  acute limb underwent catheter directed lysis and PTA / stent of left SFA, Popliteal, peroneal presents with complains of pain (not as severe as prior to intervention) in both calves L > R. Tolerance 1-2 blocks. Pain starts when she wakes up in the morning and starts walking. Relieved with rest.When laying down gets pain on bottom of left foot. Sensation in left foot still reduced since critical limb ischemia. Pt was referred for Peripheral Angiogram.       < from: [facility] Duplex Lower Extrem Arterial, Bilat (12.04.18 @ 14:21) >  Impression: Bilateral lower extremity arterial stenotic and occlusive disease is again identified, as described above.The most important new finding to account for the patient's symptomatology is a high grade in-stent stenosis in the left superficial femoral artery with PSV measuring 449 cm/s    < from: [facility] Physiol Extremity Lower 3+ Level, BI (12.04.18 @ 14:07) >  IMPRESSION: There is increased severity of the peripheral arterial vascular disease affecting the left lower extremity compared to the prior examination.The blood pressure measurements at the left ankle and the left ankle-brachial index has decreased.  Further the amplitude of the pulse volume recordings has decreased at the level of the left lower thigh. (13 Dec 2018 06:37)    Pt s/p PTA Rt SFA SAMUEL; PTA and stent to Rt Popliteal artery via RFA perclose with no site complications.  Pt seen and examined by attending and cleared for discharge home on Plavix and Eliquis and followup as scheduled as outpt. Pt tolerated procedure well with no post cath complications and hospitalization remained uneventful. Pt stable for discharge home.

## 2018-12-14 NOTE — PROGRESS NOTE ADULT - SUBJECTIVE AND OBJECTIVE BOX
Vascular Cardiology  Progress note     [facility] [id number]              EMAIL [email or web address]   OFFICE [phone]    CC:  PAD    INTERVAL HISTORY:     s/p PTA R SFA and SAMUEL , S/P R POP stent.  no cp or sob.  groin is stable.        Allergies    Cipro (Headache)  Cipro (Other)    Intolerances    	    MEDICATIONS:  amLODIPine   Tablet 10 milliGRAM(s) Oral daily  apixaban 5 milliGRAM(s) Oral every 12 hours  aspirin enteric coated 81 milliGRAM(s) Oral daily  clopidogrel Tablet 75 milliGRAM(s) Oral daily  hydrochlorothiazide 25 milliGRAM(s) Oral daily  lisinopril 40 milliGRAM(s) Oral daily        acetaminophen   Tablet .. 650 milliGRAM(s) Oral every 6 hours PRN  gabapentin 100 milliGRAM(s) Oral three times a day    loperamide 2 milliGRAM(s) Oral every 6 hours PRN    atorvastatin 40 milliGRAM(s) Oral at bedtime  dextrose 40% Gel 15 Gram(s) Oral once PRN  dextrose 50% Injectable 12.5 Gram(s) IV Push once  dextrose 50% Injectable 25 Gram(s) IV Push once  dextrose 50% Injectable 25 Gram(s) IV Push once  glucagon  Injectable 1 milliGRAM(s) IntraMuscular once PRN  insulin lispro (HumaLOG) corrective regimen sliding scale   SubCutaneous three times a day before meals  insulin lispro (HumaLOG) corrective regimen sliding scale   SubCutaneous at bedtime    dextrose 5%. 1000 milliLiter(s) IV Continuous <Continuous>  sodium chloride 0.9%. 1000 milliLiter(s) IV Continuous <Continuous>      PAST MEDICAL & SURGICAL HISTORY:  High cholesterol  HTN (hypertension)  Diabetes  LEILANI (obstructive sleep apnea): does not use CPAP since gastric bypass surgery  Ulcer of toe of right foot: 2nd  DM (diabetes mellitus)  HTN (hypertension)  Peripheral arterial disease: s/p Right leg stent  H/O gastric bypass  H/O  section  History of intravascular stent placement  S/P  section  H/O abdominoplasty  H/O bilateral breast reduction surgery  S/P gastric bypass      FAMILY HISTORY:  No pertinent family history in first degree relatives  No pertinent family history in first degree relatives      SOCIAL HISTORY:  unchanged    REVIEW OF SYSTEMS:  CONSTITUTIONAL: No fever, weight loss, or fatigue  EYES: No eye pain, visual disturbances, or discharge  ENMT:  No difficulty hearing, tinnitus, vertigo; No sinus or throat pain  NECK: No pain or stiffness  RESPIRATORY: No cough, wheezing, chills or hemoptysis; No Shortness of Breath  CARDIOVASCULAR: No chest pain, palpitations, passing out, dizziness, or leg swelling  GASTROINTESTINAL: No abdominal or epigastric pain. No nausea, vomiting, or hematemesis; No diarrhea or constipation. No melena or hematochezia.  GENITOURINARY: No dysuria, frequency, hematuria, or incontinence  NEUROLOGICAL: No headaches, memory loss, loss of strength, numbness, or tremors  SKIN: No itching, burning, rashes, or lesions   LYMPH Nodes: No enlarged glands  ENDOCRINE: No heat or cold intolerance; No hair loss  MUSCULOSKELETAL: No joint pain or swelling; No muscle, back, or extremity pain  PSYCHIATRIC: No depression, anxiety, mood swings, or difficulty sleeping  HEME/LYMPH: No easy bruising, or bleeding gums  ALLERY AND IMMUNOLOGIC: No hives or eczema	    [ x] All others negative	  [ ] Unable to obtain    PHYSICAL EXAM:  T(C): 36.7 (18 @ 09:25), Max: 37.1 (18 @ 04:56)  HR: 63 (18 @ 09:25) (63 - 79)  BP: 137/57 (18 @ 09:25) (133/50 - 153/63)  RR: 17 (18 @ 09:25) (17 - 18)  SpO2: 98% (18 @ 09:25) (93% - 100%)  Wt(kg): --  I&O's Summary    13 Dec 2018 07:01  -  14 Dec 2018 07:00  --------------------------------------------------------  IN: 280 mL / OUT: 0 mL / NET: 280 mL    14 Dec 2018 07:01  -  14 Dec 2018 10:10  --------------------------------------------------------  IN: 120 mL / OUT: 0 mL / NET: 120 mL        Appearance: Normal	  HEENT:   Normal oral mucosa, PERRL, EOMI	  Carotid:  Right: No bruit    Left:  No bruit  Lymphatic: No lymphadenopathy  Cardiovascular: Normal S1 S2, No JVD, No murmurs, No edema  Respiratory: Lungs clear to auscultation	  Psychiatry: A & O x 3, Mood & affect appropriate  Gastrointestinal:  Soft, Non-tender, + BS	  Skin: No rashes, No ecchymoses, No cyanosis	  Neurologic: Non-focal  Extremities: Normal range of motion, No clubbing, cyanosis.  Vascular:   L groin stable.  Palpable DP on the right.        LABS:	 	    CBC Full  -  ( 14 Dec 2018 05:17 )  WBC Count : 6.8 K/uL  Hemoglobin : 9.1 g/dL  Hematocrit : 28.1 %  Platelet Count - Automated : 255 K/uL  Mean Cell Volume : 72.7 fl  Mean Cell Hemoglobin : 23.5 pg  Mean Cell Hemoglobin Concentration : 32.3 gm/dL  Auto Neutrophil # : 3.9 K/uL  Auto Lymphocyte # : 2.1 K/uL  Auto Monocyte # : 0.6 K/uL  Auto Eosinophil # : 0.2 K/uL  Auto Basophil # : 0.1 K/uL  Auto Neutrophil % : 56.8 %  Auto Lymphocyte % : 30.7 %  Auto Monocyte % : 8.2 %  Auto Eosinophil % : 3.2 %  Auto Basophil % : 1.1 %        145  |  107  |  15  ----------------------------<  111<H>  3.7   |  26  |  0.87      141  |  102  |  17  ----------------------------<  130<H>  3.7   |  25  |  1.00    Ca    8.6      14 Dec 2018 05:17  Ca    9.1      13 Dec 2018 07:01    TPro  6.9  /  Alb  4.1  /  TBili  0.4  /  DBili  x   /  AST  11  /  ALT  7<L>  /  AlkPhos  137<H>            Assessment:  1. PAD  2.  HTN  3.  HLD  4.  HLD       Plan  1.  Doing well post procedure  2.  Continue with plavix.  Restart Eliquis today  3.  Continue with statin therapy and home medications  4.  We discussed groin precautions (e.g. no heavy lifting, baths x 2 weeks).    5.  Plan for discharge today with followup in 2 weeks      Thanks    Blaine Garcia [id number]      Thank you      Vascular Cardiology Service   [id number]  [id number]  Office [phone]  email:   [email or web address]

## 2018-12-14 NOTE — DISCHARGE NOTE ADULT - NS AS ACTIVITY OBS
No Heavy lifting/straining/Walking-Indoors allowed/Showering allowed/Walking-Outdoors allowed/No excessive stair climbing, no heavy lifting for 1 week, no bathing or swimming for 1 week.  No driving for 48 hours.

## 2018-12-14 NOTE — DISCHARGE NOTE ADULT - PATIENT PORTAL LINK FT
You can access the [facility] Patient Portal, offered by [facility], by registering with the following website: [email or web address]

## 2018-12-14 NOTE — DISCHARGE NOTE ADULT - CARE PROVIDER_API CALL
Mark Anthony Brooke (MD), Cardiovascular Disease; Endovascular Medicine; Interventional Cardiology; Vascular Medicine  [address] [place] [address]  Phone: [phone]  Fax: [phone]

## 2018-12-14 NOTE — DISCHARGE NOTE ADULT - SECONDARY DIAGNOSIS.
High cholesterol Hypertension, unspecified type Type 2 diabetes mellitus with other circulatory complication, without long-term current use of insulin

## 2018-12-14 NOTE — DISCHARGE NOTE ADULT - CARE PLAN
Principal Discharge DX:	Peripheral arterial disease  Goal:	Pt remains free of leg pain and understands post cath discharge instructions  Assessment and plan of treatment:	No heavy lifting, strenuous activity, bending, straining, or unnecessary stair climbing for 2 weeks. No driving for 2 days. You may shower 24 hours following the procedure but avoid baths/swimming for 1 week. Check your groin site for bleeding and/or swelling daily following procedure and call your doctor immediately if it occurs or if you experience increased pain at the site. Follow up with your cardiologist in 1-2 weeks. You may call [facility] if you have any questions/concerns regarding your procedure [phone]    Continue your Plavix, Eliquis and atorvastatin  Secondary Diagnosis:	High cholesterol  Goal:	Your LDL cholesterol will be less than 70mg/dL  Assessment and plan of treatment:	Continue with your cholesterol medications. Eat a heart healthy diet that is low in saturated fats and salt, and includes whole grains, fruits, vegetables and lean protein; exercise regularly (consult with your physician or cardiologist first); maintain a heart healthy weight; if you smoke - quit (A resource to help you stop smoking is the [facility] [employer] [facility] for Tobacco Control – phone number [phone].). Continue to follow with your primary physician or cardiologist.  Secondary Diagnosis:	Hypertension, unspecified type  Goal:	Your blood pressure will be controlled.  Assessment and plan of treatment:	Continue with your blood pressure medications; eat a heart healthy diet with low salt diet; exercise regularly (consult with your physician or cardiologist first); maintain a heart healthy weight; if you smoke - quit (A resource to help you stop smoking is the [facility] [employer] [facility] for Tobacco Control – phone number [phone].); include healthy ways to manage stress. Continue to follow with your primary care physician or cardiologist.  Secondary Diagnosis:	Type 2 diabetes mellitus with other circulatory complication, without long-term current use of insulin  Goal:	Your hemoglobin A1C will be between 7-8  Assessment and plan of treatment:	Continue to follow with your primary care MD or your endocrinologist.  Follow a heart healthy diabetic diet. If you check your fingerstick glucose at home, call your MD if it is greater than 250mg/dL on 2 occasions or less than 100mg/dL on 2 occasions. Know signs of low blood sugar, such as: dizziness, shakiness, sweating, confusion, hunger, nervousness-drink 4 ounces apple juice if occurs and call your doctor. Know early signs of high blood sugar, such as: frequent urination, increased thirst, blurry vision, fatigue, headache - call your doctor if this occurs. Follow with other practitioners to care for your diabetes, such as ophthamologist and podiatrist.     Your AIC is 8.6  Assessment and plan of treatment:	No heavy lifting, strenuous activity, bending, straining, or unnecessary stair climbing for 2 weeks. No driving for 2 days. You may shower 24 hours following the procedure but avoid baths/swimming for 1 week. Check your groin site for bleeding and/or swelling daily following procedure and call your doctor immediately if it occurs or if you experience increased pain at the site. Follow up with your cardiologist in 1-2 weeks. You may call [facility] if you have any questions/concerns regarding your procedure [phone]

## 2018-12-28 ENCOUNTER — APPOINTMENT (OUTPATIENT)
Dept: CARDIOLOGY | Facility: CLINIC | Age: 68
End: 2018-12-28
Payer: MEDICARE

## 2018-12-28 VITALS
WEIGHT: 155 LBS | HEART RATE: 83 BPM | OXYGEN SATURATION: 99 % | BODY MASS INDEX: 25.83 KG/M2 | HEIGHT: 65 IN | DIASTOLIC BLOOD PRESSURE: 74 MMHG | SYSTOLIC BLOOD PRESSURE: 150 MMHG

## 2018-12-28 DIAGNOSIS — R06.02 SHORTNESS OF BREATH: ICD-10-CM

## 2018-12-28 PROCEDURE — 99214 OFFICE O/P EST MOD 30 MIN: CPT

## 2018-12-28 RX ORDER — RIVAROXABAN 2.5 MG/1
2.5 TABLET, FILM COATED ORAL
Qty: 1 | Refills: 3 | Status: COMPLETED | COMMUNITY
Start: 2018-12-28

## 2018-12-28 NOTE — HISTORY OF PRESENT ILLNESS
[FreeTextEntry1] : 69 y/o F with h/o DM, HTN, HLD PAD with RT SFA stenting in 2016. Initially presented with worsening left claudication. Post left le intervention with SAMUEL /stent of left SFA, PTA of left pop. PTA and stent left tibioperoneal and prox peroneal.  Presented back to the hospital April 2018 with acute limb underwent catheter directed lysis and PTA / stent of left SFA, Popliteal, peroneal.  \par \par Pt with continued severe disabling claudication. Went for angiogram 12/13 found to have 90% ISR of L SFA and pop s/p L SFA stenting, SAMUEL to SFA and pop. Noted to have 80% stenosis in RIGHT CFA\par \par Still feeling pain in both calves, feels it in the morning for a couple hrs when it is the worst, \par Feels stabbing pains at night intermittently in left foot unchanged.  Says she needs to stop eliquis because it is too expensive.  Planning to go to [facility] in January.  \par \par Cath 12/13/18 - intervention on LEFT SFA and POP ISR.  s/p stent to sfa and SAMUEL to prox SFA and pop\par \par Meds: plavix 75mg, eliquis 5mg BID, not taking pravachol 40mg, ramipril 10mg, HCTZ 25mg, norvasc 5mg \par

## 2018-12-28 NOTE — PHYSICAL EXAM
[General Appearance - Well Developed] : well developed [General Appearance - Well Nourished] : well nourished [Normal Conjunctiva] : the conjunctiva exhibited no abnormalities [Normal Oral Mucosa] : normal oral mucosa [Normal Jugular Venous V Waves Present] : normal jugular venous V waves present [] : no respiratory distress [Heart Sounds] : normal S1 and S2 [1+] : left 1+ [0] : right 0 [2+] : left 2+ [Bowel Sounds] : normal bowel sounds [Nail Clubbing] : no clubbing of the fingernails [Skin Color & Pigmentation] : normal skin color and pigmentation [Oriented To Time, Place, And Person] : oriented to person, place, and time [FreeTextEntry1] : palpable pulses throughout, s/p partial right 2nd toe amputation

## 2018-12-28 NOTE — DISCUSSION/SUMMARY
[FreeTextEntry1] : 68 y/o F with h/o DM, HTN, HLD, persistent claudication bilateral calves\par PAD s/p bilateral interventions complicated by acute limb ischemia April 2018 of left leg s/p CDT \par Most recent peripheral angiogram Dec 2018 for lifestyle limiting claudication: \par RIGHT - 80% CFA, 50% SFA, \par LEFT - 90% ISR SFA, 90% ISR POP\par INTERVENTION -  LEFT: SFA PANTERA, prox SFA SAMUEL, popliteal SAMUEL\par \par Plan:\par CVD/PAD:\par - agreeable to lower dose statin pravachol 10mg, poor compliance in past, , discussed importance of cholesterol lowering therapy\par - exercise therapy\par - switch eliquis 5mg BID to xarelto 2.5mg BID (COMPASS trial) as she cannot afford eliquis, cont plavix 75mg \par - cont BP meds HCTZ 25mg, norvasc 5mg, ramipril 10mg.  If cont to be elevated will obtain renal imaging to evaluate for YANNA.\par - rec referral to Dr. Rosas for cholesterol management however pt refusing\par - Carotid duplex for left carotid bruit\par - Exertional SOB, high risk for CAD.  Pharm nuc stress and ECHO\par - cont metformin for moderate/well controlled DM (Hba1c 7.1)\par

## 2018-12-28 NOTE — REVIEW OF SYSTEMS
[Lower Ext Edema] : lower extremity edema [Leg Claudication] : intermittent leg claudication [Negative] : Heme/Lymph

## 2019-01-09 ENCOUNTER — FORM ENCOUNTER (OUTPATIENT)
Age: 69
End: 2019-01-09

## 2019-01-10 ENCOUNTER — OUTPATIENT (OUTPATIENT)
Dept: OUTPATIENT SERVICES | Facility: HOSPITAL | Age: 69
LOS: 1 days | End: 2019-01-10
Payer: COMMERCIAL

## 2019-01-10 ENCOUNTER — APPOINTMENT (OUTPATIENT)
Dept: CV DIAGNOSTICS | Facility: HOSPITAL | Age: 69
End: 2019-01-10

## 2019-01-10 ENCOUNTER — APPOINTMENT (OUTPATIENT)
Dept: CV DIAGNOSITCS | Facility: HOSPITAL | Age: 69
End: 2019-01-10

## 2019-01-10 ENCOUNTER — APPOINTMENT (OUTPATIENT)
Dept: ULTRASOUND IMAGING | Facility: HOSPITAL | Age: 69
End: 2019-01-10

## 2019-01-10 DIAGNOSIS — Z98.890 OTHER SPECIFIED POSTPROCEDURAL STATES: Chronic | ICD-10-CM

## 2019-01-10 DIAGNOSIS — Z98.89 OTHER SPECIFIED POSTPROCEDURAL STATES: Chronic | ICD-10-CM

## 2019-01-10 DIAGNOSIS — Z98.891 HISTORY OF UTERINE SCAR FROM PREVIOUS SURGERY: Chronic | ICD-10-CM

## 2019-01-10 DIAGNOSIS — I10 ESSENTIAL (PRIMARY) HYPERTENSION: ICD-10-CM

## 2019-01-10 DIAGNOSIS — Z95.828 PRESENCE OF OTHER VASCULAR IMPLANTS AND GRAFTS: Chronic | ICD-10-CM

## 2019-01-10 DIAGNOSIS — Z98.84 BARIATRIC SURGERY STATUS: Chronic | ICD-10-CM

## 2019-01-10 DIAGNOSIS — I73.9 PERIPHERAL VASCULAR DISEASE, UNSPECIFIED: Chronic | ICD-10-CM

## 2019-01-10 PROCEDURE — 93306 TTE W/DOPPLER COMPLETE: CPT

## 2019-01-10 PROCEDURE — 93880 EXTRACRANIAL BILAT STUDY: CPT

## 2019-01-10 PROCEDURE — 78452 HT MUSCLE IMAGE SPECT MULT: CPT | Mod: 26

## 2019-01-10 PROCEDURE — 93018 CV STRESS TEST I&R ONLY: CPT

## 2019-01-10 PROCEDURE — 93016 CV STRESS TEST SUPVJ ONLY: CPT

## 2019-01-10 PROCEDURE — 93306 TTE W/DOPPLER COMPLETE: CPT | Mod: 26

## 2019-01-10 PROCEDURE — A9500: CPT

## 2019-01-10 PROCEDURE — 93880 EXTRACRANIAL BILAT STUDY: CPT | Mod: 26

## 2019-01-10 PROCEDURE — 78452 HT MUSCLE IMAGE SPECT MULT: CPT

## 2019-01-10 PROCEDURE — 93017 CV STRESS TEST TRACING ONLY: CPT

## 2019-01-24 ENCOUNTER — INPATIENT (INPATIENT)
Facility: HOSPITAL | Age: 69
LOS: 9 days | Discharge: HOME | DRG: 234 | End: 2019-02-03
Attending: THORACIC SURGERY (CARDIOTHORACIC VASCULAR SURGERY) | Admitting: INTERNAL MEDICINE
Payer: MEDICARE

## 2019-01-24 VITALS
SYSTOLIC BLOOD PRESSURE: 144 MMHG | TEMPERATURE: 98 F | WEIGHT: 154.98 LBS | RESPIRATION RATE: 18 BRPM | DIASTOLIC BLOOD PRESSURE: 67 MMHG | OXYGEN SATURATION: 98 % | HEART RATE: 73 BPM | HEIGHT: 65 IN

## 2019-01-24 DIAGNOSIS — R94.39 ABNORMAL RESULT OF OTHER CARDIOVASCULAR FUNCTION STUDY: ICD-10-CM

## 2019-01-24 DIAGNOSIS — Z98.89 OTHER SPECIFIED POSTPROCEDURAL STATES: Chronic | ICD-10-CM

## 2019-01-24 DIAGNOSIS — Z98.84 BARIATRIC SURGERY STATUS: Chronic | ICD-10-CM

## 2019-01-24 DIAGNOSIS — Z95.828 PRESENCE OF OTHER VASCULAR IMPLANTS AND GRAFTS: Chronic | ICD-10-CM

## 2019-01-24 DIAGNOSIS — Z98.891 HISTORY OF UTERINE SCAR FROM PREVIOUS SURGERY: Chronic | ICD-10-CM

## 2019-01-24 DIAGNOSIS — I25.10 ATHEROSCLEROTIC HEART DISEASE OF NATIVE CORONARY ARTERY WITHOUT ANGINA PECTORIS: ICD-10-CM

## 2019-01-24 DIAGNOSIS — I73.9 PERIPHERAL VASCULAR DISEASE, UNSPECIFIED: Chronic | ICD-10-CM

## 2019-01-24 DIAGNOSIS — Z98.890 OTHER SPECIFIED POSTPROCEDURAL STATES: Chronic | ICD-10-CM

## 2019-01-24 LAB
ALBUMIN SERPL ELPH-MCNC: 3.7 G/DL — SIGNIFICANT CHANGE UP (ref 3.3–5)
ALP SERPL-CCNC: 131 U/L — HIGH (ref 40–120)
ALT FLD-CCNC: 7 U/L — LOW (ref 10–45)
ANION GAP SERPL CALC-SCNC: 12 MMOL/L — SIGNIFICANT CHANGE UP (ref 5–17)
AST SERPL-CCNC: 15 U/L — SIGNIFICANT CHANGE UP (ref 10–40)
BILIRUB SERPL-MCNC: 0.4 MG/DL — SIGNIFICANT CHANGE UP (ref 0.2–1.2)
BLD GP AB SCN SERPL QL: NEGATIVE — SIGNIFICANT CHANGE UP
BUN SERPL-MCNC: 17 MG/DL — SIGNIFICANT CHANGE UP (ref 7–23)
CALCIUM SERPL-MCNC: 9 MG/DL — SIGNIFICANT CHANGE UP (ref 8.4–10.5)
CHLORIDE SERPL-SCNC: 107 MMOL/L — SIGNIFICANT CHANGE UP (ref 96–108)
CO2 SERPL-SCNC: 25 MMOL/L — SIGNIFICANT CHANGE UP (ref 22–31)
CREAT SERPL-MCNC: 0.88 MG/DL — SIGNIFICANT CHANGE UP (ref 0.5–1.3)
GLUCOSE BLDC GLUCOMTR-MCNC: 121 MG/DL — HIGH (ref 70–99)
GLUCOSE SERPL-MCNC: 133 MG/DL — HIGH (ref 70–99)
HCT VFR BLD CALC: 28.8 % — LOW (ref 34.5–45)
HGB BLD-MCNC: 9.4 G/DL — LOW (ref 11.5–15.5)
MCHC RBC-ENTMCNC: 23.9 PG — LOW (ref 27–34)
MCHC RBC-ENTMCNC: 32.4 GM/DL — SIGNIFICANT CHANGE UP (ref 32–36)
MCV RBC AUTO: 73.6 FL — LOW (ref 80–100)
PA ADP PRP-ACNC: 308 PRU — SIGNIFICANT CHANGE UP (ref 194–417)
PLATELET # BLD AUTO: 314 K/UL — SIGNIFICANT CHANGE UP (ref 150–400)
POTASSIUM SERPL-MCNC: 4.2 MMOL/L — SIGNIFICANT CHANGE UP (ref 3.5–5.3)
POTASSIUM SERPL-SCNC: 4.2 MMOL/L — SIGNIFICANT CHANGE UP (ref 3.5–5.3)
PROT SERPL-MCNC: 6.7 G/DL — SIGNIFICANT CHANGE UP (ref 6–8.3)
RBC # BLD: 3.92 M/UL — SIGNIFICANT CHANGE UP (ref 3.8–5.2)
RBC # FLD: 18.2 % — HIGH (ref 10.3–14.5)
RH IG SCN BLD-IMP: POSITIVE — SIGNIFICANT CHANGE UP
SODIUM SERPL-SCNC: 144 MMOL/L — SIGNIFICANT CHANGE UP (ref 135–145)
WBC # BLD: 8.1 K/UL — SIGNIFICANT CHANGE UP (ref 3.8–10.5)
WBC # FLD AUTO: 8.1 K/UL — SIGNIFICANT CHANGE UP (ref 3.8–10.5)

## 2019-01-24 PROCEDURE — 93010 ELECTROCARDIOGRAM REPORT: CPT

## 2019-01-24 PROCEDURE — 93010 ELECTROCARDIOGRAM REPORT: CPT | Mod: 77

## 2019-01-24 PROCEDURE — 99222 1ST HOSP IP/OBS MODERATE 55: CPT | Mod: 57

## 2019-01-24 RX ORDER — INSULIN LISPRO 100/ML
VIAL (ML) SUBCUTANEOUS
Qty: 0 | Refills: 0 | Status: DISCONTINUED | OUTPATIENT
Start: 2019-01-24 | End: 2019-01-28

## 2019-01-24 RX ORDER — DEXTROSE 50 % IN WATER 50 %
15 SYRINGE (ML) INTRAVENOUS ONCE
Qty: 0 | Refills: 0 | Status: DISCONTINUED | OUTPATIENT
Start: 2019-01-24 | End: 2019-01-28

## 2019-01-24 RX ORDER — HYDROCHLOROTHIAZIDE 25 MG
25 TABLET ORAL DAILY
Qty: 0 | Refills: 0 | Status: DISCONTINUED | OUTPATIENT
Start: 2019-01-24 | End: 2019-01-28

## 2019-01-24 RX ORDER — DEXTROSE 50 % IN WATER 50 %
12.5 SYRINGE (ML) INTRAVENOUS ONCE
Qty: 0 | Refills: 0 | Status: DISCONTINUED | OUTPATIENT
Start: 2019-01-24 | End: 2019-01-28

## 2019-01-24 RX ORDER — HEPARIN SODIUM 5000 [USP'U]/ML
800 INJECTION INTRAVENOUS; SUBCUTANEOUS
Qty: 25000 | Refills: 0 | Status: DISCONTINUED | OUTPATIENT
Start: 2019-01-24 | End: 2019-01-25

## 2019-01-24 RX ORDER — ATORVASTATIN CALCIUM 80 MG/1
10 TABLET, FILM COATED ORAL AT BEDTIME
Qty: 0 | Refills: 0 | Status: DISCONTINUED | OUTPATIENT
Start: 2019-01-24 | End: 2019-01-28

## 2019-01-24 RX ORDER — LOPERAMIDE HCL 2 MG
2 TABLET ORAL
Qty: 0 | Refills: 0 | Status: DISCONTINUED | OUTPATIENT
Start: 2019-01-24 | End: 2019-01-28

## 2019-01-24 RX ORDER — METFORMIN HYDROCHLORIDE 850 MG/1
1 TABLET ORAL
Qty: 0 | Refills: 0 | COMMUNITY

## 2019-01-24 RX ORDER — AMLODIPINE BESYLATE 2.5 MG/1
1 TABLET ORAL
Qty: 0 | Refills: 0 | COMMUNITY

## 2019-01-24 RX ORDER — AMLODIPINE BESYLATE 2.5 MG/1
5 TABLET ORAL DAILY
Qty: 0 | Refills: 0 | Status: DISCONTINUED | OUTPATIENT
Start: 2019-01-24 | End: 2019-01-28

## 2019-01-24 RX ORDER — GABAPENTIN 400 MG/1
100 CAPSULE ORAL THREE TIMES A DAY
Qty: 0 | Refills: 0 | Status: DISCONTINUED | OUTPATIENT
Start: 2019-01-24 | End: 2019-01-28

## 2019-01-24 RX ADMIN — HEPARIN SODIUM 8 UNIT(S)/HR: 5000 INJECTION INTRAVENOUS; SUBCUTANEOUS at 19:31

## 2019-01-24 RX ADMIN — Medication 2 MILLIGRAM(S): at 21:28

## 2019-01-24 RX ADMIN — GABAPENTIN 100 MILLIGRAM(S): 400 CAPSULE ORAL at 21:28

## 2019-01-24 RX ADMIN — Medication 25 MILLIGRAM(S): at 18:03

## 2019-01-24 RX ADMIN — ATORVASTATIN CALCIUM 10 MILLIGRAM(S): 80 TABLET, FILM COATED ORAL at 21:28

## 2019-01-24 NOTE — CONSULT NOTE ADULT - ATTENDING COMMENTS
I have examined this patient and reviewed all the relevant studies and history.  She presents with palpitations and dyspnea and is found to have significant triple vessel disease referred for CABG.  She has been on Eliquis and Plavix and we will admit for heparin  and follow P2Y12 to assess platelet function before scheduling surgery.

## 2019-01-24 NOTE — CONSULT NOTE ADULT - SUBJECTIVE AND OBJECTIVE BOX
History of Present Illness:  This is a 67 y/o AA female with PMHx of PAD (on Eliquis last dose 19 AM), s/p RT. SFA stent, HTN, HLD, DM2 (Hgba1c 6.7, managed by Dr Ruby PMHAIDER), LEILANI (not on CPAP), gastric bypass, left carotid bruit, s/p recent LLE (PANTERA to Lt SFA, balloon angioplasty on Lt pop, & PANTERA to Lt peroneal artery (3/22), 2018 acute limb underwent catheter directed lysis and PTA / stent of left SFA, Popliteal, peroneal presents with complains of pain (not as severe as prior to intervention) in both calves L > R. Tolerance 1-2 blocks. Pt was referred for Peripheral Angiogram found to have 90% ISR of LSFA and pop s/p L SFA stenting, SAMUEL to SFA and popliteal.  Pt. endorses progressing episodes of exertional SOB and palpitations.  Denies chest pain/pressure, dizziness, diaphoresis, nausea, vomiting, recent weight gain, peripheral edema, or syncope.  Pt. does not take medications as prescribed. NST  1/10/19 results below.  Pt. presents today asymptomatic for further evaluation and cardiac cath.   CT Surgery consulted for  multi vessel disease   Past Medical History  PAD (peripheral artery disease)  High cholesterol  HTN (hypertension)  Diabetes  LEILANI (obstructive sleep apnea): does not use CPAP since gastric bypass surgery  Ulcer of toe of right foot: 2nd  DM (diabetes mellitus)  HTN (hypertension)    Past Surgical History  Peripheral arterial disease: s/p Right leg stent  H/O gastric bypass  H/O  section  History of intravascular stent placement  S/P  section  H/O abdominoplasty  H/O bilateral breast reduction surgery  S/P gastric bypass  MEDICATIONS  (STANDING):    MEDICATIONS  (PRN):      Vital Signs Last 24 Hrs  T(C): 36.8 (19 @ 07:48), Max: 36.8 (19 @ 07:48)  T(F): 98.2 (19 @ 07:48), Max: 98.2 (19 @ 07:48)  HR: 73 (19 @ 07:48) (73 - 73)  BP: 144/67 (19 @ 07:48) (144/67 - 144/67)  RR: 18 (19 @ 07:48) (18 - 18)  SpO2: 98% (19 @ 07:48) (98% - 98%)           Daily Height in cm: 165.1 (2019 07:48)    Daily Weight in k.3 (2019 07:48)  Admit Wt: Drug Dosing Weight  Height (cm): 165.1 (2019 08:17)  Weight (kg): 70.3 (2019 08:17)  BMI (kg/m2): 25.8 (2019 08:17)  BSA (m2): 1.77 (2019 08:17)    Allergies: Cipro (Headache)  Cipro (Other)      SOCIAL HISTORY: retired RN  Smoker: [ ] Yes  [x ] No        PACK YEARS:                         WHEN QUIT?  ETOH use: [ ] Yes  [ x] No              FREQUENCY / QUANTITY:  Ilicit Drug use:  [ ] Yes  [x] No    FAMILY HISTORY:  No pertinent family history in first degree relatives  No pertinent family history in first degree relatives      Review of Systems  GENERAL:  no weakness, fatigue, fevers or chills  NEURO: no dizziness, numbness, tingling or weakness  SKIN: no itching, burning, rashes, or lesions   HEENT: no visual changes;  no headache, no vertigo, no recent colds  RESPIRATORY: shortness of breath,  cough, denies  sputum, wheezing, hemoptysis;   CARDIOVASCULAR:  no chest pain,  or palpitations  GI: no abd pain. no N/V/D.  PERIPHERAL VASCULAR:  s/p recent   B/l LE stent and ballon angioplasty   -no swelling, no tenderness, no erythema, no varicose veins.     PHYSICAL EXAM  General: Well nourished, well developed, NAD.                                              Neuro: Normal exam oriented to person/place & time with no focal motor or sensory  deficits.                    Eyes: Normal exam of conjunctiva & lids, pupils equally reactive.   ENT: Normal exam of nasal/oral mucosa with absence of cyanosis.   Neck: Normal exam of jugular veins, trachea & thyroid.   Chest: Normal lung exam with good air movement absence of wheezes, rales, or rhonchi:                                                                          CV:  Auscultation: normal S1S2, regular   Murmurs   Carotids: No Bruits[x ]  Abdominal Aorta: normal [ x] nonpalpable[ ]                                                                         GI: Normal exam of abdomen with no noted masses or tenderness. +BSx4Q                                                                                            Extremities: Normal no evidence of cyanosis or deformity, Edema: negative  Lower Extremity Pulses: Right[+ ] Left[ +]Varicosities[ -]  SKIN : Normal exam to inspection & palpation.                                                           LABS:                        9.4    8.1   )-----------( 314      ( 2019 08:10 )             28.8         144  |  107  |  17  ----------------------------<  133<H>  4.2   |  25  |  0.88    Ca    9.0      2019 08:10    TPro  6.7  /  Alb  3.7  /  TBili  0.4  /  DBili  x   /  AST  15  /  ALT  7<L>  /  AlkPhos  131<H>            Cardiac Cath: preliminary results multi vessel disease     TTE / SALBADOR:< from: Transthoracic Echocardiogram (01.10.19 @ 09:24) >  1. Normal mitral valve with chordal systolic anterior  motion. Mild mitral regurgitation.  2. Calcified trileaflet aortic valve with normal opening.  No aortic valve regurgitation seen.  3. Moderate concentric left ventricular hypertrophy.  4. Hyperdynamic left ventricular systolic function.  5. Mild diastolic dysfunction (Stage I).  6. Normal right ventricular size and function.  *** Compared with echocardiogram of 4/3/2018, results are  similar on today's study. History of Present Illness:  This is a 69 y/o AA female with PMHx of PAD (on Eliquis last dose 19 AM), s/p RT. SFA stent, HTN, HLD, DM2 (Hgba1c 6.7, managed by Dr Ruby PMHAIDER), LEILANI (not on CPAP), gastric bypass, left carotid bruit, s/p recent LLE (PANTERA to Lt SFA, balloon angioplasty on Lt pop, & PANTERA to Lt peroneal artery (3/22), 2018 acute limb underwent catheter directed lysis and PTA / stent of left SFA, Popliteal, peroneal presents with complains of pain (not as severe as prior to intervention) in both calves L > R. Tolerance 1-2 blocks. Pt was referred for Peripheral Angiogram found to have 90% ISR of LSFA and pop s/p L SFA stenting, SAMUEL to SFA and popliteal.  Pt. endorses progressing episodes of exertional SOB and palpitations.  Denies chest pain/pressure, dizziness, diaphoresis, nausea, vomiting, recent weight gain, peripheral edema, or syncope.  Pt. does not take medications as prescribed. NST  1/10/19 results below.  Pt. presents today asymptomatic for further evaluation and cardiac cath.   CT Surgery consulted for  multi vessel disease   Past Medical History  PAD (peripheral artery disease)  High cholesterol  HTN (hypertension)  Diabetes  LEILANI (obstructive sleep apnea): does not use CPAP since gastric bypass surgery  Ulcer of toe of right foot: 2nd  DM (diabetes mellitus)  HTN (hypertension)    Past Surgical History  Peripheral arterial disease: s/p Right leg stent  H/O gastric bypass  H/O  section  History of intravascular stent placement  S/P  section  H/O abdominoplasty  H/O bilateral breast reduction surgery  S/P gastric bypass  MEDICATIONS  (STANDING):  MEDICATIONS  (STANDING):  amLODIPine   Tablet 5 milliGRAM(s) Oral daily  gabapentin 100 milliGRAM(s) Oral three times a day  heparin  Infusion 800 Unit(s)/Hr (8 mL/Hr) IV Continuous <Continuous>  hydrochlorothiazide 25 milliGRAM(s) Oral daily    MEDICATIONS  (PRN):    MEDICATIONS  (PRN):      Vital Signs Last 24 Hrs  T(C): 36.8 (19 @ 07:48), Max: 36.8 (19 @ 07:48)  T(F): 98.2 (19 @ 07:48), Max: 98.2 (19 @ 07:48)  HR: 73 (19 @ 07:48) (73 - 73)  BP: 144/67 (19 @ 07:48) (144/67 - 144/67)  RR: 18 (19 @ 07:48) (18 - 18)  SpO2: 98% (19 @ 07:48) (98% - 98%)           Daily Height in cm: 165.1 (2019 07:48)    Daily Weight in k.3 (2019 07:48)  Admit Wt: Drug Dosing Weight  Height (cm): 165.1 (2019 08:17)  Weight (kg): 70.3 (2019 08:17)  BMI (kg/m2): 25.8 (2019 08:17)  BSA (m2): 1.77 (2019 08:17)    Allergies: Cipro (Headache)  Cipro (Other)      SOCIAL HISTORY: retired RN  Smoker: [ ] Yes  [x ] No        PACK YEARS:                         WHEN QUIT?  ETOH use: [ ] Yes  [ x] No              FREQUENCY / QUANTITY:  Ilicit Drug use:  [ ] Yes  [x] No    FAMILY HISTORY:  No pertinent family history in first degree relatives  No pertinent family history in first degree relatives      Review of Systems  GENERAL:  no weakness, fatigue, fevers or chills  NEURO: no dizziness, numbness, tingling or weakness  SKIN: no itching, burning, rashes, or lesions   HEENT: no visual changes;  no headache, no vertigo, no recent colds  RESPIRATORY: shortness of breath,  cough, denies  sputum, wheezing, hemoptysis;   CARDIOVASCULAR:  no chest pain,  or palpitations  GI: no abd pain. no N/V/D.  PERIPHERAL VASCULAR:  s/p recent   B/l LE stent and ballon angioplasty   -no swelling, no tenderness, no erythema, no varicose veins.     PHYSICAL EXAM  General: Well nourished, well developed, NAD.                                              Neuro: Normal exam oriented to person/place & time with no focal motor or sensory  deficits.                    Eyes: Normal exam of conjunctiva & lids, pupils equally reactive.   ENT: Normal exam of nasal/oral mucosa with absence of cyanosis.   Neck: Normal exam of jugular veins, trachea & thyroid.   Chest: Normal lung exam with good air movement absence of wheezes, rales, or rhonchi:                                                                          CV:  Auscultation: normal S1S2, regular   Murmurs   Carotids: No Bruits[x ]  Abdominal Aorta: normal [ x] nonpalpable[ ]                                                                         GI: Normal exam of abdomen with no noted masses or tenderness. +BSx4Q                                                                                            Extremities:  Normal no evidence of cyanosis or deformity, Edema: negative  Lower Extremity Pulses: Right[+ ] Left[ +]Varicosities[ -] Right groin cath site  SKIN : Normal exam to inspection & palpation.                                                           LABS:                        9.4    8.1   )-----------( 314      ( 2019 08:10 )             28.8         144  |  107  |  17  ----------------------------<  133<H>  4.2   |  25  |  0.88    Ca    9.0      2019 08:10    TPro  6.7  /  Alb  3.7  /  TBili  0.4  /  DBili  x   /  AST  15  /  ALT  7<L>  /  AlkPhos  131<H>            Cardiac Cath: preliminary results multi vessel disease     TTE / SALBADOR:< from: Transthoracic Echocardiogram (01.10.19 @ 09:24) >  1. Normal mitral valve with chordal systolic anterior  motion. Mild mitral regurgitation.  2. Calcified trileaflet aortic valve with normal opening.  No aortic valve regurgitation seen.  3. Moderate concentric left ventricular hypertrophy.  4. Hyperdynamic left ventricular systolic function.  5. Mild diastolic dysfunction (Stage I).  6. Normal right ventricular size and function.  *** Compared with echocardiogram of 4/3/2018, results are  similar on today's study.

## 2019-01-24 NOTE — CONSULT NOTE ADULT - PROBLEM SELECTOR RECOMMENDATION 9
admit to 2 [facility] to Dr Wolff  cardiac surgery workup  P2y12  hold Plavix  vein mapping  CABG  Monday January 28,2019

## 2019-01-24 NOTE — H&P CARDIOLOGY - PMH
Diabetes    DM (diabetes mellitus)    High cholesterol    HTN (hypertension)    HTN (hypertension)    LEILANI (obstructive sleep apnea)  does not use CPAP since gastric bypass surgery  PAD (peripheral artery disease)    Ulcer of toe of right foot  2nd Diabetes    DM (diabetes mellitus)    High cholesterol    HTN (hypertension)    LEILANI (obstructive sleep apnea)  does not use CPAP since gastric bypass surgery  PAD (peripheral artery disease)    Ulcer of toe of right foot  2nd

## 2019-01-24 NOTE — H&P CARDIOLOGY - HISTORY OF PRESENT ILLNESS
This is a 69 y/o AA female with PMHx of PAD (on Eliquis last dose 1/23/19 AM), s/p RT. SFA stent, HTN, HLD, DM2 (Hgba1c 5.2 in 11/2018 , managed by Dr Tung BANSAL), LEILANI (not on CPAP), left carotid bruit, s/p recent LLE (PANTERA to Lt SFA, balloon angioplasty on Lt pop, & PANTERA to Lt peroneal artery (3/22),  Presented back to the hospital April 2018 with  acute limb underwent catheter directed lysis and PTA / stent of left SFA, Popliteal, peroneal presents with complains of pain (not as severe as prior to intervention) in both calves L > R. Tolerance 1-2 blocks. Pain starts when she wakes up in the morning and starts walking. Relieved with rest.When laying down gets pain on bottom of left foot. Sensation in left foot still reduced since critical limb ischemia. Pt was referred for Peripheral Angiogram found to have 90% ISR of LSFA and pop s/p L SFA stenting, SAMUEL to SFA and popliteal.  Pt. endorses progressing episodes of exertional SOB and palpitations.  Denies chest pain/pressure, dizziness, diaphoresis, nausea, vomiting, recent weight gain, peripheral edema, or syncope.  NST  1/10/19 results below.  Pt. presents today asymptomatic for further evaluation and cardiac cath.     NUCLEAR FINDINGS:  Review of raw data shows: Minor motion artifact.,  Extensive splanchnic uptake  The left ventricle was normal in size. There are large,  mild to moderate defects in the inferolateral, apical  lateral, inferior, and basal inferoseptal walls that are  mostly fixed suggestive of infarct with mild jorge-infarct  ischemia.  ------------------------------------------------------------------------  GATED ANALYSIS:  Post-stress gated wall motion analysis was performed (LVEF  = 62 %;LVEDV = 66 ml.) revealing mild hypokinesis of the  inferior, inferolateral, apical lateral, and basal  inferoseptal walls.  Overall left ventricular ejection is  preserved.  ------------------------------------------------------------------------  IMPRESSIONS:Abnormal Study  * Chest Pain: No chest pain with administration of  Regadenoson.  * Symptom: Headache.  * HR Response: Appropriate.  * BP Response: Appropriate.  * Heart Rhythm: Sinus Rhythm - 61 BPM.  * Baseline ECG: Nonspecific ST-T wave abnormality.  * ECG Changes: No significant ischemic ST segment changes  beyond baseline abnormalities.  * Arrhythmia: None.  * Review of raw data shows: Minor motion artifact,  extensive splanchnic uptake  * The left ventricle was normal in size. There are large,  mild to moderate defects in the inferolateral, apical  lateral, inferior, and basal inferoseptal walls that are  mostly fixed suggestive of infarct with mild jorge-infarct  ischemia.  * Post-stress gated wall motion analysis was performed  (LVEF = 62 %;LVEDV = 66 ml.) revealing mild hypokinesis of  the inferior, inferolateral, apical lateral, and basal  inferoseptal walls.  Overall left ventricular ejection is  preserved. This is a 69 y/o AA female with PMHx of PAD (on Eliquis last dose 1/23/19 AM), s/p RT. SFA stent, HTN, HLD, DM2 (Hgba1c 6.7, managed by Dr Tung BANSAL), LEILANI (not on CPAP), gastric bypass, left carotid bruit, s/p recent LLE (PANTERA to Lt SFA, balloon angioplasty on Lt pop, & PANTERA to Lt peroneal artery (3/22), April 2018 acute limb underwent catheter directed lysis and PTA / stent of left SFA, Popliteal, peroneal presents with complains of pain (not as severe as prior to intervention) in both calves L > R. Tolerance 1-2 blocks. Pt was referred for Peripheral Angiogram found to have 90% ISR of LSFA and pop s/p L SFA stenting, SAMUEL to SFA and popliteal.  Pt. endorses progressing episodes of exertional SOB and palpitations.  Denies chest pain/pressure, dizziness, diaphoresis, nausea, vomiting, recent weight gain, peripheral edema, or syncope.  Pt. does not take medications as prescribed. NST  1/10/19 results below.  Pt. presents today asymptomatic for further evaluation and cardiac cath.     NUCLEAR FINDINGS:  Review of raw data shows: Minor motion artifact.,  Extensive splanchnic uptake  The left ventricle was normal in size. There are large,  mild to moderate defects in the inferolateral, apical  lateral, inferior, and basal inferoseptal walls that are  mostly fixed suggestive of infarct with mild jorge-infarct  ischemia.  ------------------------------------------------------------------------  GATED ANALYSIS:  Post-stress gated wall motion analysis was performed (LVEF  = 62 %;LVEDV = 66 ml.) revealing mild hypokinesis of the  inferior, inferolateral, apical lateral, and basal  inferoseptal walls.  Overall left ventricular ejection is  preserved.  ------------------------------------------------------------------------  IMPRESSIONS:Abnormal Study  * Chest Pain: No chest pain with administration of  Regadenoson.  * Symptom: Headache.  * HR Response: Appropriate.  * BP Response: Appropriate.  * Heart Rhythm: Sinus Rhythm - 61 BPM.  * Baseline ECG: Nonspecific ST-T wave abnormality.  * ECG Changes: No significant ischemic ST segment changes  beyond baseline abnormalities.  * Arrhythmia: None.  * Review of raw data shows: Minor motion artifact,  extensive splanchnic uptake  * The left ventricle was normal in size. There are large,  mild to moderate defects in the inferolateral, apical  lateral, inferior, and basal inferoseptal walls that are  mostly fixed suggestive of infarct with mild jorge-infarct  ischemia.  * Post-stress gated wall motion analysis was performed  (LVEF = 62 %;LVEDV = 66 ml.) revealing mild hypokinesis of  the inferior, inferolateral, apical lateral, and basal  inferoseptal walls.  Overall left ventricular ejection is  preserved.

## 2019-01-25 DIAGNOSIS — D64.9 ANEMIA, UNSPECIFIED: ICD-10-CM

## 2019-01-25 LAB
ALBUMIN SERPL ELPH-MCNC: 3.7 G/DL — SIGNIFICANT CHANGE UP (ref 3.3–5)
ALP SERPL-CCNC: 127 U/L — HIGH (ref 40–120)
ALT FLD-CCNC: 8 U/L — LOW (ref 10–45)
ANION GAP SERPL CALC-SCNC: 11 MMOL/L — SIGNIFICANT CHANGE UP (ref 5–17)
APPEARANCE UR: CLEAR — SIGNIFICANT CHANGE UP
APTT BLD: 66.7 SEC — HIGH (ref 27.5–36.3)
APTT BLD: 81 SEC — HIGH (ref 27.5–36.3)
AST SERPL-CCNC: 13 U/L — SIGNIFICANT CHANGE UP (ref 10–40)
BILIRUB SERPL-MCNC: 0.6 MG/DL — SIGNIFICANT CHANGE UP (ref 0.2–1.2)
BILIRUB UR-MCNC: NEGATIVE — SIGNIFICANT CHANGE UP
BUN SERPL-MCNC: 16 MG/DL — SIGNIFICANT CHANGE UP (ref 7–23)
CALCIUM SERPL-MCNC: 9.1 MG/DL — SIGNIFICANT CHANGE UP (ref 8.4–10.5)
CHLORIDE SERPL-SCNC: 107 MMOL/L — SIGNIFICANT CHANGE UP (ref 96–108)
CO2 SERPL-SCNC: 24 MMOL/L — SIGNIFICANT CHANGE UP (ref 22–31)
COLOR SPEC: YELLOW — SIGNIFICANT CHANGE UP
CREAT SERPL-MCNC: 0.83 MG/DL — SIGNIFICANT CHANGE UP (ref 0.5–1.3)
DIFF PNL FLD: ABNORMAL
GLUCOSE BLDC GLUCOMTR-MCNC: 115 MG/DL — HIGH (ref 70–99)
GLUCOSE BLDC GLUCOMTR-MCNC: 140 MG/DL — HIGH (ref 70–99)
GLUCOSE BLDC GLUCOMTR-MCNC: 163 MG/DL — HIGH (ref 70–99)
GLUCOSE BLDC GLUCOMTR-MCNC: 85 MG/DL — SIGNIFICANT CHANGE UP (ref 70–99)
GLUCOSE SERPL-MCNC: 206 MG/DL — HIGH (ref 70–99)
GLUCOSE UR QL: NEGATIVE MG/DL — SIGNIFICANT CHANGE UP
HCT VFR BLD CALC: 26.3 % — LOW (ref 34.5–45)
HCT VFR BLD CALC: 27.3 % — LOW (ref 34.5–45)
HGB BLD-MCNC: 8.6 G/DL — LOW (ref 11.5–15.5)
HGB BLD-MCNC: 8.9 G/DL — LOW (ref 11.5–15.5)
KETONES UR-MCNC: NEGATIVE — SIGNIFICANT CHANGE UP
LEUKOCYTE ESTERASE UR-ACNC: ABNORMAL
MCHC RBC-ENTMCNC: 23.6 PG — LOW (ref 27–34)
MCHC RBC-ENTMCNC: 23.8 PG — LOW (ref 27–34)
MCHC RBC-ENTMCNC: 32.6 GM/DL — SIGNIFICANT CHANGE UP (ref 32–36)
MCHC RBC-ENTMCNC: 32.8 GM/DL — SIGNIFICANT CHANGE UP (ref 32–36)
MCV RBC AUTO: 72.4 FL — LOW (ref 80–100)
MCV RBC AUTO: 72.6 FL — LOW (ref 80–100)
MRSA PCR RESULT.: SIGNIFICANT CHANGE UP
NITRITE UR-MCNC: NEGATIVE — SIGNIFICANT CHANGE UP
OB PNL STL: NEGATIVE — SIGNIFICANT CHANGE UP
PH UR: 6.5 — SIGNIFICANT CHANGE UP (ref 5–8)
PLATELET # BLD AUTO: 287 K/UL — SIGNIFICANT CHANGE UP (ref 150–400)
PLATELET # BLD AUTO: 301 K/UL — SIGNIFICANT CHANGE UP (ref 150–400)
POTASSIUM SERPL-MCNC: 3.8 MMOL/L — SIGNIFICANT CHANGE UP (ref 3.5–5.3)
POTASSIUM SERPL-SCNC: 3.8 MMOL/L — SIGNIFICANT CHANGE UP (ref 3.5–5.3)
PROT SERPL-MCNC: 6.6 G/DL — SIGNIFICANT CHANGE UP (ref 6–8.3)
PROT UR-MCNC: NEGATIVE MG/DL — SIGNIFICANT CHANGE UP
RBC # BLD: 3.63 M/UL — LOW (ref 3.8–5.2)
RBC # BLD: 3.77 M/UL — LOW (ref 3.8–5.2)
RBC # FLD: 18 % — HIGH (ref 10.3–14.5)
RBC # FLD: 18.2 % — HIGH (ref 10.3–14.5)
S AUREUS DNA NOSE QL NAA+PROBE: SIGNIFICANT CHANGE UP
SODIUM SERPL-SCNC: 142 MMOL/L — SIGNIFICANT CHANGE UP (ref 135–145)
SP GR SPEC: 1.02 — SIGNIFICANT CHANGE UP (ref 1.01–1.02)
TSH SERPL-MCNC: 2.95 UIU/ML — SIGNIFICANT CHANGE UP (ref 0.27–4.2)
UROBILINOGEN FLD QL: NEGATIVE MG/DL — SIGNIFICANT CHANGE UP
WBC # BLD: 7.5 K/UL — SIGNIFICANT CHANGE UP (ref 3.8–10.5)
WBC # BLD: 7.6 K/UL — SIGNIFICANT CHANGE UP (ref 3.8–10.5)
WBC # FLD AUTO: 7.5 K/UL — SIGNIFICANT CHANGE UP (ref 3.8–10.5)
WBC # FLD AUTO: 7.6 K/UL — SIGNIFICANT CHANGE UP (ref 3.8–10.5)

## 2019-01-25 PROCEDURE — 99231 SBSQ HOSP IP/OBS SF/LOW 25: CPT | Mod: 24

## 2019-01-25 PROCEDURE — 99222 1ST HOSP IP/OBS MODERATE 55: CPT

## 2019-01-25 PROCEDURE — 71045 X-RAY EXAM CHEST 1 VIEW: CPT | Mod: 26

## 2019-01-25 PROCEDURE — 94010 BREATHING CAPACITY TEST: CPT | Mod: 26

## 2019-01-25 RX ORDER — ASPIRIN/CALCIUM CARB/MAGNESIUM 324 MG
81 TABLET ORAL DAILY
Qty: 0 | Refills: 0 | Status: DISCONTINUED | OUTPATIENT
Start: 2019-01-25 | End: 2019-01-28

## 2019-01-25 RX ORDER — METOPROLOL TARTRATE 50 MG
25 TABLET ORAL
Qty: 0 | Refills: 0 | Status: DISCONTINUED | OUTPATIENT
Start: 2019-01-25 | End: 2019-01-28

## 2019-01-25 RX ORDER — HEPARIN SODIUM 5000 [USP'U]/ML
800 INJECTION INTRAVENOUS; SUBCUTANEOUS
Qty: 25000 | Refills: 0 | Status: DISCONTINUED | OUTPATIENT
Start: 2019-01-25 | End: 2019-01-28

## 2019-01-25 RX ADMIN — Medication 25 MILLIGRAM(S): at 18:38

## 2019-01-25 RX ADMIN — HEPARIN SODIUM 8 UNIT(S)/HR: 5000 INJECTION INTRAVENOUS; SUBCUTANEOUS at 12:10

## 2019-01-25 RX ADMIN — GABAPENTIN 100 MILLIGRAM(S): 400 CAPSULE ORAL at 15:08

## 2019-01-25 RX ADMIN — ATORVASTATIN CALCIUM 10 MILLIGRAM(S): 80 TABLET, FILM COATED ORAL at 22:33

## 2019-01-25 RX ADMIN — HEPARIN SODIUM 8 UNIT(S)/HR: 5000 INJECTION INTRAVENOUS; SUBCUTANEOUS at 17:03

## 2019-01-25 RX ADMIN — GABAPENTIN 100 MILLIGRAM(S): 400 CAPSULE ORAL at 08:00

## 2019-01-25 RX ADMIN — HEPARIN SODIUM 8 UNIT(S)/HR: 5000 INJECTION INTRAVENOUS; SUBCUTANEOUS at 01:31

## 2019-01-25 RX ADMIN — Medication 81 MILLIGRAM(S): at 18:39

## 2019-01-25 RX ADMIN — Medication 25 MILLIGRAM(S): at 08:01

## 2019-01-25 RX ADMIN — GABAPENTIN 100 MILLIGRAM(S): 400 CAPSULE ORAL at 22:34

## 2019-01-25 RX ADMIN — AMLODIPINE BESYLATE 5 MILLIGRAM(S): 2.5 TABLET ORAL at 08:00

## 2019-01-25 NOTE — PROGRESS NOTE ADULT - SUBJECTIVE AND OBJECTIVE BOX
VITAL SIGNS    Telemetry:    Vital Signs Last 24 Hrs  T(C): 36.7 (19 @ 05:08), Max: 37 (19 @ 20:26)  T(F): 98.1 (19 @ 05:08), Max: 98.6 (19 @ 20:26)  HR: 65 (19 @ 05:08) (65 - 76)  BP: 134/73 (19 @ 05:08) (134/73 - 158/76)  RR: 18 (19 @ 05:08) (18 - 18)  SpO2: 100% (19 @ 05:08) (95% - 100%)             @ 07:  -   @ 07:00  --------------------------------------------------------  IN: 808 mL / OUT: 400 mL / NET: 408 mL     @ 07:  -   @ 12:54  --------------------------------------------------------  IN: 240 mL / OUT: 0 mL / NET: 240 mL       Daily     Daily Weight in k.3 (2019 07:18)  Admit Wt: Drug Dosing Weight  Height (cm): 165.1 (2019 08:17)  Weight (kg): 70.3 (2019 08:17)  BMI (kg/m2): 25.8 (2019 08:17)  BSA (m2): 1.77 (2019 08:17)    Bilirubin Total, Serum: 0.6 mg/dL ( @ 08:57)    CAPILLARY BLOOD GLUCOSE      POCT Blood Glucose.: 140 mg/dL (2019 11:40)  POCT Blood Glucose.: 163 mg/dL (2019 07:53)  POCT Blood Glucose.: 121 mg/dL (2019 17:07)          amLODIPine   Tablet 5 milliGRAM(s) Oral daily  atorvastatin 10 milliGRAM(s) Oral at bedtime  dextrose 40% Gel 15 Gram(s) Oral once PRN  dextrose 50% Injectable 12.5 Gram(s) IV Push once  gabapentin 100 milliGRAM(s) Oral three times a day  heparin  Infusion 800 Unit(s)/Hr IV Continuous <Continuous>  hydrochlorothiazide 25 milliGRAM(s) Oral daily  insulin lispro (HumaLOG) corrective regimen sliding scale   SubCutaneous three times a day before meals  loperamide 2 milliGRAM(s) Oral two times a day PRN      PHYSICAL EXAM    Subjective: "Hi.   Neurology: alert and oriented x 3, nonfocal, no gross deficits  CV : tele:  RSR  Sternal Wound :  CDI with dressing , Stable  Lungs: clear. RR easy, unlabored   Abdomen: soft, nontender, nondistended, positive bowel sounds, bowel movement   Neg N/V/D   :  pt voiding without difficulty   Extremities:   HUSAIN; edema, neg calf tenderness.   PPP bilaterally      PW:  Chest tubes: VITAL SIGNS    Telemetry:  rsr    Vital Signs Last 24 Hrs  T(C): 36.7 (19 @ 05:08), Max: 37 (19 @ 20:26)  T(F): 98.1 (19 @ 05:08), Max: 98.6 (19 @ 20:26)  HR: 65 (19 @ 05:08) (65 - 76)  BP: 134/73 (19 @ 05:08) (134/73 - 158/76)  RR: 18 (19 @ 05:08) (18 - 18)  SpO2: 100% (19 @ 05:08) (95% - 100%)             07:  -   @ 07:00  --------------------------------------------------------  IN: 808 mL / OUT: 400 mL / NET: 408 mL     07:  -   @ 12:54  --------------------------------------------------------  IN: 240 mL / OUT: 0 mL / NET: 240 mL       Daily     Daily Weight in k.3 (2019 07:18)  Admit Wt: Drug Dosing Weight  Height (cm): 165.1 (2019 08:17)  Weight (kg): 70.3 (2019 08:17)  BMI (kg/m2): 25.8 (2019 08:17)  BSA (m2): 1.77 (2019 08:17)    Bilirubin Total, Serum: 0.6 mg/dL ( @ 08:57)    CAPILLARY BLOOD GLUCOSE      POCT Blood Glucose.: 140 mg/dL (2019 11:40)  POCT Blood Glucose.: 163 mg/dL (2019 07:53)  POCT Blood Glucose.: 121 mg/dL (2019 17:07)          amLODIPine   Tablet 5 milliGRAM(s) Oral daily  atorvastatin 10 milliGRAM(s) Oral at bedtime  dextrose 40% Gel 15 Gram(s) Oral once PRN  dextrose 50% Injectable 12.5 Gram(s) IV Push once  gabapentin 100 milliGRAM(s) Oral three times a day  heparin  Infusion 800 Unit(s)/Hr IV Continuous <Continuous>  hydrochlorothiazide 25 milliGRAM(s) Oral daily  insulin lispro (HumaLOG) corrective regimen sliding scale   SubCutaneous three times a day before meals  loperamide 2 milliGRAM(s) Oral two times a day PRN      PHYSICAL EXAM    Subjective: "I'm ok.."  Neurology: alert and oriented x 3, nonfocal, no gross deficits  CV : tele:  RSR   Lungs: clear. RR easy, unlabored   Abdomen: soft, nontender, nondistended, positive bowel sounds, + bowel movement   Neg N/V/D   :  pt voiding without difficulty   Extremities:   HUSAIN; neg LE edema, neg calf tenderness.   PPP bilaterally; right groin cdi mark- neg hematoma

## 2019-01-25 NOTE — PROGRESS NOTE ADULT - ASSESSMENT
This is a 69 y/o AA female with PMHx of PAD (on Eliquis last dose 1/23/19 AM), s/p RT. SFA stent, HTN, HLD, DM2 (Hgba1c 6.7, managed by Dr Tugn BANSAL), LEILANI (not on CPAP), gastric bypass, left carotid bruit, s/p recent LLE (PANTERA to Lt SFA, balloon angioplasty on Lt pop, & PANTERA to Lt peroneal artery (3/22), April 2018 acute limb underwent catheter directed lysis and PTA / stent of left SFA, Popliteal, peroneal presents with complains of pain (not as severe as prior to intervention) in both calves L > R. Tolerance 1-2 blocks. Pt was referred for Peripheral Angiogram found to have 90% ISR of LSFA and pop s/p L SFA stenting, SAMUEL to SFA and popliteal.  Pt. endorses progressing episodes of exertional SOB and palpitations.  Denies chest pain/pressure, dizziness, diaphoresis, nausea, vomiting, recent weight gain, peripheral edema, or syncope.  Pt. does not take medications as prescribed. NST  1/10/19 results below.  Pt. presents today asymptomatic for further evaluation and cardiac cath.   CT Surgery consulted for  multi vessel disease   Patient amenable to cardiac surgery on Plavix now on hold P2ty12 sent.  Will need vein mapping prior to surgery This is a 67 y/o AA female with PMHx of PAD (on Eliquis last dose 1/23/19 AM), s/p RT. SFA stent, HTN, HLD, DM2 (Hgba1c 6.7, managed by Dr Tung BANSAL), LEILANI (not on CPAP), gastric bypass, left carotid bruit, s/p recent LLE (PANTERA to Lt SFA, balloon angioplasty on Lt pop, & PANTERA to Lt peroneal artery (3/22), April 2018 acute limb underwent catheter directed lysis and PTA / stent of left SFA, Popliteal, peroneal presents with complains of pain (not as severe as prior to intervention) in both calves L > R. Tolerance 1-2 blocks. Pt was referred for Peripheral Angiogram found to have 90% ISR of LSFA and pop s/p L SFA stenting, SAMUEL to SFA and popliteal.  Pt. endorses progressing episodes of exertional SOB and palpitations.  Denies chest pain/pressure, dizziness, diaphoresis, nausea, vomiting, recent weight gain, peripheral edema, or syncope.  Pt. does not take medications as prescribed. NST  1/10/19 results below.  Pt. presents today asymptomatic for further evaluation and cardiac cath.   CT Surgery consulted for  multi vessel disease   Patient amenable to cardiac surgery on Plavix now on hold P2ty12 sent.  1/25 VSS; vein mapping done; continue preop workup; p2y12 308; heparin gttp PTT 66.7; hct 26- ck guiac; prob transfuse 1 unit prbc in am   [facility] monday with Dr. Wolff

## 2019-01-25 NOTE — CONSULT NOTE ADULT - SUBJECTIVE AND OBJECTIVE BOX
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  Had SOB and CP had left heart cath with Dr. Brooke found to have multivessel coronary disease.  Patient was admitted and seen by Ctsx with plans for surgery next Monday.  She was on eliquis for PAD, which was switched to heparin gtt.  Currently no chest pain or shortness of breath.  She complains of a chronic numbness in her left foot.   at bedside.  Plans for bein mapping.  Holding plavix for now.             Allergies    Cipro (Headache)  Cipro (Other)    Intolerances    	    MEDICATIONS:  amLODIPine   Tablet 5 milliGRAM(s) Oral daily  heparin  Infusion 800 Unit(s)/Hr IV Continuous <Continuous>  hydrochlorothiazide 25 milliGRAM(s) Oral daily        gabapentin 100 milliGRAM(s) Oral three times a day    loperamide 2 milliGRAM(s) Oral two times a day PRN    atorvastatin 10 milliGRAM(s) Oral at bedtime  dextrose 40% Gel 15 Gram(s) Oral once PRN  dextrose 50% Injectable 12.5 Gram(s) IV Push once  insulin lispro (HumaLOG) corrective regimen sliding scale   SubCutaneous three times a day before meals        PAST MEDICAL & SURGICAL HISTORY:  PAD (peripheral artery disease)  High cholesterol  HTN (hypertension)  Diabetes  LEILANI (obstructive sleep apnea): does not use CPAP since gastric bypass surgery  Ulcer of toe of right foot: 2nd  DM (diabetes mellitus)  Peripheral arterial disease: s/p Right leg stent  H/O gastric bypass  H/O  section  History of intravascular stent placement  S/P  section  H/O abdominoplasty  H/O bilateral breast reduction surgery  S/P gastric bypass      FAMILY HISTORY:  No pertinent family history in first degree relatives  No pertinent family history in first degree relatives      SOCIAL HISTORY:  unchanged    REVIEW OF SYSTEMS:  CONSTITUTIONAL: No fever, weight loss, or fatigue  EYES: No eye pain, visual disturbances, or discharge  ENMT:  No difficulty hearing, tinnitus, vertigo; No sinus or throat pain  NECK: No pain or stiffness  RESPIRATORY: No cough, wheezing, chills or hemoptysis;  dyspnea with exertion.    CARDIOVASCULAR: No chest pain, palpitations, passing out, dizziness, or leg swelling  GASTROINTESTINAL: No abdominal or epigastric pain. No nausea, vomiting, or hematemesis; No diarrhea or constipation. No melena or hematochezia.  GENITOURINARY: No dysuria, frequency, hematuria, or incontinence  NEUROLOGICAL: No headaches, memory loss, loss of strength, numbness, or tremors  SKIN: No itching, burning, rashes, or lesions   LYMPH Nodes: No enlarged glands  ENDOCRINE: No heat or cold intolerance; No hair loss  MUSCULOSKELETAL:  numbness of the left foot.    PSYCHIATRIC: No depression, anxiety, mood swings, or difficulty sleeping  HEME/LYMPH: No easy bruising, or bleeding gums  ALLERY AND IMMUNOLOGIC: No hives or eczema	    [ x] All others negative	  [ ] Unable to obtain    PHYSICAL EXAM:  T(C): 36.7 (19 @ 05:08), Max: 37 (19 @ 20:26)  HR: 65 (19 @ 05:08) (65 - 76)  BP: 134/73 (19 @ 05:08) (134/73 - 158/76)  RR: 18 (19 @ 05:08) (18 - 18)  SpO2: 100% (19 @ 05:08) (95% - 100%)  Wt(kg): --  I&O's Summary    2019 07:  -  2019 07:00  --------------------------------------------------------  IN: 808 mL / OUT: 400 mL / NET: 408 mL    2019 07:  -  2019 12:17  --------------------------------------------------------  IN: 240 mL / OUT: 0 mL / NET: 240 mL        Appearance: Normal	  HEENT:   Normal oral mucosa, PERRL, EOMI	  Carotid:   Right:  No Bruit      Left:  No bruit  Lymphatic: No lymphadenopathy  Cardiovascular: Normal S1 S2, No JVD, No murmurs  Respiratory: Lungs clear to auscultation	  Psychiatry: A & O x 3, Mood & affect appropriate  Gastrointestinal:  Soft, Non-tender, + BS	  Skin: No rashes, No ecchymoses, No cyanosis	  Neurologic: Non-focal  Extremities: Normal range of motion.   Foot Exam:  Warm, no ulceration.   Left DP palpable.  Right PT palpable.  healed 2nd digit amp      LABS:	 	    CBC Full  -  ( 2019 08:57 )  WBC Count : 7.5 K/uL  Hemoglobin : 8.9 g/dL  Hematocrit : 27.3 %  Platelet Count - Automated : 287 K/uL  Mean Cell Volume : 72.4 fl  Mean Cell Hemoglobin : 23.6 pg  Mean Cell Hemoglobin Concentration : 32.6 gm/dL  Auto Neutrophil # : x  Auto Lymphocyte # : x  Auto Monocyte # : x  Auto Eosinophil # : x  Auto Basophil # : x  Auto Neutrophil % : x  Auto Lymphocyte % : x  Auto Monocyte % : x  Auto Eosinophil % : x  Auto Basophil % : x        142  |  107  |  16  ----------------------------<  206<H>  3.8   |  24  |  0.83      144  |  107  |  17  ----------------------------<  133<H>  4.2   |  25  |  0.88    Ca    9.1      2019 08:57  Ca    9.0      2019 08:10    TPro  6.6  /  Alb  3.7  /  TBili  0.6  /  DBili  x   /  AST  13  /  ALT  8<L>  /  AlkPhos  127<H>    TPro  6.7  /  Alb  3.7  /  TBili  0.4  /  DBili  x   /  AST  15  /  ALT  7<L>  /  AlkPhos  131<H>            Assessment:  1. Coronary artery disease  2.  Peripheral artery disease  3.  HTN  4.  HLD  5.  Diabetes             Plan:  1. Appeciate CT surgery evaluation  2.  Preoperative testing per CTsx  3.  Continue with heparin gtt for now  4.  Continue statin therapy - consider increasing Lipitor to 40mg daily (high intensity statin)  5.  Continue BP control with HCTZ and norvasc ( BP and HR well controlled)  6.  telemetry monitoring     Will follow with you    Blaine Garcia [id number]         Thank you      Vascular Cardiology Service    Please call with any questions:   [id number]  Office [phone]  email:  [email or web address]

## 2019-01-25 NOTE — PROGRESS NOTE ADULT - PROBLEM SELECTOR PLAN 1
Preop workup in progress  initiate low dose b-blockers; asa and statin  ck vein mapping  heparin gttp   type and screen  ohs 1/28 with Dr. Wolff

## 2019-01-26 LAB
ANION GAP SERPL CALC-SCNC: 13 MMOL/L — SIGNIFICANT CHANGE UP (ref 5–17)
APTT BLD: 83.4 SEC — HIGH (ref 27.5–36.3)
BLD GP AB SCN SERPL QL: NEGATIVE — SIGNIFICANT CHANGE UP
BUN SERPL-MCNC: 19 MG/DL — SIGNIFICANT CHANGE UP (ref 7–23)
CALCIUM SERPL-MCNC: 9.2 MG/DL — SIGNIFICANT CHANGE UP (ref 8.4–10.5)
CHLORIDE SERPL-SCNC: 105 MMOL/L — SIGNIFICANT CHANGE UP (ref 96–108)
CO2 SERPL-SCNC: 25 MMOL/L — SIGNIFICANT CHANGE UP (ref 22–31)
CREAT SERPL-MCNC: 0.76 MG/DL — SIGNIFICANT CHANGE UP (ref 0.5–1.3)
GLUCOSE BLDC GLUCOMTR-MCNC: 107 MG/DL — HIGH (ref 70–99)
GLUCOSE BLDC GLUCOMTR-MCNC: 137 MG/DL — HIGH (ref 70–99)
GLUCOSE BLDC GLUCOMTR-MCNC: 137 MG/DL — HIGH (ref 70–99)
GLUCOSE SERPL-MCNC: 116 MG/DL — HIGH (ref 70–99)
HCT VFR BLD CALC: 27.6 % — LOW (ref 34.5–45)
HGB BLD-MCNC: 9.3 G/DL — LOW (ref 11.5–15.5)
INR BLD: 1.06 RATIO — SIGNIFICANT CHANGE UP (ref 0.88–1.16)
MCHC RBC-ENTMCNC: 24.4 PG — LOW (ref 27–34)
MCHC RBC-ENTMCNC: 33.5 GM/DL — SIGNIFICANT CHANGE UP (ref 32–36)
MCV RBC AUTO: 72.6 FL — LOW (ref 80–100)
PLATELET # BLD AUTO: 289 K/UL — SIGNIFICANT CHANGE UP (ref 150–400)
POTASSIUM SERPL-MCNC: 3.6 MMOL/L — SIGNIFICANT CHANGE UP (ref 3.5–5.3)
POTASSIUM SERPL-SCNC: 3.6 MMOL/L — SIGNIFICANT CHANGE UP (ref 3.5–5.3)
PROTHROM AB SERPL-ACNC: 12.1 SEC — SIGNIFICANT CHANGE UP (ref 10–12.9)
RBC # BLD: 3.8 M/UL — SIGNIFICANT CHANGE UP (ref 3.8–5.2)
RBC # FLD: 17.9 % — HIGH (ref 10.3–14.5)
RH IG SCN BLD-IMP: POSITIVE — SIGNIFICANT CHANGE UP
SODIUM SERPL-SCNC: 143 MMOL/L — SIGNIFICANT CHANGE UP (ref 135–145)
WBC # BLD: 7.5 K/UL — SIGNIFICANT CHANGE UP (ref 3.8–10.5)
WBC # FLD AUTO: 7.5 K/UL — SIGNIFICANT CHANGE UP (ref 3.8–10.5)

## 2019-01-26 PROCEDURE — 99232 SBSQ HOSP IP/OBS MODERATE 35: CPT

## 2019-01-26 RX ORDER — ACETAMINOPHEN 500 MG
650 TABLET ORAL EVERY 6 HOURS
Qty: 0 | Refills: 0 | Status: DISCONTINUED | OUTPATIENT
Start: 2019-01-26 | End: 2019-01-28

## 2019-01-26 RX ADMIN — GABAPENTIN 100 MILLIGRAM(S): 400 CAPSULE ORAL at 08:47

## 2019-01-26 RX ADMIN — AMLODIPINE BESYLATE 5 MILLIGRAM(S): 2.5 TABLET ORAL at 08:47

## 2019-01-26 RX ADMIN — Medication 25 MILLIGRAM(S): at 08:47

## 2019-01-26 RX ADMIN — GABAPENTIN 100 MILLIGRAM(S): 400 CAPSULE ORAL at 14:38

## 2019-01-26 RX ADMIN — Medication 25 MILLIGRAM(S): at 17:12

## 2019-01-26 RX ADMIN — Medication 81 MILLIGRAM(S): at 14:38

## 2019-01-26 RX ADMIN — HEPARIN SODIUM 8 UNIT(S)/HR: 5000 INJECTION INTRAVENOUS; SUBCUTANEOUS at 07:32

## 2019-01-26 NOTE — PROGRESS NOTE ADULT - SUBJECTIVE AND OBJECTIVE BOX
VITAL SIGNS-Telemetry:  SR 55-70  Vital Signs Last 24 Hrs  T(C): 36.7 (01-26-19 @ 04:56), Max: 36.8 (01-25-19 @ 13:30)  T(F): 98 (01-26-19 @ 04:56), Max: 98.3 (01-25-19 @ 13:30)  HR: 64 (01-26-19 @ 04:56) (64 - 80)  BP: 147/73 (01-26-19 @ 04:56) (145/74 - 147/73)  RR: 18 (01-26-19 @ 04:56) (18 - 18)  SpO2: 96% (01-26-19 @ 04:56) (96% - 98%)         01-25 @ 07:01  -  01-26 @ 07:00  --------------------------------------------------------  IN: 876 mL / OUT: 0 mL / NET: 876 mL    Daily     Daily     CAPILLARY BLOOD GLUCOSE  POCT Blood Glucose.: 137 mg/dL (26 Jan 2019 07:49)  POCT Blood Glucose.: 115 mg/dL (25 Jan 2019 16:45)  POCT Blood Glucose.: 140 mg/dL (25 Jan 2019 11:40)        PHYSICAL EXAM:  Neurology: alert and oriented x 3, nonfocal, no gross deficits  CV : S1S2 + systolic murmur  Lungs: CTA  Abdomen: soft, nontender, nondistended, positive bowel sounds, last bowel movement       +bm  Extremities:   no c/c/e      amLODIPine   Tablet 5 milliGRAM(s) Oral daily  aspirin enteric coated 81 milliGRAM(s) Oral daily  atorvastatin 10 milliGRAM(s) Oral at bedtime  dextrose 40% Gel 15 Gram(s) Oral once PRN  dextrose 50% Injectable 12.5 Gram(s) IV Push once  gabapentin 100 milliGRAM(s) Oral three times a day  heparin  Infusion 800 Unit(s)/Hr IV Continuous <Continuous>  hydrochlorothiazide 25 milliGRAM(s) Oral daily  insulin lispro (HumaLOG) corrective regimen sliding scale   SubCutaneous three times a day before meals  loperamide 2 milliGRAM(s) Oral two times a day PRN  metoprolol tartrate 25 milliGRAM(s) Oral two times a day    Physical Therapy Rec:   Home  [  ]   Home w/ PT  [ x ]  Rehab  [  ]  Discussed with Cardiothoracic Team at AM rounds.

## 2019-01-26 NOTE — PROGRESS NOTE ADULT - ASSESSMENT
This is a 69 y/o AA female with PMHx of PAD (on Eliquis last dose 1/23/19 AM), s/p RT. SFA stent, HTN, HLD, DM2 (Hgba1c 6.7, managed by Dr Tung BANSAL), LEILANI (not on CPAP), gastric bypass, left carotid bruit, s/p recent LLE (PANTERA to Lt SFA, balloon angioplasty on Lt pop, & PANTERA to Lt peroneal artery (3/22), April 2018 acute limb underwent catheter directed lysis and PTA / stent of left SFA, Popliteal, peroneal presents with complains of pain (not as severe as prior to intervention) in both calves L > R. Tolerance 1-2 blocks. Pt was referred for Peripheral Angiogram found to have 90% ISR of LSFA and pop s/p L SFA stenting, SAMUEL to SFA and popliteal.  Pt. endorses progressing episodes of exertional SOB and palpitations.  Denies chest pain/pressure, dizziness, diaphoresis, nausea, vomiting, recent weight gain, peripheral edema, or syncope.  Pt. does not take medications as prescribed. NST  1/10/19 results below.  Pt. presents today asymptomatic for further evaluation and cardiac cath.   CT Surgery consulted for  multi vessel disease   Patient amenable to cardiac surgery on Plavix now on hold P2ty12 sent.  1/25 VSS; vein mapping done; continue preop workup; p2y12 308; heparin gttp PTT 66.7; hct 26- ck guiac; prob transfuse 1 unit prbc in am   [facility] monday with Dr. Wolff

## 2019-01-27 LAB
ANION GAP SERPL CALC-SCNC: 11 MMOL/L — SIGNIFICANT CHANGE UP (ref 5–17)
APTT BLD: 89.5 SEC — HIGH (ref 27.5–36.3)
BUN SERPL-MCNC: 19 MG/DL — SIGNIFICANT CHANGE UP (ref 7–23)
CALCIUM SERPL-MCNC: 9.3 MG/DL — SIGNIFICANT CHANGE UP (ref 8.4–10.5)
CHLORIDE SERPL-SCNC: 104 MMOL/L — SIGNIFICANT CHANGE UP (ref 96–108)
CO2 SERPL-SCNC: 27 MMOL/L — SIGNIFICANT CHANGE UP (ref 22–31)
CREAT SERPL-MCNC: 0.75 MG/DL — SIGNIFICANT CHANGE UP (ref 0.5–1.3)
GLUCOSE BLDC GLUCOMTR-MCNC: 125 MG/DL — HIGH (ref 70–99)
GLUCOSE BLDC GLUCOMTR-MCNC: 134 MG/DL — HIGH (ref 70–99)
GLUCOSE BLDC GLUCOMTR-MCNC: 185 MG/DL — HIGH (ref 70–99)
GLUCOSE SERPL-MCNC: 139 MG/DL — HIGH (ref 70–99)
HCT VFR BLD CALC: 34.4 % — LOW (ref 34.5–45)
HGB BLD-MCNC: 11.6 G/DL — SIGNIFICANT CHANGE UP (ref 11.5–15.5)
MCHC RBC-ENTMCNC: 25.4 PG — LOW (ref 27–34)
MCHC RBC-ENTMCNC: 33.7 GM/DL — SIGNIFICANT CHANGE UP (ref 32–36)
MCV RBC AUTO: 75.3 FL — LOW (ref 80–100)
PLATELET # BLD AUTO: 304 K/UL — SIGNIFICANT CHANGE UP (ref 150–400)
POTASSIUM SERPL-MCNC: 3.6 MMOL/L — SIGNIFICANT CHANGE UP (ref 3.5–5.3)
POTASSIUM SERPL-SCNC: 3.6 MMOL/L — SIGNIFICANT CHANGE UP (ref 3.5–5.3)
RBC # BLD: 4.57 M/UL — SIGNIFICANT CHANGE UP (ref 3.8–5.2)
RBC # FLD: 19.6 % — HIGH (ref 10.3–14.5)
SODIUM SERPL-SCNC: 142 MMOL/L — SIGNIFICANT CHANGE UP (ref 135–145)
WBC # BLD: 8.3 K/UL — SIGNIFICANT CHANGE UP (ref 3.8–10.5)
WBC # FLD AUTO: 8.3 K/UL — SIGNIFICANT CHANGE UP (ref 3.8–10.5)

## 2019-01-27 PROCEDURE — 99232 SBSQ HOSP IP/OBS MODERATE 35: CPT | Mod: 25

## 2019-01-27 PROCEDURE — 93010 ELECTROCARDIOGRAM REPORT: CPT

## 2019-01-27 RX ORDER — ALPRAZOLAM 0.25 MG
0.25 TABLET ORAL ONCE
Qty: 0 | Refills: 0 | Status: DISCONTINUED | OUTPATIENT
Start: 2019-01-27 | End: 2019-01-27

## 2019-01-27 RX ORDER — CHLORHEXIDINE GLUCONATE 213 G/1000ML
1 SOLUTION TOPICAL ONCE
Qty: 0 | Refills: 0 | Status: COMPLETED | OUTPATIENT
Start: 2019-01-27 | End: 2019-01-27

## 2019-01-27 RX ORDER — CHLORHEXIDINE GLUCONATE 213 G/1000ML
15 SOLUTION TOPICAL ONCE
Qty: 0 | Refills: 0 | Status: DISCONTINUED | OUTPATIENT
Start: 2019-01-27 | End: 2019-01-28

## 2019-01-27 RX ORDER — CEFUROXIME AXETIL 250 MG
1500 TABLET ORAL ONCE
Qty: 0 | Refills: 0 | Status: COMPLETED | OUTPATIENT
Start: 2019-01-27 | End: 2019-01-27

## 2019-01-27 RX ADMIN — AMLODIPINE BESYLATE 5 MILLIGRAM(S): 2.5 TABLET ORAL at 08:44

## 2019-01-27 RX ADMIN — Medication 650 MILLIGRAM(S): at 09:15

## 2019-01-27 RX ADMIN — Medication 650 MILLIGRAM(S): at 08:44

## 2019-01-27 RX ADMIN — CHLORHEXIDINE GLUCONATE 1 APPLICATION(S): 213 SOLUTION TOPICAL at 20:23

## 2019-01-27 RX ADMIN — Medication 81 MILLIGRAM(S): at 08:44

## 2019-01-27 RX ADMIN — GABAPENTIN 100 MILLIGRAM(S): 400 CAPSULE ORAL at 08:44

## 2019-01-27 RX ADMIN — Medication 25 MILLIGRAM(S): at 17:16

## 2019-01-27 RX ADMIN — Medication 0.25 MILLIGRAM(S): at 21:59

## 2019-01-27 RX ADMIN — Medication 25 MILLIGRAM(S): at 08:44

## 2019-01-28 ENCOUNTER — APPOINTMENT (OUTPATIENT)
Dept: CARDIOTHORACIC SURGERY | Facility: HOSPITAL | Age: 69
End: 2019-01-28

## 2019-01-28 LAB
ALBUMIN SERPL ELPH-MCNC: 3 G/DL — LOW (ref 3.3–5)
ALP SERPL-CCNC: 123 U/L — HIGH (ref 40–120)
ALT FLD-CCNC: 69 U/L — HIGH (ref 10–45)
ANION GAP SERPL CALC-SCNC: 14 MMOL/L — SIGNIFICANT CHANGE UP (ref 5–17)
ANISOCYTOSIS BLD QL: SLIGHT — SIGNIFICANT CHANGE UP
APTT BLD: 27.4 SEC — LOW (ref 27.5–36.3)
AST SERPL-CCNC: 178 U/L — HIGH (ref 10–40)
BASE EXCESS BLDMV CALC-SCNC: -0.1 MMOL/L — SIGNIFICANT CHANGE UP (ref -3–3)
BASE EXCESS BLDMV CALC-SCNC: -1.8 MMOL/L — SIGNIFICANT CHANGE UP (ref -3–3)
BASE EXCESS BLDMV CALC-SCNC: -1.9 MMOL/L — SIGNIFICANT CHANGE UP (ref -3–3)
BASE EXCESS BLDMV CALC-SCNC: -2.5 MMOL/L — SIGNIFICANT CHANGE UP (ref -3–3)
BASE EXCESS BLDMV CALC-SCNC: 0.5 MMOL/L — SIGNIFICANT CHANGE UP (ref -3–3)
BASOPHILS # BLD AUTO: 0 K/UL — SIGNIFICANT CHANGE UP (ref 0–0.2)
BILIRUB SERPL-MCNC: 1.1 MG/DL — SIGNIFICANT CHANGE UP (ref 0.2–1.2)
BUN SERPL-MCNC: 17 MG/DL — SIGNIFICANT CHANGE UP (ref 7–23)
CALCIUM SERPL-MCNC: 8.8 MG/DL — SIGNIFICANT CHANGE UP (ref 8.4–10.5)
CHLORIDE SERPL-SCNC: 107 MMOL/L — SIGNIFICANT CHANGE UP (ref 96–108)
CK MB BLD-MCNC: 10.1 % — HIGH (ref 0–3.5)
CK MB CFR SERPL CALC: 35.2 NG/ML — HIGH (ref 0–3.8)
CK SERPL-CCNC: 349 U/L — HIGH (ref 25–170)
CO2 BLDMV-SCNC: 24 MMOL/L — SIGNIFICANT CHANGE UP (ref 21–29)
CO2 BLDMV-SCNC: 24 MMOL/L — SIGNIFICANT CHANGE UP (ref 21–29)
CO2 BLDMV-SCNC: 25 MMOL/L — SIGNIFICANT CHANGE UP (ref 21–29)
CO2 BLDMV-SCNC: 25 MMOL/L — SIGNIFICANT CHANGE UP (ref 21–29)
CO2 BLDMV-SCNC: 26 MMOL/L — SIGNIFICANT CHANGE UP (ref 21–29)
CO2 SERPL-SCNC: 21 MMOL/L — LOW (ref 22–31)
CREAT SERPL-MCNC: 0.69 MG/DL — SIGNIFICANT CHANGE UP (ref 0.5–1.3)
EOSINOPHIL # BLD AUTO: 0.2 K/UL — SIGNIFICANT CHANGE UP (ref 0–0.5)
EOSINOPHIL NFR BLD AUTO: 3 % — SIGNIFICANT CHANGE UP (ref 0–6)
FIBRINOGEN PPP-MCNC: 425 MG/DL — SIGNIFICANT CHANGE UP (ref 350–510)
GAS PNL BLDA: SIGNIFICANT CHANGE UP
GAS PNL BLDMV: SIGNIFICANT CHANGE UP
GLUCOSE BLDC GLUCOMTR-MCNC: 103 MG/DL — HIGH (ref 70–99)
GLUCOSE BLDC GLUCOMTR-MCNC: 111 MG/DL — HIGH (ref 70–99)
GLUCOSE BLDC GLUCOMTR-MCNC: 127 MG/DL — HIGH (ref 70–99)
GLUCOSE BLDC GLUCOMTR-MCNC: 129 MG/DL — HIGH (ref 70–99)
GLUCOSE BLDC GLUCOMTR-MCNC: 178 MG/DL — HIGH (ref 70–99)
GLUCOSE BLDC GLUCOMTR-MCNC: 187 MG/DL — HIGH (ref 70–99)
GLUCOSE BLDC GLUCOMTR-MCNC: 97 MG/DL — SIGNIFICANT CHANGE UP (ref 70–99)
GLUCOSE SERPL-MCNC: 194 MG/DL — HIGH (ref 70–99)
HCO3 BLDMV-SCNC: 23 MMOL/L — SIGNIFICANT CHANGE UP (ref 20–28)
HCO3 BLDMV-SCNC: 24 MMOL/L — SIGNIFICANT CHANGE UP (ref 20–28)
HCO3 BLDMV-SCNC: 25 MMOL/L — SIGNIFICANT CHANGE UP (ref 20–28)
HCT VFR BLD CALC: 30.4 % — LOW (ref 34.5–45)
HGB BLD-MCNC: 10.2 G/DL — LOW (ref 11.5–15.5)
HOROWITZ INDEX BLDMV+IHG-RTO: 100 — SIGNIFICANT CHANGE UP
HOROWITZ INDEX BLDMV+IHG-RTO: 40 — SIGNIFICANT CHANGE UP
HOROWITZ INDEX BLDMV+IHG-RTO: 60 — SIGNIFICANT CHANGE UP
HYPOCHROMIA BLD QL: SLIGHT — SIGNIFICANT CHANGE UP
INR BLD: 1.26 RATIO — HIGH (ref 0.88–1.16)
LYMPHOCYTES # BLD AUTO: 1.9 K/UL — SIGNIFICANT CHANGE UP (ref 1–3.3)
LYMPHOCYTES # BLD AUTO: 20 % — SIGNIFICANT CHANGE UP (ref 13–44)
MCHC RBC-ENTMCNC: 26.3 PG — LOW (ref 27–34)
MCHC RBC-ENTMCNC: 33.6 GM/DL — SIGNIFICANT CHANGE UP (ref 32–36)
MCV RBC AUTO: 78.3 FL — LOW (ref 80–100)
MICROCYTES BLD QL: SLIGHT — SIGNIFICANT CHANGE UP
MONOCYTES # BLD AUTO: 0.5 K/UL — SIGNIFICANT CHANGE UP (ref 0–0.9)
MONOCYTES NFR BLD AUTO: 5 % — SIGNIFICANT CHANGE UP (ref 2–14)
NEUTROPHILS # BLD AUTO: 8.6 K/UL — HIGH (ref 1.8–7.4)
NEUTROPHILS NFR BLD AUTO: 62 % — SIGNIFICANT CHANGE UP (ref 43–77)
NEUTS BAND # BLD: 10 % — HIGH (ref 0–8)
O2 CT VFR BLD CALC: 35 MMHG — SIGNIFICANT CHANGE UP (ref 30–65)
O2 CT VFR BLD CALC: 38 MMHG — SIGNIFICANT CHANGE UP (ref 30–65)
O2 CT VFR BLD CALC: 42 MMHG — SIGNIFICANT CHANGE UP (ref 30–65)
O2 CT VFR BLD CALC: 44 MMHG — SIGNIFICANT CHANGE UP (ref 30–65)
O2 CT VFR BLD CALC: 48 MMHG — SIGNIFICANT CHANGE UP (ref 30–65)
PCO2 BLDMV: 39 MMHG — SIGNIFICANT CHANGE UP (ref 30–65)
PCO2 BLDMV: 41 MMHG — SIGNIFICANT CHANGE UP (ref 30–65)
PCO2 BLDMV: 42 MMHG — SIGNIFICANT CHANGE UP (ref 30–65)
PCO2 BLDMV: 43 MMHG — SIGNIFICANT CHANGE UP (ref 30–65)
PCO2 BLDMV: 48 MMHG — SIGNIFICANT CHANGE UP (ref 30–65)
PH BLDMV: 7.31 — LOW (ref 7.32–7.45)
PH BLDMV: 7.35 — SIGNIFICANT CHANGE UP (ref 7.32–7.45)
PH BLDMV: 7.35 — SIGNIFICANT CHANGE UP (ref 7.32–7.45)
PH BLDMV: 7.4 — SIGNIFICANT CHANGE UP (ref 7.32–7.45)
PH BLDMV: 7.4 — SIGNIFICANT CHANGE UP (ref 7.32–7.45)
PLAT MORPH BLD: NORMAL — SIGNIFICANT CHANGE UP
PLATELET # BLD AUTO: 188 K/UL — SIGNIFICANT CHANGE UP (ref 150–400)
POTASSIUM SERPL-MCNC: 4 MMOL/L — SIGNIFICANT CHANGE UP (ref 3.5–5.3)
POTASSIUM SERPL-SCNC: 4 MMOL/L — SIGNIFICANT CHANGE UP (ref 3.5–5.3)
PROT SERPL-MCNC: 4.9 G/DL — LOW (ref 6–8.3)
PROTHROM AB SERPL-ACNC: 14.6 SEC — HIGH (ref 10–12.9)
RBC # BLD: 3.88 M/UL — SIGNIFICANT CHANGE UP (ref 3.8–5.2)
RBC # FLD: 19.3 % — HIGH (ref 10.3–14.5)
RBC BLD AUTO: ABNORMAL
SAO2 % BLDMV: 61 % — SIGNIFICANT CHANGE UP (ref 60–90)
SAO2 % BLDMV: 68 % — SIGNIFICANT CHANGE UP (ref 60–90)
SAO2 % BLDMV: 69 % — SIGNIFICANT CHANGE UP (ref 60–90)
SAO2 % BLDMV: 69 % — SIGNIFICANT CHANGE UP (ref 60–90)
SAO2 % BLDMV: 74 % — SIGNIFICANT CHANGE UP (ref 60–90)
SODIUM SERPL-SCNC: 142 MMOL/L — SIGNIFICANT CHANGE UP (ref 135–145)
TROPONIN T, HIGH SENSITIVITY RESULT: 1222 NG/L — HIGH (ref 0–51)
WBC # BLD: 11.2 K/UL — HIGH (ref 3.8–10.5)
WBC # FLD AUTO: 11.2 K/UL — HIGH (ref 3.8–10.5)

## 2019-01-28 PROCEDURE — 93010 ELECTROCARDIOGRAM REPORT: CPT

## 2019-01-28 PROCEDURE — 71045 X-RAY EXAM CHEST 1 VIEW: CPT | Mod: 26

## 2019-01-28 PROCEDURE — 33533 CABG ARTERIAL SINGLE: CPT

## 2019-01-28 PROCEDURE — 33533 CABG ARTERIAL SINGLE: CPT | Mod: AS

## 2019-01-28 PROCEDURE — 99291 CRITICAL CARE FIRST HOUR: CPT

## 2019-01-28 PROCEDURE — 33518 CABG ARTERY-VEIN TWO: CPT | Mod: AS

## 2019-01-28 PROCEDURE — 33518 CABG ARTERY-VEIN TWO: CPT

## 2019-01-28 RX ORDER — POTASSIUM CHLORIDE 20 MEQ
10 PACKET (EA) ORAL ONCE
Qty: 0 | Refills: 0 | Status: COMPLETED | OUTPATIENT
Start: 2019-01-28 | End: 2019-01-28

## 2019-01-28 RX ORDER — CEFUROXIME AXETIL 250 MG
1500 TABLET ORAL EVERY 8 HOURS
Qty: 0 | Refills: 0 | Status: COMPLETED | OUTPATIENT
Start: 2019-01-28 | End: 2019-01-30

## 2019-01-28 RX ORDER — POTASSIUM CHLORIDE 20 MEQ
10 PACKET (EA) ORAL
Qty: 0 | Refills: 0 | Status: DISCONTINUED | OUTPATIENT
Start: 2019-01-28 | End: 2019-01-29

## 2019-01-28 RX ORDER — DOCUSATE SODIUM 100 MG
100 CAPSULE ORAL THREE TIMES A DAY
Qty: 0 | Refills: 0 | Status: DISCONTINUED | OUTPATIENT
Start: 2019-01-28 | End: 2019-02-03

## 2019-01-28 RX ORDER — SODIUM CHLORIDE 9 MG/ML
250 INJECTION, SOLUTION INTRAVENOUS
Qty: 0 | Refills: 0 | Status: DISCONTINUED | OUTPATIENT
Start: 2019-01-28 | End: 2019-01-28

## 2019-01-28 RX ORDER — OXYCODONE AND ACETAMINOPHEN 5; 325 MG/1; MG/1
2 TABLET ORAL EVERY 6 HOURS
Qty: 0 | Refills: 0 | Status: DISCONTINUED | OUTPATIENT
Start: 2019-01-28 | End: 2019-01-29

## 2019-01-28 RX ORDER — CHLORHEXIDINE GLUCONATE 213 G/1000ML
5 SOLUTION TOPICAL EVERY 4 HOURS
Qty: 0 | Refills: 0 | Status: DISCONTINUED | OUTPATIENT
Start: 2019-01-28 | End: 2019-01-31

## 2019-01-28 RX ORDER — SODIUM CHLORIDE 9 MG/ML
500 INJECTION, SOLUTION INTRAVENOUS ONCE
Qty: 0 | Refills: 0 | Status: COMPLETED | OUTPATIENT
Start: 2019-01-28 | End: 2019-01-28

## 2019-01-28 RX ORDER — DEXTROSE 50 % IN WATER 50 %
50 SYRINGE (ML) INTRAVENOUS
Qty: 0 | Refills: 0 | Status: DISCONTINUED | OUTPATIENT
Start: 2019-01-28 | End: 2019-02-03

## 2019-01-28 RX ORDER — POTASSIUM CHLORIDE 20 MEQ
10 PACKET (EA) ORAL
Qty: 0 | Refills: 0 | Status: COMPLETED | OUTPATIENT
Start: 2019-01-28 | End: 2019-01-28

## 2019-01-28 RX ORDER — FAMOTIDINE 10 MG/ML
20 INJECTION INTRAVENOUS EVERY 12 HOURS
Qty: 0 | Refills: 0 | Status: DISCONTINUED | OUTPATIENT
Start: 2019-01-28 | End: 2019-01-29

## 2019-01-28 RX ORDER — HYDROMORPHONE HYDROCHLORIDE 2 MG/ML
0.5 INJECTION INTRAMUSCULAR; INTRAVENOUS; SUBCUTANEOUS ONCE
Qty: 0 | Refills: 0 | Status: DISCONTINUED | OUTPATIENT
Start: 2019-01-28 | End: 2019-01-28

## 2019-01-28 RX ORDER — METOCLOPRAMIDE HCL 10 MG
10 TABLET ORAL EVERY 8 HOURS
Qty: 0 | Refills: 0 | Status: DISCONTINUED | OUTPATIENT
Start: 2019-01-28 | End: 2019-01-28

## 2019-01-28 RX ORDER — MEPERIDINE HYDROCHLORIDE 50 MG/ML
25 INJECTION INTRAMUSCULAR; INTRAVENOUS; SUBCUTANEOUS ONCE
Qty: 0 | Refills: 0 | Status: DISCONTINUED | OUTPATIENT
Start: 2019-01-28 | End: 2019-01-29

## 2019-01-28 RX ORDER — NICARDIPINE HYDROCHLORIDE 30 MG/1
5 CAPSULE, EXTENDED RELEASE ORAL
Qty: 40 | Refills: 0 | Status: DISCONTINUED | OUTPATIENT
Start: 2019-01-28 | End: 2019-01-29

## 2019-01-28 RX ORDER — SODIUM CHLORIDE 9 MG/ML
250 INJECTION, SOLUTION INTRAVENOUS ONCE
Qty: 0 | Refills: 0 | Status: COMPLETED | OUTPATIENT
Start: 2019-01-28 | End: 2019-01-28

## 2019-01-28 RX ORDER — SODIUM CHLORIDE 9 MG/ML
1000 INJECTION INTRAMUSCULAR; INTRAVENOUS; SUBCUTANEOUS
Qty: 0 | Refills: 0 | Status: DISCONTINUED | OUTPATIENT
Start: 2019-01-28 | End: 2019-01-31

## 2019-01-28 RX ORDER — NOREPINEPHRINE BITARTRATE/D5W 8 MG/250ML
0.03 PLASTIC BAG, INJECTION (ML) INTRAVENOUS
Qty: 16 | Refills: 0 | Status: DISCONTINUED | OUTPATIENT
Start: 2019-01-28 | End: 2019-01-28

## 2019-01-28 RX ORDER — ASPIRIN/CALCIUM CARB/MAGNESIUM 324 MG
300 TABLET ORAL ONCE
Qty: 0 | Refills: 0 | Status: DISCONTINUED | OUTPATIENT
Start: 2019-01-28 | End: 2019-01-29

## 2019-01-28 RX ORDER — ASPIRIN/CALCIUM CARB/MAGNESIUM 324 MG
325 TABLET ORAL DAILY
Qty: 0 | Refills: 0 | Status: DISCONTINUED | OUTPATIENT
Start: 2019-01-28 | End: 2019-01-29

## 2019-01-28 RX ORDER — SODIUM CHLORIDE 9 MG/ML
1000 INJECTION, SOLUTION INTRAVENOUS
Qty: 0 | Refills: 0 | Status: DISCONTINUED | OUTPATIENT
Start: 2019-01-28 | End: 2019-01-31

## 2019-01-28 RX ORDER — DEXTROSE 50 % IN WATER 50 %
25 SYRINGE (ML) INTRAVENOUS
Qty: 0 | Refills: 0 | Status: DISCONTINUED | OUTPATIENT
Start: 2019-01-28 | End: 2019-02-03

## 2019-01-28 RX ORDER — OXYCODONE AND ACETAMINOPHEN 5; 325 MG/1; MG/1
1 TABLET ORAL EVERY 4 HOURS
Qty: 0 | Refills: 0 | Status: DISCONTINUED | OUTPATIENT
Start: 2019-01-28 | End: 2019-01-29

## 2019-01-28 RX ORDER — NOREPINEPHRINE BITARTRATE/D5W 8 MG/250ML
0.05 PLASTIC BAG, INJECTION (ML) INTRAVENOUS
Qty: 8 | Refills: 0 | Status: DISCONTINUED | OUTPATIENT
Start: 2019-01-28 | End: 2019-01-28

## 2019-01-28 RX ORDER — SODIUM CHLORIDE 9 MG/ML
250 INJECTION, SOLUTION INTRAVENOUS ONCE
Qty: 0 | Refills: 0 | Status: DISCONTINUED | OUTPATIENT
Start: 2019-01-28 | End: 2019-01-28

## 2019-01-28 RX ORDER — METOCLOPRAMIDE HCL 10 MG
10 TABLET ORAL EVERY 8 HOURS
Qty: 0 | Refills: 0 | Status: DISCONTINUED | OUTPATIENT
Start: 2019-01-28 | End: 2019-02-02

## 2019-01-28 RX ORDER — INSULIN HUMAN 100 [IU]/ML
2 INJECTION, SOLUTION SUBCUTANEOUS
Qty: 100 | Refills: 0 | Status: DISCONTINUED | OUTPATIENT
Start: 2019-01-28 | End: 2019-01-29

## 2019-01-28 RX ADMIN — Medication 100 MILLIEQUIVALENT(S): at 23:52

## 2019-01-28 RX ADMIN — SODIUM CHLORIDE 500 MILLILITER(S): 9 INJECTION, SOLUTION INTRAVENOUS at 18:45

## 2019-01-28 RX ADMIN — Medication 10 MILLIGRAM(S): at 15:03

## 2019-01-28 RX ADMIN — Medication 100 MILLIEQUIVALENT(S): at 20:25

## 2019-01-28 RX ADMIN — Medication 100 MILLIGRAM(S): at 22:22

## 2019-01-28 RX ADMIN — Medication 10 MILLIGRAM(S): at 22:22

## 2019-01-28 RX ADMIN — Medication 50 MILLIEQUIVALENT(S): at 15:41

## 2019-01-28 RX ADMIN — Medication 50 MILLIEQUIVALENT(S): at 18:59

## 2019-01-28 RX ADMIN — HYDROMORPHONE HYDROCHLORIDE 0.5 MILLIGRAM(S): 2 INJECTION INTRAMUSCULAR; INTRAVENOUS; SUBCUTANEOUS at 17:45

## 2019-01-28 RX ADMIN — Medication 50 MILLIEQUIVALENT(S): at 14:00

## 2019-01-28 RX ADMIN — Medication 25 MILLIGRAM(S): at 06:15

## 2019-01-28 RX ADMIN — Medication 100 MILLIEQUIVALENT(S): at 21:17

## 2019-01-28 RX ADMIN — Medication 50 MILLIEQUIVALENT(S): at 13:27

## 2019-01-28 RX ADMIN — Medication 25 MILLIGRAM(S): at 06:17

## 2019-01-28 RX ADMIN — CHLORHEXIDINE GLUCONATE 5 MILLILITER(S): 213 SOLUTION TOPICAL at 17:48

## 2019-01-28 RX ADMIN — SODIUM CHLORIDE 500 MILLILITER(S): 9 INJECTION, SOLUTION INTRAVENOUS at 13:45

## 2019-01-28 RX ADMIN — AMLODIPINE BESYLATE 5 MILLIGRAM(S): 2.5 TABLET ORAL at 06:17

## 2019-01-28 RX ADMIN — SODIUM CHLORIDE 1000 MILLILITER(S): 9 INJECTION, SOLUTION INTRAVENOUS at 17:50

## 2019-01-28 RX ADMIN — FAMOTIDINE 20 MILLIGRAM(S): 10 INJECTION INTRAVENOUS at 17:48

## 2019-01-28 RX ADMIN — Medication 325 MILLIGRAM(S): at 22:22

## 2019-01-28 RX ADMIN — CHLORHEXIDINE GLUCONATE 5 MILLILITER(S): 213 SOLUTION TOPICAL at 13:28

## 2019-01-28 RX ADMIN — Medication 100 MILLIGRAM(S): at 16:07

## 2019-01-28 RX ADMIN — HYDROMORPHONE HYDROCHLORIDE 0.5 MILLIGRAM(S): 2 INJECTION INTRAMUSCULAR; INTRAVENOUS; SUBCUTANEOUS at 18:00

## 2019-01-28 NOTE — BRIEF OPERATIVE NOTE - PROCEDURE
<<-----Click on this checkbox to enter Procedure CABG, 1 arterial and 2 venous  01/28/2019  Lima to LAD/ SVG to OM/ Distal RCA  Active  NORBERT

## 2019-01-28 NOTE — PROGRESS NOTE ADULT - SUBJECTIVE AND OBJECTIVE BOX
CRITICAL CARE ATTENDING - CTICU    MEDICATIONS  (STANDING):  aspirin enteric coated 325 milliGRAM(s) Oral daily  aspirin Suppository 300 milliGRAM(s) Rectal once  cefuroxime  IVPB 1500 milliGRAM(s) IV Intermittent once  chlorhexidine 0.12% Liquid 5 milliLiter(s) Oral Mucosa every 4 hours  dextrose 5%. 1000 milliLiter(s) (15 mL/Hr) IV Continuous <Continuous>  dextrose 50% Injectable 50 milliLiter(s) IV Push every 15 minutes  dextrose 50% Injectable 25 milliLiter(s) IV Push every 15 minutes  docusate sodium 100 milliGRAM(s) Oral three times a day  famotidine Injectable 20 milliGRAM(s) IV Push every 12 hours  insulin Infusion 2 Unit(s)/Hr (2 mL/Hr) IV Continuous <Continuous>  meperidine     Injectable 25 milliGRAM(s) IV Push once  metoclopramide  IVPB 10 milliGRAM(s) IV Intermittent every 8 hours  niCARdipine Infusion 5 mG/Hr (25 mL/Hr) IV Continuous <Continuous>  norepinephrine Infusion 0.03 MICROgram(s)/kG/Min (2 mL/Hr) IV Continuous <Continuous>  potassium chloride  10 mEq/50 mL IVPB 10 milliEquivalent(s) IV Intermittent every 1 hour  potassium chloride  10 mEq/50 mL IVPB 10 milliEquivalent(s) IV Intermittent every 1 hour  potassium chloride  10 mEq/50 mL IVPB 10 milliEquivalent(s) IV Intermittent every 1 hour  sodium chloride 0.9%. 1000 milliLiter(s) (10 mL/Hr) IV Continuous <Continuous>                                    11.6   8.3   )-----------( 304      ( 27 Jan 2019 08:55 )             34.4       01-27    142  |  104  |  19  ----------------------------<  139<H>  3.6   |  27  |  0.75    Ca    9.3      27 Jan 2019 08:55        PTT - ( 27 Jan 2019 08:55 )  PTT:89.5 sec        Daily Height in cm: 165.1 (28 Jan 2019 08:29)    Daily       01-27 @ 07:01  -  01-28 @ 07:00  --------------------------------------------------------  IN: 800 mL / OUT: 0 mL / NET: 800 mL        Critically Ill patient  : [ ] preoperative ,   [x ] post operative    Requires :  [x ] Arterial Line   [x ] Central Line  [x ] PA catheter  [ ] IABP  [ ] ECMO  [ ] LVAD  [x ] Ventilator  [x ] pacemaker [ ] Impella.    Bedside evaluation , monitoring , treatment of hemodynamics , fluids , IVP/ IVCD meds.        Diagnosis:     Op day CABG x3 L     Hypotension    Hypvolemia     Hemodynamic lability,instability. Requires IVCD [x ] vasopressors [ ] inotropes  [ ] vasodilator  [ x]IVSS fluid  to maintain MAP, perfusion, C.I.    [place] Janeen catheter interpretation and therapeutic management of unstable hemodynamics    Ventilator Management:  [x ]AC-rest    [x ]CPAP-PS Wean tonight   [ ]Trach Collar     [ ]Extubate    [ ] T-Piece  [ ]peep>5    Requires chest PT, pulmonary toilet, ambu bagging, suctioning to maintain SaO2,  patent airway and treat atelectasis.    Temporary pacemaker (TPM) interrogation and setting.                      Discussed with CT surgeon, Physician's Assistant - Nurse Practitioner- Critical care medicine team.   Dicussed at  AM / PM rounds.   Chart, labs , films reviewed.    Total Time: 30 min.

## 2019-01-28 NOTE — PRE-ANESTHESIA EVALUATION ADULT - NSANTHADDINFOFT_GEN_ALL_CORE
Decision to forego placing SALBADOR in pt with prior Gastric Bypass and normal LV function on preop TTE for non vavlular surgery

## 2019-01-28 NOTE — AIRWAY REMOVAL NOTE  ADULT & PEDS - ARTIFICAL AIRWAY REMOVAL COMMENTS
Written order for extubation verified. The patient was identified by full name and birth date compared to the identification band. Present during the procedure was Tracey Mukherjee RN.

## 2019-01-29 DIAGNOSIS — Z95.1 PRESENCE OF AORTOCORONARY BYPASS GRAFT: ICD-10-CM

## 2019-01-29 LAB
ALBUMIN SERPL ELPH-MCNC: 3.4 G/DL — SIGNIFICANT CHANGE UP (ref 3.3–5)
ALP SERPL-CCNC: 122 U/L — HIGH (ref 40–120)
ALT FLD-CCNC: 70 U/L — HIGH (ref 10–45)
ANION GAP SERPL CALC-SCNC: 12 MMOL/L — SIGNIFICANT CHANGE UP (ref 5–17)
APTT BLD: 27.2 SEC — LOW (ref 27.5–36.3)
AST SERPL-CCNC: 112 U/L — HIGH (ref 10–40)
BASE EXCESS BLDMV CALC-SCNC: -1.9 MMOL/L — SIGNIFICANT CHANGE UP (ref -3–3)
BILIRUB SERPL-MCNC: 0.8 MG/DL — SIGNIFICANT CHANGE UP (ref 0.2–1.2)
BUN SERPL-MCNC: 15 MG/DL — SIGNIFICANT CHANGE UP (ref 7–23)
CALCIUM SERPL-MCNC: 8.6 MG/DL — SIGNIFICANT CHANGE UP (ref 8.4–10.5)
CHLORIDE SERPL-SCNC: 111 MMOL/L — HIGH (ref 96–108)
CO2 BLDMV-SCNC: 24 MMOL/L — SIGNIFICANT CHANGE UP (ref 21–29)
CO2 SERPL-SCNC: 20 MMOL/L — LOW (ref 22–31)
CREAT SERPL-MCNC: 0.72 MG/DL — SIGNIFICANT CHANGE UP (ref 0.5–1.3)
GAS PNL BLDA: SIGNIFICANT CHANGE UP
GAS PNL BLDA: SIGNIFICANT CHANGE UP
GAS PNL BLDMV: SIGNIFICANT CHANGE UP
GLUCOSE BLDC GLUCOMTR-MCNC: 107 MG/DL — HIGH (ref 70–99)
GLUCOSE BLDC GLUCOMTR-MCNC: 113 MG/DL — HIGH (ref 70–99)
GLUCOSE BLDC GLUCOMTR-MCNC: 121 MG/DL — HIGH (ref 70–99)
GLUCOSE BLDC GLUCOMTR-MCNC: 125 MG/DL — HIGH (ref 70–99)
GLUCOSE BLDC GLUCOMTR-MCNC: 128 MG/DL — HIGH (ref 70–99)
GLUCOSE BLDC GLUCOMTR-MCNC: 139 MG/DL — HIGH (ref 70–99)
GLUCOSE BLDC GLUCOMTR-MCNC: 152 MG/DL — HIGH (ref 70–99)
GLUCOSE BLDC GLUCOMTR-MCNC: 157 MG/DL — HIGH (ref 70–99)
GLUCOSE BLDC GLUCOMTR-MCNC: 164 MG/DL — HIGH (ref 70–99)
GLUCOSE BLDC GLUCOMTR-MCNC: 58 MG/DL — LOW (ref 70–99)
GLUCOSE BLDC GLUCOMTR-MCNC: 72 MG/DL — SIGNIFICANT CHANGE UP (ref 70–99)
GLUCOSE BLDC GLUCOMTR-MCNC: 87 MG/DL — SIGNIFICANT CHANGE UP (ref 70–99)
GLUCOSE BLDC GLUCOMTR-MCNC: 92 MG/DL — SIGNIFICANT CHANGE UP (ref 70–99)
GLUCOSE BLDC GLUCOMTR-MCNC: 92 MG/DL — SIGNIFICANT CHANGE UP (ref 70–99)
GLUCOSE SERPL-MCNC: 153 MG/DL — HIGH (ref 70–99)
HCO3 BLDMV-SCNC: 23 MMOL/L — SIGNIFICANT CHANGE UP (ref 20–28)
HCT VFR BLD CALC: 32.3 % — LOW (ref 34.5–45)
HGB BLD-MCNC: 11.1 G/DL — LOW (ref 11.5–15.5)
HOROWITZ INDEX BLDMV+IHG-RTO: 40 — SIGNIFICANT CHANGE UP
INR BLD: 1.12 RATIO — SIGNIFICANT CHANGE UP (ref 0.88–1.16)
MAGNESIUM SERPL-MCNC: 1.7 MG/DL — SIGNIFICANT CHANGE UP (ref 1.6–2.6)
MCHC RBC-ENTMCNC: 27.2 PG — SIGNIFICANT CHANGE UP (ref 27–34)
MCHC RBC-ENTMCNC: 34.5 GM/DL — SIGNIFICANT CHANGE UP (ref 32–36)
MCV RBC AUTO: 78.7 FL — LOW (ref 80–100)
O2 CT VFR BLD CALC: 38 MMHG — SIGNIFICANT CHANGE UP (ref 30–65)
PCO2 BLDMV: 44 MMHG — SIGNIFICANT CHANGE UP (ref 30–65)
PH BLDMV: 7.35 — SIGNIFICANT CHANGE UP (ref 7.32–7.45)
PHOSPHATE SERPL-MCNC: 3.7 MG/DL — SIGNIFICANT CHANGE UP (ref 2.5–4.5)
PLATELET # BLD AUTO: 219 K/UL — SIGNIFICANT CHANGE UP (ref 150–400)
POTASSIUM SERPL-MCNC: 5 MMOL/L — SIGNIFICANT CHANGE UP (ref 3.5–5.3)
POTASSIUM SERPL-SCNC: 5 MMOL/L — SIGNIFICANT CHANGE UP (ref 3.5–5.3)
PROT SERPL-MCNC: 5.7 G/DL — LOW (ref 6–8.3)
PROTHROM AB SERPL-ACNC: 12.9 SEC — SIGNIFICANT CHANGE UP (ref 10–12.9)
RBC # BLD: 4.1 M/UL — SIGNIFICANT CHANGE UP (ref 3.8–5.2)
RBC # FLD: 19.6 % — HIGH (ref 10.3–14.5)
SAO2 % BLDMV: 63 % — SIGNIFICANT CHANGE UP (ref 60–90)
SODIUM SERPL-SCNC: 143 MMOL/L — SIGNIFICANT CHANGE UP (ref 135–145)
WBC # BLD: 13.7 K/UL — HIGH (ref 3.8–10.5)
WBC # FLD AUTO: 13.7 K/UL — HIGH (ref 3.8–10.5)

## 2019-01-29 PROCEDURE — 71045 X-RAY EXAM CHEST 1 VIEW: CPT | Mod: 26,76

## 2019-01-29 PROCEDURE — 99233 SBSQ HOSP IP/OBS HIGH 50: CPT

## 2019-01-29 PROCEDURE — 93010 ELECTROCARDIOGRAM REPORT: CPT

## 2019-01-29 RX ORDER — METOPROLOL TARTRATE 50 MG
25 TABLET ORAL EVERY 8 HOURS
Qty: 0 | Refills: 0 | Status: DISCONTINUED | OUTPATIENT
Start: 2019-01-29 | End: 2019-01-30

## 2019-01-29 RX ORDER — ENOXAPARIN SODIUM 100 MG/ML
30 INJECTION SUBCUTANEOUS DAILY
Qty: 0 | Refills: 0 | Status: DISCONTINUED | OUTPATIENT
Start: 2019-01-29 | End: 2019-02-03

## 2019-01-29 RX ORDER — TRAMADOL HYDROCHLORIDE 50 MG/1
50 TABLET ORAL ONCE
Qty: 0 | Refills: 0 | Status: DISCONTINUED | OUTPATIENT
Start: 2019-01-29 | End: 2019-01-29

## 2019-01-29 RX ORDER — AMLODIPINE BESYLATE 2.5 MG/1
5 TABLET ORAL ONCE
Qty: 0 | Refills: 0 | Status: COMPLETED | OUTPATIENT
Start: 2019-01-29 | End: 2019-01-29

## 2019-01-29 RX ORDER — TICAGRELOR 90 MG/1
60 TABLET ORAL
Qty: 0 | Refills: 0 | Status: DISCONTINUED | OUTPATIENT
Start: 2019-01-29 | End: 2019-01-30

## 2019-01-29 RX ORDER — ATORVASTATIN CALCIUM 80 MG/1
10 TABLET, FILM COATED ORAL AT BEDTIME
Qty: 0 | Refills: 0 | Status: DISCONTINUED | OUTPATIENT
Start: 2019-01-29 | End: 2019-01-31

## 2019-01-29 RX ORDER — ASPIRIN/CALCIUM CARB/MAGNESIUM 324 MG
81 TABLET ORAL DAILY
Qty: 0 | Refills: 0 | Status: DISCONTINUED | OUTPATIENT
Start: 2019-01-30 | End: 2019-02-03

## 2019-01-29 RX ORDER — HYDROMORPHONE HYDROCHLORIDE 2 MG/ML
0.5 INJECTION INTRAMUSCULAR; INTRAVENOUS; SUBCUTANEOUS ONCE
Qty: 0 | Refills: 0 | Status: DISCONTINUED | OUTPATIENT
Start: 2019-01-29 | End: 2019-01-29

## 2019-01-29 RX ORDER — GABAPENTIN 400 MG/1
100 CAPSULE ORAL THREE TIMES A DAY
Qty: 0 | Refills: 0 | Status: DISCONTINUED | OUTPATIENT
Start: 2019-01-29 | End: 2019-02-03

## 2019-01-29 RX ORDER — PANTOPRAZOLE SODIUM 20 MG/1
40 TABLET, DELAYED RELEASE ORAL
Qty: 0 | Refills: 0 | Status: DISCONTINUED | OUTPATIENT
Start: 2019-01-29 | End: 2019-02-03

## 2019-01-29 RX ORDER — AMLODIPINE BESYLATE 2.5 MG/1
5 TABLET ORAL DAILY
Qty: 0 | Refills: 0 | Status: DISCONTINUED | OUTPATIENT
Start: 2019-01-29 | End: 2019-02-03

## 2019-01-29 RX ORDER — METOPROLOL TARTRATE 50 MG
25 TABLET ORAL
Qty: 0 | Refills: 0 | Status: DISCONTINUED | OUTPATIENT
Start: 2019-01-29 | End: 2019-01-29

## 2019-01-29 RX ORDER — INSULIN LISPRO 100/ML
VIAL (ML) SUBCUTANEOUS
Qty: 0 | Refills: 0 | Status: DISCONTINUED | OUTPATIENT
Start: 2019-01-29 | End: 2019-01-30

## 2019-01-29 RX ORDER — HYDROCHLOROTHIAZIDE 25 MG
25 TABLET ORAL DAILY
Qty: 0 | Refills: 0 | Status: DISCONTINUED | OUTPATIENT
Start: 2019-01-29 | End: 2019-02-03

## 2019-01-29 RX ADMIN — Medication 100 MILLIGRAM(S): at 08:05

## 2019-01-29 RX ADMIN — OXYCODONE AND ACETAMINOPHEN 2 TABLET(S): 5; 325 TABLET ORAL at 11:15

## 2019-01-29 RX ADMIN — Medication 25 MILLIGRAM(S): at 15:39

## 2019-01-29 RX ADMIN — Medication 10 MILLIGRAM(S): at 06:07

## 2019-01-29 RX ADMIN — ENOXAPARIN SODIUM 30 MILLIGRAM(S): 100 INJECTION SUBCUTANEOUS at 11:49

## 2019-01-29 RX ADMIN — HYDROMORPHONE HYDROCHLORIDE 0.5 MILLIGRAM(S): 2 INJECTION INTRAMUSCULAR; INTRAVENOUS; SUBCUTANEOUS at 06:40

## 2019-01-29 RX ADMIN — Medication 100 MILLIGRAM(S): at 00:18

## 2019-01-29 RX ADMIN — OXYCODONE AND ACETAMINOPHEN 2 TABLET(S): 5; 325 TABLET ORAL at 10:18

## 2019-01-29 RX ADMIN — HYDROMORPHONE HYDROCHLORIDE 0.5 MILLIGRAM(S): 2 INJECTION INTRAMUSCULAR; INTRAVENOUS; SUBCUTANEOUS at 06:25

## 2019-01-29 RX ADMIN — Medication 325 MILLIGRAM(S): at 11:49

## 2019-01-29 RX ADMIN — Medication 100 MILLIGRAM(S): at 21:18

## 2019-01-29 RX ADMIN — TICAGRELOR 60 MILLIGRAM(S): 90 TABLET ORAL at 18:19

## 2019-01-29 RX ADMIN — FAMOTIDINE 20 MILLIGRAM(S): 10 INJECTION INTRAVENOUS at 06:08

## 2019-01-29 RX ADMIN — Medication 25 MILLIGRAM(S): at 21:18

## 2019-01-29 RX ADMIN — AMLODIPINE BESYLATE 5 MILLIGRAM(S): 2.5 TABLET ORAL at 21:18

## 2019-01-29 RX ADMIN — HYDROMORPHONE HYDROCHLORIDE 0.5 MILLIGRAM(S): 2 INJECTION INTRAMUSCULAR; INTRAVENOUS; SUBCUTANEOUS at 02:40

## 2019-01-29 RX ADMIN — HYDROMORPHONE HYDROCHLORIDE 0.5 MILLIGRAM(S): 2 INJECTION INTRAMUSCULAR; INTRAVENOUS; SUBCUTANEOUS at 02:25

## 2019-01-29 RX ADMIN — Medication 100 MILLIGRAM(S): at 15:40

## 2019-01-29 RX ADMIN — CHLORHEXIDINE GLUCONATE 5 MILLILITER(S): 213 SOLUTION TOPICAL at 21:40

## 2019-01-29 RX ADMIN — Medication 25 MILLIGRAM(S): at 06:29

## 2019-01-29 RX ADMIN — TRAMADOL HYDROCHLORIDE 50 MILLIGRAM(S): 50 TABLET ORAL at 20:30

## 2019-01-29 RX ADMIN — AMLODIPINE BESYLATE 5 MILLIGRAM(S): 2.5 TABLET ORAL at 08:05

## 2019-01-29 RX ADMIN — Medication 100 MILLIGRAM(S): at 06:08

## 2019-01-29 RX ADMIN — GABAPENTIN 100 MILLIGRAM(S): 400 CAPSULE ORAL at 21:19

## 2019-01-29 RX ADMIN — Medication 25 MILLIGRAM(S): at 09:42

## 2019-01-29 RX ADMIN — Medication 10 MILLIGRAM(S): at 13:41

## 2019-01-29 RX ADMIN — TRAMADOL HYDROCHLORIDE 50 MILLIGRAM(S): 50 TABLET ORAL at 19:59

## 2019-01-29 NOTE — PHYSICAL THERAPY INITIAL EVALUATION ADULT - GENERAL OBSERVATIONS, REHAB EVAL
Pt found in chair +O2 +anoop +IVs +tele +pulse ox +1 chest tube +hitchcock +b/l radha chavez present.

## 2019-01-29 NOTE — DIETITIAN INITIAL EVALUATION ADULT. - ADHERENCE
good/Pt states she does not eat a lot; breakfast of bagel with butter and coffee, lunch (as two meals) of tuna fish sandwich, dinner of chicken and vegetables; Pt drinks coffee, water and 3-4 oz servings of apple juice during the day; No supplements PTA, however has metformin x2/day (500mg) for T2DM management; Pt states she tests blood glucose x1/week, numbers ~180, typically <200 (HgbA1c 6.7%, however, indicates good control)

## 2019-01-29 NOTE — DIETITIAN INITIAL EVALUATION ADULT. - NS FNS WEIGHT CHANGE REASON
unintentional unintentional/Last admission weight noted to be 74.8 kg (4-4-18), however, Pt reports UBW of 68.9 kg. At this admission, Pt weighed 72.3 kg, currently 83.4 kg, like to intraoperative fluid shifts. ?Accuracy of self reported weights

## 2019-01-29 NOTE — PROGRESS NOTE ADULT - ASSESSMENT
This is a 69 y/o AA female with PMHx of PAD (on Eliquis last dose 1/23/19 AM), s/p RT. SFA stent, HTN, HLD, DM2 (Hgba1c 6.7, managed by Dr Tung BANSAL), LEILANI (not on CPAP), gastric bypass, left carotid bruit, s/p recent LLE (PANTERA to Lt SFA, balloon angioplasty on Lt pop, & PANTERA to Lt peroneal artery (3/22), April 2018 acute limb underwent catheter directed lysis and PTA / stent of left SFA, Popliteal, peroneal presents with complains of pain (not as severe as prior to intervention) in both calves L > R. Tolerance 1-2 blocks. Pt was referred for Peripheral Angiogram found to have 90% ISR of LSFA and pop s/p L SFA stenting, SAMUEL to SFA and popliteal.  Pt. endorses progressing episodes of exertional SOB and palpitations.  Denies chest pain/pressure, dizziness, diaphoresis, nausea, vomiting, recent weight gain, peripheral edema, or syncope.  Pt. does not take medications as prescribed. NST  1/10/19 results below.  Pt. presents today asymptomatic for further evaluation and cardiac cath.   CT Surgery consulted for  multi vessel disease   Patient amenable to cardiac surgery on Plavix now on hold P2ty12 sent.  1/25 VSS; vein mapping done; continue preop workup; p2y12 308; heparin gttp PTT 66.7; hct 26- ck guiac; prob transfuse 1 unit prbc in am   01/28/2019  CABG, 1 arterial and 2 venous    Lima to LAD/ SVG to OM/ Distal RCA   Extubated d 0  Brillinta for pvd/LE stenting, no eliquis.  L pleural ct remains in place  Insulin infusion for glucose control, weaned off  MS ct d/c, l pleural ct remains.  Transferred to sdu This is a 69 y/o AA female with PMHx of PAD (on Eliquis last dose 1/23/19 AM), s/p RT. SFA stent, HTN, HLD, DM2 (Hgba1c 6.7, managed by Dr Tung BANSAL), LEILANI (not on CPAP), gastric bypass, left carotid bruit, s/p recent LLE (PANTERA to Lt SFA, balloon angioplasty on Lt pop, & PANTERA to Lt peroneal artery (3/22), April 2018 acute limb underwent catheter directed lysis and PTA / stent of left SFA, Popliteal, peroneal presents with complains of pain (not as severe as prior to intervention) in both calves L > R. Tolerance 1-2 blocks. Pt was referred for Peripheral Angiogram found to have 90% ISR of LSFA and pop s/p L SFA stenting, SAMUEL to SFA and popliteal.  Pt. endorses progressing episodes of exertional SOB and palpitations.  Denies chest pain/pressure, dizziness, diaphoresis, nausea, vomiting, recent weight gain, peripheral edema, or syncope.  Pt. does not take medications as prescribed. NST  1/10/19 results below.  Pt. presents today asymptomatic for further evaluation and cardiac cath.   CT Surgery consulted for  multi vessel disease   Patient amenable to cardiac surgery on Plavix now on hold P2ty12 sent.  1/25 VSS; vein mapping done; continue preop workup; p2y12 308; heparin gttp PTT 66.7; hct 26- ck guiac; prob transfuse 1 unit prbc in am   01/28/2019  CABG, 1 arterial and 2 venous    Lima to LAD/ SVG to OM/ Distal RCA   Extubated d 0  Brillinta for pvd/LE stenting, no eliquis.  L pleural ct remains in place  Insulin infusion for glucose control, weaned off  MS ct d/c, l pleural ct remains.  Transferred to sdu  Pt received 2 percocet's  and is lethargic. A&o x3, follows commands.  D/c narcotics .

## 2019-01-29 NOTE — PHYSICAL THERAPY INITIAL EVALUATION ADULT - BALANCE TRAINING, PT EVAL
GOAL: Pt will increase static/ dynamic standing balance to Good with RW to improve safety with functional activities in 4 weeks.

## 2019-01-29 NOTE — PHARMACOTHERAPY INTERVENTION NOTE - COMMENTS
The patient is receiving ticagrelor 60 mG PO twice a day. Recommended to adjust aspirin dose from 325 mG to 81 mG while receiving ticagrelor (US Boxed Warning: Maintenance doses of aspirin greater than 100 mg/day reduce the efficacy of ticagrelor and should be avoided. Use of higher maintenance doses of aspirin (>100 mg/day) was associated with relatively unfavorable outcomes for ticagrelor versus clopidogrel in the PLATO trial (Sherry, 2011; Francisco Javier, 2009)). Consider changing ticagrelor dose to 90 mG PO twice a day or obtain P2Y12 to determine the efficacy, obviously, in conjunction with risk assessment for bleeding (patient's risk factors).     Xuan Wood, Pharm.D.  [phone]

## 2019-01-29 NOTE — PHYSICAL THERAPY INITIAL EVALUATION ADULT - ADDITIONAL COMMENTS
pt lives in private home with , 4 steps to enter +HR. 1 flight of stairs to bedroom. Prior to admission, pt was I with all functional mobility and ADLs without AD.

## 2019-01-29 NOTE — DIETITIAN INITIAL EVALUATION ADULT. - ENERGY NEEDS
Height (cm): 165.1 (01-28)  Weight (kg): 72.3 (01-28)  BMI (kg/m2): 26.5 (01-28)  IBW (kg): 56.8 +/-10%   % IBW: 127  No pressure injuries noted; mid sternal surgical incision; +1 generalized edema per nursing flow sheet

## 2019-01-29 NOTE — DIETITIAN INITIAL EVALUATION ADULT. - NS AS NUTRI INTERV ED CONTENT
Encouraged adequate protein/energy intake for wound healing, encouraged Pt to order additional protein snacks, small frequent meals to prevent early satiety, including protein source at every meal; Pt deferred heart healthy consistent carbohydrate diet education at this time/Nutrition relationship to health/disease

## 2019-01-29 NOTE — DIETITIAN INITIAL EVALUATION ADULT. - PT NOT SOURCE
Pt fatigued, dozed off during interview, daughter assisted with some questions Pt fatigued, dozed off during interview, sister assisted with some questions

## 2019-01-29 NOTE — PROGRESS NOTE ADULT - SUBJECTIVE AND OBJECTIVE BOX
Vascular Cardiology Consult Note     SPECTRA [id number]              EMAIL [email or web address]   OFFICE [phone]    CC:  CAD    Doing well.  S/P CABG yesterday.  Now in stepdown unit.   at bedside.  pleural drain, a-line, R IJ TLC and hitchcock inplace.  No CP.  Sitting in a chair.  Feels hot.           Allergies    Cipro (Headache)  Cipro (Other)    Intolerances    	   MEDICATIONS  (STANDING):  amLODIPine   Tablet 5 milliGRAM(s) Oral daily  atorvastatin 10 milliGRAM(s) Oral at bedtime  cefuroxime  IVPB 1500 milliGRAM(s) IV Intermittent every 8 hours  chlorhexidine 0.12% Liquid 5 milliLiter(s) Oral Mucosa every 4 hours  dextrose 5%. 1000 milliLiter(s) (15 mL/Hr) IV Continuous <Continuous>  dextrose 50% Injectable 50 milliLiter(s) IV Push every 15 minutes  dextrose 50% Injectable 25 milliLiter(s) IV Push every 15 minutes  docusate sodium 100 milliGRAM(s) Oral three times a day  enoxaparin Injectable 30 milliGRAM(s) SubCutaneous daily  gabapentin 100 milliGRAM(s) Oral three times a day  hydrochlorothiazide 25 milliGRAM(s) Oral daily  insulin lispro (HumaLOG) corrective regimen sliding scale   SubCutaneous Before meals and at bedtime  metoclopramide Injectable 10 milliGRAM(s) IV Push every 8 hours  metoprolol tartrate 25 milliGRAM(s) Oral two times a day  pantoprazole    Tablet 40 milliGRAM(s) Oral before breakfast  sodium chloride 0.9%. 1000 milliLiter(s) (10 mL/Hr) IV Continuous <Continuous>  ticagrelor 60 milliGRAM(s) Oral two times a day    MEDICATIONS  (PRN):  oxyCODONE    5 mG/acetaminophen 325 mG 1 Tablet(s) Oral every 4 hours PRN Mild Pain (1 - 3)  oxyCODONE    5 mG/acetaminophen 325 mG 2 Tablet(s) Oral every 6 hours PRN Moderate Pain (4 - 6)       PAST MEDICAL & SURGICAL HISTORY:  PAD (peripheral artery disease)  High cholesterol  HTN (hypertension)  Diabetes  LEILANI (obstructive sleep apnea): does not use CPAP since gastric bypass surgery  Ulcer of toe of right foot: 2nd  DM (diabetes mellitus)  Peripheral arterial disease: s/p Right leg stent  H/O gastric bypass  H/O  section  History of intravascular stent placement  S/P  section  H/O abdominoplasty  H/O bilateral breast reduction surgery  S/P gastric bypass      FAMILY HISTORY:  No pertinent family history in first degree relatives  No pertinent family history in first degree relatives      SOCIAL HISTORY:  unchanged    REVIEW OF SYSTEMS:  CONSTITUTIONAL: Fatigue  EYES: No eye pain, visual disturbances, or discharge  ENMT:  No difficulty hearing, tinnitus, vertigo; No sinus or throat pain  NECK: No pain or stiffness  RESPIRATORY: No cough, wheezing, chills or hemoptysis;  dyspnea with exertion.    CARDIOVASCULAR: Incisional chest pain, palpitations, passing out, dizziness, or leg swelling  GASTROINTESTINAL: No abdominal or epigastric pain. No nausea, vomiting, or hematemesis; No diarrhea or constipation. No melena or hematochezia.  GENITOURINARY: No dysuria, frequency, hematuria, or incontinence  NEUROLOGICAL: No headaches, memory loss, loss of strength, numbness, or tremors  SKIN: No itching, burning, rashes, or lesions   LYMPH Nodes: No enlarged glands  ENDOCRINE: No heat or cold intolerance; No hair loss  MUSCULOSKELETAL:  numbness of the left foot.    PSYCHIATRIC: No depression, anxiety, mood swings, or difficulty sleeping  HEME/LYMPH: No easy bruising, or bleeding gums  ALLERY AND IMMUNOLOGIC: No hives or eczema	    [ x] All others negative	  [ ] Unable to obtain     ICU Vital Signs Last 24 Hrs  T(C): 36.9 (2019 11:00), Max: 37.4 (2019 03:00)  T(F): 98.4 (2019 11:00), Max: 99.3 (2019 03:00)  HR: 68 (2019 11:00) (62 - 99)  BP: --  BP(mean): --  ABP: 159/49 (2019 11:00) (116/50 - 289/289)  ABP(mean): 79 (2019 11:00) (67 - 289)  RR: 15 (2019 11:00) (7 - 38)  SpO2: 99% (2019 11:00) (75% - 100%)          Appearance: Normal	  HEENT:   Normal oral mucosa, PERRL, EOMI	  Carotid:   Right:  No Bruit      Left:  No bruit  Lymphatic: No lymphadenopathy  Cardiovascular: Normal S1 S2, No JVD, No murmurs, anterior chest wall sternotomy site C/D/I  Respiratory: Lungs clear to auscultation	  Psychiatry: A & O x 3, Mood & affect appropriate  Gastrointestinal:  Soft, Non-tender, + BS	  Skin: No rashes, No ecchymoses, No cyanosis	  Neurologic: Non-focal  Extremities: Normal range of motion.   Foot Exam:  Warm, no ulceration.   Left DP palpable.  Right PT palpable.  healed 2nd digit amp  + chest tube  + hitchcock  + R IJ TLC                         11.1   13.7  )-----------( 219      ( 2019 01:10 )             32.3       143  |  111<H>  |  15  ----------------------------<  153<H>  5.0   |  20<L>  |  0.72    Ca    8.6      2019 01:09  Phos  3.7       Mg     1.7         TPro  5.7<L>  /  Alb  3.4  /  TBili  0.8  /  DBili  x   /  AST  112<H>  /  ALT  70<H>  /  AlkPhos  122<H>        Assessment:  1. Coronary artery disease  2.  Peripheral artery disease  3.  HTN  4.  HLD  5.  Diabetes             Plan:  1. Appeciate CT surgery help with this case  2.  Continue post op care per CT sx.   3.  She was on anticoagulation prior to CABG for PAD - resume when safe per CTsx  4.  Continue statin therapy - consider increasing Lipitor to 40mg daily (high intensity statin)  5.  Continue BP control with HCTZ and norvasc and BB  6.  telemetry monitoring     Will follow with you    Blaine Garcia [id number]         Thank you      Vascular Cardiology Service    Please call with any questions:   JOSS [id number]  Office [phone]  email:  [email or web address]

## 2019-01-29 NOTE — DIETITIAN INITIAL EVALUATION ADULT. - OTHER INFO
Pt seen for length of stay on CTU. Pt with PMHx HTN, HLD, T2DM, LEILANI, obesity and gastric bypass. Admitted for SOB and palpitations, found to have CAD. Currently S/P CABG x 3 POD1 (1-28). Pt interviewed at bedside. Pt reports appetite poor since admission and surgery, however denies any nausea/vomiting/constipation/diarrhea or chewing/swallowing problems reported, NKFA. Pt reports UBW of 68.9 kgs, last admission weight noted to be 74.8 kg (4-4-18). Encouraged ordering high protein snacks with meals so Pt can spread meals throughout the day and consume adequate protein for wound healing. Pt declined any nutrition supplement at this time, deferred heart health nutrition education at this time 2/2 fatigue. Pt made aware RDN to remain available. Pt seen for length of stay on CTU. Pt with PMHx HTN, HLD, T2DM, LEIALNI, obesity and gastric bypass. Admitted for SOB and palpitations, found to have CAD. Currently S/P CABG x 3 POD1 (1-28). Pt interviewed at bedside. Pt reports appetite poor since admission and surgery, however denies any nausea/vomiting/constipation/diarrhea or chewing/swallowing problems, NKFA. Encouraged ordering high protein snacks with meals so Pt can spread meals throughout the day and consume adequate protein for wound healing. Pt declined any nutrition supplement at this time, deferred heart health nutrition education at this time 2/2 fatigue. Pt made aware RDN to remain available.

## 2019-01-29 NOTE — PROGRESS NOTE ADULT - SUBJECTIVE AND OBJECTIVE BOX
VITAL SIGNS    Telemetry:    Vital Signs Last 24 Hrs  T(C): 37.4 (19 @ 07:00), Max: 37.4 (19 @ 03:00)  T(F): 99.3 (19 @ 07:00), Max: 99.3 (19 @ 03:00)  HR: 66 (19 @ 10:35) (62 - 99)  BP: --  RR: 17 (19 @ 10:35) (7 - 38)  SpO2: 99% (19 @ 10:35) (75% - 100%)                       11.1<L>                x    | x    | x            13.7<H> >-----------< 219     ------------------------< x                     32.3<L>                x    | x    | x                                                                         Ca x     Mg x     Ph x        ,             x                    143  | 20<L>| 15           x     >-----------< x       ------------------------< 153<H>                 x                    5.0  | 111<H>| 0.72                                                                      Ca 8.6   Mg 1.7   Ph 3.7                Daily     Daily Weight in k.4 (2019 00:00)        CAPILLARY BLOOD GLUCOSE  113 (2019 09:00)  121 (2019 08:00)  128 (2019 07:00)  139 (2019 06:00)  149 (2019 05:00)  155 (2019 04:00)  164 (2019 03:00)  157 (2019 02:00)  148 (2019 01:00)  125 (2019 00:00)  103 (2019 23:30)  97 (2019 23:00)  102 (2019 22:00)  106 (2019 21:00)  116 (2019 20:00)  111 (2019 19:00)  129 (2019 18:00)  150 (2019 17:00)  187 (2019 16:00)  181 (2019 15:00)  178 (2019 14:00)  193 (2019 13:00)      POCT Blood Glucose.: 113 mg/dL (2019 08:57)  POCT Blood Glucose.: 121 mg/dL (2019 08:09)  POCT Blood Glucose.: 128 mg/dL (2019 06:56)  POCT Blood Glucose.: 139 mg/dL (2019 05:58)  POCT Blood Glucose.: 152 mg/dL (2019 03:53)  POCT Blood Glucose.: 164 mg/dL (2019 03:01)  POCT Blood Glucose.: 157 mg/dL (2019 02:03)  POCT Blood Glucose.: 125 mg/dL (2019 23:56)  POCT Blood Glucose.: 103 mg/dL (2019 23:30)  POCT Blood Glucose.: 97 mg/dL (2019 23:02)  POCT Blood Glucose.: 111 mg/dL (2019 19:08)  POCT Blood Glucose.: 129 mg/dL (2019 18:03)  POCT Blood Glucose.: 187 mg/dL (2019 16:02)  POCT Blood Glucose.: 178 mg/dL (2019 15:23)                Coumadin    [ ] YES          [  ]      NO                                   PHYSICAL EXAM        Neurology: alert and oriented x 3, nonfocal, no gross deficits  CV : .S1S2 RRR  Sternal Wound :  CDI , Stable  Pacing Wires        [  ]   Settings:                                  Isolated  [  ]  Lungs: bibasilar crackles   Drains:     MS         [  ] Drainage:                 L Pleural  [  ]  Drainage:                R Pleural  [  ]  Drainage:  Abdomen: soft, nontender, nondistended, positive bowel sounds, last bowel movement   :         voiding    Extremities:               amLODIPine   Tablet 5 milliGRAM(s) Oral daily  aspirin enteric coated 325 milliGRAM(s) Oral daily  atorvastatin 10 milliGRAM(s) Oral at bedtime  cefuroxime  IVPB 1500 milliGRAM(s) IV Intermittent every 8 hours  chlorhexidine 0.12% Liquid 5 milliLiter(s) Oral Mucosa every 4 hours  dextrose 5%. 1000 milliLiter(s) IV Continuous <Continuous>  dextrose 50% Injectable 50 milliLiter(s) IV Push every 15 minutes  dextrose 50% Injectable 25 milliLiter(s) IV Push every 15 minutes  docusate sodium 100 milliGRAM(s) Oral three times a day  enoxaparin Injectable 30 milliGRAM(s) SubCutaneous daily  gabapentin 100 milliGRAM(s) Oral three times a day  hydrochlorothiazide 25 milliGRAM(s) Oral daily  insulin lispro (HumaLOG) corrective regimen sliding scale   SubCutaneous Before meals and at bedtime  metoclopramide Injectable 10 milliGRAM(s) IV Push every 8 hours  metoprolol tartrate 25 milliGRAM(s) Oral two times a day  oxyCODONE    5 mG/acetaminophen 325 mG 1 Tablet(s) Oral every 4 hours PRN  oxyCODONE    5 mG/acetaminophen 325 mG 2 Tablet(s) Oral every 6 hours PRN  pantoprazole    Tablet 40 milliGRAM(s) Oral before breakfast  sodium chloride 0.9%. 1000 milliLiter(s) IV Continuous <Continuous>  ticagrelor 60 milliGRAM(s) Oral two times a day                    Physical Therapy Rec:   Home  [  ]   Home w/ PT  [  ]  Rehab  [  ]  Discussed with Cardiothoracic Team at AM rounds. im tired    VITAL SIGNS    Telemetry:  nsr 67  Vital Signs Last 24 Hrs  T(C): 37.4 (19 @ 07:00), Max: 37.4 (19 @ 03:00)  T(F): 99.3 (19 @ 07:00), Max: 99.3 (19 @ 03:00)  HR: 66 (19 @ 10:35) (62 - 99)  BP: -- 149/59- 159/61  RR: 17 (19 @ 10:35) (7 - 38)  SpO2: 99% (19 @ 10:35) (75% - 100%)                       11.1<L>                x    | x    | x            13.7<H> >-----------< 219     ------------------------< x                     32.3<L>                x    | x    | x                                                                         Ca x     Mg x     Ph x        ,             x                    143  | 20<L>| 15           x     >-----------< x       ------------------------< 153<H>                 x                    5.0  | 111<H>| 0.72                                                                      Ca 8.6   Mg 1.7   Ph 3.7                Daily     Daily Weight in k.4 (2019 00:00)        CAPILLARY BLOOD GLUCOSE  113 (2019 09:00)  121 (2019 08:00)  128 (2019 07:00)  139 (2019 06:00)  149 (2019 05:00)  155 (2019 04:00)  164 (2019 03:00)  157 (2019 02:00)  148 (2019 01:00)  125 (2019 00:00)  103 (2019 23:30)  97 (2019 23:00)  102 (2019 22:00)  106 (2019 21:00)  116 (2019 20:00)  111 (2019 19:00)  129 (2019 18:00)  150 (2019 17:00)  187 (2019 16:00)  181 (2019 15:00)  178 (2019 14:00)  193 (2019 13:00)      POCT Blood Glucose.: 113 mg/dL (2019 08:57)  POCT Blood Glucose.: 121 mg/dL (2019 08:09)  POCT Blood Glucose.: 128 mg/dL (2019 06:56)  POCT Blood Glucose.: 139 mg/dL (2019 05:58)  POCT Blood Glucose.: 152 mg/dL (2019 03:53)  POCT Blood Glucose.: 164 mg/dL (2019 03:01)  POCT Blood Glucose.: 157 mg/dL (2019 02:03)  POCT Blood Glucose.: 125 mg/dL (2019 23:56)  POCT Blood Glucose.: 103 mg/dL (2019 23:30)  POCT Blood Glucose.: 97 mg/dL (2019 23:02)  POCT Blood Glucose.: 111 mg/dL (2019 19:08)  POCT Blood Glucose.: 129 mg/dL (2019 18:03)  POCT Blood Glucose.: 187 mg/dL (2019 16:02)  POCT Blood Glucose.: 178 mg/dL (2019 15:23)                Coumadin    [ ] YES          [ x ]      NO                                   PHYSICAL EXAM        Neurology: alert and oriented x 3, nonfocal, no gross deficits  CV : .S1S2 RRR  Sternal Wound :  CDI , Stable  Pacing Wires        [  ]   Settings:                                  Isolated  [ x ]  Lungs: bibasilar crackles   Drains:     MS         [  ] Drainage:                 L Pleural  [ x ]  Drainage:                R Pleural  [  ]  Drainage:  Abdomen: soft, nontender, nondistended, positive bowel sounds, last bowel movement preop  :       hitchcock sbd    Extremities:     trace edema - calf tenderness.  R anoop          amLODIPine   Tablet 5 milliGRAM(s) Oral daily  aspirin enteric coated 325 milliGRAM(s) Oral daily  atorvastatin 10 milliGRAM(s) Oral at bedtime  cefuroxime  IVPB 1500 milliGRAM(s) IV Intermittent every 8 hours  chlorhexidine 0.12% Liquid 5 milliLiter(s) Oral Mucosa every 4 hours  dextrose 5%. 1000 milliLiter(s) IV Continuous <Continuous>  dextrose 50% Injectable 50 milliLiter(s) IV Push every 15 minutes  dextrose 50% Injectable 25 milliLiter(s) IV Push every 15 minutes  docusate sodium 100 milliGRAM(s) Oral three times a day  enoxaparin Injectable 30 milliGRAM(s) SubCutaneous daily  gabapentin 100 milliGRAM(s) Oral three times a day  hydrochlorothiazide 25 milliGRAM(s) Oral daily  insulin lispro (HumaLOG) corrective regimen sliding scale   SubCutaneous Before meals and at bedtime  metoclopramide Injectable 10 milliGRAM(s) IV Push every 8 hours  metoprolol tartrate 25 milliGRAM(s) Oral two times a day  oxyCODONE    5 mG/acetaminophen 325 mG 1 Tablet(s) Oral every 4 hours PRN  oxyCODONE    5 mG/acetaminophen 325 mG 2 Tablet(s) Oral every 6 hours PRN  pantoprazole    Tablet 40 milliGRAM(s) Oral before breakfast  sodium chloride 0.9%. 1000 milliLiter(s) IV Continuous <Continuous>  ticagrelor 60 milliGRAM(s) Oral two times a day                    Physical Therapy Rec:   Home  [  ]   Home w/ PT  [  ]  Rehab  [  ]  Discussed with Cardiothoracic Team at AM rounds.

## 2019-01-29 NOTE — DIETITIAN INITIAL EVALUATION ADULT. - NS AS NUTRI INTERV STRATEGIES
1) Continue current diet order  2) Monitor PO intake / labs / GI function / skin integrity  3) Obtain current and weekly weights  4) RDN to remain available 1) Continue consistent carbohydrate DASH diet order  2) Monitor PO intake / labs / GI function / skin integrity  3) Obtain current and weekly weights  4) RDN to remain available

## 2019-01-29 NOTE — DIETITIAN INITIAL EVALUATION ADULT. - PERTINENT LABORATORY DATA
01-29 Cl- 111<H>    CO2 20<L>    Glu 153<H>    Hgb 11.1 g/dL<L>  Hct 32.3 %<L>   12-14 HgA1C 6.7     24Hr Finger Stick range: mg/dL 01-29 Cl- 111<H>  Glu 153<H>  AlkPhos 112 <H>   <H>  ALT 70 <H>   12-14 HgA1C 6.7     24Hr Finger Stick range: mg/dL

## 2019-01-29 NOTE — PHYSICAL THERAPY INITIAL EVALUATION ADULT - PERTINENT HX OF CURRENT PROBLEM, REHAB EVAL
69 yo F presented for cardiac cath, found to have multi vessel disease. Now s/p CABG, 1 arterial and 2 venous (Lima to LAD/ SVG to OM/ Distal RCA) on 1/28. Chest XRay 1/29: The heart is enlarged. The lungs appear to be clear.  A [place] catheter is seen on the right and the tip is in main pulmonary artery. A left chest tube is in place. No pneumothorax. Mediastinal tube is in place as well.

## 2019-01-29 NOTE — PHYSICAL THERAPY INITIAL EVALUATION ADULT - GAIT DEVIATIONS NOTED, PT EVAL
decreased velocity of limb motion/decreased marcus/decreased stride length/decreased weight-shifting ability

## 2019-01-29 NOTE — DIETITIAN INITIAL EVALUATION ADULT. - PERTINENT MEDS FT
atorvastatin  gabapentin  hydrochlorothiazide  insulin lispro (HumaLOG) corrective regimen sliding scale  pantoprazole    Tablet

## 2019-01-30 LAB
ANION GAP SERPL CALC-SCNC: 10 MMOL/L — SIGNIFICANT CHANGE UP (ref 5–17)
ANION GAP SERPL CALC-SCNC: 12 MMOL/L — SIGNIFICANT CHANGE UP (ref 5–17)
BUN SERPL-MCNC: 14 MG/DL — SIGNIFICANT CHANGE UP (ref 7–23)
BUN SERPL-MCNC: 16 MG/DL — SIGNIFICANT CHANGE UP (ref 7–23)
CALCIUM SERPL-MCNC: 8.6 MG/DL — SIGNIFICANT CHANGE UP (ref 8.4–10.5)
CALCIUM SERPL-MCNC: 9 MG/DL — SIGNIFICANT CHANGE UP (ref 8.4–10.5)
CHLORIDE SERPL-SCNC: 102 MMOL/L — SIGNIFICANT CHANGE UP (ref 96–108)
CHLORIDE SERPL-SCNC: 105 MMOL/L — SIGNIFICANT CHANGE UP (ref 96–108)
CO2 SERPL-SCNC: 26 MMOL/L — SIGNIFICANT CHANGE UP (ref 22–31)
CO2 SERPL-SCNC: 27 MMOL/L — SIGNIFICANT CHANGE UP (ref 22–31)
CREAT SERPL-MCNC: 0.69 MG/DL — SIGNIFICANT CHANGE UP (ref 0.5–1.3)
CREAT SERPL-MCNC: 0.76 MG/DL — SIGNIFICANT CHANGE UP (ref 0.5–1.3)
GLUCOSE BLDC GLUCOMTR-MCNC: 138 MG/DL — HIGH (ref 70–99)
GLUCOSE BLDC GLUCOMTR-MCNC: 144 MG/DL — HIGH (ref 70–99)
GLUCOSE BLDC GLUCOMTR-MCNC: 154 MG/DL — HIGH (ref 70–99)
GLUCOSE BLDC GLUCOMTR-MCNC: 250 MG/DL — HIGH (ref 70–99)
GLUCOSE BLDC GLUCOMTR-MCNC: 29 MG/DL — CRITICAL LOW (ref 70–99)
GLUCOSE BLDC GLUCOMTR-MCNC: 37 MG/DL — CRITICAL LOW (ref 70–99)
GLUCOSE BLDC GLUCOMTR-MCNC: 61 MG/DL — LOW (ref 70–99)
GLUCOSE BLDC GLUCOMTR-MCNC: 62 MG/DL — LOW (ref 70–99)
GLUCOSE SERPL-MCNC: 186 MG/DL — HIGH (ref 70–99)
GLUCOSE SERPL-MCNC: 68 MG/DL — LOW (ref 70–99)
HCT VFR BLD CALC: 35.3 % — SIGNIFICANT CHANGE UP (ref 34.5–45)
HGB BLD-MCNC: 11.5 G/DL — SIGNIFICANT CHANGE UP (ref 11.5–15.5)
MCHC RBC-ENTMCNC: 25.6 PG — LOW (ref 27–34)
MCHC RBC-ENTMCNC: 32.5 GM/DL — SIGNIFICANT CHANGE UP (ref 32–36)
MCV RBC AUTO: 78.8 FL — LOW (ref 80–100)
PLATELET # BLD AUTO: 171 K/UL — SIGNIFICANT CHANGE UP (ref 150–400)
POTASSIUM SERPL-MCNC: 3.4 MMOL/L — LOW (ref 3.5–5.3)
POTASSIUM SERPL-MCNC: 4.2 MMOL/L — SIGNIFICANT CHANGE UP (ref 3.5–5.3)
POTASSIUM SERPL-SCNC: 3.4 MMOL/L — LOW (ref 3.5–5.3)
POTASSIUM SERPL-SCNC: 4.2 MMOL/L — SIGNIFICANT CHANGE UP (ref 3.5–5.3)
RBC # BLD: 4.48 M/UL — SIGNIFICANT CHANGE UP (ref 3.8–5.2)
RBC # FLD: 19.6 % — HIGH (ref 10.3–14.5)
SODIUM SERPL-SCNC: 141 MMOL/L — SIGNIFICANT CHANGE UP (ref 135–145)
SODIUM SERPL-SCNC: 141 MMOL/L — SIGNIFICANT CHANGE UP (ref 135–145)
WBC # BLD: 10.3 K/UL — SIGNIFICANT CHANGE UP (ref 3.8–10.5)
WBC # FLD AUTO: 10.3 K/UL — SIGNIFICANT CHANGE UP (ref 3.8–10.5)

## 2019-01-30 PROCEDURE — 99233 SBSQ HOSP IP/OBS HIGH 50: CPT

## 2019-01-30 PROCEDURE — 71045 X-RAY EXAM CHEST 1 VIEW: CPT | Mod: 26,77

## 2019-01-30 PROCEDURE — 71045 X-RAY EXAM CHEST 1 VIEW: CPT | Mod: 26,76

## 2019-01-30 RX ORDER — METOPROLOL TARTRATE 50 MG
25 TABLET ORAL ONCE
Qty: 0 | Refills: 0 | Status: COMPLETED | OUTPATIENT
Start: 2019-01-30 | End: 2019-01-30

## 2019-01-30 RX ORDER — OXYCODONE HYDROCHLORIDE 5 MG/1
5 TABLET ORAL EVERY 6 HOURS
Qty: 0 | Refills: 0 | Status: DISCONTINUED | OUTPATIENT
Start: 2019-01-30 | End: 2019-01-31

## 2019-01-30 RX ORDER — TICAGRELOR 90 MG/1
90 TABLET ORAL
Qty: 0 | Refills: 0 | Status: DISCONTINUED | OUTPATIENT
Start: 2019-01-30 | End: 2019-02-03

## 2019-01-30 RX ORDER — METOPROLOL TARTRATE 50 MG
50 TABLET ORAL
Qty: 0 | Refills: 0 | Status: DISCONTINUED | OUTPATIENT
Start: 2019-01-30 | End: 2019-02-01

## 2019-01-30 RX ORDER — ACETAMINOPHEN 500 MG
1000 TABLET ORAL ONCE
Qty: 0 | Refills: 0 | Status: COMPLETED | OUTPATIENT
Start: 2019-01-30 | End: 2019-01-30

## 2019-01-30 RX ORDER — POTASSIUM BICARBONATE 978 MG/1
25 TABLET, EFFERVESCENT ORAL
Qty: 0 | Refills: 0 | Status: COMPLETED | OUTPATIENT
Start: 2019-01-30 | End: 2019-01-30

## 2019-01-30 RX ADMIN — Medication 10 MILLIGRAM(S): at 21:32

## 2019-01-30 RX ADMIN — Medication 100 MILLIGRAM(S): at 21:33

## 2019-01-30 RX ADMIN — OXYCODONE HYDROCHLORIDE 5 MILLIGRAM(S): 5 TABLET ORAL at 15:13

## 2019-01-30 RX ADMIN — Medication 1000 MILLIGRAM(S): at 01:46

## 2019-01-30 RX ADMIN — Medication 100 MILLIGRAM(S): at 14:05

## 2019-01-30 RX ADMIN — Medication 50 MILLIGRAM(S): at 18:52

## 2019-01-30 RX ADMIN — ATORVASTATIN CALCIUM 10 MILLIGRAM(S): 80 TABLET, FILM COATED ORAL at 21:33

## 2019-01-30 RX ADMIN — AMLODIPINE BESYLATE 5 MILLIGRAM(S): 2.5 TABLET ORAL at 06:04

## 2019-01-30 RX ADMIN — PANTOPRAZOLE SODIUM 40 MILLIGRAM(S): 20 TABLET, DELAYED RELEASE ORAL at 06:04

## 2019-01-30 RX ADMIN — TICAGRELOR 60 MILLIGRAM(S): 90 TABLET ORAL at 18:52

## 2019-01-30 RX ADMIN — Medication 10 MILLIGRAM(S): at 14:05

## 2019-01-30 RX ADMIN — Medication 81 MILLIGRAM(S): at 11:18

## 2019-01-30 RX ADMIN — CHLORHEXIDINE GLUCONATE 5 MILLILITER(S): 213 SOLUTION TOPICAL at 06:08

## 2019-01-30 RX ADMIN — Medication 100 MILLIGRAM(S): at 00:53

## 2019-01-30 RX ADMIN — GABAPENTIN 100 MILLIGRAM(S): 400 CAPSULE ORAL at 14:05

## 2019-01-30 RX ADMIN — OXYCODONE HYDROCHLORIDE 5 MILLIGRAM(S): 5 TABLET ORAL at 15:43

## 2019-01-30 RX ADMIN — POTASSIUM BICARBONATE 25 MILLIEQUIVALENT(S): 978 TABLET, EFFERVESCENT ORAL at 09:59

## 2019-01-30 RX ADMIN — POTASSIUM BICARBONATE 25 MILLIEQUIVALENT(S): 978 TABLET, EFFERVESCENT ORAL at 11:17

## 2019-01-30 RX ADMIN — Medication 25 MILLIGRAM(S): at 06:04

## 2019-01-30 RX ADMIN — TICAGRELOR 60 MILLIGRAM(S): 90 TABLET ORAL at 06:09

## 2019-01-30 RX ADMIN — OXYCODONE HYDROCHLORIDE 5 MILLIGRAM(S): 5 TABLET ORAL at 10:35

## 2019-01-30 RX ADMIN — CHLORHEXIDINE GLUCONATE 5 MILLILITER(S): 213 SOLUTION TOPICAL at 14:05

## 2019-01-30 RX ADMIN — GABAPENTIN 100 MILLIGRAM(S): 400 CAPSULE ORAL at 21:33

## 2019-01-30 RX ADMIN — OXYCODONE HYDROCHLORIDE 5 MILLIGRAM(S): 5 TABLET ORAL at 09:59

## 2019-01-30 RX ADMIN — ENOXAPARIN SODIUM 30 MILLIGRAM(S): 100 INJECTION SUBCUTANEOUS at 11:18

## 2019-01-30 RX ADMIN — GABAPENTIN 100 MILLIGRAM(S): 400 CAPSULE ORAL at 06:04

## 2019-01-30 RX ADMIN — Medication 400 MILLIGRAM(S): at 01:16

## 2019-01-30 RX ADMIN — Medication 100 MILLIGRAM(S): at 06:04

## 2019-01-30 RX ADMIN — TICAGRELOR 90 MILLIGRAM(S): 90 TABLET ORAL at 21:32

## 2019-01-30 RX ADMIN — Medication 25 MILLIGRAM(S): at 09:59

## 2019-01-30 RX ADMIN — POTASSIUM BICARBONATE 25 MILLIEQUIVALENT(S): 978 TABLET, EFFERVESCENT ORAL at 12:36

## 2019-01-30 NOTE — PROGRESS NOTE ADULT - SUBJECTIVE AND OBJECTIVE BOX
im tired    VITAL SIGNS    Telemetry:   Nsr 70-90  Vital Signs Last 24 Hrs  T(C): 36.8 (19 @ 07:01), Max: 37.2 (19 @ 19:14)  T(F): 98.3 (19 @ 07:01), Max: 98.9 (19 @ 19:14)  HR: 86 (19 @ 07:01) (66 - 87)  BP: 132/94 (19 @ 07:01) (132/94 - 171/71)  RR: 18 (19 @ 07:01) (15 - 20)  SpO2: 92% (19 @ 07:01) (92% - 99%)                       11.5                 141  | 26   | 14           10.3  >-----------< 171     ------------------------< 68<L>                 35.3                 3.4<L>| 105  | 0.69                                                                      Ca 9.0   Mg x     Ph x        ,             11.1<L>                x    | x    | x            13.7<H> >-----------< 219     ------------------------< x                     32.3<L>                x    | x    | x                                                                         Ca x     Mg x     Ph x                  Daily     Daily Weight in k.6 (2019 07:01)        CAPILLARY BLOOD GLUCOSE      POCT Blood Glucose.: 250 mg/dL (2019 08:39)  POCT Blood Glucose.: 61 mg/dL (2019 08:20)  POCT Blood Glucose.: 62 mg/dL (2019 08:04)  POCT Blood Glucose.: 29 mg/dL (2019 07:48)  POCT Blood Glucose.: 37 mg/dL (2019 07:46)  POCT Blood Glucose.: 92 mg/dL (2019 21:37)  POCT Blood Glucose.: 92 mg/dL (2019 17:29)  POCT Blood Glucose.: 72 mg/dL (2019 16:51)  POCT Blood Glucose.: 58 mg/dL (2019 16:46)  POCT Blood Glucose.: 87 mg/dL (2019 12:45)  POCT Blood Glucose.: 107 mg/dL (2019 11:39)                Coumadin    [ ] YES          [ x      NO                                   PHYSICAL EXAM        Neurology: alert and oriented x 3, nonfocal, no gross deficits  CV : .S1S2 RRR  Sternal Wound :  CDI , Stable  Pacing Wires        [  ]   Settings:                                  Isolated  [x  ]  Lungs: bibasilar crackles   Drains:     MS         [  ] Drainage:                 L Pleural  [  ]  Drainage:                R Pleural  [  ]  Drainage:  Abdomen: soft, nontender, nondistended, positive bowel sounds, last bowel movement  preop  :         voiding    Extremities:    trace edema - calf tenderness           amLODIPine   Tablet 5 milliGRAM(s) Oral daily  aspirin enteric coated 81 milliGRAM(s) Oral daily  atorvastatin 10 milliGRAM(s) Oral at bedtime  chlorhexidine 0.12% Liquid 5 milliLiter(s) Oral Mucosa every 4 hours  dextrose 5%. 1000 milliLiter(s) IV Continuous <Continuous>  dextrose 50% Injectable 50 milliLiter(s) IV Push every 15 minutes  dextrose 50% Injectable 25 milliLiter(s) IV Push every 15 minutes  docusate sodium 100 milliGRAM(s) Oral three times a day  enoxaparin Injectable 30 milliGRAM(s) SubCutaneous daily  gabapentin 100 milliGRAM(s) Oral three times a day  hydrochlorothiazide 25 milliGRAM(s) Oral daily  metoclopramide Injectable 10 milliGRAM(s) IV Push every 8 hours  metoprolol tartrate 25 milliGRAM(s) Oral once  metoprolol tartrate 50 milliGRAM(s) Oral two times a day  oxyCODONE    IR 5 milliGRAM(s) Oral every 6 hours PRN  pantoprazole    Tablet 40 milliGRAM(s) Oral before breakfast  potassium bicarbonate/potassium citrate 25 milliEquivalent(s) Oral every 1 hour  sodium chloride 0.9%. 1000 milliLiter(s) IV Continuous <Continuous>  ticagrelor 60 milliGRAM(s) Oral two times a day                    Physical Therapy Rec:   Home  [  ]   Home w/ PT  [  ]  Rehab  [  ]  Discussed with Cardiothoracic Team at AM rounds.

## 2019-01-30 NOTE — PROGRESS NOTE ADULT - ASSESSMENT
This is a 69 y/o AA female with PMHx of PAD (on Eliquis last dose 1/23/19 AM), s/p RT. SFA stent, HTN, HLD, DM2 (Hgba1c 6.7, managed by Dr Tung BANSAL), LEILANI (not on CPAP), gastric bypass, left carotid bruit, s/p recent LLE (PANTERA to Lt SFA, balloon angioplasty on Lt pop, & PANTERA to Lt peroneal artery (3/22), April 2018 acute limb underwent catheter directed lysis and PTA / stent of left SFA, Popliteal, peroneal presents with complains of pain (not as severe as prior to intervention) in both calves L > R. Tolerance 1-2 blocks. Pt was referred for Peripheral Angiogram found to have 90% ISR of LSFA and pop s/p L SFA stenting, SAMUEL to SFA and popliteal.  Pt. endorses progressing episodes of exertional SOB and palpitations.  Denies chest pain/pressure, dizziness, diaphoresis, nausea, vomiting, recent weight gain, peripheral edema, or syncope.  Pt. does not take medications as prescribed. NST  1/10/19 results below.  Pt. presents today asymptomatic for further evaluation and cardiac cath.   CT Surgery consulted for  multi vessel disease   Patient amenable to cardiac surgery on Plavix now on hold [id number] sent.  1/25 VSS; vein mapping done; continue preop workup; p2y12 308; heparin gttp PTT 66.7; hct 26- ck guiac; prob transfuse 1 unit prbc in am   01/28/2019  CABG, 1 arterial and 2 venous    Lima to LAD/ SVG to OM/ Distal RCA   Extubated d 0  Brillinta for pvd/LE stenting, no eliquis.  L pleural ct remains in place  Insulin infusion for glucose control, weaned off  MS ct d/c, l pleural ct remains.  Transferred to sdu  Pt received 2 percocet's  and is lethargic. A&o x3, follows commands.  D/c narcotics .  1/30 Pt w/o complaints overnight  Hypoglycemia this am, sliding scale d/c  Lopressor increased for bp/hr  d/c CT  May transfer to floor

## 2019-01-30 NOTE — PROGRESS NOTE ADULT - SUBJECTIVE AND OBJECTIVE BOX
Vascular Cardiology Consult Note     [facility] [id number]              EMAIL [email or web address]   OFFICE [phone]    CC:  CAD    Doing well.  Now in stepdown unit.  Family memeber at bedside. draine removed earlier.    No CP.  Sitting in a chair having lunch.  Walked earlier today.           Allergies    Cipro (Headache)  Cipro (Other)    Intolerances    	 MEDICATIONS  (STANDING):  amLODIPine   Tablet 5 milliGRAM(s) Oral daily  aspirin enteric coated 81 milliGRAM(s) Oral daily  atorvastatin 10 milliGRAM(s) Oral at bedtime  chlorhexidine 0.12% Liquid 5 milliLiter(s) Oral Mucosa every 4 hours  dextrose 5%. 1000 milliLiter(s) (15 mL/Hr) IV Continuous <Continuous>  dextrose 50% Injectable 50 milliLiter(s) IV Push every 15 minutes  dextrose 50% Injectable 25 milliLiter(s) IV Push every 15 minutes  docusate sodium 100 milliGRAM(s) Oral three times a day  enoxaparin Injectable 30 milliGRAM(s) SubCutaneous daily  gabapentin 100 milliGRAM(s) Oral three times a day  hydrochlorothiazide 25 milliGRAM(s) Oral daily  metoclopramide Injectable 10 milliGRAM(s) IV Push every 8 hours  metoprolol tartrate 50 milliGRAM(s) Oral two times a day  pantoprazole    Tablet 40 milliGRAM(s) Oral before breakfast  sodium chloride 0.9%. 1000 milliLiter(s) (10 mL/Hr) IV Continuous <Continuous>  ticagrelor 60 milliGRAM(s) Oral two times a day    MEDICATIONS  (PRN):  oxyCODONE    IR 5 milliGRAM(s) Oral every 6 hours PRN Severe Pain (7 - 10)    PAST MEDICAL & SURGICAL HISTORY:  PAD (peripheral artery disease)  High cholesterol  HTN (hypertension)  Diabetes  LEILANI (obstructive sleep apnea): does not use CPAP since gastric bypass surgery  Ulcer of toe of right foot: 2nd  DM (diabetes mellitus)  Peripheral arterial disease: s/p Right leg stent  H/O gastric bypass  H/O  section  History of intravascular stent placement  S/P  section  H/O abdominoplasty  H/O bilateral breast reduction surgery  S/P gastric bypass      FAMILY HISTORY:  No pertinent family history in first degree relatives  No pertinent family history in first degree relatives      SOCIAL HISTORY:  unchanged    REVIEW OF SYSTEMS:  CONSTITUTIONAL: Fatigue  EYES: No eye pain, visual disturbances, or discharge  ENMT:  No difficulty hearing, tinnitus, vertigo; No sinus or throat pain  NECK: No pain or stiffness  RESPIRATORY: No cough, wheezing, chills or hemoptysis;  dyspnea with exertion.    CARDIOVASCULAR: Incisional chest pain, palpitations, passing out, dizziness, or leg swelling  GASTROINTESTINAL: No abdominal or epigastric pain. No nausea, vomiting, or hematemesis; No diarrhea or constipation. No melena or hematochezia.  GENITOURINARY: No dysuria, frequency, hematuria, or incontinence  NEUROLOGICAL: No headaches, memory loss, loss of strength, numbness, or tremors  SKIN: No itching, burning, rashes, or lesions   LYMPH Nodes: No enlarged glands  ENDOCRINE: No heat or cold intolerance; No hair loss  MUSCULOSKELETAL:  numbness of the left foot.    PSYCHIATRIC: No depression, anxiety, mood swings, or difficulty sleeping  HEME/LYMPH: No easy bruising, or bleeding gums  ALLERY AND IMMUNOLOGIC: No hives or eczema	    [ x] All others negative	  [ ] Unable to obtain     ICU Vital Signs Last 24 Hrs  T(C): 36.8 (2019 11:21), Max: 37.2 (2019 19:14)  T(F): 98.3 (2019 11:21), Max: 98.9 (2019 19:14)  HR: 77 (2019 11:21) (72 - 87)  BP: 153/67 (2019 11:21) (132/94 - 171/71)  BP(mean): 95 (2019 05:00) (91 - 103)  ABP: 147/55 (2019 05:00) (147/55 - 189/63)  ABP(mean): 83 (2019 05:00) (83 - 101)  RR: 18 (2019 11:21) (17 - 20)  SpO2: 92% (2019 11:21) (92% - 99%)          Appearance: Normal	  HEENT:   Normal oral mucosa, PERRL, EOMI	  Carotid:   Right:  No Bruit      Left:  No bruit  Lymphatic: No lymphadenopathy  Cardiovascular: Normal S1 S2, No JVD, No murmurs, anterior chest wall sternotomy site C/D/I  Respiratory: Lungs clear to auscultation	  Psychiatry: A & O x 3, Mood & affect appropriate  Gastrointestinal:  Soft, Non-tender, + BS	  Skin: No rashes, No ecchymoses, No cyanosis	  Neurologic: Non-focal  Extremities: Normal range of motion.   Foot Exam:  Warm, no ulceration.   Left DP palpable.  Right PT palpable.                                   11.5   10.3  )-----------( 171      ( 2019 06:44 )             35.3       141  |  105  |  14  ----------------------------<  68<L>  3.4<L>   |  26  |  0.69    Ca    9.0      2019 06:44  Phos  3.7       Mg     1.7         TPro  5.7<L>  /  Alb  3.4  /  TBili  0.8  /  DBili  x   /  AST  112<H>  /  ALT  70<H>  /  AlkPhos  122<H>      1. Coronary artery disease  2.  Peripheral artery disease  3.  HTN  4.  HLD  5.  Diabetes             Plan:  1. Appreciate CT surgery help with this case  2.  Continue post op care per CT sx.   3.  She was on anticoagulation prior to CABG for PAD -will resume this as outpatient once she recovers from CABG (eliquis)  4.  Continue statin therapy - consider increasing Lipitor to 40mg daily (high intensity statin)   5.  Continue BP control with HCTZ and norvasc and BB  6.  Telemetry monitoring   7.  Continue with aspirin and Brilinta  D/W Terrance ALONZO regarding increasing Brilinta to 90 BID - he will discuss with Dr. Wolff.      Will follow with you    Blaine Garcia [id number]         Thank you      Vascular Cardiology Service    Please call with any questions:   SPECTRA - [phone]  Office [phone]  email:  [email or web address]

## 2019-01-31 ENCOUNTER — TRANSCRIPTION ENCOUNTER (OUTPATIENT)
Age: 69
End: 2019-01-31

## 2019-01-31 DIAGNOSIS — E11.9 TYPE 2 DIABETES MELLITUS WITHOUT COMPLICATIONS: ICD-10-CM

## 2019-01-31 DIAGNOSIS — Z29.9 ENCOUNTER FOR PROPHYLACTIC MEASURES, UNSPECIFIED: ICD-10-CM

## 2019-01-31 LAB
ALBUMIN SERPL ELPH-MCNC: 3.2 G/DL — LOW (ref 3.3–5)
ALP SERPL-CCNC: 102 U/L — SIGNIFICANT CHANGE UP (ref 40–120)
ALT FLD-CCNC: 31 U/L — SIGNIFICANT CHANGE UP (ref 10–45)
ANION GAP SERPL CALC-SCNC: 12 MMOL/L — SIGNIFICANT CHANGE UP (ref 5–17)
AST SERPL-CCNC: 30 U/L — SIGNIFICANT CHANGE UP (ref 10–40)
BILIRUB DIRECT SERPL-MCNC: 0.3 MG/DL — HIGH (ref 0–0.2)
BILIRUB INDIRECT FLD-MCNC: 1.1 MG/DL — HIGH (ref 0.2–1)
BILIRUB SERPL-MCNC: 1.4 MG/DL — HIGH (ref 0.2–1.2)
BUN SERPL-MCNC: 13 MG/DL — SIGNIFICANT CHANGE UP (ref 7–23)
CALCIUM SERPL-MCNC: 9 MG/DL — SIGNIFICANT CHANGE UP (ref 8.4–10.5)
CHLORIDE SERPL-SCNC: 99 MMOL/L — SIGNIFICANT CHANGE UP (ref 96–108)
CO2 SERPL-SCNC: 27 MMOL/L — SIGNIFICANT CHANGE UP (ref 22–31)
CREAT SERPL-MCNC: 0.76 MG/DL — SIGNIFICANT CHANGE UP (ref 0.5–1.3)
GLUCOSE BLDC GLUCOMTR-MCNC: 135 MG/DL — HIGH (ref 70–99)
GLUCOSE SERPL-MCNC: 187 MG/DL — HIGH (ref 70–99)
HCT VFR BLD CALC: 32.7 % — LOW (ref 34.5–45)
HGB BLD-MCNC: 10.5 G/DL — LOW (ref 11.5–15.5)
MCHC RBC-ENTMCNC: 25.4 PG — LOW (ref 27–34)
MCHC RBC-ENTMCNC: 32 GM/DL — SIGNIFICANT CHANGE UP (ref 32–36)
MCV RBC AUTO: 79.2 FL — LOW (ref 80–100)
PLATELET # BLD AUTO: 251 K/UL — SIGNIFICANT CHANGE UP (ref 150–400)
POTASSIUM SERPL-MCNC: 3.8 MMOL/L — SIGNIFICANT CHANGE UP (ref 3.5–5.3)
POTASSIUM SERPL-SCNC: 3.8 MMOL/L — SIGNIFICANT CHANGE UP (ref 3.5–5.3)
PROT SERPL-MCNC: 6.1 G/DL — SIGNIFICANT CHANGE UP (ref 6–8.3)
RBC # BLD: 4.13 M/UL — SIGNIFICANT CHANGE UP (ref 3.8–5.2)
RBC # FLD: 19.4 % — HIGH (ref 10.3–14.5)
SODIUM SERPL-SCNC: 138 MMOL/L — SIGNIFICANT CHANGE UP (ref 135–145)
WBC # BLD: 12.2 K/UL — HIGH (ref 3.8–10.5)
WBC # FLD AUTO: 12.2 K/UL — HIGH (ref 3.8–10.5)

## 2019-01-31 PROCEDURE — 99233 SBSQ HOSP IP/OBS HIGH 50: CPT

## 2019-01-31 PROCEDURE — 71045 X-RAY EXAM CHEST 1 VIEW: CPT | Mod: 26

## 2019-01-31 RX ORDER — ATORVASTATIN CALCIUM 80 MG/1
40 TABLET, FILM COATED ORAL AT BEDTIME
Qty: 0 | Refills: 0 | Status: DISCONTINUED | OUTPATIENT
Start: 2019-01-31 | End: 2019-02-03

## 2019-01-31 RX ORDER — FUROSEMIDE 40 MG
20 TABLET ORAL DAILY
Qty: 0 | Refills: 0 | Status: DISCONTINUED | OUTPATIENT
Start: 2019-01-31 | End: 2019-02-02

## 2019-01-31 RX ORDER — OXYCODONE HYDROCHLORIDE 5 MG/1
5 TABLET ORAL EVERY 4 HOURS
Qty: 0 | Refills: 0 | Status: DISCONTINUED | OUTPATIENT
Start: 2019-01-31 | End: 2019-02-03

## 2019-01-31 RX ORDER — POTASSIUM BICARBONATE 978 MG/1
25 TABLET, EFFERVESCENT ORAL ONCE
Qty: 0 | Refills: 0 | Status: COMPLETED | OUTPATIENT
Start: 2019-01-31 | End: 2019-01-31

## 2019-01-31 RX ADMIN — OXYCODONE HYDROCHLORIDE 5 MILLIGRAM(S): 5 TABLET ORAL at 15:47

## 2019-01-31 RX ADMIN — OXYCODONE HYDROCHLORIDE 5 MILLIGRAM(S): 5 TABLET ORAL at 16:15

## 2019-01-31 RX ADMIN — Medication 10 MILLIGRAM(S): at 21:34

## 2019-01-31 RX ADMIN — Medication 100 MILLIGRAM(S): at 12:54

## 2019-01-31 RX ADMIN — ENOXAPARIN SODIUM 30 MILLIGRAM(S): 100 INJECTION SUBCUTANEOUS at 11:10

## 2019-01-31 RX ADMIN — CHLORHEXIDINE GLUCONATE 5 MILLILITER(S): 213 SOLUTION TOPICAL at 10:16

## 2019-01-31 RX ADMIN — POTASSIUM BICARBONATE 25 MILLIEQUIVALENT(S): 978 TABLET, EFFERVESCENT ORAL at 11:09

## 2019-01-31 RX ADMIN — OXYCODONE HYDROCHLORIDE 5 MILLIGRAM(S): 5 TABLET ORAL at 20:22

## 2019-01-31 RX ADMIN — Medication 10 MILLIGRAM(S): at 12:54

## 2019-01-31 RX ADMIN — Medication 50 MILLIGRAM(S): at 17:59

## 2019-01-31 RX ADMIN — Medication 20 MILLIGRAM(S): at 10:15

## 2019-01-31 RX ADMIN — OXYCODONE HYDROCHLORIDE 5 MILLIGRAM(S): 5 TABLET ORAL at 19:52

## 2019-01-31 RX ADMIN — Medication 50 MILLIGRAM(S): at 05:44

## 2019-01-31 RX ADMIN — OXYCODONE HYDROCHLORIDE 5 MILLIGRAM(S): 5 TABLET ORAL at 08:15

## 2019-01-31 RX ADMIN — Medication 100 MILLIGRAM(S): at 21:34

## 2019-01-31 RX ADMIN — OXYCODONE HYDROCHLORIDE 5 MILLIGRAM(S): 5 TABLET ORAL at 02:11

## 2019-01-31 RX ADMIN — Medication 81 MILLIGRAM(S): at 11:10

## 2019-01-31 RX ADMIN — GABAPENTIN 100 MILLIGRAM(S): 400 CAPSULE ORAL at 05:44

## 2019-01-31 RX ADMIN — AMLODIPINE BESYLATE 5 MILLIGRAM(S): 2.5 TABLET ORAL at 05:43

## 2019-01-31 RX ADMIN — ATORVASTATIN CALCIUM 40 MILLIGRAM(S): 80 TABLET, FILM COATED ORAL at 21:34

## 2019-01-31 RX ADMIN — TICAGRELOR 90 MILLIGRAM(S): 90 TABLET ORAL at 05:44

## 2019-01-31 RX ADMIN — OXYCODONE HYDROCHLORIDE 5 MILLIGRAM(S): 5 TABLET ORAL at 07:45

## 2019-01-31 RX ADMIN — GABAPENTIN 100 MILLIGRAM(S): 400 CAPSULE ORAL at 12:53

## 2019-01-31 RX ADMIN — OXYCODONE HYDROCHLORIDE 5 MILLIGRAM(S): 5 TABLET ORAL at 02:41

## 2019-01-31 RX ADMIN — Medication 25 MILLIGRAM(S): at 05:43

## 2019-01-31 RX ADMIN — GABAPENTIN 100 MILLIGRAM(S): 400 CAPSULE ORAL at 21:34

## 2019-01-31 RX ADMIN — PANTOPRAZOLE SODIUM 40 MILLIGRAM(S): 20 TABLET, DELAYED RELEASE ORAL at 05:44

## 2019-01-31 RX ADMIN — Medication 10 MILLIGRAM(S): at 05:43

## 2019-01-31 RX ADMIN — TICAGRELOR 90 MILLIGRAM(S): 90 TABLET ORAL at 17:59

## 2019-01-31 RX ADMIN — Medication 100 MILLIGRAM(S): at 05:44

## 2019-01-31 NOTE — DISCHARGE NOTE ADULT - CARE PROVIDER_API CALL
Saravanan Wolff)  Thoracic and Cardiac Surgery  [address] [place] [address]  Phone: [phone]  Fax: [phone]

## 2019-01-31 NOTE — DISCHARGE NOTE ADULT - MEDICATION SUMMARY - MEDICATIONS TO STOP TAKING
I will STOP taking the medications listed below when I get home from the hospital:    ramipril 10 mg oral capsule  -- 1 cap(s) by mouth once a day    apixaban 5 mg oral tablet  -- 1 tab(s) by mouth every 12 hours    clopidogrel 75 mg oral tablet  -- 1 tab(s) by mouth once a day    amLODIPine 10 mg oral tablet  -- 0.5 tab(s) by mouth once a day

## 2019-01-31 NOTE — PROGRESS NOTE ADULT - SUBJECTIVE AND OBJECTIVE BOX
Vascular Cardiology Consult Note     [facility] [id number]              EMAIL [email or web address]   OFFICE [phone]    CC:  CAD    Doing well.     No CP.  Sitting in a chair.  Family at bedside.  Some leg swelling.            Allergies    Cipro (Headache)  Cipro (Other)    Intolerances   MEDICATIONS  (STANDING):  amLODIPine   Tablet 5 milliGRAM(s) Oral daily  aspirin enteric coated 81 milliGRAM(s) Oral daily  atorvastatin 10 milliGRAM(s) Oral at bedtime  dextrose 50% Injectable 50 milliLiter(s) IV Push every 15 minutes  dextrose 50% Injectable 25 milliLiter(s) IV Push every 15 minutes  docusate sodium 100 milliGRAM(s) Oral three times a day  enoxaparin Injectable 30 milliGRAM(s) SubCutaneous daily  furosemide    Tablet 20 milliGRAM(s) Oral daily  gabapentin 100 milliGRAM(s) Oral three times a day  hydrochlorothiazide 25 milliGRAM(s) Oral daily  metoclopramide Injectable 10 milliGRAM(s) IV Push every 8 hours  metoprolol tartrate 50 milliGRAM(s) Oral two times a day  pantoprazole    Tablet 40 milliGRAM(s) Oral before breakfast  potassium bicarbonate/potassium citrate 25 milliEquivalent(s) Oral once  ticagrelor 90 milliGRAM(s) Oral two times a day    MEDICATIONS  (PRN):  oxyCODONE    IR 5 milliGRAM(s) Oral every 6 hours PRN Severe Pain (7 - 10)    PAST MEDICAL & SURGICAL HISTORY:  PAD (peripheral artery disease)  High cholesterol  HTN (hypertension)  Diabetes  LEILANI (obstructive sleep apnea): does not use CPAP since gastric bypass surgery  Ulcer of toe of right foot: 2nd  DM (diabetes mellitus)  Peripheral arterial disease: s/p Right leg stent  H/O gastric bypass  H/O  section  History of intravascular stent placement  S/P  section  H/O abdominoplasty  H/O bilateral breast reduction surgery  S/P gastric bypass      FAMILY HISTORY:  No pertinent family history in first degree relatives  No pertinent family history in first degree relatives      SOCIAL HISTORY:  unchanged    REVIEW OF SYSTEMS:  CONSTITUTIONAL: Fatigue  EYES: No eye pain, visual disturbances, or discharge  ENMT:  No difficulty hearing, tinnitus, vertigo; No sinus or throat pain  NECK: No pain or stiffness  RESPIRATORY: No cough, wheezing, chills or hemoptysis;  dyspnea with exertion.    CARDIOVASCULAR: Incisional chest pain, palpitations, passing out, dizziness, or leg swelling  GASTROINTESTINAL: No abdominal or epigastric pain. No nausea, vomiting, or hematemesis; No diarrhea or constipation. No melena or hematochezia.  GENITOURINARY: No dysuria, frequency, hematuria, or incontinence  NEUROLOGICAL: No headaches, memory loss, loss of strength, numbness, or tremors  SKIN: No itching, burning, rashes, or lesions   LYMPH Nodes: No enlarged glands  ENDOCRINE: No heat or cold intolerance; No hair loss  MUSCULOSKELETAL:  numbness of the left foot.    PSYCHIATRIC: No depression, anxiety, mood swings, or difficulty sleeping  HEME/LYMPH: No easy bruising, or bleeding gums  ALLERY AND IMMUNOLOGIC: No hives or eczema	    [ x] All others negative	  [ ] Unable to obtain   ICU Vital Signs Last 24 Hrs  T(C): 37 (2019 05:40), Max: 37 (2019 20:56)  T(F): 98.6 (2019 05:40), Max: 98.6 (2019 20:56)  HR: 89 (2019 05:40) (77 - 89)  BP: 128/68 (2019 05:40) (128/68 - 153/67)  BP(mean): --  ABP: --  ABP(mean): --  RR: 18 (2019 05:40) (18 - 18)  SpO2: 86% (2019 05:40) (86% - 92%)        Appearance: Normal	  HEENT:   Normal oral mucosa, PERRL, EOMI	  Carotid:   Right:  No Bruit      Left:  No bruit  Lymphatic: No lymphadenopathy  Cardiovascular: Normal S1 S2, No JVD, No murmurs, anterior chest wall sternotomy site C/D/I  Respiratory: bibasilar Rales  Psychiatry: A & O x 3, Mood & affect appropriate  Gastrointestinal:  Soft, Non-tender, + BS	  Skin: No rashes, No ecchymoses, No cyanosis	  Neurologic: Non-focal  Extremities: Normal range of motion.   Foot Exam:  Warm, no ulceration.  Mild edema of the legs                                          10.5   12.2  )-----------( 251      ( 2019 09:43 )             32.7   01-    138  |  99  |  13  ----------------------------<  187<H>  3.8   |  27  |  0.76    Ca    9.0      2019 09:43        1. Coronary artery disease  2.  Peripheral artery disease  3.  HTN  4.  HLD  5.  Diabetes             Plan:  1. Appreciate CT surgery help with this case  2.  Continue post op care per CT sx.   3.  She was on anticoagulation prior to CABG for PAD -will resume this as outpatient once she recovers from CABG (eliquis)  4.  Continue statin therapy - consider increasing Lipitor to 40mg daily (high intensity statin) if LFT's ok  5.  Continue BP control with norvasc, HCTZ and BB.  She has some LE edema and rales on exam, would give short course of Lasix 20mg PO daily  (2-3 days)  6.  Telemetry monitoring   7.  Continue with aspirin and Brilinta     Will follow with you    Blaine Garcia [id number]         Thank you      Vascular Cardiology Service    Please call with any questions:   SPECTRA - [phone]  Office [phone]  email:  [email or web address]

## 2019-01-31 NOTE — DISCHARGE NOTE ADULT - INSTRUCTIONS
low cholesterol low sodium diet- no added salt Report signs and symptoms of distress, such as chest pain, shortness of breath, signs of infection and swelling  to your health-care provider promptly; if necessary call 911. Take medications as instructed. Keep follow-up appointments as scheduled. Follow Discharge instructions " Do List" and "Do Not List".

## 2019-01-31 NOTE — DISCHARGE NOTE ADULT - PATIENT PORTAL LINK FT
You can access the [facility] Portal, offered by [facility], by registering with the following website: [email or web address]

## 2019-01-31 NOTE — PROGRESS NOTE ADULT - SUBJECTIVE AND OBJECTIVE BOX
VITAL SIGNS-Telemetry:    Vital Signs Last 24 Hrs  T(C): 37 (01-31-19 @ 05:40), Max: 37 (01-30-19 @ 20:56)  T(F): 98.6 (01-31-19 @ 05:40), Max: 98.6 (01-30-19 @ 20:56)  HR: 89 (01-31-19 @ 05:40) (77 - 89)  BP: 128/68 (01-31-19 @ 05:40) (128/68 - 153/67)  RR: 18 (01-31-19 @ 05:40) (18 - 18)  SpO2: 86% (01-31-19 @ 05:40) (86% - 92%)         01-30 @ 07:01  -  01-31 @ 07:00  --------------------------------------------------------  IN: 480 mL / OUT: 1235 mL / NET: -755 mL        Daily     Daily     CAPILLARY BLOOD GLUCOSE  POCT Blood Glucose.: 135 mg/dL (31 Jan 2019 07:42)  POCT Blood Glucose.: 154 mg/dL (30 Jan 2019 21:49)  POCT Blood Glucose.: 144 mg/dL (30 Jan 2019 16:31)  POCT Blood Glucose.: 138 mg/dL (30 Jan 2019 11:32)  POCT Blood Glucose.: 250 mg/dL (30 Jan 2019 08:39)  POCT Blood Glucose.: 61 mg/dL (30 Jan 2019 08:20)        Pacing Wires        [  ]   Settings:                                  Isolated  [ x ]         PHYSICAL EXAM:  Neurology: alert and oriented x 3, nonfocal, no gross deficits  CV : S1S2  Sternal Wound :  CDI , Stable  Lungs: bibasilar crackles  Abdomen: soft, nontender, nondistended, positive bowel sounds, last bowel movement       pre-op  Extremities:     no edema no calf tenderness    amLODIPine   Tablet 5 milliGRAM(s) Oral daily  aspirin enteric coated 81 milliGRAM(s) Oral daily  atorvastatin 10 milliGRAM(s) Oral at bedtime  chlorhexidine 0.12% Liquid 5 milliLiter(s) Oral Mucosa every 4 hours  dextrose 5%. 1000 milliLiter(s) IV Continuous <Continuous>  dextrose 50% Injectable 50 milliLiter(s) IV Push every 15 minutes  dextrose 50% Injectable 25 milliLiter(s) IV Push every 15 minutes  docusate sodium 100 milliGRAM(s) Oral three times a day  enoxaparin Injectable 30 milliGRAM(s) SubCutaneous daily  gabapentin 100 milliGRAM(s) Oral three times a day  hydrochlorothiazide 25 milliGRAM(s) Oral daily  metoclopramide Injectable 10 milliGRAM(s) IV Push every 8 hours  metoprolol tartrate 50 milliGRAM(s) Oral two times a day  oxyCODONE    IR 5 milliGRAM(s) Oral every 6 hours PRN  pantoprazole    Tablet 40 milliGRAM(s) Oral before breakfast  sodium chloride 0.9%. 1000 milliLiter(s) IV Continuous <Continuous>  ticagrelor 90 milliGRAM(s) Oral two times a day    Physical Therapy Rec:   Home  [  ]   Home w/ PT  [ x ]  Rehab  [  ]  Discussed with Cardiothoracic Team at AM rounds.

## 2019-01-31 NOTE — DISCHARGE NOTE ADULT - HOSPITAL COURSE
68F PMHx of PAD (on Eliquis last dose 1/23/19 AM), s/p RT. SFA stent, HTN, HLD, DM2 (Hgba1c 6.7, managed by Dr Tung BANSAL), LEILANI (not on CPAP), gastric bypass, left carotid bruit, s/p recent LLE (PANTERA to Lt SFA, balloon angioplasty on Lt pop, & PANTERA to Lt peroneal artery (3/22), April 2018 acute limb underwent catheter directed lysis and PTA / stent of left SFA, Popliteal, peroneal presents with complains of pain (not as severe as prior to intervention) in both calves L > R. Tolerance 1-2 blocks. Pt was referred for Peripheral Angiogram found to have 90% ISR of LSFA and pop s/p L SFA stenting, SAMUEL to SFA and popliteal.  Pt. endorses progressing episodes of exertional SOB and palpitations.  Denies chest pain/pressure, dizziness, diaphoresis, nausea, vomiting, recent weight gain, peripheral edema, or syncope.  Pt. does not take medications as prescribed. NST  1/10/19 results below.  Pt. presents today asymptomatic for further evaluation and cardiac cath.   CT Surgery consulted for  multi vessel disease   Patient amenable to cardiac surgery on Plavix now on hold P2ty12 sent.  1/25 VSS; vein mapping done; continue preop workup; p2y12 308; heparin gttp PTT 66.7; hct 26- ck guiac; prob transfuse 1 unit prbc in am   01/28/2019  CABG, 1 arterial and 2 venous    [place]ima to LAD/ SVG to OM/ Distal RCA   Extubated d 0  Brillinta for pvd/LE stenting, no eliquis.  L pleural ct remains in place  Insulin infusion for glucose control, weaned off  MS ct d/c, l pleural ct remains.  Tr sdu  Pt received 2 percocet's  and is lethargic. A&o x3, narcotics d/c'd  1/30 Pt w/o complaints overnight  Hypoglycemia this am, sliding scale d/c; A1C 6.7   Lopressor increased for bp/hr  1/31 VSS - lungs bibasilar crackles - d/t poor inspiratory effort -  chest pt given. incentive spirometry encouraged along with coughing & deep breathing.  c/o pain incisional - relieved w/ medications  2/1 VSS; increase bb as tolerated; d/w pw; pain management and pulm toilet  2/2 16 bts wct overnight- lopressor 75 mg po bid as per Dr. Wolff; NO 3.4- K supplemented this am and repeat K this afternoon; diuretics now completed  2/3 - VSS Labs WNL WBC - 10  pt does not want to go home d/t diarrhea from the "Glucerna"  instructed.  will wait for labs later then go.  Discharge planning- home Sunday

## 2019-01-31 NOTE — DISCHARGE NOTE ADULT - HOME CARE AGENCY
[facility] [phone] for visiting nurse. Agency will contact you to schedule visit

## 2019-01-31 NOTE — PROGRESS NOTE ADULT - ASSESSMENT
This is a 69 y/o AA female with PMHx of PAD (on Eliquis last dose 1/23/19 AM), s/p RT. SFA stent, HTN, HLD, DM2 (Hgba1c 6.7, managed by Dr Tung BANSAL), LEILANI (not on CPAP), gastric bypass, left carotid bruit, s/p recent LLE (PANTERA to Lt SFA, balloon angioplasty on Lt pop, & PANTERA to Lt peroneal artery (3/22), April 2018 acute limb underwent catheter directed lysis and PTA / stent of left SFA, Popliteal, peroneal presents with complains of pain (not as severe as prior to intervention) in both calves L > R. Tolerance 1-2 blocks. Pt was referred for Peripheral Angiogram found to have 90% ISR of LSFA and pop s/p L SFA stenting, SAMUEL to SFA and popliteal.  Pt. endorses progressing episodes of exertional SOB and palpitations.  Denies chest pain/pressure, dizziness, diaphoresis, nausea, vomiting, recent weight gain, peripheral edema, or syncope.  Pt. does not take medications as prescribed. NST  1/10/19 results below.  Pt. presents today asymptomatic for further evaluation and cardiac cath.   CT Surgery consulted for  multi vessel disease   Patient amenable to cardiac surgery on Plavix now on hold P2ty12 sent.  1/25 VSS; vein mapping done; continue preop workup; p2y12 308; heparin gttp PTT 66.7; hct 26- ck guiac; prob transfuse 1 unit prbc in am   01/28/2019  CABG, 1 arterial and 2 venous    Lima to LAD/ SVG to OM/ Distal RCA   Extubated d 0  Brillinta for pvd/LE stenting, no eliquis.  L pleural ct remains in place  Insulin infusion for glucose control, weaned off  MS ct d/c, l pleural ct remains.  Transferred to sdu  Pt received 2 percocet's  and is lethargic. A&o x3, follows commands.  D/c narcotics .  1/30 Pt w/o complaints overnight  Hypoglycemia this am, sliding scale d/c  Lopressor increased for bp/hr  d/c CT  May transfer to floor This is a 67 y/o AA female with PMHx of PAD (on Eliquis last dose 1/23/19 AM), s/p RT. SFA stent, HTN, HLD, DM2 (Hgba1c 6.7, managed by Dr Tung BANSAL), LEILANI (not on CPAP), gastric bypass, left carotid bruit, s/p recent LLE (PANTERA to Lt SFA, balloon angioplasty on Lt pop, & PANTERA to Lt peroneal artery (3/22), April 2018 acute limb underwent catheter directed lysis and PTA / stent of left SFA, Popliteal, peroneal presents with complains of pain (not as severe as prior to intervention) in both calves L > R. Tolerance 1-2 blocks. Pt was referred for Peripheral Angiogram found to have 90% ISR of LSFA and pop s/p L SFA stenting, SAMUEL to SFA and popliteal.  Pt. endorses progressing episodes of exertional SOB and palpitations.  Denies chest pain/pressure, dizziness, diaphoresis, nausea, vomiting, recent weight gain, peripheral edema, or syncope.  Pt. does not take medications as prescribed. NST  1/10/19 results below.  Pt. presents today asymptomatic for further evaluation and cardiac cath.   CT Surgery consulted for  multi vessel disease   Patient amenable to cardiac surgery on Plavix now on hold P2ty12 sent.  1/25 VSS; vein mapping done; continue preop workup; p2y12 308; heparin gttp PTT 66.7; hct 26- ck guiac; prob transfuse 1 unit prbc in am   01/28/2019  CABG, 1 arterial and 2 venous    [place] to LAD/ SVG to OM/ Distal RCA   Extubated d 0  Brillinta for pvd/LE stenting, no eliquis.  L pleural ct remains in place  Insulin infusion for glucose control, weaned off  MS ct d/c, l pleural ct remains.  Transferred to sdu  Pt received 2 percocet's  and is lethargic. A&o x3, follows commands.  D/c narcotics .  1/30 Pt w/o complaints overnight  Hypoglycemia this am, sliding scale d/c  Lopressor increased for bp/hr  1/31 VSS - lungs bibasilar crackles - d/t poor inspiratory effort -  chest pt given. incentive spirometry encouraged along with coughing & deep breathing.  c/o pain incisional - relieved w/ medications  d/c home Saturday?

## 2019-01-31 NOTE — DISCHARGE NOTE ADULT - CARE PLAN
Principal Discharge DX:	S/P CABG x 3  Goal:	full recovery  Assessment and plan of treatment:	follow up appt with DR. Saravanan Wolff in 2 weeks - call [phone] this week to schedule your appointment time for tuesday, february 19.  follow up appt with your Primary Care MD and/or Cardiologist in 3-4 weeks  ambulate 4-5 times a day  shower daily

## 2019-01-31 NOTE — DISCHARGE NOTE ADULT - MEDICATION SUMMARY - MEDICATIONS TO TAKE
I will START or STAY ON the medications listed below when I get home from the hospital:    aspirin 81 mg oral delayed release tablet  -- 1 tab(s) by mouth once a day  -- Indication: For Anti-platelet    gabapentin 100 mg oral capsule  -- 1 cap(s) by mouth 3 times a day  -- Indication: For neuropathy    metFORMIN 500 mg oral tablet  -- 1 tab(s) by mouth 2 times a day   last dose 1/23/19 AM dose  -- Indication: For Diabetes    atorvastatin 40 mg oral tablet  -- 1 tab(s) by mouth once a day (at bedtime)  -- Indication: For Hi cholesterol    ticagrelor 90 mg oral tablet  -- 1 tab(s) by mouth 2 times a day  -- Indication: For blood thinner    metoprolol tartrate 50 mg oral tablet  -- 1.5 tab(s) by mouth 2 times a day   -- It is very important that you take or use this exactly as directed.  Do not skip doses or discontinue unless directed by your doctor.  May cause drowsiness.  Alcohol may intensify this effect.  Use care when operating dangerous machinery.  Some non-prescription drugs may aggravate your condition.  Read all labels carefully.  If a warning appears, check with your doctor before taking.  Take with food or milk.  This drug may impair the ability to drive or operate machinery.  Use care until you become familiar with its effects.    -- Indication: For Cardiac med    amLODIPine 5 mg oral tablet  -- 1 tab(s) by mouth once a day  -- Indication: For Htn    hydroCHLOROthiazide 25 mg oral tablet  -- 1 tab(s) by mouth once a day  -- Indication: For water pill    pantoprazole 40 mg oral delayed release tablet  -- 1 tab(s) by mouth once a day (before a meal)  -- Indication: For gerd I will START or STAY ON the medications listed below when I get home from the hospital:    aspirin 81 mg oral delayed release tablet  -- 1 tab(s) by mouth once a day  -- Indication: For Anti-platelet    oxyCODONE 5 mg oral capsule  -- 1 cap(s) by mouth every 6 hours, As Needed MDD:four tabs   -- Caution federal law prohibits the transfer of this drug to any person other  than the person for whom it was prescribed.  It is very important that you take or use this exactly as directed.  Do not skip doses or discontinue unless directed by your doctor.  May cause drowsiness.  Alcohol may intensify this effect.  Use care when operating dangerous machinery.  This prescription cannot be refilled.  Using more of this medication than prescribed may cause serious breathing problems.    -- Indication: For PAin control as needed    gabapentin 100 mg oral capsule  -- 1 cap(s) by mouth 3 times a day  -- Indication: For neuropathy    metFORMIN 500 mg oral tablet  -- 1 tab(s) by mouth 2 times a day   last dose 1/23/19 AM dose  -- Indication: For Diabetes    atorvastatin 40 mg oral tablet  -- 1 tab(s) by mouth once a day (at bedtime)  -- Indication: For Hi cholesterol    ticagrelor 90 mg oral tablet  -- 1 tab(s) by mouth 2 times a day  -- Indication: For blood thinner    metoprolol tartrate 50 mg oral tablet  -- 1.5 tab(s) by mouth 2 times a day   -- It is very important that you take or use this exactly as directed.  Do not skip doses or discontinue unless directed by your doctor.  May cause drowsiness.  Alcohol may intensify this effect.  Use care when operating dangerous machinery.  Some non-prescription drugs may aggravate your condition.  Read all labels carefully.  If a warning appears, check with your doctor before taking.  Take with food or milk.  This drug may impair the ability to drive or operate machinery.  Use care until you become familiar with its effects.    -- Indication: For Cardiac med    amLODIPine 5 mg oral tablet  -- 1 tab(s) by mouth once a day  -- Indication: For Htn    hydroCHLOROthiazide 25 mg oral tablet  -- 1 tab(s) by mouth once a day  -- Indication: For water pill    pantoprazole 40 mg oral delayed release tablet  -- 1 tab(s) by mouth once a day (before a meal)  -- Indication: For gerd

## 2019-01-31 NOTE — DISCHARGE NOTE ADULT - PLAN OF CARE
full recovery follow up appt with DR. Saravanan Wolff in 2 weeks - call [phone] this week to schedule your appointment time for tuesday, february 19.  follow up appt with your Primary Care MD and/or Cardiologist in 3-4 weeks  ambulate 4-5 times a day  shower daily

## 2019-01-31 NOTE — DISCHARGE NOTE ADULT - OTHER SIGNIFICANT FINDINGS
32 minutes spent on this discharge    PHYSICAL EXAM:  Neurology: alert and oriented x 3, nonfocal, no gross deficits  CV : S1S2  Sternal Wound :  CDI , Stable  Lungs: cTA  Abdomen: soft, nontender, nondistended, positive bowel sounds, last bowel movement  +BM loose x 3       Extremities:     no edema, no calf tenderness, LSVG inc CDI

## 2019-02-01 DIAGNOSIS — E87.6 HYPOKALEMIA: ICD-10-CM

## 2019-02-01 LAB
ANION GAP SERPL CALC-SCNC: 16 MMOL/L — SIGNIFICANT CHANGE UP (ref 5–17)
BUN SERPL-MCNC: 14 MG/DL — SIGNIFICANT CHANGE UP (ref 7–23)
CALCIUM SERPL-MCNC: 8.9 MG/DL — SIGNIFICANT CHANGE UP (ref 8.4–10.5)
CHLORIDE SERPL-SCNC: 99 MMOL/L — SIGNIFICANT CHANGE UP (ref 96–108)
CO2 SERPL-SCNC: 27 MMOL/L — SIGNIFICANT CHANGE UP (ref 22–31)
CREAT SERPL-MCNC: 0.81 MG/DL — SIGNIFICANT CHANGE UP (ref 0.5–1.3)
GLUCOSE SERPL-MCNC: 151 MG/DL — HIGH (ref 70–99)
HCT VFR BLD CALC: 34.4 % — LOW (ref 34.5–45)
HGB BLD-MCNC: 10.5 G/DL — LOW (ref 11.5–15.5)
MCHC RBC-ENTMCNC: 24.1 PG — LOW (ref 27–34)
MCHC RBC-ENTMCNC: 30.4 GM/DL — LOW (ref 32–36)
MCV RBC AUTO: 79 FL — LOW (ref 80–100)
PLATELET # BLD AUTO: 284 K/UL — SIGNIFICANT CHANGE UP (ref 150–400)
POTASSIUM SERPL-MCNC: 3.5 MMOL/L — SIGNIFICANT CHANGE UP (ref 3.5–5.3)
POTASSIUM SERPL-MCNC: 4 MMOL/L — SIGNIFICANT CHANGE UP (ref 3.5–5.3)
POTASSIUM SERPL-SCNC: 3.5 MMOL/L — SIGNIFICANT CHANGE UP (ref 3.5–5.3)
POTASSIUM SERPL-SCNC: 4 MMOL/L — SIGNIFICANT CHANGE UP (ref 3.5–5.3)
RBC # BLD: 4.35 M/UL — SIGNIFICANT CHANGE UP (ref 3.8–5.2)
RBC # FLD: 19.4 % — HIGH (ref 10.3–14.5)
SODIUM SERPL-SCNC: 142 MMOL/L — SIGNIFICANT CHANGE UP (ref 135–145)
WBC # BLD: 11.2 K/UL — HIGH (ref 3.8–10.5)
WBC # FLD AUTO: 11.2 K/UL — HIGH (ref 3.8–10.5)

## 2019-02-01 RX ORDER — METOPROLOL TARTRATE 50 MG
25 TABLET ORAL ONCE
Qty: 0 | Refills: 0 | Status: COMPLETED | OUTPATIENT
Start: 2019-02-01 | End: 2019-02-01

## 2019-02-01 RX ORDER — POTASSIUM BICARBONATE 978 MG/1
25 TABLET, EFFERVESCENT ORAL
Qty: 0 | Refills: 0 | Status: COMPLETED | OUTPATIENT
Start: 2019-02-01 | End: 2019-02-01

## 2019-02-01 RX ORDER — METOPROLOL TARTRATE 50 MG
75 TABLET ORAL
Qty: 0 | Refills: 0 | Status: DISCONTINUED | OUTPATIENT
Start: 2019-02-01 | End: 2019-02-02

## 2019-02-01 RX ADMIN — Medication 81 MILLIGRAM(S): at 11:50

## 2019-02-01 RX ADMIN — GABAPENTIN 100 MILLIGRAM(S): 400 CAPSULE ORAL at 13:18

## 2019-02-01 RX ADMIN — OXYCODONE HYDROCHLORIDE 5 MILLIGRAM(S): 5 TABLET ORAL at 17:27

## 2019-02-01 RX ADMIN — Medication 100 MILLIGRAM(S): at 13:19

## 2019-02-01 RX ADMIN — PANTOPRAZOLE SODIUM 40 MILLIGRAM(S): 20 TABLET, DELAYED RELEASE ORAL at 06:13

## 2019-02-01 RX ADMIN — GABAPENTIN 100 MILLIGRAM(S): 400 CAPSULE ORAL at 21:29

## 2019-02-01 RX ADMIN — POTASSIUM BICARBONATE 25 MILLIEQUIVALENT(S): 978 TABLET, EFFERVESCENT ORAL at 10:00

## 2019-02-01 RX ADMIN — GABAPENTIN 100 MILLIGRAM(S): 400 CAPSULE ORAL at 06:13

## 2019-02-01 RX ADMIN — TICAGRELOR 90 MILLIGRAM(S): 90 TABLET ORAL at 17:27

## 2019-02-01 RX ADMIN — OXYCODONE HYDROCHLORIDE 5 MILLIGRAM(S): 5 TABLET ORAL at 06:42

## 2019-02-01 RX ADMIN — Medication 25 MILLIGRAM(S): at 06:13

## 2019-02-01 RX ADMIN — TICAGRELOR 90 MILLIGRAM(S): 90 TABLET ORAL at 06:13

## 2019-02-01 RX ADMIN — Medication 75 MILLIGRAM(S): at 17:26

## 2019-02-01 RX ADMIN — POTASSIUM BICARBONATE 25 MILLIEQUIVALENT(S): 978 TABLET, EFFERVESCENT ORAL at 11:49

## 2019-02-01 RX ADMIN — ENOXAPARIN SODIUM 30 MILLIGRAM(S): 100 INJECTION SUBCUTANEOUS at 11:50

## 2019-02-01 RX ADMIN — OXYCODONE HYDROCHLORIDE 5 MILLIGRAM(S): 5 TABLET ORAL at 21:28

## 2019-02-01 RX ADMIN — Medication 10 MILLIGRAM(S): at 13:19

## 2019-02-01 RX ADMIN — POTASSIUM BICARBONATE 25 MILLIEQUIVALENT(S): 978 TABLET, EFFERVESCENT ORAL at 13:17

## 2019-02-01 RX ADMIN — Medication 20 MILLIGRAM(S): at 06:13

## 2019-02-01 RX ADMIN — ATORVASTATIN CALCIUM 40 MILLIGRAM(S): 80 TABLET, FILM COATED ORAL at 21:29

## 2019-02-01 RX ADMIN — Medication 10 MILLIGRAM(S): at 23:29

## 2019-02-01 RX ADMIN — Medication 50 MILLIGRAM(S): at 06:13

## 2019-02-01 RX ADMIN — Medication 100 MILLIGRAM(S): at 06:13

## 2019-02-01 RX ADMIN — OXYCODONE HYDROCHLORIDE 5 MILLIGRAM(S): 5 TABLET ORAL at 22:00

## 2019-02-01 RX ADMIN — OXYCODONE HYDROCHLORIDE 5 MILLIGRAM(S): 5 TABLET ORAL at 06:12

## 2019-02-01 RX ADMIN — Medication 10 MILLIGRAM(S): at 06:13

## 2019-02-01 RX ADMIN — AMLODIPINE BESYLATE 5 MILLIGRAM(S): 2.5 TABLET ORAL at 06:13

## 2019-02-01 RX ADMIN — Medication 25 MILLIGRAM(S): at 11:51

## 2019-02-01 NOTE — PROGRESS NOTE ADULT - ASSESSMENT
This is a 67 y/o AA female with PMHx of PAD (on Eliquis last dose 1/23/19 AM), s/p RT. SFA stent, HTN, HLD, DM2 (Hgba1c 6.7, managed by Dr Tung BANSAL), LEILANI (not on CPAP), gastric bypass, left carotid bruit, s/p recent LLE (PANTERA to Lt SFA, balloon angioplasty on Lt pop, & PANTERA to Lt peroneal artery (3/22), April 2018 acute limb underwent catheter directed lysis and PTA / stent of left SFA, Popliteal, peroneal presents with complains of pain (not as severe as prior to intervention) in both calves L > R. Tolerance 1-2 blocks. Pt was referred for Peripheral Angiogram found to have 90% ISR of LSFA and pop s/p L SFA stenting, SAMUEL to SFA and popliteal.  Pt. endorses progressing episodes of exertional SOB and palpitations.  Denies chest pain/pressure, dizziness, diaphoresis, nausea, vomiting, recent weight gain, peripheral edema, or syncope.  Pt. does not take medications as prescribed. NST  1/10/19 results below.  Pt. presents today asymptomatic for further evaluation and cardiac cath.   CT Surgery consulted for  multi vessel disease   Patient amenable to cardiac surgery on Plavix now on hold P2ty12 sent.  1/25 VSS; vein mapping done; continue preop workup; p2y12 308; heparin gttp PTT 66.7; hct 26- ck guiac; prob transfuse 1 unit prbc in am   01/28/2019  CABG, 1 arterial and 2 venous    Lima to LAD/ SVG to OM/ Distal RCA   Extubated d 0  Brillinta for pvd/LE stenting, no eliquis.  L pleural ct remains in place  Insulin infusion for glucose control, weaned off  MS ct d/c, l pleural ct remains.  Transferred to sdu  Pt received 2 percocet's  and is lethargic. A&o x3, follows commands.  D/c narcotics .  1/30 Pt w/o complaints overnight  Hypoglycemia this am, sliding scale d/c  Lopressor increased for bp/hr  1/31 VSS - lungs bibasilar crackles - d/t poor inspiratory effort -  chest pt given. incentive spirometry encouraged along with coughing & deep breathing.  c/o pain incisional - relieved w/ medications  d/c home Saturday? This is a 69 y/o AA female with PMHx of PAD (on Eliquis last dose 1/23/19 AM), s/p RT. SFA stent, HTN, HLD, DM2 (Hgba1c 6.7, managed by Dr Tung BANSAL), LEILANI (not on CPAP), gastric bypass, left carotid bruit, s/p recent LLE (PANTERA to Lt SFA, balloon angioplasty on Lt pop, & PANTERA to Lt peroneal artery (3/22), April 2018 acute limb underwent catheter directed lysis and PTA / stent of left SFA, Popliteal, peroneal presents with complains of pain (not as severe as prior to intervention) in both calves L > R. Tolerance 1-2 blocks. Pt was referred for Peripheral Angiogram found to have 90% ISR of LSFA and pop s/p L SFA stenting, SAMUEL to SFA and popliteal.  Pt. endorses progressing episodes of exertional SOB and palpitations.  Denies chest pain/pressure, dizziness, diaphoresis, nausea, vomiting, recent weight gain, peripheral edema, or syncope.  Pt. does not take medications as prescribed. NST  1/10/19 results below.  Pt. presents today asymptomatic for further evaluation and cardiac cath.   CT Surgery consulted for  multi vessel disease   Patient amenable to cardiac surgery on Plavix now on hold P2ty12 sent.  1/25 VSS; vein mapping done; continue preop workup; p2y12 308; heparin gttp PTT 66.7; hct 26- ck guiac; prob transfuse 1 unit prbc in am   01/28/2019  CABG, 1 arterial and 2 venous    Lima to LAD/ SVG to OM/ Distal RCA   Extubated d 0  Brillinta for pvd/LE stenting, no eliquis.  L pleural ct remains in place  Insulin infusion for glucose control, weaned off  MS ct d/c, l pleural ct remains.  Transferred to sdu  Pt received 2 percocet's  and is lethargic. A&o x3, follows commands.  D/c narcotics .  1/30 Pt w/o complaints overnight  Hypoglycemia this am, sliding scale d/c  Lopressor increased for bp/hr  1/31 VSS - lungs bibasilar crackles - d/t poor inspiratory effort -  chest pt given. incentive spirometry encouraged along with coughing & deep breathing.  c/o pain incisional - relieved w/ medications  2/1 VSS; increase bb as tolerated; d/w pw; pain management and pulm toilet  Discharge planning- home sat / sun This is a 69 y/o AA female with PMHx of PAD (on Eliquis last dose 1/23/19 AM), s/p RT. SFA stent, HTN, HLD, DM2 (Hgba1c 6.7, managed by Dr Tung BANSAL), LEILANI (not on CPAP), gastric bypass, left carotid bruit, s/p recent LLE (PANTERA to Lt SFA, balloon angioplasty on Lt pop, & PANTERA to Lt peroneal artery (3/22), April 2018 acute limb underwent catheter directed lysis and PTA / stent of left SFA, Popliteal, peroneal presents with complains of pain (not as severe as prior to intervention) in both calves L > R. Tolerance 1-2 blocks. Pt was referred for Peripheral Angiogram found to have 90% ISR of LSFA and pop s/p L SFA stenting, SAMUEL to SFA and popliteal.  Pt. endorses progressing episodes of exertional SOB and palpitations.  Denies chest pain/pressure, dizziness, diaphoresis, nausea, vomiting, recent weight gain, peripheral edema, or syncope.  Pt. does not take medications as prescribed. NST  1/10/19 results below.  Pt. presents today asymptomatic for further evaluation and cardiac cath.   CT Surgery consulted for  multi vessel disease   Patient amenable to cardiac surgery on Plavix now on hold P2ty12 sent.  1/25 VSS; vein mapping done; continue preop workup; p2y12 308; heparin gttp PTT 66.7; hct 26- ck guiac; prob transfuse 1 unit prbc in am   01/28/2019  CABG, 1 arterial and 2 venous    Lima to LAD/ SVG to OM/ Distal RCA   Extubated d 0  Brillinta for pvd/LE stenting, no eliquis.  L pleural ct remains in place  Insulin infusion for glucose control, weaned off  MS ct d/c, l pleural ct remains.  Transferred to sdu  Pt received 2 percocet's  and is lethargic. A&o x3, follows commands.  D/c narcotics .  1/30 Pt w/o complaints overnight  Hypoglycemia this am, sliding scale d/c; A1C 6.7   Lopressor increased for bp/hr  1/31 VSS - lungs bibasilar crackles - d/t poor inspiratory effort -  chest pt given. incentive spirometry encouraged along with coughing & deep breathing.  c/o pain incisional - relieved w/ medications  2/1 VSS; increase bb as tolerated; d/w pw; pain management and pulm toilet  Discharge planning- home sat / sun

## 2019-02-01 NOTE — PROGRESS NOTE ADULT - SUBJECTIVE AND OBJECTIVE BOX
VITAL SIGNS    Telemetry:    Vital Signs Last 24 Hrs  T(C): 36.8 (19 @ 05:41), Max: 36.8 (19 @ 05:41)  T(F): 98.2 (19 @ 05:41), Max: 98.2 (19 @ 05:41)  HR: 96 (19 @ 05:41) (69 - 97)  BP: 148/76 (19 @ 05:41) (131/78 - 149/83)  RR: 18 (19 @ 05:41) (18 - 18)  SpO2: 94% (19 @ 05:41) (94% - 97%)             @ 07:01  -   @ 07:00  --------------------------------------------------------  IN: 840 mL / OUT: 1300 mL / NET: -460 mL     @ 07:01  -   @ 11:50  --------------------------------------------------------  IN: 220 mL / OUT: 0 mL / NET: 220 mL       Daily     Daily Weight in k (2019 07:36)  Admit Wt: Drug Dosing Weight  Height (cm): 165.1 (2019 08:29)  Weight (kg): 72.3 (2019 08:29)  BMI (kg/m2): 26.5 (2019 08:29)  BSA (m2): 1.8 (2019 08:29)      CAPILLARY BLOOD GLUCOSE              amLODIPine   Tablet 5 milliGRAM(s) Oral daily  aspirin enteric coated 81 milliGRAM(s) Oral daily  atorvastatin 40 milliGRAM(s) Oral at bedtime  dextrose 50% Injectable 50 milliLiter(s) IV Push every 15 minutes  dextrose 50% Injectable 25 milliLiter(s) IV Push every 15 minutes  docusate sodium 100 milliGRAM(s) Oral three times a day  enoxaparin Injectable 30 milliGRAM(s) SubCutaneous daily  furosemide    Tablet 20 milliGRAM(s) Oral daily  gabapentin 100 milliGRAM(s) Oral three times a day  hydrochlorothiazide 25 milliGRAM(s) Oral daily  metoclopramide Injectable 10 milliGRAM(s) IV Push every 8 hours  metoprolol tartrate 75 milliGRAM(s) Oral <User Schedule>  metoprolol tartrate 25 milliGRAM(s) Oral once  oxyCODONE    IR 5 milliGRAM(s) Oral every 4 hours PRN  pantoprazole    Tablet 40 milliGRAM(s) Oral before breakfast  potassium bicarbonate/potassium citrate 25 milliEquivalent(s) Oral every 1 hour  ticagrelor 90 milliGRAM(s) Oral two times a day      PHYSICAL EXAM    Subjective: "Hi.   Neurology: alert and oriented x 3, nonfocal, no gross deficits  CV : tele:  RSR  Sternal Wound :  CDI with dressing , Stable  Lungs: clear. RR easy, unlabored   Abdomen: soft, nontender, nondistended, positive bowel sounds, bowel movement   Neg N/V/D   :  pt voiding without difficulty   Extremities:   HUSAIN; edema, neg calf tenderness.   PPP bilaterally      PW:  Chest tubes: VITAL SIGNS    Telemetry:  rsr    Vital Signs Last 24 Hrs  T(C): 36.8 (19 @ 05:41), Max: 36.8 (19 @ 05:41)  T(F): 98.2 (19 @ 05:41), Max: 98.2 (19 @ 05:41)  HR: 96 (19 @ 05:41) (69 - 97)  BP: 148/76 (19 @ 05:41) (131/78 - 149/83)  RR: 18 (19 @ 05:41) (18 - 18)  SpO2: 94% (19 @ 05:41) (94% - 97%)             @ 07:01  -   @ 07:00  --------------------------------------------------------  IN: 840 mL / OUT: 1300 mL / NET: -460 mL     @ 07:01  -   @ 11:50  --------------------------------------------------------  IN: 220 mL / OUT: 0 mL / NET: 220 mL       Daily     Daily Weight in k (2019 07:36)  Admit Wt: Drug Dosing Weight  Height (cm): 165.1 (2019 08:29)  Weight (kg): 72.3 (2019 08:29)  BMI (kg/m2): 26.5 (2019 08:29)  BSA (m2): 1.8 (2019 08:29)      CAPILLARY BLOOD GLUCOSE              amLODIPine   Tablet 5 milliGRAM(s) Oral daily  aspirin enteric coated 81 milliGRAM(s) Oral daily  atorvastatin 40 milliGRAM(s) Oral at bedtime  dextrose 50% Injectable 50 milliLiter(s) IV Push every 15 minutes  dextrose 50% Injectable 25 milliLiter(s) IV Push every 15 minutes  docusate sodium 100 milliGRAM(s) Oral three times a day  enoxaparin Injectable 30 milliGRAM(s) SubCutaneous daily  furosemide    Tablet 20 milliGRAM(s) Oral daily  gabapentin 100 milliGRAM(s) Oral three times a day  hydrochlorothiazide 25 milliGRAM(s) Oral daily  metoclopramide Injectable 10 milliGRAM(s) IV Push every 8 hours  metoprolol tartrate 75 milliGRAM(s) Oral <User Schedule>  metoprolol tartrate 25 milliGRAM(s) Oral once  oxyCODONE    IR 5 milliGRAM(s) Oral every 4 hours PRN  pantoprazole    Tablet 40 milliGRAM(s) Oral before breakfast  potassium bicarbonate/potassium citrate 25 milliEquivalent(s) Oral every 1 hour  ticagrelor 90 milliGRAM(s) Oral two times a day      PHYSICAL EXAM    Subjective: "I had a bad night."   Neurology: alert and oriented x 3, nonfocal, no gross deficits  CV : tele:  RSR    Sternal Wound :  CDI MARK- sternum Stable; +pw   Lungs: clear. RR easy, unlabored   Abdomen: soft, nontender, nondistended, positive bowel sounds, + bowel movement   Neg N/V/D   :  pt voiding without difficulty   Extremities:   HUSAIN; trace LE edema, neg calf tenderness.   PPP bilaterally; rt SVG site cdi mark      PW: + isolated  Chest tubes: none VITAL SIGNS    Telemetry:  rsr    Vital Signs Last 24 Hrs  T(C): 36.8 (19 @ 05:41), Max: 36.8 (19 @ 05:41)  T(F): 98.2 (19 @ 05:41), Max: 98.2 (19 @ 05:41)  HR: 96 (19 @ 05:41) (69 - 97)  BP: 148/76 (19 @ 05:41) (131/78 - 149/83)  RR: 18 (19 @ 05:41) (18 - 18)  SpO2: 94% (19 @ 05:41) (94% - 97%)             @ 07:01  -   @ 07:00  --------------------------------------------------------  IN: 840 mL / OUT: 1300 mL / NET: -460 mL     @ 07:01  -   @ 11:50  --------------------------------------------------------  IN: 220 mL / OUT: 0 mL / NET: 220 mL       Daily     Daily Weight in k (2019 07:36)  Admit Wt: Drug Dosing Weight  Height (cm): 165.1 (2019 08:29)  Weight (kg): 72.3 (2019 08:29)  BMI (kg/m2): 26.5 (2019 08:29)  BSA (m2): 1.8 (2019 08:29)          amLODIPine   Tablet 5 milliGRAM(s) Oral daily  aspirin enteric coated 81 milliGRAM(s) Oral daily  atorvastatin 40 milliGRAM(s) Oral at bedtime  dextrose 50% Injectable 50 milliLiter(s) IV Push every 15 minutes  dextrose 50% Injectable 25 milliLiter(s) IV Push every 15 minutes  docusate sodium 100 milliGRAM(s) Oral three times a day  enoxaparin Injectable 30 milliGRAM(s) SubCutaneous daily  furosemide    Tablet 20 milliGRAM(s) Oral daily  gabapentin 100 milliGRAM(s) Oral three times a day  hydrochlorothiazide 25 milliGRAM(s) Oral daily  metoclopramide Injectable 10 milliGRAM(s) IV Push every 8 hours  metoprolol tartrate 75 milliGRAM(s) Oral <User Schedule>  metoprolol tartrate 25 milliGRAM(s) Oral once  oxyCODONE    IR 5 milliGRAM(s) Oral every 4 hours PRN  pantoprazole    Tablet 40 milliGRAM(s) Oral before breakfast  potassium bicarbonate/potassium citrate 25 milliEquivalent(s) Oral every 1 hour  ticagrelor 90 milliGRAM(s) Oral two times a day      PHYSICAL EXAM    Subjective: "I had a bad night."   Neurology: alert and oriented x 3, nonfocal, no gross deficits  CV : tele:  RSR    Sternal Wound :  CDI MARK- sternum Stable; +pw   Lungs: clear. RR easy, unlabored   Abdomen: soft, nontender, nondistended, positive bowel sounds, + bowel movement   Neg N/V/D   :  pt voiding without difficulty   Extremities:   HUSAIN; trace LE edema, neg calf tenderness.   PPP bilaterally; rt SVG site cdi mark      PW: + isolated  Chest tubes: none

## 2019-02-01 NOTE — PROGRESS NOTE ADULT - PROBLEM SELECTOR PLAN 1
Asa, Statin, B-blocker, Chest PT,  Incentive spirometry, wound care and assessment.  Ambulate   Shower pod #5 continue postop care  continue asa/ statin/ increase lopressor 75 mg po bid  d/c pw today  diuresis  pulm toilet  pain management  increase activity as tolerated   Discharge planning - home sat/ sun

## 2019-02-02 LAB
ANION GAP SERPL CALC-SCNC: 14 MMOL/L — SIGNIFICANT CHANGE UP (ref 5–17)
BUN SERPL-MCNC: 18 MG/DL — SIGNIFICANT CHANGE UP (ref 7–23)
CALCIUM SERPL-MCNC: 8.9 MG/DL — SIGNIFICANT CHANGE UP (ref 8.4–10.5)
CHLORIDE SERPL-SCNC: 98 MMOL/L — SIGNIFICANT CHANGE UP (ref 96–108)
CO2 SERPL-SCNC: 27 MMOL/L — SIGNIFICANT CHANGE UP (ref 22–31)
CREAT SERPL-MCNC: 0.79 MG/DL — SIGNIFICANT CHANGE UP (ref 0.5–1.3)
GLUCOSE SERPL-MCNC: 148 MG/DL — HIGH (ref 70–99)
HCT VFR BLD CALC: 29.7 % — LOW (ref 34.5–45)
HGB BLD-MCNC: 10.1 G/DL — LOW (ref 11.5–15.5)
MAGNESIUM SERPL-MCNC: 1.4 MG/DL — LOW (ref 1.6–2.6)
MCHC RBC-ENTMCNC: 26.6 PG — LOW (ref 27–34)
MCHC RBC-ENTMCNC: 34 GM/DL — SIGNIFICANT CHANGE UP (ref 32–36)
MCV RBC AUTO: 78.2 FL — LOW (ref 80–100)
PHOSPHATE SERPL-MCNC: 3.3 MG/DL — SIGNIFICANT CHANGE UP (ref 2.5–4.5)
PLATELET # BLD AUTO: 301 K/UL — SIGNIFICANT CHANGE UP (ref 150–400)
POTASSIUM SERPL-MCNC: 3.4 MMOL/L — LOW (ref 3.5–5.3)
POTASSIUM SERPL-MCNC: 3.8 MMOL/L — SIGNIFICANT CHANGE UP (ref 3.5–5.3)
POTASSIUM SERPL-SCNC: 3.4 MMOL/L — LOW (ref 3.5–5.3)
POTASSIUM SERPL-SCNC: 3.8 MMOL/L — SIGNIFICANT CHANGE UP (ref 3.5–5.3)
RBC # BLD: 3.8 M/UL — SIGNIFICANT CHANGE UP (ref 3.8–5.2)
RBC # FLD: 19.5 % — HIGH (ref 10.3–14.5)
SODIUM SERPL-SCNC: 139 MMOL/L — SIGNIFICANT CHANGE UP (ref 135–145)
WBC # BLD: 10.1 K/UL — SIGNIFICANT CHANGE UP (ref 3.8–10.5)
WBC # FLD AUTO: 10.1 K/UL — SIGNIFICANT CHANGE UP (ref 3.8–10.5)

## 2019-02-02 RX ORDER — POTASSIUM BICARBONATE 978 MG/1
25 TABLET, EFFERVESCENT ORAL
Qty: 0 | Refills: 0 | Status: COMPLETED | OUTPATIENT
Start: 2019-02-02 | End: 2019-02-02

## 2019-02-02 RX ORDER — POTASSIUM CHLORIDE 20 MEQ
20 PACKET (EA) ORAL ONCE
Qty: 0 | Refills: 0 | Status: COMPLETED | OUTPATIENT
Start: 2019-02-02 | End: 2019-02-02

## 2019-02-02 RX ORDER — METOPROLOL TARTRATE 50 MG
75 TABLET ORAL EVERY 8 HOURS
Qty: 0 | Refills: 0 | Status: DISCONTINUED | OUTPATIENT
Start: 2019-02-02 | End: 2019-02-02

## 2019-02-02 RX ORDER — METOPROLOL TARTRATE 50 MG
75 TABLET ORAL
Qty: 0 | Refills: 0 | Status: DISCONTINUED | OUTPATIENT
Start: 2019-02-02 | End: 2019-02-03

## 2019-02-02 RX ADMIN — TICAGRELOR 90 MILLIGRAM(S): 90 TABLET ORAL at 17:31

## 2019-02-02 RX ADMIN — POTASSIUM BICARBONATE 25 MILLIEQUIVALENT(S): 978 TABLET, EFFERVESCENT ORAL at 12:18

## 2019-02-02 RX ADMIN — Medication 75 MILLIGRAM(S): at 06:18

## 2019-02-02 RX ADMIN — POTASSIUM BICARBONATE 25 MILLIEQUIVALENT(S): 978 TABLET, EFFERVESCENT ORAL at 11:27

## 2019-02-02 RX ADMIN — Medication 10 MILLIGRAM(S): at 06:18

## 2019-02-02 RX ADMIN — TICAGRELOR 90 MILLIGRAM(S): 90 TABLET ORAL at 06:18

## 2019-02-02 RX ADMIN — Medication 75 MILLIGRAM(S): at 17:33

## 2019-02-02 RX ADMIN — Medication 81 MILLIGRAM(S): at 11:28

## 2019-02-02 RX ADMIN — ENOXAPARIN SODIUM 30 MILLIGRAM(S): 100 INJECTION SUBCUTANEOUS at 11:29

## 2019-02-02 RX ADMIN — OXYCODONE HYDROCHLORIDE 5 MILLIGRAM(S): 5 TABLET ORAL at 11:59

## 2019-02-02 RX ADMIN — Medication 25 MILLIGRAM(S): at 06:18

## 2019-02-02 RX ADMIN — Medication 20 MILLIEQUIVALENT(S): at 21:05

## 2019-02-02 RX ADMIN — PANTOPRAZOLE SODIUM 40 MILLIGRAM(S): 20 TABLET, DELAYED RELEASE ORAL at 06:18

## 2019-02-02 RX ADMIN — POTASSIUM BICARBONATE 25 MILLIEQUIVALENT(S): 978 TABLET, EFFERVESCENT ORAL at 10:09

## 2019-02-02 RX ADMIN — Medication 10 MILLIGRAM(S): at 13:37

## 2019-02-02 RX ADMIN — ATORVASTATIN CALCIUM 40 MILLIGRAM(S): 80 TABLET, FILM COATED ORAL at 21:05

## 2019-02-02 RX ADMIN — AMLODIPINE BESYLATE 5 MILLIGRAM(S): 2.5 TABLET ORAL at 06:36

## 2019-02-02 RX ADMIN — OXYCODONE HYDROCHLORIDE 5 MILLIGRAM(S): 5 TABLET ORAL at 12:30

## 2019-02-02 NOTE — PROGRESS NOTE ADULT - PROBLEM SELECTOR PLAN 1
continue postop care  continue asa/ statin/ increase lopressor 75 mg po bid  d/c pw today  diuresis  pulm toilet  pain management  increase activity as tolerated   Discharge planning - home sat/ sun continue postop care  continue asa/ statin/ lopressor 75 mg po bid  pulm toilet  pain management  increase activity as tolerated   Discharge planning - home sun

## 2019-02-02 NOTE — PROGRESS NOTE ADULT - SUBJECTIVE AND OBJECTIVE BOX
VITAL SIGNS    Telemetry:    Vital Signs Last 24 Hrs  T(C): 36.8 (19 @ 04:22), Max: 36.8 (19 @ 04:22)  T(F): 98.2 (19 @ 04:22), Max: 98.2 (19 @ 04:22)  HR: 83 (19 @ 04:22) (75 - 85)  BP: 134/80 (19 @ 04:22) (112/74 - 139/71)  RR: 18 (19 @ 04:22) (18 - 18)  SpO2: 94% (19 @ 04:22) (94% - 96%)             @ 07:01  -   @ 07:00  --------------------------------------------------------  IN: 440 mL / OUT: 700 mL / NET: -260 mL     @ 07:01  -  02 @ 10:32  --------------------------------------------------------  IN: 220 mL / OUT: 0 mL / NET: 220 mL       Daily     Daily Weight in k.2 (2019 08:18)  Admit Wt: Drug Dosing Weight  Height (cm): 165.1 (2019 08:29)  Weight (kg): 72.3 (2019 08:29)  BMI (kg/m2): 26.5 (2019 08:29)  BSA (m2): 1.8 (2019 08:29)      CAPILLARY BLOOD GLUCOSE              amLODIPine   Tablet 5 milliGRAM(s) Oral daily  aspirin enteric coated 81 milliGRAM(s) Oral daily  atorvastatin 40 milliGRAM(s) Oral at bedtime  dextrose 50% Injectable 50 milliLiter(s) IV Push every 15 minutes  dextrose 50% Injectable 25 milliLiter(s) IV Push every 15 minutes  docusate sodium 100 milliGRAM(s) Oral three times a day  enoxaparin Injectable 30 milliGRAM(s) SubCutaneous daily  gabapentin 100 milliGRAM(s) Oral three times a day  hydrochlorothiazide 25 milliGRAM(s) Oral daily  metoclopramide Injectable 10 milliGRAM(s) IV Push every 8 hours  metoprolol tartrate 75 milliGRAM(s) Oral <User Schedule>  oxyCODONE    IR 5 milliGRAM(s) Oral every 4 hours PRN  pantoprazole    Tablet 40 milliGRAM(s) Oral before breakfast  potassium bicarbonate/potassium citrate 25 milliEquivalent(s) Oral every 1 hour  ticagrelor 90 milliGRAM(s) Oral two times a day      PHYSICAL EXAM    Subjective: "Hi.   Neurology: alert and oriented x 3, nonfocal, no gross deficits  CV : tele:  RSR  Sternal Wound :  CDI with dressing , Stable  Lungs: clear. RR easy, unlabored   Abdomen: soft, nontender, nondistended, positive bowel sounds, bowel movement   Neg N/V/D   :  pt voiding without difficulty   Extremities:   HUSAIN; edema, neg calf tenderness.   PPP bilaterally      PW:  Chest tubes: VITAL SIGNS    Telemetry:  RSR  with pvcs/; 16 bts wct overnight   Vital Signs Last 24 Hrs  T(C): 36.8 (19 @ 04:22), Max: 36.8 (19 @ 04:22)  T(F): 98.2 (19 @ 04:22), Max: 98.2 (19 @ 04:22)  HR: 83 (19 @ 04:22) (75 - 85)  BP: 134/80 (19 @ 04:22) (112/74 - 139/71)  RR: 18 (19 @ 04:22) (18 - 18)  SpO2: 94% (19 @ 04:22) (94% - 96%)             @ 07:01  -   @ 07:00  --------------------------------------------------------  IN: 440 mL / OUT: 700 mL / NET: -260 mL     @ 07:01  -   @ 10:32  --------------------------------------------------------  IN: 220 mL / OUT: 0 mL / NET: 220 mL       Daily     Daily Weight in k.2 (2019 08:18)  Admit Wt: Drug Dosing Weight  Height (cm): 165.1 (2019 08:29)  Weight (kg): 72.3 (2019 08:29)  BMI (kg/m2): 26.5 (2019 08:29)  BSA (m2): 1.8 (2019 08:29)        amLODIPine   Tablet 5 milliGRAM(s) Oral daily  aspirin enteric coated 81 milliGRAM(s) Oral daily  atorvastatin 40 milliGRAM(s) Oral at bedtime  dextrose 50% Injectable 50 milliLiter(s) IV Push every 15 minutes  dextrose 50% Injectable 25 milliLiter(s) IV Push every 15 minutes  docusate sodium 100 milliGRAM(s) Oral three times a day  enoxaparin Injectable 30 milliGRAM(s) SubCutaneous daily  gabapentin 100 milliGRAM(s) Oral three times a day  hydrochlorothiazide 25 milliGRAM(s) Oral daily  metoclopramide Injectable 10 milliGRAM(s) IV Push every 8 hours  metoprolol tartrate 75 milliGRAM(s) Oral <User Schedule>  oxyCODONE    IR 5 milliGRAM(s) Oral every 4 hours PRN  pantoprazole    Tablet 40 milliGRAM(s) Oral before breakfast  potassium bicarbonate/potassium citrate 25 milliEquivalent(s) Oral every 1 hour  ticagrelor 90 milliGRAM(s) Oral two times a day      PHYSICAL EXAM    Subjective: "I feel ok."   Neurology: alert and oriented x 3, nonfocal, no gross deficits  CV : tele:   RSR  with pvcs/; 16 bts wct overnight   Sternal Wound :  CDI MARK; sternum Stable  Lungs: clear. RR easy, unlabored   Abdomen: soft, nontender, nondistended, positive bowel sounds, + bowel movement   Neg N/V/D; obese abdomen    :  pt voiding without difficulty   Extremities:   HUSAIN; neg LE edema, neg calf tenderness.   PPP bilaterally; right SVG site cdi mark     PW: no  Chest tubes: none

## 2019-02-02 NOTE — PROGRESS NOTE ADULT - ASSESSMENT
This is a 67 y/o AA female with PMHx of PAD (on Eliquis last dose 1/23/19 AM), s/p RT. SFA stent, HTN, HLD, DM2 (Hgba1c 6.7, managed by Dr Tung BANSAL), LEILANI (not on CPAP), gastric bypass, left carotid bruit, s/p recent LLE (PANTERA to Lt SFA, balloon angioplasty on Lt pop, & PANTERA to Lt peroneal artery (3/22), April 2018 acute limb underwent catheter directed lysis and PTA / stent of left SFA, Popliteal, peroneal presents with complains of pain (not as severe as prior to intervention) in both calves L > R. Tolerance 1-2 blocks. Pt was referred for Peripheral Angiogram found to have 90% ISR of LSFA and pop s/p L SFA stenting, SAMUEL to SFA and popliteal.  Pt. endorses progressing episodes of exertional SOB and palpitations.  Denies chest pain/pressure, dizziness, diaphoresis, nausea, vomiting, recent weight gain, peripheral edema, or syncope.  Pt. does not take medications as prescribed. NST  1/10/19 results below.  Pt. presents today asymptomatic for further evaluation and cardiac cath.   CT Surgery consulted for  multi vessel disease   Patient amenable to cardiac surgery on Plavix now on hold P2ty12 sent.  1/25 VSS; vein mapping done; continue preop workup; p2y12 308; heparin gttp PTT 66.7; hct 26- ck guiac; prob transfuse 1 unit prbc in am   01/28/2019  CABG, 1 arterial and 2 venous    [place] to LAD/ SVG to OM/ Distal RCA   Extubated d 0  Brillinta for pvd/LE stenting, no eliquis.  L pleural ct remains in place  Insulin infusion for glucose control, weaned off  MS ct d/c, l pleural ct remains.  Transferred to sdu  Pt received 2 percocet's  and is lethargic. A&o x3, follows commands.  D/c narcotics .  1/30 Pt w/o complaints overnight  Hypoglycemia this am, sliding scale d/c; A1C 6.7   Lopressor increased for bp/hr  1/31 VSS - lungs bibasilar crackles - d/t poor inspiratory effort -  chest pt given. incentive spirometry encouraged along with coughing & deep breathing.  c/o pain incisional - relieved w/ medications  2/1 VSS; increase bb as tolerated; d/w pw; pain management and pulm toilet  Discharge planning- home sat / sun This is a 67 y/o AA female with PMHx of PAD (on Eliquis last dose 1/23/19 AM), s/p RT. SFA stent, HTN, HLD, DM2 (Hgba1c 6.7, managed by Dr Tung BANSAL), LEILANI (not on CPAP), gastric bypass, left carotid bruit, s/p recent LLE (PANTERA to Lt SFA, balloon angioplasty on Lt pop, & PANTERA to Lt peroneal artery (3/22), April 2018 acute limb underwent catheter directed lysis and PTA / stent of left SFA, Popliteal, peroneal presents with complains of pain (not as severe as prior to intervention) in both calves L > R. Tolerance 1-2 blocks. Pt was referred for Peripheral Angiogram found to have 90% ISR of LSFA and pop s/p L SFA stenting, SAMUEL to SFA and popliteal.  Pt. endorses progressing episodes of exertional SOB and palpitations.  Denies chest pain/pressure, dizziness, diaphoresis, nausea, vomiting, recent weight gain, peripheral edema, or syncope.  Pt. does not take medications as prescribed. NST  1/10/19 results below.  Pt. presents today asymptomatic for further evaluation and cardiac cath.   CT Surgery consulted for  multi vessel disease   Patient amenable to cardiac surgery on Plavix now on hold P2ty12 sent.  1/25 VSS; vein mapping done; continue preop workup; p2y12 308; heparin gttp PTT 66.7; hct 26- ck guiac; prob transfuse 1 unit prbc in am   01/28/2019  CABG, 1 arterial and 2 venous    [place]ima to LAD/ SVG to OM/ Distal RCA   Extubated d 0  Brillinta for pvd/LE stenting, no eliquis.  L pleural ct remains in place  Insulin infusion for glucose control, weaned off  MS ct d/c, l pleural ct remains.  Transferred to sdu  Pt received 2 percocet's  and is lethargic. A&o x3, follows commands.  D/c narcotics .  1/30 Pt w/o complaints overnight  Hypoglycemia this am, sliding scale d/c; A1C 6.7   Lopressor increased for bp/hr  1/31 VSS - lungs bibasilar crackles - d/t poor inspiratory effort -  chest pt given. incentive spirometry encouraged along with coughing & deep breathing.  c/o pain incisional - relieved w/ medications  2/1 VSS; increase bb as tolerated; d/w pw; pain management and pulm toilet  2/2 16 bts wct overnight- lopressor 75 mg po bid as per Dr. Wolff; K 3.4- K supplemented this am and repeat K this afternoon; diuretics now completed  Discharge planning- home sun as per Dr. Wolff

## 2019-02-03 VITALS
HEART RATE: 83 BPM | RESPIRATION RATE: 18 BRPM | DIASTOLIC BLOOD PRESSURE: 88 MMHG | SYSTOLIC BLOOD PRESSURE: 127 MMHG | TEMPERATURE: 99 F | OXYGEN SATURATION: 97 %

## 2019-02-03 LAB
ANION GAP SERPL CALC-SCNC: 16 MMOL/L — SIGNIFICANT CHANGE UP (ref 5–17)
BUN SERPL-MCNC: 17 MG/DL — SIGNIFICANT CHANGE UP (ref 7–23)
CALCIUM SERPL-MCNC: 9.2 MG/DL — SIGNIFICANT CHANGE UP (ref 8.4–10.5)
CHLORIDE SERPL-SCNC: 98 MMOL/L — SIGNIFICANT CHANGE UP (ref 96–108)
CO2 SERPL-SCNC: 24 MMOL/L — SIGNIFICANT CHANGE UP (ref 22–31)
CREAT SERPL-MCNC: 0.78 MG/DL — SIGNIFICANT CHANGE UP (ref 0.5–1.3)
GLUCOSE SERPL-MCNC: 180 MG/DL — HIGH (ref 70–99)
HCT VFR BLD CALC: 29.6 % — LOW (ref 34.5–45)
HGB BLD-MCNC: 10.2 G/DL — LOW (ref 11.5–15.5)
MCHC RBC-ENTMCNC: 27 PG — SIGNIFICANT CHANGE UP (ref 27–34)
MCHC RBC-ENTMCNC: 34.5 GM/DL — SIGNIFICANT CHANGE UP (ref 32–36)
MCV RBC AUTO: 78.4 FL — LOW (ref 80–100)
PLATELET # BLD AUTO: 362 K/UL — SIGNIFICANT CHANGE UP (ref 150–400)
POTASSIUM SERPL-MCNC: 3.7 MMOL/L — SIGNIFICANT CHANGE UP (ref 3.5–5.3)
POTASSIUM SERPL-SCNC: 3.7 MMOL/L — SIGNIFICANT CHANGE UP (ref 3.5–5.3)
RBC # BLD: 3.78 M/UL — LOW (ref 3.8–5.2)
RBC # FLD: 19.4 % — HIGH (ref 10.3–14.5)
SODIUM SERPL-SCNC: 138 MMOL/L — SIGNIFICANT CHANGE UP (ref 135–145)
WBC # BLD: 11.2 K/UL — HIGH (ref 3.8–10.5)
WBC # FLD AUTO: 11.2 K/UL — HIGH (ref 3.8–10.5)

## 2019-02-03 PROCEDURE — 87641 MR-STAPH DNA AMP PROBE: CPT

## 2019-02-03 PROCEDURE — 94010 BREATHING CAPACITY TEST: CPT

## 2019-02-03 PROCEDURE — 93005 ELECTROCARDIOGRAM TRACING: CPT

## 2019-02-03 PROCEDURE — P9047: CPT

## 2019-02-03 PROCEDURE — 97530 THERAPEUTIC ACTIVITIES: CPT

## 2019-02-03 PROCEDURE — 83605 ASSAY OF LACTIC ACID: CPT

## 2019-02-03 PROCEDURE — 85384 FIBRINOGEN ACTIVITY: CPT

## 2019-02-03 PROCEDURE — 86901 BLOOD TYPING SEROLOGIC RH(D): CPT

## 2019-02-03 PROCEDURE — 82550 ASSAY OF CK (CPK): CPT

## 2019-02-03 PROCEDURE — 82962 GLUCOSE BLOOD TEST: CPT

## 2019-02-03 PROCEDURE — 84484 ASSAY OF TROPONIN QUANT: CPT

## 2019-02-03 PROCEDURE — 86923 COMPATIBILITY TEST ELECTRIC: CPT

## 2019-02-03 PROCEDURE — 81001 URINALYSIS AUTO W/SCOPE: CPT

## 2019-02-03 PROCEDURE — 82272 OCCULT BLD FECES 1-3 TESTS: CPT

## 2019-02-03 PROCEDURE — 80053 COMPREHEN METABOLIC PANEL: CPT

## 2019-02-03 PROCEDURE — 86850 RBC ANTIBODY SCREEN: CPT

## 2019-02-03 PROCEDURE — 93571 IV DOP VEL&/PRESS C FLO 1ST: CPT | Mod: LD

## 2019-02-03 PROCEDURE — C1751: CPT

## 2019-02-03 PROCEDURE — 93458 L HRT ARTERY/VENTRICLE ANGIO: CPT

## 2019-02-03 PROCEDURE — 71045 X-RAY EXAM CHEST 1 VIEW: CPT

## 2019-02-03 PROCEDURE — 82947 ASSAY GLUCOSE BLOOD QUANT: CPT

## 2019-02-03 PROCEDURE — P9016: CPT

## 2019-02-03 PROCEDURE — 76937 US GUIDE VASCULAR ACCESS: CPT

## 2019-02-03 PROCEDURE — 80076 HEPATIC FUNCTION PANEL: CPT

## 2019-02-03 PROCEDURE — 85027 COMPLETE CBC AUTOMATED: CPT

## 2019-02-03 PROCEDURE — 86891 AUTOLOGOUS BLOOD OP SALVAGE: CPT

## 2019-02-03 PROCEDURE — 85730 THROMBOPLASTIN TIME PARTIAL: CPT

## 2019-02-03 PROCEDURE — 85610 PROTHROMBIN TIME: CPT

## 2019-02-03 PROCEDURE — 97116 GAIT TRAINING THERAPY: CPT

## 2019-02-03 PROCEDURE — 85576 BLOOD PLATELET AGGREGATION: CPT

## 2019-02-03 PROCEDURE — 82435 ASSAY OF BLOOD CHLORIDE: CPT

## 2019-02-03 PROCEDURE — 97161 PT EVAL LOW COMPLEX 20 MIN: CPT

## 2019-02-03 PROCEDURE — 36430 TRANSFUSION BLD/BLD COMPNT: CPT

## 2019-02-03 PROCEDURE — 31720 CLEARANCE OF AIRWAYS: CPT

## 2019-02-03 PROCEDURE — P9045: CPT

## 2019-02-03 PROCEDURE — 94002 VENT MGMT INPAT INIT DAY: CPT

## 2019-02-03 PROCEDURE — 99239 HOSP IP/OBS DSCHRG MGMT >30: CPT | Mod: 24

## 2019-02-03 PROCEDURE — 87640 STAPH A DNA AMP PROBE: CPT

## 2019-02-03 PROCEDURE — C1887: CPT

## 2019-02-03 PROCEDURE — 82330 ASSAY OF CALCIUM: CPT

## 2019-02-03 PROCEDURE — C1769: CPT

## 2019-02-03 PROCEDURE — 84100 ASSAY OF PHOSPHORUS: CPT

## 2019-02-03 PROCEDURE — 80048 BASIC METABOLIC PNL TOTAL CA: CPT

## 2019-02-03 PROCEDURE — C1889: CPT

## 2019-02-03 PROCEDURE — 84132 ASSAY OF SERUM POTASSIUM: CPT

## 2019-02-03 PROCEDURE — C1729: CPT

## 2019-02-03 PROCEDURE — 84295 ASSAY OF SERUM SODIUM: CPT

## 2019-02-03 PROCEDURE — 83735 ASSAY OF MAGNESIUM: CPT

## 2019-02-03 PROCEDURE — 82803 BLOOD GASES ANY COMBINATION: CPT

## 2019-02-03 PROCEDURE — 84443 ASSAY THYROID STIM HORMONE: CPT

## 2019-02-03 PROCEDURE — 82553 CREATINE MB FRACTION: CPT

## 2019-02-03 PROCEDURE — 85014 HEMATOCRIT: CPT

## 2019-02-03 PROCEDURE — 86900 BLOOD TYPING SEROLOGIC ABO: CPT

## 2019-02-03 PROCEDURE — C1894: CPT

## 2019-02-03 RX ORDER — AMLODIPINE BESYLATE 2.5 MG/1
0.5 TABLET ORAL
Qty: 0 | Refills: 0 | COMMUNITY

## 2019-02-03 RX ORDER — ASPIRIN/CALCIUM CARB/MAGNESIUM 324 MG
1 TABLET ORAL
Qty: 30 | Refills: 0
Start: 2019-02-03 | End: 2019-03-04

## 2019-02-03 RX ORDER — OXYCODONE HYDROCHLORIDE 5 MG/1
1 TABLET ORAL
Qty: 20 | Refills: 0 | OUTPATIENT
Start: 2019-02-03 | End: 2019-02-07

## 2019-02-03 RX ORDER — PANTOPRAZOLE SODIUM 20 MG/1
1 TABLET, DELAYED RELEASE ORAL
Qty: 30 | Refills: 0
Start: 2019-02-03 | End: 2019-03-04

## 2019-02-03 RX ORDER — ATORVASTATIN CALCIUM 80 MG/1
1 TABLET, FILM COATED ORAL
Qty: 30 | Refills: 0
Start: 2019-02-03 | End: 2019-03-04

## 2019-02-03 RX ORDER — METFORMIN HYDROCHLORIDE 850 MG/1
1 TABLET ORAL
Qty: 0 | Refills: 0 | COMMUNITY

## 2019-02-03 RX ORDER — HYDROCHLOROTHIAZIDE 25 MG
1 TABLET ORAL
Qty: 30 | Refills: 0
Start: 2019-02-03 | End: 2019-03-04

## 2019-02-03 RX ORDER — AMLODIPINE BESYLATE 2.5 MG/1
1 TABLET ORAL
Qty: 30 | Refills: 0
Start: 2019-02-03 | End: 2019-03-04

## 2019-02-03 RX ORDER — METOPROLOL TARTRATE 50 MG
1.5 TABLET ORAL
Qty: 90 | Refills: 0
Start: 2019-02-03 | End: 2019-03-04

## 2019-02-03 RX ORDER — DICLOFENAC SODIUM 75 MG/1
1 TABLET, DELAYED RELEASE ORAL
Qty: 0 | Refills: 0 | COMMUNITY

## 2019-02-03 RX ORDER — TICAGRELOR 90 MG/1
1 TABLET ORAL
Qty: 60 | Refills: 2
Start: 2019-02-03 | End: 2019-05-03

## 2019-02-03 RX ORDER — GABAPENTIN 400 MG/1
1 CAPSULE ORAL
Qty: 90 | Refills: 0
Start: 2019-02-03 | End: 2019-03-04

## 2019-02-03 RX ORDER — OXYCODONE HYDROCHLORIDE 5 MG/1
1 TABLET ORAL
Qty: 20 | Refills: 0
Start: 2019-02-03 | End: 2019-02-07

## 2019-02-03 RX ORDER — METFORMIN HYDROCHLORIDE 850 MG/1
1 TABLET ORAL
Qty: 30 | Refills: 0
Start: 2019-02-03 | End: 2019-03-04

## 2019-02-03 RX ORDER — LOPERAMIDE HCL 2 MG
4 TABLET ORAL ONCE
Qty: 0 | Refills: 0 | Status: COMPLETED | OUTPATIENT
Start: 2019-02-03 | End: 2019-02-03

## 2019-02-03 RX ADMIN — TICAGRELOR 90 MILLIGRAM(S): 90 TABLET ORAL at 08:04

## 2019-02-03 RX ADMIN — ENOXAPARIN SODIUM 30 MILLIGRAM(S): 100 INJECTION SUBCUTANEOUS at 11:12

## 2019-02-03 RX ADMIN — Medication 4 MILLIGRAM(S): at 03:12

## 2019-02-03 RX ADMIN — AMLODIPINE BESYLATE 5 MILLIGRAM(S): 2.5 TABLET ORAL at 08:04

## 2019-02-03 RX ADMIN — Medication 81 MILLIGRAM(S): at 11:12

## 2019-02-03 RX ADMIN — PANTOPRAZOLE SODIUM 40 MILLIGRAM(S): 20 TABLET, DELAYED RELEASE ORAL at 08:05

## 2019-02-03 RX ADMIN — Medication 75 MILLIGRAM(S): at 08:05

## 2019-02-03 RX ADMIN — Medication 25 MILLIGRAM(S): at 08:06

## 2019-02-03 NOTE — PROGRESS NOTE ADULT - ASSESSMENT
This is a 69 y/o AA female with PMHx of PAD (on Eliquis last dose 1/23/19 AM), s/p RT. SFA stent, HTN, HLD, DM2 (Hgba1c 6.7, managed by Dr Tung BANSAL), LEILANI (not on CPAP), gastric bypass, left carotid bruit, s/p recent LLE (PANTERA to Lt SFA, balloon angioplasty on Lt pop, & PANTERA to Lt peroneal artery (3/22), April 2018 acute limb underwent catheter directed lysis and PTA / stent of left SFA, Popliteal, peroneal presents with complains of pain (not as severe as prior to intervention) in both calves L > R. Tolerance 1-2 blocks. Pt was referred for Peripheral Angiogram found to have 90% ISR of LSFA and pop s/p L SFA stenting, SAMUEL to SFA and popliteal.  Pt. endorses progressing episodes of exertional SOB and palpitations.  Denies chest pain/pressure, dizziness, diaphoresis, nausea, vomiting, recent weight gain, peripheral edema, or syncope.  Pt. does not take medications as prescribed. NST  1/10/19 results below.  Pt. presents today asymptomatic for further evaluation and cardiac cath.   CT Surgery consulted for  multi vessel disease   Patient amenable to cardiac surgery on Plavix now on hold P2ty12 sent.  1/25 VSS; vein mapping done; continue preop workup; p2y12 308; heparin gttp PTT 66.7; hct 26- ck guiac; prob transfuse 1 unit prbc in am   01/28/2019  CABG, 1 arterial and 2 venous    [place] to LAD/ SVG to OM/ Distal RCA   Extubated d 0  Brillinta for pvd/LE stenting, no eliquis.  L pleural ct remains in place  Insulin infusion for glucose control, weaned off  MS ct d/c, l pleural ct remains.  Transferred to sdu  Pt received 2 percocet's  and is lethargic. A&o x3, follows commands.  D/c narcotics .  1/30 Pt w/o complaints overnight  Hypoglycemia this am, sliding scale d/c; A1C 6.7   Lopressor increased for bp/hr  1/31 VSS - lungs bibasilar crackles - d/t poor inspiratory effort -  chest pt given. incentive spirometry encouraged along with coughing & deep breathing.  c/o pain incisional - relieved w/ medications  2/1 VSS; increase bb as tolerated; d/w pw; pain management and pulm toilet  2/2 16 bts wct overnight- lopressor 75 mg po bid as per Dr. Wolff; K 3.4- K supplemented this am and repeat K this afternoon; diuretics now completed  2/3 - VSS Labs WNL WBC - 10  pt does not want to go home d/t diarrhea from the "Glucerna"  instructed.  will wait for labs later then go.  Discharge planning- home Monday- as patient feels shes "not ready"   as per Dr. Wolff home today or tomorrow

## 2019-02-03 NOTE — PROGRESS NOTE ADULT - SUBJECTIVE AND OBJECTIVE BOX
VITAL SIGNS-Telemetry:  SR   Vital Signs Last 24 Hrs  T(C): 36.6 (19 @ 05:07), Max: 36.8 (19 @ 14:28)  T(F): 97.9 (19 @ 05:07), Max: 98.3 (19 @ 14:28)  HR: 88 (19 @ 05:07) (79 - 88)  BP: 146/82 (19 @ 05:07) (100/66 - 146/82)  RR: 16 (19 @ 05:07) (16 - 18)  SpO2: 95% (19 @ 05:07) (95% - 95%)         - @ 07:01  -   @ 07:00  --------------------------------------------------------  IN: 700 mL / OUT: 600 mL / NET: 100 mL    Daily     Daily Weight in k.2 (2019 07:29)        PHYSICAL EXAM:  Neurology: alert and oriented x 3, nonfocal, no gross deficits  CV : S1S2  Sternal Wound :  CDI , Stable  Lungs: cTA  Abdomen: soft, nontender, nondistended, positive bowel sounds, last bowel movement  +BM loose x 3       Extremities:     no edema, no calf tenderness, LSVG inc CDI    amLODIPine   Tablet 5 milliGRAM(s) Oral daily  aspirin enteric coated 81 milliGRAM(s) Oral daily  atorvastatin 40 milliGRAM(s) Oral at bedtime  dextrose 50% Injectable 50 milliLiter(s) IV Push every 15 minutes  dextrose 50% Injectable 25 milliLiter(s) IV Push every 15 minutes  docusate sodium 100 milliGRAM(s) Oral three times a day  enoxaparin Injectable 30 milliGRAM(s) SubCutaneous daily  gabapentin 100 milliGRAM(s) Oral three times a day  hydrochlorothiazide 25 milliGRAM(s) Oral daily  metoprolol tartrate 75 milliGRAM(s) Oral <User Schedule>  oxyCODONE    IR 5 milliGRAM(s) Oral every 4 hours PRN  pantoprazole    Tablet 40 milliGRAM(s) Oral before breakfast  ticagrelor 90 milliGRAM(s) Oral two times a day    Physical Therapy Rec:   Home  [  x]   Home w/ PT  [  ]  Rehab  [  ]  Discussed with Cardiothoracic Team at AM rounds.

## 2019-02-03 NOTE — PROGRESS NOTE ADULT - PROBLEM SELECTOR PLAN 1
continue postop care  continue asa/ statin/ lopressor 75 mg po bid  pulm toilet  pain management  increase activity as tolerated   Discharge planning - home sun

## 2019-02-03 NOTE — PROGRESS NOTE ADULT - PROBLEM SELECTOR PROBLEM 1
Coronary artery disease involving native coronary artery of native heart, angina presence unspecified
S/P CABG x 3
Coronary artery disease involving native coronary artery of native heart, angina presence unspecified
S/P CABG x 3

## 2019-02-03 NOTE — PROGRESS NOTE ADULT - PROVIDER SPECIALTY LIST ADULT
CT Surgery
Cardiology
Critical Care
CT Surgery
CT Surgery

## 2019-02-05 ENCOUNTER — APPOINTMENT (OUTPATIENT)
Age: 69
End: 2019-02-05
Payer: MEDICARE

## 2019-02-05 VITALS
WEIGHT: 154 LBS | HEART RATE: 95 BPM | OXYGEN SATURATION: 96 % | DIASTOLIC BLOOD PRESSURE: 74 MMHG | RESPIRATION RATE: 16 BRPM | BODY MASS INDEX: 25.63 KG/M2 | SYSTOLIC BLOOD PRESSURE: 122 MMHG

## 2019-02-05 PROBLEM — I73.9 PERIPHERAL VASCULAR DISEASE, UNSPECIFIED: Chronic | Status: ACTIVE | Noted: 2019-01-24

## 2019-02-05 PROCEDURE — 99024 POSTOP FOLLOW-UP VISIT: CPT

## 2019-02-05 RX ORDER — CLOPIDOGREL BISULFATE 75 MG/1
75 TABLET, FILM COATED ORAL DAILY
Qty: 30 | Refills: 5 | Status: DISCONTINUED | COMMUNITY
Start: 2018-03-29 | End: 2019-02-05

## 2019-02-05 RX ORDER — PRAVASTATIN SODIUM 10 MG/1
10 TABLET ORAL
Qty: 30 | Refills: 3 | Status: DISCONTINUED | COMMUNITY
Start: 2018-12-28 | End: 2019-02-05

## 2019-02-05 RX ORDER — ATORVASTATIN CALCIUM 40 MG/1
40 TABLET, FILM COATED ORAL
Refills: 0 | Status: ACTIVE | COMMUNITY
Start: 2019-02-05

## 2019-02-05 RX ORDER — DICLOFENAC SODIUM 100 MG/1
100 TABLET, FILM COATED, EXTENDED RELEASE ORAL
Qty: 30 | Refills: 0 | Status: DISCONTINUED | COMMUNITY
Start: 2017-05-23 | End: 2019-02-05

## 2019-02-05 RX ORDER — RAMIPRIL 10 MG/1
10 CAPSULE ORAL
Qty: 90 | Refills: 0 | Status: DISCONTINUED | COMMUNITY
Start: 2018-01-03 | End: 2019-02-05

## 2019-02-05 RX ORDER — OXYCODONE AND ACETAMINOPHEN 5; 325 MG/1; MG/1
5-325 TABLET ORAL EVERY 6 HOURS
Qty: 56 | Refills: 0 | Status: DISCONTINUED | COMMUNITY
Start: 2018-04-17 | End: 2019-02-05

## 2019-02-05 RX ORDER — APIXABAN 5 MG/1
5 TABLET, FILM COATED ORAL
Qty: 60 | Refills: 3 | Status: DISCONTINUED | COMMUNITY
Start: 2018-05-04 | End: 2019-02-05

## 2019-02-05 RX ORDER — ASPIRIN 81 MG/1
81 TABLET ORAL DAILY
Refills: 0 | Status: ACTIVE | COMMUNITY
Start: 2019-02-05

## 2019-02-05 RX ORDER — AMLODIPINE BESYLATE 5 MG/1
5 TABLET ORAL DAILY
Refills: 0 | Status: ACTIVE | COMMUNITY
Start: 2019-02-05

## 2019-02-05 RX ORDER — GABAPENTIN 100 MG/1
100 CAPSULE ORAL
Qty: 90 | Refills: 5 | Status: DISCONTINUED | COMMUNITY
Start: 2018-05-14 | End: 2019-02-05

## 2019-02-05 RX ORDER — PRAVASTATIN SODIUM 40 MG/1
40 TABLET ORAL
Qty: 1 | Refills: 3 | Status: DISCONTINUED | COMMUNITY
Start: 2018-10-12 | End: 2019-02-05

## 2019-02-05 NOTE — PHYSICAL EXAM
[Respiration, Rhythm And Depth] : normal respiratory rhythm and effort [Auscultation Breath Sounds / Voice Sounds] : lungs were clear to auscultation bilaterally [Heart Rate And Rhythm] : heart rate was normal and rhythm regular [Heart Sounds] : normal S1 and S2 [FreeTextEntry1] : MSI, CT sites and SVG sites without erythema, drainage or warmth, with edges well approximated.  Sternum stable. BLE minimal edema [Bowel Sounds] : normal bowel sounds [Abdomen Soft] : soft [Abdomen Tenderness] : non-tender

## 2019-02-05 NOTE — HISTORY OF PRESENT ILLNESS
[FreeTextEntry1] : 68F s/p cabg dr. warren. has some samples of brilinta she is taking but when she runs out of those she said she isn't filling rx because too exprensive, requesting plavix instead

## 2019-02-08 ENCOUNTER — APPOINTMENT (OUTPATIENT)
Dept: CARDIOLOGY | Facility: CLINIC | Age: 69
End: 2019-02-08
Payer: MEDICARE

## 2019-02-08 VITALS
DIASTOLIC BLOOD PRESSURE: 84 MMHG | BODY MASS INDEX: 25.83 KG/M2 | SYSTOLIC BLOOD PRESSURE: 141 MMHG | WEIGHT: 155 LBS | HEART RATE: 127 BPM | OXYGEN SATURATION: 98 % | HEIGHT: 65 IN

## 2019-02-08 DIAGNOSIS — R00.2 PALPITATIONS: ICD-10-CM

## 2019-02-08 PROCEDURE — 93000 ELECTROCARDIOGRAM COMPLETE: CPT

## 2019-02-08 PROCEDURE — 99214 OFFICE O/P EST MOD 30 MIN: CPT

## 2019-02-08 RX ORDER — OMEPRAZOLE 40 MG/1
40 CAPSULE, DELAYED RELEASE ORAL
Qty: 90 | Refills: 3 | Status: COMPLETED | COMMUNITY
Start: 2018-11-26 | End: 2019-02-08

## 2019-02-10 ENCOUNTER — MOBILE ON CALL (OUTPATIENT)
Age: 69
End: 2019-02-10

## 2019-02-12 NOTE — PATIENT PROFILE ADULT. - FUNCTIONAL SCREEN CURRENT LEVEL: TOILETING, MLM
Brief Cardiac Procedure Note    Patient: Maurizio Ptael MRN: [id number]  SSN: [id number]    YOB: 1953  Age: 77 y.o. Sex: male      Date of Procedure: 2/12/2019     Pre-procedure Diagnosis: NSTEMI    Post-procedure Diagnosis: Coronary Artery Disease    Procedure: Left Heart Catheterization with Percutaneous Coronary Intervention    Brief Description of Procedure: As above    Performed By: Ashlee Cullen MD     Assistants: None    Anesthesia: Moderate Sedation    Estimated Blood Loss: Less than 10 mL      Specimens: None    Implants: None    Findings:  Occluded distal circ. Treated with Nik 2.0 15 mm ZEYAD. 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 .      Complications: None    Recommendations: Continue medical therapy.     Signed By: Ashlee Cullen MD     February 12, 2019 (0) independent

## 2019-02-13 ENCOUNTER — NON-APPOINTMENT (OUTPATIENT)
Age: 69
End: 2019-02-13

## 2019-02-19 ENCOUNTER — APPOINTMENT (OUTPATIENT)
Dept: CARDIOTHORACIC SURGERY | Facility: CLINIC | Age: 69
End: 2019-02-19
Payer: MEDICARE

## 2019-02-19 VITALS
HEIGHT: 65 IN | DIASTOLIC BLOOD PRESSURE: 73 MMHG | WEIGHT: 150 LBS | RESPIRATION RATE: 16 BRPM | HEART RATE: 66 BPM | BODY MASS INDEX: 24.99 KG/M2 | TEMPERATURE: 98.4 F | SYSTOLIC BLOOD PRESSURE: 147 MMHG | OXYGEN SATURATION: 98 %

## 2019-02-19 DIAGNOSIS — Z09 ENCOUNTER FOR FOLLOW-UP EXAMINATION AFTER COMPLETED TREATMENT FOR CONDITIONS OTHER THAN MALIGNANT NEOPLASM: ICD-10-CM

## 2019-02-19 PROCEDURE — 99024 POSTOP FOLLOW-UP VISIT: CPT

## 2019-02-19 RX ORDER — METFORMIN HYDROCHLORIDE 500 MG/1
500 TABLET, COATED ORAL DAILY
Refills: 0 | Status: ACTIVE | COMMUNITY
Start: 2017-07-26

## 2019-02-25 RX ORDER — TICAGRELOR 90 MG/1
90 TABLET ORAL TWICE DAILY
Refills: 0 | Status: DISCONTINUED | COMMUNITY
Start: 2019-02-05 | End: 2019-02-25

## 2019-04-09 ENCOUNTER — APPOINTMENT (OUTPATIENT)
Dept: CARDIOTHORACIC SURGERY | Facility: CLINIC | Age: 69
End: 2019-04-09
Payer: MEDICARE

## 2019-04-09 VITALS
SYSTOLIC BLOOD PRESSURE: 137 MMHG | HEART RATE: 70 BPM | DIASTOLIC BLOOD PRESSURE: 70 MMHG | RESPIRATION RATE: 12 BRPM | OXYGEN SATURATION: 97 % | TEMPERATURE: 98 F

## 2019-04-09 VITALS — WEIGHT: 157 LBS | BODY MASS INDEX: 26.16 KG/M2 | HEIGHT: 65 IN

## 2019-04-09 PROCEDURE — 99024 POSTOP FOLLOW-UP VISIT: CPT

## 2019-04-09 NOTE — PHYSICAL EXAM
[Sclera] : the sclera and conjunctiva were normal [PERRL With Normal Accommodation] : pupils were equal in size, round, and reactive to light [Extraocular Movements] : extraocular movements were intact [Neck Appearance] : the appearance of the neck was normal [Neck Cervical Mass (___cm)] : no neck mass was observed [Jugular Venous Distention Increased] : there was no jugular-venous distention [Thyroid Diffuse Enlargement] : the thyroid was not enlarged [Thyroid Nodule] : there were no palpable thyroid nodules [] : no respiratory distress [Auscultation Breath Sounds / Voice Sounds] : lungs were clear to auscultation bilaterally [Heart Rate And Rhythm] : heart rate was normal and rhythm regular [Normal Chest] : the chest was normal in appearance [Surgical / Traumatic Scar Sternum] : a scar [Breast Appearance] : normal in appearance [Edema] : there was no peripheral edema [Bowel Sounds] : normal bowel sounds [Abdomen Soft] : soft [Cervical Lymph Nodes Enlarged Posterior Bilaterally] : posterior cervical [No CVA Tenderness] : no ~M costovertebral angle tenderness [Involuntary Movements] : no involuntary movements were seen [Skin Color & Pigmentation] : normal skin color and pigmentation [No Focal Deficits] : no focal deficits [Oriented To Time, Place, And Person] : oriented to person, place, and time [Impaired Insight] : insight and judgment were intact [Chest Visual Inspection Erythema] : no erythema [Chest Visual Inspection Swelling] : no swelling [Chest Palpation Induration] : no induration [FreeTextEntry1] : deferred

## 2019-04-09 NOTE — REVIEW OF SYSTEMS
[Negative] : Heme/Lymph [Dizziness] : no dizziness [FreeTextEntry5] : c/o right sided chest pressure

## 2019-04-09 NOTE — CONSULT LETTER
[Dear  ___] : Dear ~SHAYNA, [Courtesy Letter:] : I had the pleasure of seeing your patient, [unfilled], in my office today. [Please see my note below.] : Please see my note below. [Consult Closing:] : Thank you very much for allowing me to participate in the care of this patient.  If you have any questions, please do not hesitate to contact me. [Sincerely,] : Sincerely, [FreeTextEntry3] : Saravanan Wolff MD\par \par Cardiovascular & Thoracic Surgery\par Professor\par [facility]\par \par  [FreeTextEntry2] : Iker Brooke MD \par [address]\[place] [address]

## 2019-04-09 NOTE — ASSESSMENT
[FreeTextEntry1] : The patient states that she removed a thread from her sternal wound which was followed by drainage.  At present the wound is completely stable and healed wo signs of infection.  Sternum stable.  She states that she has pain along the right and left sternal border.  I am sending her from a CXR today. She was advised to take Aleve for the occasional discomfort and to use a heating pad for rainy days.

## 2019-04-09 NOTE — HISTORY OF PRESENT ILLNESS
[FreeTextEntry1] : Archana is a 68 year old female who underwent an urgent CABG x 3 on 1/28/209. Last week she noticed a small thread of skin that came out of the proximal pole of her healed sternotomy incision. For 2 days following she reports the site continued to drain "white colored liquid." She denies any fever, DOUGLAS, PND, orthopnea, dizziness or syncope. She does endorse right sided chest soreness which she has had since surgery but now "it is worse and exacerbated by "everything that I do."

## 2019-04-09 NOTE — REASON FOR VISIT
[Follow-Up: _____] : a [unfilled] follow-up visit [Spouse] : spouse [FreeTextEntry1] : s/p Urgent CABG x 3 on 1/28/2019

## 2019-04-15 ENCOUNTER — OUTPATIENT (OUTPATIENT)
Dept: OUTPATIENT SERVICES | Facility: HOSPITAL | Age: 69
LOS: 1 days | End: 2019-04-15
Payer: COMMERCIAL

## 2019-04-15 DIAGNOSIS — I73.9 PERIPHERAL VASCULAR DISEASE, UNSPECIFIED: Chronic | ICD-10-CM

## 2019-04-15 DIAGNOSIS — Z95.828 PRESENCE OF OTHER VASCULAR IMPLANTS AND GRAFTS: Chronic | ICD-10-CM

## 2019-04-15 DIAGNOSIS — Z98.84 BARIATRIC SURGERY STATUS: Chronic | ICD-10-CM

## 2019-04-15 DIAGNOSIS — Z98.89 OTHER SPECIFIED POSTPROCEDURAL STATES: Chronic | ICD-10-CM

## 2019-04-15 DIAGNOSIS — Z98.891 HISTORY OF UTERINE SCAR FROM PREVIOUS SURGERY: Chronic | ICD-10-CM

## 2019-04-15 DIAGNOSIS — Z98.890 OTHER SPECIFIED POSTPROCEDURAL STATES: Chronic | ICD-10-CM

## 2019-04-15 DIAGNOSIS — R06.02 SHORTNESS OF BREATH: ICD-10-CM

## 2019-04-15 PROCEDURE — 71046 X-RAY EXAM CHEST 2 VIEWS: CPT | Mod: 26

## 2019-04-15 PROCEDURE — 71046 X-RAY EXAM CHEST 2 VIEWS: CPT

## 2019-04-24 NOTE — HISTORY OF PRESENT ILLNESS
[FreeTextEntry1] : 69 y/o F with h/o DM, HTN, HLD PAD with RT SFA stenting in 2016. Initially presented with worsening left claudication. Post left le intervention with SAMUEL /stent of left SFA, PTA of left pop. PTA and stent left tibioperoneal and prox peroneal.  Presented back to the hospital April 2018 with acute limb underwent catheter directed lysis and PTA / stent of left SFA, Popliteal, peroneal.  \par \par Pt with continued severe disabling claudication. Went for angiogram 12/13 found to have 90% ISR of L SFA and pop s/p L SFA stenting, SAMUEL to SFA and pop. Noted to have 80% stenosis in RIGHT CFA\par \par Still feeling pain in both calves, feels it in the morning for a couple hrs when it is the worst, \par Feels stabbing pains at night intermittently in left foot unchanged.  \par Now post CABG in reports frequent palpitations and occasional 'pain everywhere' unrelated to physical activity.  \par \par \par \par Cath 12/13/18 - intervention on LEFT SFA and POP ISR.  s/p stent to sfa and SAMUEL to prox SFA and pop\par \par \par \par  \par

## 2019-04-24 NOTE — DISCUSSION/SUMMARY
[FreeTextEntry1] : 66 y/o F with h/o DM, HTN, HLD, persistent claudication bilateral calves\par PAD s/p bilateral interventions complicated by acute limb ischemia April 2018 of left leg s/p CDT \par Most recent peripheral angiogram Dec 2018 for lifestyle limiting claudication: \par RIGHT - 80% CFA, 50% SFA, \par LEFT - 90% ISR SFA, 90% ISR POP\par INTERVENTION -  LEFT: SFA PANTERA, prox SFA SAMUEL, popliteal SAMUEL\par CABG \par \par Plan:\par -Start Toprol  daily \par -Holter monitor \par -Continue risk factor mod.\par \par Addendum :\par -May hold ASA and Plavix for dental work, restart post as per dentist recommendations. \par

## 2019-05-21 NOTE — H&P ADULT - PROBLEM SELECTOR PLAN 3
Patient is well known to me from [facility].   Consult placed by ED physician yesterday.   She needs repeat UPCR.   I will see patient in my Clinic 5/23 at 12:30 PM   This was discussed with Dr. Luis Young MD  5/21/2019      
Continue Norvasc and Hydrochlorothiazide  Continue ACEI.  Monitor renal function

## 2019-12-24 ENCOUNTER — RX RENEWAL (OUTPATIENT)
Age: 69
End: 2019-12-24

## 2020-01-03 ENCOUNTER — APPOINTMENT (OUTPATIENT)
Dept: CARDIOLOGY | Facility: CLINIC | Age: 70
End: 2020-01-03
Payer: MEDICARE

## 2020-01-03 VITALS
HEIGHT: 65 IN | SYSTOLIC BLOOD PRESSURE: 151 MMHG | BODY MASS INDEX: 26.16 KG/M2 | WEIGHT: 157 LBS | HEART RATE: 61 BPM | DIASTOLIC BLOOD PRESSURE: 74 MMHG

## 2020-01-03 PROCEDURE — 99214 OFFICE O/P EST MOD 30 MIN: CPT

## 2020-01-03 RX ORDER — OXYCODONE HYDROCHLORIDE 5 MG/1
5 CAPSULE ORAL EVERY 6 HOURS
Refills: 0 | Status: DISCONTINUED | COMMUNITY
Start: 2019-02-05 | End: 2020-01-03

## 2020-01-03 RX ORDER — METOPROLOL TARTRATE 50 MG/1
50 TABLET, FILM COATED ORAL TWICE DAILY
Refills: 0 | Status: DISCONTINUED | COMMUNITY
Start: 2019-02-19 | End: 2020-01-03

## 2020-01-03 RX ORDER — DICLOFENAC SODIUM 100 MG/1
100 TABLET, FILM COATED, EXTENDED RELEASE ORAL
Refills: 0 | Status: ACTIVE | COMMUNITY

## 2020-01-03 NOTE — HISTORY OF PRESENT ILLNESS
[FreeTextEntry1] : 70 y/o F with h/o DM, HTN, HLD PAD with RT SFA stenting in 2016. Initially presented with worsening left claudication. Post left le intervention with SAMUEL /stent of left SFA, PTA of left pop. PTA and stent left tibioperoneal and prox peroneal.  Presented back to the hospital April 2018 with acute limb underwent catheter directed lysis and PTA / stent of left SFA, Popliteal, peroneal.  \par \par Pt with continued severe disabling claudication. Went for angiogram 12/13 found to have 90% ISR of L SFA and pop s/p L SFA stenting, SAMUEL to SFA and pop. Noted to have 80% stenosis in RIGHT CFA\par \par H/o CABG in reports frequent palpitations and occasional 'pain everywhere' unrelated to physical activity.  \par \par Cath 12/13/18 - intervention on LEFT SFA and POP ISR.  s/p stent to sfa and SAMUEL to prox SFA and pop\par \par For f/u today reports left foot/toes pain at night now for 2 weeks occasional dangles it of the bed for relief. Reports exercising in the gym for 30 mins on the stationary bicycle with no Sx.    \par \par \par  \par

## 2020-01-03 NOTE — DISCUSSION/SUMMARY
[FreeTextEntry1] : 70 y/o F with h/o DM, HTN, HLD, persistent claudication bilateral calves\par PAD s/p bilateral interventions complicated by acute limb ischemia April 2018 of left leg s/p CDT \par Most recent peripheral angiogram Dec 2018 for lifestyle limiting claudication: \par RIGHT - 80% CFA, 50% SFA, \par LEFT - 90% ISR SFA, 90% ISR POP\par INTERVENTION -  LEFT: SFA PANTERA, prox SFA SAMUEL, popliteal SAMUEL\par post CABG now \par Now new left toes pain at rest   \par \par Plan:\par - Le arterial duplex and DAVONTE/PVR \par - Would recommend Supervised Exercise Therapy \par - Continue risk factor mod.  \par

## 2020-01-03 NOTE — PHYSICAL EXAM
[General Appearance - Well Developed] : well developed [Normal Conjunctiva] : the conjunctiva exhibited no abnormalities [General Appearance - Well Nourished] : well nourished [Normal Oral Mucosa] : normal oral mucosa [Normal Jugular Venous V Waves Present] : normal jugular venous V waves present [] : no respiratory distress [Heart Sounds] : normal S1 and S2 [1+] : left 1+ [0] : right 0 [2+] : left 2+ [Bowel Sounds] : normal bowel sounds [Nail Clubbing] : no clubbing of the fingernails [Skin Color & Pigmentation] : normal skin color and pigmentation [Oriented To Time, Place, And Person] : oriented to person, place, and time [FreeTextEntry1] : palpable pulses throughout, s/p partial right 2nd toe amputation

## 2020-01-14 ENCOUNTER — FORM ENCOUNTER (OUTPATIENT)
Age: 70
End: 2020-01-14

## 2020-01-15 ENCOUNTER — RESULT REVIEW (OUTPATIENT)
Age: 70
End: 2020-01-15

## 2020-01-15 ENCOUNTER — APPOINTMENT (OUTPATIENT)
Dept: ULTRASOUND IMAGING | Facility: HOSPITAL | Age: 70
End: 2020-01-15
Payer: MEDICARE

## 2020-01-15 ENCOUNTER — OUTPATIENT (OUTPATIENT)
Dept: OUTPATIENT SERVICES | Facility: HOSPITAL | Age: 70
LOS: 1 days | End: 2020-01-15
Payer: COMMERCIAL

## 2020-01-15 DIAGNOSIS — Z98.89 OTHER SPECIFIED POSTPROCEDURAL STATES: Chronic | ICD-10-CM

## 2020-01-15 DIAGNOSIS — Z98.84 BARIATRIC SURGERY STATUS: Chronic | ICD-10-CM

## 2020-01-15 DIAGNOSIS — Z98.891 HISTORY OF UTERINE SCAR FROM PREVIOUS SURGERY: Chronic | ICD-10-CM

## 2020-01-15 DIAGNOSIS — I73.9 PERIPHERAL VASCULAR DISEASE, UNSPECIFIED: ICD-10-CM

## 2020-01-15 DIAGNOSIS — Z95.828 PRESENCE OF OTHER VASCULAR IMPLANTS AND GRAFTS: Chronic | ICD-10-CM

## 2020-01-15 DIAGNOSIS — I73.9 PERIPHERAL VASCULAR DISEASE, UNSPECIFIED: Chronic | ICD-10-CM

## 2020-01-15 DIAGNOSIS — Z98.890 OTHER SPECIFIED POSTPROCEDURAL STATES: Chronic | ICD-10-CM

## 2020-01-15 PROCEDURE — 93925 LOWER EXTREMITY STUDY: CPT

## 2020-01-15 PROCEDURE — 93923 UPR/LXTR ART STDY 3+ LVLS: CPT | Mod: 26

## 2020-01-15 PROCEDURE — 93923 UPR/LXTR ART STDY 3+ LVLS: CPT

## 2020-01-15 PROCEDURE — 93925 LOWER EXTREMITY STUDY: CPT | Mod: 26

## 2020-12-16 NOTE — H&P ADULT - PMH
How Severe Is Your Changing Mole?: moderate Has Your Changing Mole Been Treated?: has not been treated Diabetes    DM (diabetes mellitus)    High cholesterol    HTN (hypertension)    HTN (hypertension)    LEILANI (obstructive sleep apnea)  does not use CPAP since gastric bypass surgery  Ulcer of toe of right foot  2nd

## 2021-01-08 NOTE — DISCHARGE NOTE ADULT - NS DC STATIN PRESCRIBED YN
Alert-The patient is alert, awake and responds to voice. The patient is oriented to time, place, and person. The triage nurse is able to obtain subjective information. yes

## 2021-01-13 NOTE — PATIENT PROFILE ADULT - IS THERE A SUSPICION OF ABUSE/NEGLIGENCE?
Detail Level: Simple Plan: Patient reports she has been wearing compression stockings and the rash seems to be improving on its own no

## 2021-09-22 ENCOUNTER — APPOINTMENT (OUTPATIENT)
Dept: CARDIOLOGY | Facility: CLINIC | Age: 71
End: 2021-09-22
Payer: MEDICARE

## 2021-09-22 ENCOUNTER — NON-APPOINTMENT (OUTPATIENT)
Age: 71
End: 2021-09-22

## 2021-09-22 VITALS
BODY MASS INDEX: 26.16 KG/M2 | HEART RATE: 73 BPM | DIASTOLIC BLOOD PRESSURE: 68 MMHG | TEMPERATURE: 97.6 F | HEIGHT: 65 IN | SYSTOLIC BLOOD PRESSURE: 196 MMHG | OXYGEN SATURATION: 98 % | WEIGHT: 157 LBS

## 2021-09-22 DIAGNOSIS — R09.89 OTHER SPECIFIED SYMPTOMS AND SIGNS INVOLVING THE CIRCULATORY AND RESPIRATORY SYSTEMS: ICD-10-CM

## 2021-09-22 PROCEDURE — 99214 OFFICE O/P EST MOD 30 MIN: CPT

## 2021-09-23 NOTE — DISCUSSION/SUMMARY
[FreeTextEntry1] : 68 y/o F with h/o DM, HTN, HLD, persistent claudication bilateral calves\par PAD s/p bilateral interventions complicated by acute limb ischemia April 2018 of left leg s/p CDT \par Most recent peripheral angiogram Dec 2018 for lifestyle limiting claudication: \par RIGHT - 80% CFA, 50% SFA, \par LEFT - 90% ISR SFA, 90% ISR POP\par INTERVENTION -  LEFT: SFA PANTERA, prox SFA SAMUEL, popliteal SAMUEL\par post CABG now \par  \par \par Plan:\par - Le arterial duplex and DAVONTE/PVR \par -  home BP checks and call in a week\par - Echocardiogram\par - carotid duplex to eval for moderate stenosis seen on duplex \par - Await testing\par - She should take statin therapy, resume, at least every other day. \par - Return in 3 months

## 2021-09-23 NOTE — HISTORY OF PRESENT ILLNESS
[FreeTextEntry1] : Followup visit 9/22/2021\par She can walk 1 block \par Must stop due to bilateral calf pain.  Some left thigh pains. \par no chest pressure\par Breathing is fine.\par No leg swelling\par \par PCP:  Dr. Ruby (Pep)\par \par Medications:\par ASpirin \par Plavix\par Amlodipine 10mg daily \par Ramipril 10mg daily \par Diclofenac\par Pantoprazole 40mg daily \par \par Gabapentin (not taking)\par Metformin  (not taking, only sometimes)\par Atorvastatin (ordered, but not taking)\par Metoprolol (ordered, not taking)\par \par 68 y/o F with h/o DM, HTN, HLD PAD with RT SFA stenting in 2016. Initially presented with worsening left claudication. Post left le intervention with SAMUEL /stent of left SFA, PTA of left pop. PTA and stent left tibioperoneal and prox peroneal.  Presented back to the hospital April 2018 with acute limb underwent catheter directed lysis and PTA / stent of left SFA, Popliteal, peroneal.  \par \par Pt with continued severe disabling claudication. Went for angiogram 12/13 found to have 90% ISR of L SFA and pop s/p L SFA stenting, SAMULE to SFA and pop. Noted to have 80% stenosis in RIGHT CFA\par \par H/o CABG in reports frequent palpitations and occasional 'pain everywhere' unrelated to physical activity.  \par \par Cath 12/13/18 - intervention on LEFT SFA and POP ISR.  s/p stent to sfa and ASMUEL to prox SFA and pop\par \par For f/u today reports left foot/toes pain at night now for 2 weeks occasional dangles it of the bed for relief. Reports exercising in the gym for 30 mins on the stationary bicycle with no Sx.    \par \par \par  \par

## 2021-09-30 RX ORDER — METOPROLOL SUCCINATE 100 MG/1
100 TABLET, EXTENDED RELEASE ORAL
Qty: 30 | Refills: 3 | Status: DISCONTINUED | COMMUNITY
Start: 2019-02-08 | End: 2021-09-30

## 2021-09-30 RX ORDER — METOPROLOL TARTRATE 100 MG/1
100 TABLET ORAL
Refills: 0 | Status: DISCONTINUED | COMMUNITY
Start: 2019-02-05 | End: 2021-09-30

## 2021-10-01 ENCOUNTER — APPOINTMENT (OUTPATIENT)
Dept: CV DIAGNOSITCS | Facility: HOSPITAL | Age: 71
End: 2021-10-01
Payer: MEDICARE

## 2021-10-01 ENCOUNTER — OUTPATIENT (OUTPATIENT)
Dept: OUTPATIENT SERVICES | Facility: HOSPITAL | Age: 71
LOS: 1 days | End: 2021-10-01

## 2021-10-01 DIAGNOSIS — Z98.89 OTHER SPECIFIED POSTPROCEDURAL STATES: Chronic | ICD-10-CM

## 2021-10-01 DIAGNOSIS — Z98.890 OTHER SPECIFIED POSTPROCEDURAL STATES: Chronic | ICD-10-CM

## 2021-10-01 DIAGNOSIS — I73.9 PERIPHERAL VASCULAR DISEASE, UNSPECIFIED: Chronic | ICD-10-CM

## 2021-10-01 DIAGNOSIS — Z98.84 BARIATRIC SURGERY STATUS: Chronic | ICD-10-CM

## 2021-10-01 DIAGNOSIS — Z98.891 HISTORY OF UTERINE SCAR FROM PREVIOUS SURGERY: Chronic | ICD-10-CM

## 2021-10-01 DIAGNOSIS — I73.9 PERIPHERAL VASCULAR DISEASE, UNSPECIFIED: ICD-10-CM

## 2021-10-01 DIAGNOSIS — Z95.828 PRESENCE OF OTHER VASCULAR IMPLANTS AND GRAFTS: Chronic | ICD-10-CM

## 2021-10-01 DIAGNOSIS — Z95.1 PRESENCE OF AORTOCORONARY BYPASS GRAFT: ICD-10-CM

## 2021-10-01 PROCEDURE — 93925 LOWER EXTREMITY STUDY: CPT | Mod: 26

## 2021-10-01 PROCEDURE — 93880 EXTRACRANIAL BILAT STUDY: CPT | Mod: 26

## 2021-10-08 ENCOUNTER — NON-APPOINTMENT (OUTPATIENT)
Age: 71
End: 2021-10-08

## 2021-10-15 ENCOUNTER — NON-APPOINTMENT (OUTPATIENT)
Age: 71
End: 2021-10-15

## 2021-11-01 ENCOUNTER — OUTPATIENT (OUTPATIENT)
Dept: OUTPATIENT SERVICES | Facility: HOSPITAL | Age: 71
LOS: 1 days | End: 2021-11-01
Payer: COMMERCIAL

## 2021-11-01 ENCOUNTER — APPOINTMENT (OUTPATIENT)
Dept: CV DIAGNOSITCS | Facility: HOSPITAL | Age: 71
End: 2021-11-01

## 2021-11-01 DIAGNOSIS — Z95.828 PRESENCE OF OTHER VASCULAR IMPLANTS AND GRAFTS: Chronic | ICD-10-CM

## 2021-11-01 DIAGNOSIS — Z98.890 OTHER SPECIFIED POSTPROCEDURAL STATES: Chronic | ICD-10-CM

## 2021-11-01 DIAGNOSIS — Z98.89 OTHER SPECIFIED POSTPROCEDURAL STATES: Chronic | ICD-10-CM

## 2021-11-01 DIAGNOSIS — Z98.891 HISTORY OF UTERINE SCAR FROM PREVIOUS SURGERY: Chronic | ICD-10-CM

## 2021-11-01 DIAGNOSIS — Z98.84 BARIATRIC SURGERY STATUS: Chronic | ICD-10-CM

## 2021-11-01 DIAGNOSIS — Z95.1 PRESENCE OF AORTOCORONARY BYPASS GRAFT: ICD-10-CM

## 2021-11-01 DIAGNOSIS — I73.9 PERIPHERAL VASCULAR DISEASE, UNSPECIFIED: Chronic | ICD-10-CM

## 2021-11-01 PROCEDURE — 93306 TTE W/DOPPLER COMPLETE: CPT

## 2021-11-01 PROCEDURE — 76377 3D RENDER W/INTRP POSTPROCES: CPT

## 2021-11-01 PROCEDURE — 93306 TTE W/DOPPLER COMPLETE: CPT | Mod: 26

## 2021-11-01 PROCEDURE — 76377 3D RENDER W/INTRP POSTPROCES: CPT | Mod: 26

## 2022-03-16 ENCOUNTER — APPOINTMENT (OUTPATIENT)
Dept: CARDIOLOGY | Facility: CLINIC | Age: 72
End: 2022-03-16
Payer: MEDICARE

## 2022-03-16 VITALS
DIASTOLIC BLOOD PRESSURE: 75 MMHG | TEMPERATURE: 97.6 F | SYSTOLIC BLOOD PRESSURE: 145 MMHG | WEIGHT: 157 LBS | BODY MASS INDEX: 26.16 KG/M2 | HEART RATE: 80 BPM | HEIGHT: 65 IN | OXYGEN SATURATION: 100 %

## 2022-03-16 PROCEDURE — 99214 OFFICE O/P EST MOD 30 MIN: CPT

## 2022-03-16 RX ORDER — CLOPIDOGREL BISULFATE 75 MG/1
75 TABLET, FILM COATED ORAL
Qty: 90 | Refills: 3 | Status: ACTIVE | COMMUNITY
Start: 1900-01-01 | End: 1900-01-01

## 2022-03-16 NOTE — DISCUSSION/SUMMARY
[FreeTextEntry1] : 70 y/o F with h/o DM, HTN, HLD, persistent claudication bilateral calves\par PAD s/p bilateral interventions complicated by acute limb ischemia April 2018 of left leg s/p CDT \par Most recent peripheral angiogram Dec 2018 for lifestyle limiting claudication: \par RIGHT - 80% CFA, 50% SFA, \par LEFT - 90% ISR SFA, 90% ISR POP\par INTERVENTION -  LEFT: SFA PANTERA, prox SFA SAMUEL, popliteal SAMUEL\par post CABG now \par  \par \par Plan: \par 1.  Continue current meds.  Coreg refilled.  Stressed importance of med compliance.  \par 2.  Enroll in Supervised exercise therapy for claudication\par 3.  Repeat carotid duplex in 6 months\par 4.  Repeat LE arterial duplex for claudication to re-assess anatomy \par 5.  Return in August 2022.  If still symptomatic, then might consider angio \par

## 2022-03-16 NOTE — HISTORY OF PRESENT ILLNESS
[FreeTextEntry1] : 3/16/2022\par \par ECHO:   11/1/2021 - mild concentric LVH, normal LV function \par Carotid 10/4/2021:  L ICA :  50-79%.  Right ICA :  16-49%\par RIGHT subclavian stenosis \par Coreg stopped 1 month ago - ran out of meds.  \par \par Complains of left sided foot numbness and claudication. \par R leg also with claudication. \par \par She can walk 2 blocks.  After 2 blocks she starts having pain in the calf.  Resolves with rest.  Resolves after 1 minute.  She is not walking much because its cold.  \par \par \par PCP:  Dr. Ruby ([place])\par \par Medications:\par ASpirin \par Plavix\par Amlodipine 10mg daily \par Ramipril 10mg daily \par Diclofenac\par Pantoprazole 40mg daily\par HCTZ 25 (not taking all the time)\par Coreg 3.125mg BID (ran out) \par \par Gabapentin (not taking)\par Metformin  (not taking, only sometimes)\par Atorvastatin (ordered, but not taking)\par \par \par 70 y/o F with h/o DM, HTN, HLD PAD with RT SFA stenting in 2016. Initially presented with worsening left claudication. Post left le intervention with SAMUEL /stent of left SFA, PTA of left pop. PTA and stent left tibioperoneal and prox peroneal.  Presented back to the hospital April 2018 with acute limb underwent catheter directed lysis and PTA / stent of left SFA, Popliteal, peroneal.  \par \par Pt with continued severe disabling claudication. Went for angiogram 12/13 found to have 90% ISR of L SFA and pop s/p L SFA stenting, SAMUEL to SFA and pop. Noted to have 80% stenosis in RIGHT CFA\par \par H/o CABG in reports frequent palpitations and occasional 'pain everywhere' unrelated to physical activity.  \par \par Cath 12/13/18 - intervention on LEFT SFA and POP ISR.  s/p stent to sfa and SAMUEL to prox SFA and pop\par \par For f/u today reports left foot/toes pain at night now for 2 weeks occasional dangles it of the bed for relief. Reports exercising in the gym for 30 mins on the stationary bicycle with no Sx.    \par \par \par  \par

## 2022-03-23 ENCOUNTER — APPOINTMENT (OUTPATIENT)
Dept: CARDIOLOGY | Facility: CLINIC | Age: 72
End: 2022-03-23

## 2022-05-11 ENCOUNTER — NON-APPOINTMENT (OUTPATIENT)
Age: 72
End: 2022-05-11

## 2022-05-18 RX ORDER — CARVEDILOL 6.25 MG/1
6.25 TABLET, FILM COATED ORAL
Qty: 180 | Refills: 1 | Status: ACTIVE | COMMUNITY
Start: 2022-05-18 | End: 1900-01-01

## 2022-05-18 RX ORDER — CARVEDILOL 3.12 MG/1
3.12 TABLET, FILM COATED ORAL
Qty: 180 | Refills: 3 | Status: DISCONTINUED | COMMUNITY
Start: 2021-09-30 | End: 2022-05-18

## 2022-06-07 ENCOUNTER — NON-APPOINTMENT (OUTPATIENT)
Age: 72
End: 2022-06-07

## 2022-06-10 ENCOUNTER — APPOINTMENT (OUTPATIENT)
Dept: ULTRASOUND IMAGING | Facility: HOSPITAL | Age: 72
End: 2022-06-10
Payer: MEDICARE

## 2022-06-17 ENCOUNTER — OUTPATIENT (OUTPATIENT)
Dept: OUTPATIENT SERVICES | Facility: HOSPITAL | Age: 72
LOS: 1 days | End: 2022-06-17
Payer: COMMERCIAL

## 2022-06-17 ENCOUNTER — RESULT REVIEW (OUTPATIENT)
Age: 72
End: 2022-06-17

## 2022-06-17 ENCOUNTER — NON-APPOINTMENT (OUTPATIENT)
Age: 72
End: 2022-06-17

## 2022-06-17 ENCOUNTER — APPOINTMENT (OUTPATIENT)
Dept: ULTRASOUND IMAGING | Facility: HOSPITAL | Age: 72
End: 2022-06-17

## 2022-06-17 DIAGNOSIS — Z98.89 OTHER SPECIFIED POSTPROCEDURAL STATES: Chronic | ICD-10-CM

## 2022-06-17 DIAGNOSIS — Z95.828 PRESENCE OF OTHER VASCULAR IMPLANTS AND GRAFTS: Chronic | ICD-10-CM

## 2022-06-17 DIAGNOSIS — Z98.84 BARIATRIC SURGERY STATUS: Chronic | ICD-10-CM

## 2022-06-17 DIAGNOSIS — Z98.891 HISTORY OF UTERINE SCAR FROM PREVIOUS SURGERY: Chronic | ICD-10-CM

## 2022-06-17 DIAGNOSIS — I73.9 PERIPHERAL VASCULAR DISEASE, UNSPECIFIED: ICD-10-CM

## 2022-06-17 DIAGNOSIS — I73.9 PERIPHERAL VASCULAR DISEASE, UNSPECIFIED: Chronic | ICD-10-CM

## 2022-06-17 DIAGNOSIS — Z98.890 OTHER SPECIFIED POSTPROCEDURAL STATES: Chronic | ICD-10-CM

## 2022-06-17 DIAGNOSIS — Z00.00 ENCOUNTER FOR GENERAL ADULT MEDICAL EXAMINATION WITHOUT ABNORMAL FINDINGS: ICD-10-CM

## 2022-06-17 PROCEDURE — 93925 LOWER EXTREMITY STUDY: CPT

## 2022-06-17 PROCEDURE — 93925 LOWER EXTREMITY STUDY: CPT | Mod: 26

## 2022-06-20 LAB
ALBUMIN SERPL ELPH-MCNC: 4 G/DL
ALP BLD-CCNC: 211 U/L
ALT SERPL-CCNC: 17 U/L
ANION GAP SERPL CALC-SCNC: 11 MMOL/L
APTT BLD: 31.8 SEC
AST SERPL-CCNC: 17 U/L
BASOPHILS # BLD AUTO: 0.04 K/UL
BASOPHILS NFR BLD AUTO: 0.5 %
BILIRUB SERPL-MCNC: 0.4 MG/DL
BUN SERPL-MCNC: 19 MG/DL
CALCIUM SERPL-MCNC: 9.3 MG/DL
CHLORIDE SERPL-SCNC: 106 MMOL/L
CO2 SERPL-SCNC: 26 MMOL/L
CREAT SERPL-MCNC: 1.16 MG/DL
EGFR: 49 ML/MIN/1.73M2
EOSINOPHIL # BLD AUTO: 0.29 K/UL
EOSINOPHIL NFR BLD AUTO: 3.4 %
GLUCOSE SERPL-MCNC: 169 MG/DL
HCT VFR BLD CALC: 29.4 %
HGB BLD-MCNC: 8.8 G/DL
IMM GRANULOCYTES NFR BLD AUTO: 0.2 %
INR PPP: 1.03 RATIO
LYMPHOCYTES # BLD AUTO: 1.63 K/UL
LYMPHOCYTES NFR BLD AUTO: 18.8 %
MAN DIFF?: NORMAL
MCHC RBC-ENTMCNC: 22.2 PG
MCHC RBC-ENTMCNC: 29.9 GM/DL
MCV RBC AUTO: 74.1 FL
MONOCYTES # BLD AUTO: 0.59 K/UL
MONOCYTES NFR BLD AUTO: 6.8 %
NEUTROPHILS # BLD AUTO: 6.08 K/UL
NEUTROPHILS NFR BLD AUTO: 70.3 %
PLATELET # BLD AUTO: 328 K/UL
POTASSIUM SERPL-SCNC: 4.3 MMOL/L
PROT SERPL-MCNC: 6.6 G/DL
PT BLD: 12.1 SEC
RBC # BLD: 3.97 M/UL
RBC # FLD: 19.6 %
SODIUM SERPL-SCNC: 143 MMOL/L
WBC # FLD AUTO: 8.65 K/UL

## 2022-06-21 LAB — SARS-COV-2 N GENE NPH QL NAA+PROBE: NOT DETECTED

## 2022-06-23 ENCOUNTER — INPATIENT (INPATIENT)
Facility: HOSPITAL | Age: 72
LOS: 0 days | Discharge: HOME | End: 2022-06-24
Attending: STUDENT IN AN ORGANIZED HEALTH CARE EDUCATION/TRAINING PROGRAM | Admitting: STUDENT IN AN ORGANIZED HEALTH CARE EDUCATION/TRAINING PROGRAM
Payer: MEDICARE

## 2022-06-23 VITALS — WEIGHT: 154.98 LBS | HEIGHT: 65 IN

## 2022-06-23 DIAGNOSIS — Z98.89 OTHER SPECIFIED POSTPROCEDURAL STATES: Chronic | ICD-10-CM

## 2022-06-23 DIAGNOSIS — Z98.84 BARIATRIC SURGERY STATUS: Chronic | ICD-10-CM

## 2022-06-23 DIAGNOSIS — Z98.891 HISTORY OF UTERINE SCAR FROM PREVIOUS SURGERY: Chronic | ICD-10-CM

## 2022-06-23 DIAGNOSIS — Z95.828 PRESENCE OF OTHER VASCULAR IMPLANTS AND GRAFTS: Chronic | ICD-10-CM

## 2022-06-23 DIAGNOSIS — Z98.890 OTHER SPECIFIED POSTPROCEDURAL STATES: Chronic | ICD-10-CM

## 2022-06-23 DIAGNOSIS — Z95.1 PRESENCE OF AORTOCORONARY BYPASS GRAFT: Chronic | ICD-10-CM

## 2022-06-23 DIAGNOSIS — I73.9 PERIPHERAL VASCULAR DISEASE, UNSPECIFIED: Chronic | ICD-10-CM

## 2022-06-23 LAB
GLUCOSE BLDC GLUCOMTR-MCNC: 136 MG/DL — HIGH (ref 70–99)
GLUCOSE BLDC GLUCOMTR-MCNC: 78 MG/DL — SIGNIFICANT CHANGE UP (ref 70–99)
GLUCOSE BLDC GLUCOMTR-MCNC: 81 MG/DL — SIGNIFICANT CHANGE UP (ref 70–99)

## 2022-06-23 PROCEDURE — 93010 ELECTROCARDIOGRAM REPORT: CPT

## 2022-06-23 PROCEDURE — 37230: CPT | Mod: RT

## 2022-06-23 PROCEDURE — 75716 ARTERY X-RAYS ARMS/LEGS: CPT | Mod: 26,XU

## 2022-06-23 PROCEDURE — 37226: CPT | Mod: RT

## 2022-06-23 PROCEDURE — 75625 CONTRAST EXAM ABDOMINL AORTA: CPT | Mod: 26,XU

## 2022-06-23 PROCEDURE — 36246 INS CATH ABD/L-EXT ART 2ND: CPT | Mod: 59

## 2022-06-23 RX ORDER — CARVEDILOL PHOSPHATE 80 MG/1
1 CAPSULE, EXTENDED RELEASE ORAL
Qty: 0 | Refills: 0 | DISCHARGE

## 2022-06-23 RX ORDER — HYDROCHLOROTHIAZIDE 25 MG
25 TABLET ORAL DAILY
Refills: 0 | Status: DISCONTINUED | OUTPATIENT
Start: 2022-06-23 | End: 2022-06-24

## 2022-06-23 RX ORDER — ASPIRIN/CALCIUM CARB/MAGNESIUM 324 MG
81 TABLET ORAL DAILY
Refills: 0 | Status: DISCONTINUED | OUTPATIENT
Start: 2022-06-23 | End: 2022-06-24

## 2022-06-23 RX ORDER — ATORVASTATIN CALCIUM 80 MG/1
40 TABLET, FILM COATED ORAL AT BEDTIME
Refills: 0 | Status: DISCONTINUED | OUTPATIENT
Start: 2022-06-23 | End: 2022-06-24

## 2022-06-23 RX ORDER — METOPROLOL TARTRATE 50 MG
75 TABLET ORAL
Refills: 0 | Status: DISCONTINUED | OUTPATIENT
Start: 2022-06-23 | End: 2022-06-24

## 2022-06-23 RX ORDER — AMLODIPINE BESYLATE 2.5 MG/1
5 TABLET ORAL DAILY
Refills: 0 | Status: DISCONTINUED | OUTPATIENT
Start: 2022-06-23 | End: 2022-06-24

## 2022-06-23 RX ORDER — CLOPIDOGREL BISULFATE 75 MG/1
75 TABLET, FILM COATED ORAL DAILY
Refills: 0 | Status: DISCONTINUED | OUTPATIENT
Start: 2022-06-24 | End: 2022-06-24

## 2022-06-23 RX ORDER — PANTOPRAZOLE SODIUM 20 MG/1
40 TABLET, DELAYED RELEASE ORAL
Refills: 0 | Status: DISCONTINUED | OUTPATIENT
Start: 2022-06-23 | End: 2022-06-24

## 2022-06-23 RX ORDER — LISINOPRIL 2.5 MG/1
10 TABLET ORAL DAILY
Refills: 0 | Status: DISCONTINUED | OUTPATIENT
Start: 2022-06-24 | End: 2022-06-24

## 2022-06-23 RX ORDER — SODIUM CHLORIDE 9 MG/ML
1000 INJECTION INTRAMUSCULAR; INTRAVENOUS; SUBCUTANEOUS
Refills: 0 | Status: DISCONTINUED | OUTPATIENT
Start: 2022-06-23 | End: 2022-06-24

## 2022-06-23 RX ORDER — OXYCODONE HYDROCHLORIDE 5 MG/1
5 TABLET ORAL
Refills: 0 | Status: DISCONTINUED | OUTPATIENT
Start: 2022-06-23 | End: 2022-06-24

## 2022-06-23 RX ORDER — INFLUENZA VIRUS VACCINE 15; 15; 15; 15 UG/.5ML; UG/.5ML; UG/.5ML; UG/.5ML
0.7 SUSPENSION INTRAMUSCULAR ONCE
Refills: 0 | Status: DISCONTINUED | OUTPATIENT
Start: 2022-06-23 | End: 2022-06-24

## 2022-06-23 RX ADMIN — ATORVASTATIN CALCIUM 40 MILLIGRAM(S): 80 TABLET, FILM COATED ORAL at 22:54

## 2022-06-23 NOTE — ASU PATIENT PROFILE, ADULT - NSICDXPASTMEDICALHX_GEN_ALL_CORE_FT
PAST MEDICAL HISTORY:  Diabetes     DM (diabetes mellitus)     High cholesterol     HTN (hypertension)     LEILANI (obstructive sleep apnea) does not use CPAP since gastric bypass surgery    PAD (peripheral artery disease)     Ulcer of toe of right foot 2nd

## 2022-06-23 NOTE — ASU PATIENT PROFILE, ADULT - NSICDXPASTSURGICALHX_GEN_ALL_CORE_FT
PAST SURGICAL HISTORY:  H/O abdominoplasty     H/O bilateral breast reduction surgery     H/O  section     H/O gastric bypass     History of intravascular stent placement     Peripheral arterial disease s/p Right leg stent    S/P  section     S/P gastric bypass

## 2022-06-23 NOTE — H&P CARDIOLOGY - HISTORY OF PRESENT ILLNESS
This is a 73 y/o AA female with PMHx of PAD, s/p RT. SFA stent,x1 , L LE stents x 3 HTN, HLD, DM), LEILANI (not on CPAP), gastric bypass,s/p LLE (PANTERA to Lt SFA, balloon angioplasty on Lt pop, & PANTERA to Lt peroneal artery (3/22), April 2018 acute limb underwent catheter directed lysis and PTA / stent of left SFA, Popliteal, peroneal presents with complains of R>L LE pain , pt reports severe pain at night and unable to sleep for past few weeks, pt also has R 3rd toe ulcer she has been treating herself, also noted blackened area to lateral aspect of L big toe,  Pt was referred for Peripheral Angiogram found to have 90% ISR of LSFA and pop s/p L SFA stenting, SAMUEL to SFA and popliteal, peripheral angiogram recommended for abnormal arterial duplex revealing R severe CFA and dPT/DP occlusion and occluded L SFA, [place] class 4 symptoms     Patient is a 72y Female who presents to the cardiology department for elective cardiac catherization.     SUBJ:    MEDICATIONS  (STANDING):    MEDICATIONS  (PRN):        Vital Signs Last 24 Hrs  T(C): --  T(F): --  HR: --  BP: --  BP(mean): --  RR: --  SpO2: --      REVIEW OF SYSTEMS:  CONSTITUTIONAL: No fever, weight loss, or fatigue  CARDIOLOGY: PAtient denies chest pain, shortness of breath or syncopal episodes.   RESPIRATORY: denies shortness of breath, wheezing  NEUROLOGICAL: NO weakness, no focal deficits to report.  ENDOCRINOLOGICAL: no recent change in diabetic medications.   GI: no BRBPR, no N,V,diarrhea.     PHYSICAL EXAM:  · CONSTITUTIONAL:	Well-developed, well nourished    ·RESPIRATORY:   airway patent; breath sounds equal; good air movement; respirations non-labored; clear to auscultation bilaterally; no chest wall tenderness; no intercostal retractions; no rales,rhonchi or wheeze  · CARDIOVASCULAR	regular rate and rhythm  no rub  no murmur  normal PMI  · EXTREMITIES: R 3rd toe ulcer, L lateral big toe blackened area?  · VASCULAR: 	b/l DP/PT via doppler

## 2022-06-23 NOTE — H&P CARDIOLOGY - NSICDXPASTMEDICALHX_GEN_ALL_CORE_FT
PAST MEDICAL HISTORY:  Diabetes     DM (diabetes mellitus)     High cholesterol     HTN (hypertension)     LEILANI (obstructive sleep apnea) does not use CPAP since gastric bypass surgery    PAD (peripheral artery disease)     Ulcer of toe of right foot 3rd

## 2022-06-23 NOTE — PATIENT PROFILE ADULT - FALL HARM RISK - RISK INTERVENTIONS
Assistance OOB with selected safe patient handling equipment/Assistance with ambulation/Communicate Fall Risk and Risk Factors to all staff, patient, and family/Discuss with provider need for PT consult/Monitor gait and stability/Provide patient with walking aids - walker, cane, crutches/Reinforce activity limits and safety measures with patient and family/Sit up slowly, dangle for a short time, stand at bedside before walking/Use of alarms - bed, chair and/or voice tab/Visual Cue: Yellow wristband/Bed in lowest position, wheels locked, appropriate side rails in place/Call bell, personal items and telephone in reach/Instruct patient to call for assistance before getting out of bed or chair/Non-slip footwear when patient is out of bed/[place] to call system/Physically safe environment - no spills, clutter or unnecessary equipment/Purposeful Proactive Rounding/Room/bathroom lighting operational, light cord in reach

## 2022-06-23 NOTE — H&P CARDIOLOGY - NSICDXPASTSURGICALHX_GEN_ALL_CORE_FT
PAST SURGICAL HISTORY:  H/O abdominoplasty     H/O bilateral breast reduction surgery     H/O  section     H/O gastric bypass     History of intravascular stent placement     Peripheral arterial disease s/p Right leg stent x 1 , left leg stent x 3    S/P CABG x 3     S/P  section     S/P gastric bypass

## 2022-06-23 NOTE — CHART NOTE - NSCHARTNOTEFT_GEN_A_CORE
INDICATION:      CLI                          [place] class 5    PHYSICIAN: Dr. Brooke  ASSISTANT/FELLOW: Greg Landin    ACCESS: Ultrasound Guided left femoral artery access    VASCULAR CLOSURE DEVICE (if applicable): Perclose    ANGIOGRAM:  Selective Abdominal Aorta, bilateral renal artery, Right Iliac artery, Right SFA, Left Iliac, Left SFA, Right Lower Extremity DSA angiography     DETAILED PROCEDURE SUMMARY:  After obtaining informed consent, the patient was brought to the catheterization suite in a post- absorptive and non-sedated state. After this, a timeout was performed and I administered moderate sedation throughout this 45-minute procedure. An independent trained observer pushed medications at my direction, and monitored the patient’s level of consciousness and physiological status throughout. The patient was prepped and draped in the usual sterile manner. 1% lidocaine was used for local anesthesia and the patient was sedated as per endovascular lab protocol. Access was obtained in the  Femoral Artery using the modified Seldinger technique 5 [place] Sheath was placed in the artery under fluoroscopy and ultrasound guidance which was utilized for assessment of arterial patency and actual real-time visualization of needle passage to the arterial lumen. Sheath was then exchanged for 6 Fr 45 cm destination sheath prior to the interventional portion of the study.    CONTRAST: 100 ml    DIAGNOSTIC FINDINGS    Abdominal Aorta: Patent  Right Common Iliac Artery: Patent  Right External Iliac Artery: Patent  Right Common Femoral Artery: severe disease with 80% stenosis and area of healed dissection  Right SFA Artery: severe disease, with multilevel areas of significant stenoses in mid and distal SFA  Right Profunda Artery: Patent  Right Popliteal Artery: Severe disease, 90% stenosis  Right Tibial Peroneal Trunk Artery: severe disease, 90% stenosis  Reft Anterior Tibial Artery: Occluded  Right Peroneal Artery: moderate disease, patent  Right Posterior Tibial Artery: Occluded  Right Dorsalis Pedis Artery:  Left Common Iliac Artery: Patent  Left External Iliac Artery: Patent  Left Common Femoral Artery: Patent  Left SFA Artery: severe disease with multilevel areas of stenoses  Left Profunda Artery: Patent      INTERVENTIONS:  - Successful PTA of of right CFA with 7.0 x 60 mm [place] DCB   - successful PTA of right SFA and Popliteal Artery with 4.0 x 220 mm [place] OTW balloon, 5.0 x 200 mm [place] Drug Coated Balloon, 6.0 x 80 mm Epic stent   - Successful PTA of right Popliteal Artery with 4.0 x 220 mm [place] OTW balloon, 5.0 x 200 mm [place] Drug Coated Balloon, and 6.0 x 60 mm Rosa M Stent  - Successful PTA of of right Tibioperoneal Trunk with Two 3.5 - 6.0 mm x 6 mm Tack Stents       COMPLICATION: none    PERIPHERAL DIAGNOSTIC ANGIOGRAM/INTERVENTION SUMMARY:  - severe    RECOMMENDATIONS:  IV fluids:  Antiplatelets:  Aggressive risk factor modification  Disposition: INDICATION:      CLI                          [place] class 5    PHYSICIAN: Dr. Brooke  ASSISTANT/FELLOW: Greg Landin    ACCESS: Ultrasound Guided left femoral artery access    VASCULAR CLOSURE DEVICE (if applicable): Perclose    ANGIOGRAM:  Selective Abdominal Aorta, bilateral renal artery, Right Iliac artery, Right SFA, Left Iliac, Left SFA, Right Lower Extremity DSA angiography     DETAILED PROCEDURE SUMMARY:  After obtaining informed consent, the patient was brought to the catheterization suite in a post- absorptive and non-sedated state. After this, a timeout was performed and I administered moderate sedation throughout this 45-minute procedure. An independent trained observer pushed medications at my direction, and monitored the patient’s level of consciousness and physiological status throughout. The patient was prepped and draped in the usual sterile manner. 1% lidocaine was used for local anesthesia and the patient was sedated as per endovascular lab protocol. Access was obtained in the  Femoral Artery using the modified Seldinger technique 5 [place] Sheath was placed in the artery under fluoroscopy and ultrasound guidance which was utilized for assessment of arterial patency and actual real-time visualization of needle passage to the arterial lumen. Sheath was then exchanged for 6 Fr 45 cm destination sheath prior to the interventional portion of the study.    CONTRAST: 100 ml    DIAGNOSTIC FINDINGS    Abdominal Aorta: Patent  Right Renal Artery: 50% stenosis  Left Renal Artery: 80% stenosis  Right Common Iliac Artery: Patent  Right External Iliac Artery: Patent  Right Common Femoral Artery: severe disease with 80% stenosis and area of healed dissection  Right SFA Artery: severe disease, with multilevel areas of significant stenoses in mid and distal SFA  Right Profunda Artery: Patent  Right Popliteal Artery: Severe disease, 90% stenosis  Right Tibial Peroneal Trunk Artery: severe disease, 90% stenosis  Reft Anterior Tibial Artery: Occluded  Right Peroneal Artery: moderate disease, patent  Right Posterior Tibial Artery: Occluded  Right Dorsalis Pedis Artery:  Left Common Iliac Artery: Patent  Left External Iliac Artery: Patent  Left Common Femoral Artery: Patent  Left SFA Artery: severe disease with multilevel areas of stenoses  Left Profunda Artery: Patent      INTERVENTIONS:  - Successful PTA of of right CFA with 7.0 x 60 mm [place] DCB   - successful PTA of right SFA and Popliteal Artery with 4.0 x 220 mm [place] OTW balloon, 5.0 x 200 mm [place] Drug Coated Balloon, 6.0 x 80 mm Epic stent   - Successful PTA of right Popliteal Artery with 4.0 x 220 mm [place] OTW balloon, 5.0 x 200 mm [place] Drug Coated Balloon, and 6.0 x 60 mm Rosa M Stent  - Successful PTA of of right Tibioperoneal Trunk with Two 3.5 - 6.0 mm x 6 mm Tack Stents       COMPLICATION: none    PERIPHERAL DIAGNOSTIC ANGIOGRAM/INTERVENTION SUMMARY:  - severe right CFA, SFA, Popliteal, and TP trunk disease with one vessel distal run off, s/p successful PTA of CFA, SFA, Popliteal Artery and TP trunk as above.    RECOMMENDATIONS:  IV fluids: NS@100cc/hr x 6 hours  Antiplatelets: ASA 81mg, and Plavix 75mg  Aggressive risk factor modification including BP control  Disposition: Admit for overnight observation in [facility] due to complexity of procedure, and need for hydration  F/U with Dr. Garcia as outpatient  Recommend obtaining bilateral renal artery duplex as outpatient with Dr. Garcia  Will consider left renal artery intervention in the future if continues to have uncontrolled HTN resistant to medical therapy  Will consider intervention for severe left SFA / Popliteal disease if worsening symptoms or poor wound healing INDICATION:      CLI                          [place] class 5    PHYSICIAN: Dr. Brooke  ASSISTANT/FELLOW: Greg Landin    ACCESS: Ultrasound Guided left femoral artery access    VASCULAR CLOSURE DEVICE (if applicable): Perclose    ANGIOGRAM:  Selective Abdominal Aorta, bilateral renal artery, Right Iliac artery, Right SFA, Left Iliac, Left SFA, Right Lower Extremity DSA angiography     DETAILED PROCEDURE SUMMARY:  After obtaining informed consent, the patient was brought to the catheterization suite in a post- absorptive and non-sedated state. After this, a timeout was performed and I administered moderate sedation throughout this 45-minute procedure. An independent trained observer pushed medications at my direction, and monitored the patient’s level of consciousness and physiological status throughout. The patient was prepped and draped in the usual sterile manner. 1% lidocaine was used for local anesthesia and the patient was sedated as per endovascular lab protocol. Access was obtained in the  Femoral Artery using the modified Seldinger technique 5 [place] Sheath was placed in the artery under fluoroscopy and ultrasound guidance which was utilized for assessment of arterial patency and actual real-time visualization of needle passage to the arterial lumen. Sheath was then exchanged for 6 Fr 45 cm destination sheath prior to the interventional portion of the study.    CONTRAST: 100 ml    DIAGNOSTIC FINDINGS    Abdominal Aorta: Patent  Right Renal Artery: 50% stenosis  Left Renal Artery: 80% stenosis  Right Common Iliac Artery: Patent  Right External Iliac Artery: Patent  Right Common Femoral Artery: severe disease with 80% stenosis and area of healed dissection  Right SFA Artery: severe disease, with multilevel areas of significant stenoses in mid and distal SFA  Right Profunda Artery: Patent  Right Popliteal Artery: Severe disease, 90% stenosis  Right Tibial Peroneal Trunk Artery: severe disease, 90% stenosis  Reft Anterior Tibial Artery: Occluded  Right Peroneal Artery: moderate disease, patent  Right Posterior Tibial Artery: Occluded  Right Dorsalis Pedis Artery:  Left Common Iliac Artery: Patent  Left External Iliac Artery: Patent  Left Common Femoral Artery: Patent  Left SFA Artery: severe disease with multilevel areas of stenoses  Left Profunda Artery: Patent      INTERVENTIONS:  - Successful PTA of of right CFA with 7.0 x 60 mm [place] DCB   - successful PTA of right SFA and Popliteal Artery with 4.0 x 220 mm Franko OTW balloon, 5.0 x 200 mm [place] Drug Coated Balloon, 6.0 x 80 mm Epic stent   - Successful PTA of right Popliteal Artery with 4.0 x 220 mm Franko OTW balloon, and 5.0 x 200 mm [place] Drug Coated Balloon.  - Successful PTA of of right Tibioperoneal Trunk with Two 3.5 - 6.0 mm x 6 mm Tack Stents       COMPLICATION: none    PERIPHERAL DIAGNOSTIC ANGIOGRAM/INTERVENTION SUMMARY:  - severe right CFA, SFA, Popliteal, and TP trunk disease with one vessel distal run off, s/p successful PTA of CFA, SFA, Popliteal Artery and TP trunk as above.    RECOMMENDATIONS:  IV fluids: NS@100cc/hr x 6 hours  Antiplatelets: ASA 81mg, and Plavix 75mg  Aggressive risk factor modification including BP control  Disposition: Admit for overnight observation in [facility] due to complexity of procedure, and need for hydration  F/U with Dr. Garcia as outpatient  Recommend obtaining bilateral renal artery duplex as outpatient with Dr. Garcia  Will consider left renal artery intervention in the future if continues to have uncontrolled HTN resistant to medical therapy  Will consider intervention for severe left SFA / Popliteal disease if worsening symptoms or poor wound healing

## 2022-06-23 NOTE — ASU PATIENT PROFILE, ADULT - FALL HARM RISK - UNIVERSAL INTERVENTIONS
Bed in lowest position, wheels locked, appropriate side rails in place/Call bell, personal items and telephone in reach/Instruct patient to call for assistance before getting out of bed or chair/Non-slip footwear when patient is out of bed/[place] to call system/Physically safe environment - no spills, clutter or unnecessary equipment/Purposeful Proactive Rounding/Room/bathroom lighting operational, light cord in reach

## 2022-06-24 ENCOUNTER — TRANSCRIPTION ENCOUNTER (OUTPATIENT)
Age: 72
End: 2022-06-24

## 2022-06-24 VITALS — HEART RATE: 62 BPM | DIASTOLIC BLOOD PRESSURE: 61 MMHG | SYSTOLIC BLOOD PRESSURE: 134 MMHG

## 2022-06-24 LAB
GLUCOSE BLDC GLUCOMTR-MCNC: 91 MG/DL — SIGNIFICANT CHANGE UP (ref 70–99)
GLUCOSE BLDC GLUCOMTR-MCNC: 96 MG/DL — SIGNIFICANT CHANGE UP (ref 70–99)

## 2022-06-24 PROCEDURE — 99239 HOSP IP/OBS DSCHRG MGMT >30: CPT

## 2022-06-24 RX ADMIN — CLOPIDOGREL BISULFATE 75 MILLIGRAM(S): 75 TABLET, FILM COATED ORAL at 11:22

## 2022-06-24 RX ADMIN — AMLODIPINE BESYLATE 5 MILLIGRAM(S): 2.5 TABLET ORAL at 05:28

## 2022-06-24 RX ADMIN — Medication 25 MILLIGRAM(S): at 05:28

## 2022-06-24 RX ADMIN — LISINOPRIL 10 MILLIGRAM(S): 2.5 TABLET ORAL at 05:28

## 2022-06-24 RX ADMIN — Medication 81 MILLIGRAM(S): at 11:21

## 2022-06-24 RX ADMIN — PANTOPRAZOLE SODIUM 40 MILLIGRAM(S): 20 TABLET, DELAYED RELEASE ORAL at 05:29

## 2022-06-24 RX ADMIN — Medication 75 MILLIGRAM(S): at 05:29

## 2022-06-24 NOTE — DISCHARGE NOTE PROVIDER - NSDCCPTREATMENT_GEN_ALL_CORE_FT
PRINCIPAL PROCEDURE  Procedure: Angiography of peripheral vessel with percutaneous transluminal angioplasty (PTA) if indicated  Findings and Treatment:   INTERVENTIONS:  - Successful PTA of of right CFA with 7.0 x 60 mm [place] DCB   - successful PTA of right SFA and Popliteal Artery with 4.0 x 220 mm Franko OTW balloon, 5.0 x 200 mm [place] Drug Coated Balloon, 6.0 x 80 mm Epic stent   - Successful PTA of right Popliteal Artery with 4.0 x 220 mm Franko OTW balloon, 5.0 x 200 mm [place] Drug Coated Balloon, and 6.0 x 60 mm Rosa M Stent  - Successful PTA of of right Tibioperoneal Trunk with Two 3.5 - 6.0 mm x 6 mm Tack Stents

## 2022-06-24 NOTE — DISCHARGE NOTE PROVIDER - HOSPITAL COURSE
Mrs. Zeng is a 73 y/o  female with PAD s/p right SFA PANTERA x 1, s/p left SFA PANTERA x1, s/p left peroneal artery PANTERA x 1 and s/p left popliteal artery PANTERA x 1, left big toe wound (eschar), HTN, HLD, DM, LEILANI (not on CPAP) and gastric bypass. She was referred for peripheral angiogram secondary to 90% ISR of left SFA and popliteal stents found on arterial duplex. Duplex also revealed severe right CFA and dPT/DP occlusion. On 06/23/22, she underwent successful PTA of right CFA with 7.0 x 60 mm [place] DCB, successful PTA of right SFA and popliteal artery with 4.0 x220 mm Franko OTW balloon, 5.0 x 200 mm [place] drug coated balloon, 6.0 x 80 mm Epic stent, successful PTA of right popliteal artery with 4.0 x 220 mm Franko OTW balloon,  5.0 x 220 mm [place] drug coated balloon, and 6.0 x 60 mm Rosa M Stent and successful PTA of right tibioperoneal trunk with two 3.5-6.0 mm x 6 mm Tack stents by Dr. Brooke with no immediate complications. Plan and recommendations include continue Aspirin 81 mg daily and Plavix 75 mg daily; aggressive risk factor modification including BP control. She is to follow-up with Dr. Garcia as outpatient consider obtaining bilateral renal artery duplex as outpatient, consider left renal artery intervention in the future if continues to have uncontrolled HTN resistant to medical therapy and intervention for severe left SFA/Popliteal disease if worsening symptoms or poor wound healing.          Mrs. Zeng is a 73 y/o  female with PAD s/p right SFA PANTERA x 1, s/p left SFA PANTERA x1, s/p left peroneal artery PANTERA x 1 and s/p left popliteal artery PANTERA x 1, left big toe wound/ulcer (eschar), HTN, HLD, DM, LEILANI (not on CPAP) and gastric bypass. She was referred for peripheral angiogram secondary to 90% ISR of left SFA and popliteal stents found on arterial duplex. Duplex also revealed severe right CFA and dPT/DP occlusion. On 06/23/22, she underwent successful PTA of right CFA with 7.0 x 60 mm [place] DCB, successful PTA of right SFA and popliteal artery with 4.0 x220 mm Frank[place] OTW balloon, 5.0 x 200 mm [place] drug coated balloon, 6.0 x 80 mm Epic stent, successful PTA of right popliteal artery with 4.0 x 220 mm Franko OTW balloon,  5.0 x 220 mm [place] drug coated balloon, and 6.0 x 60 mm Rosa M Stent and successful PTA of right tibioperoneal trunk with two 3.5-6.0 mm x 6 mm Tack stents by Dr. Brooke with no immediate complications. Plan and recommendations include continue Aspirin 81 mg daily and Plavix 75 mg daily, B-blocker and statin therapy; aggressive risk factor modification including BP control. She is to follow-up with Dr. Garcia as outpatient consider obtaining bilateral renal artery duplex as outpatient, consider left renal artery intervention in the future if continues to have uncontrolled HTN resistant to medical therapy and intervention for severe left SFA/Popliteal disease if worsening symptoms or poor wound healing.                             Mrs. Zeng is a 73 y/o  female with PAD s/p right SFA PANTERA x 1, s/p left SFA PANTERA x1, s/p left peroneal artery PANTERA x 1 and s/p left popliteal artery PANTERA x 1, left big toe wound/ulcer (eschar), HTN, HLD, DM, LEILANI (not on CPAP) and gastric bypass. She was referred for peripheral angiogram secondary to 90% ISR of left SFA and popliteal stents found on arterial duplex. Duplex also revealed severe right CFA and dPT/DP occlusion.     On 06/23/22, she underwent successful PTA of right CFA with 7.0 x 60 mm [place] DCB, successful PTA of right SFA and popliteal artery with 4.0 x220 mm [place] OTW balloon, 5.0 x 200 mm [place] drug coated balloon, 6.0 x 80 mm Epic stent, successful PTA of right popliteal artery with 4.0 x 220 mm Franko OTW balloon,  5.0 x 220 mm [place] drug coated balloon, and 6.0 x 60 mm Rosa M Stent and successful PTA of right tibioperoneal trunk with two 3.5-6.0 mm x 6 mm Tack stents by Dr. Brooke with no immediate complications. Plan and recommendations include continue Aspirin 81 mg daily and Plavix 75 mg daily, B-blocker and statin therapy; aggressive risk factor modification including BP control. She is to follow-up with Dr. Garcia as outpatient consider obtaining bilateral renal artery duplex as outpatient, consider left renal artery intervention in the future if continues to have uncontrolled HTN resistant to medical therapy and intervention for severe left SFA/Popliteal disease if worsening symptoms or poor wound healing.     Of note, patient refused post-procedural lab work. He home SBP is 140-150s on her current regimen. Dr. Wayne discussed the importance of BP control, and informed the patient that her BP should be <120/80. Patient disagreed with this advice and would like to follow-up with her PCP for management of hypertension.

## 2022-06-24 NOTE — DISCHARGE NOTE NURSING/CASE MANAGEMENT/SOCIAL WORK - NSDCPEFALRISK_GEN_ALL_CORE
For information on Fall & Injury Prevention, visit: [email or web address] OR  [email or web address] OR  https://www.cdc.gov/steadi/patient.html

## 2022-06-24 NOTE — DISCHARGE NOTE PROVIDER - NSDCFUADDINST_GEN_ALL_CORE_FT
-Continue antiplatelets: ASA 81mg daily and Plavix 75mg daily  -Aggressive risk factor modification including BP control; continue all home antihypertensive  -Follow-up with Dr. Garcia as outpatient in 2 weeks  -Recommend obtaining bilateral renal artery duplex as outpatient with Dr. Garcia  -Will consider left renal artery intervention in the future if continues to have uncontrolled HTN resistant to medical therapy  -Will consider intervention for severe left SFA / Popliteal disease if worsening symptoms or poor wound healing.   -Continue antiplatelets: ASA 81mg daily and Plavix 75mg daily, B-Blocker, and statin therapy  -Aggressive risk factor modification including BP control; continue all home antihypertensive  -Monitor access site for bleeding/hematoma  -No strenuous activity for 3 days (routine walking okay)  -No heavy lifting > 30lbs for 2 weeks  -May shower in 24 hours  -Leave dressing in place for 24 - 48 hours, if it does not fall off in 48 hours can remove  -Follow-up with Dr. Garcia as outpatient in 2 weeks  -Post angiogram instructions, access site care, and activity restrictions reviewed with patient  -Return to hospital if experience lower extremity pain and site bleeding - Continue aspirin 81mg daily and Plavix 75mg daily  - Your blood pressure should be < 120/80. Please discuss your medications with your Primary Care Provider and follow a low salt diet.   - Monitor access site for bleeding/hematoma  - No strenuous activity for 3 days (routine walking okay)  - No heavy lifting > 30lbs for 2 weeks  - May shower in 24 hours  - Leave dressing in place for 24 - 48 hours, if it does not fall off in 48 hours can remove  - Follow-up with Dr. Garcia as outpatient in 2 weeks. You may need a study called a renal artery duplex.  - Post angiogram instructions, access site care, and activity restrictions reviewed with patient  - Return to hospital if experience lower extremity pain and site bleeding

## 2022-06-24 NOTE — DISCHARGE NOTE PROVIDER - NSDCMRMEDTOKEN_GEN_ALL_CORE_FT
amLODIPine 5 mg oral tablet: 1 tab(s) orally once a day  aspirin 81 mg oral delayed release tablet: 1 tab(s) orally once a day  atorvastatin 40 mg oral tablet: 1 tab(s) orally once a day (at bedtime)  clopidogrel 75 mg oral tablet: 1 tab(s) orally once a day  diclofenac sodium 100 mg oral tablet, extended release: 1 tab(s) orally once a day  hydroCHLOROthiazide 25 mg oral tablet: 1 tab(s) orally once a day  metoprolol tartrate 50 mg oral tablet: 1.5 tab(s) orally 2 times a day   oxyCODONE 5 mg oral capsule: 1 cap(s) orally every 6 hours, As Needed MDD:four tabs   pantoprazole 40 mg oral delayed release tablet: 1 tab(s) orally once a day (before a meal)  ramipril 10 mg oral capsule: 1 cap(s) orally once a day

## 2022-06-24 NOTE — DISCHARGE NOTE PROVIDER - NSDCCPCAREPLAN_GEN_ALL_CORE_FT
PRINCIPAL DISCHARGE DIAGNOSIS  Diagnosis: Status post percutaneous transluminal angioplasty (PTA) with stent placement  Assessment and Plan of Treatment:   -Severe right CFA, SFA, Popliteal, and TP trunk disease with one vessel distal run off, s/p successful PTA of CFA, SFA, Popliteal Artery and TP trunk  RECOMMENDATIONS:  -Continue antiplatelets: ASA 81mg, and Plavix 75mg  -Aggressive risk factor modification including BP control  -Follow-up with Dr. Garcia as outpatient  -Recommend obtaining bilateral renal artery duplex as outpatient with Dr. Garcia  -Will consider left renal artery intervention in the future if continues to have uncontrolled HTN resistant to medical therapy  -Will consider intervention for severe left SFA / Popliteal disease if worsening symptoms or poor wound healing.

## 2022-06-24 NOTE — DISCHARGE NOTE NURSING/CASE MANAGEMENT/SOCIAL WORK - PATIENT PORTAL LINK FT
You can access the FollowMyHealth Patient Portal offered by [facility] by registering at the following website: [email or web address]. By joining [facility]’s FollowMyHealth portal, you will also be able to view your health information using other applications (apps) compatible with our system.

## 2022-06-24 NOTE — DISCHARGE NOTE PROVIDER - CARE PROVIDER_API CALL
Blaine Garcia (DO)  Cardiovascular Disease; Internal Medicine; Nuclear Cardiology  [address] [place] [address]  Phone: [phone]  Fax: [phone]  Follow Up Time: 2 weeks

## 2022-06-24 NOTE — DISCHARGE NOTE PROVIDER - COLLABORATE WITH
4 Eyes Skin Assessment    Monna Baumgarten is being assessed upon: Admission    I agree that I, Estephanie Clifton, along with christy norton rn. (either 2 RN's or 1 LPN and 1 RN) have performed a thorough Head to Toe Skin Assessment on the patient. ALL assessment sites listed below have been assessed. Areas assessed by both nurses:     [x]   Head, Face, and Ears   [x]   Shoulders, Back, and Chest  [x]   Arms, Elbows, and Hands   [x]   Coccyx, Sacrum, and Ischium  [x]   Legs, Feet, and Heels    Does the Patient have Skin Breakdown?  No    Case Prevention initiated: No  Wound Care Orders initiated: No    [facility] nurse consulted for Pressure Injury (Stage 3,4, Unstageable, DTI, NWPT, and Complex wounds) and New or Established Ostomies: No        Primary Nurse eSignature: Estephanie Clifton RN on 4/6/2020 at 9:17 PM      Co-Signer eSignature: Electronically signed by Beulah Combs RN on 4/7/2020 at 1:56 AM
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
ACP

## 2022-06-29 DIAGNOSIS — I70.245 ATHEROSCLEROSIS OF NATIVE ARTERIES OF LEFT LEG WITH ULCERATION OF OTHER PART OF FOOT: ICD-10-CM

## 2022-06-29 DIAGNOSIS — L97.518 NON-PRESSURE CHRONIC ULCER OF OTHER PART OF RIGHT FOOT WITH OTHER SPECIFIED SEVERITY: ICD-10-CM

## 2022-06-29 DIAGNOSIS — L97.528 NON-PRESSURE CHRONIC ULCER OF OTHER PART OF LEFT FOOT WITH OTHER SPECIFIED SEVERITY: ICD-10-CM

## 2022-06-29 DIAGNOSIS — I70.235 ATHEROSCLEROSIS OF NATIVE ARTERIES OF RIGHT LEG WITH ULCERATION OF OTHER PART OF FOOT: ICD-10-CM

## 2022-06-29 DIAGNOSIS — Z79.82 LONG TERM (CURRENT) USE OF ASPIRIN: ICD-10-CM

## 2022-06-29 DIAGNOSIS — I10 ESSENTIAL (PRIMARY) HYPERTENSION: ICD-10-CM

## 2022-06-29 DIAGNOSIS — E11.51 TYPE 2 DIABETES MELLITUS WITH DIABETIC PERIPHERAL ANGIOPATHY WITHOUT GANGRENE: ICD-10-CM

## 2022-06-29 DIAGNOSIS — I25.10 ATHEROSCLEROTIC HEART DISEASE OF NATIVE CORONARY ARTERY WITHOUT ANGINA PECTORIS: ICD-10-CM

## 2022-06-29 DIAGNOSIS — I70.1 ATHEROSCLEROSIS OF RENAL ARTERY: ICD-10-CM

## 2022-06-29 DIAGNOSIS — Z95.1 PRESENCE OF AORTOCORONARY BYPASS GRAFT: ICD-10-CM

## 2022-06-29 DIAGNOSIS — E78.00 PURE HYPERCHOLESTEROLEMIA, UNSPECIFIED: ICD-10-CM

## 2022-06-29 DIAGNOSIS — Z98.84 BARIATRIC SURGERY STATUS: ICD-10-CM

## 2022-06-29 DIAGNOSIS — G47.33 OBSTRUCTIVE SLEEP APNEA (ADULT) (PEDIATRIC): ICD-10-CM

## 2022-06-29 DIAGNOSIS — E11.621 TYPE 2 DIABETES MELLITUS WITH FOOT ULCER: ICD-10-CM

## 2022-07-15 ENCOUNTER — APPOINTMENT (OUTPATIENT)
Dept: CARDIOLOGY | Facility: CLINIC | Age: 72
End: 2022-07-15

## 2022-07-15 ENCOUNTER — NON-APPOINTMENT (OUTPATIENT)
Age: 72
End: 2022-07-15

## 2022-07-15 VITALS
BODY MASS INDEX: 25.33 KG/M2 | HEART RATE: 54 BPM | SYSTOLIC BLOOD PRESSURE: 165 MMHG | WEIGHT: 152 LBS | DIASTOLIC BLOOD PRESSURE: 77 MMHG | HEIGHT: 65 IN | OXYGEN SATURATION: 100 %

## 2022-07-15 DIAGNOSIS — I10 ESSENTIAL (PRIMARY) HYPERTENSION: ICD-10-CM

## 2022-07-15 PROCEDURE — 99214 OFFICE O/P EST MOD 30 MIN: CPT

## 2022-07-15 NOTE — HISTORY OF PRESENT ILLNESS
[FreeTextEntry1] : 7/15/2022\par \par Had intevention with Dr. Brooke\par R CFA, SFA, POP and TPT\par also found  to have renal artery stenosis\par The wound on the right 3rd digit is healing/healed.  \par She still has calf pains bilaterally\par No blood in the urine or stool\par She has audible signal bilaterally, monophasic. \par Pain in the r toes is better now\par She feels tired and fatigued\par \par Medications:\par ASpirin 81\par Plavix 75\par Amlodipine 10mg daily \par Ramipril 10mg daily \par Diclofenac\par Pantoprazole 40mg daily\par HCTZ 25 (not taking all the time)\par Coreg 3.125mg BID  \par \par 3/16/2022\par \par ECHO:   11/1/2021 - mild concentric LVH, normal LV function \par Carotid 10/4/2021:  L ICA :  50-79%.  Right ICA :  16-49%\par RIGHT subclavian stenosis \par Coreg stopped 1 month ago - ran out of meds.  \par \par Complains of left sided foot numbness and claudication. \par R leg also with claudication. \par \par She can walk 2 blocks.  After 2 blocks she starts having pain in the calf.  Resolves with rest.  Resolves after 1 minute.  She is not walking much because its cold.  \par \par \par PCP:  Dr. Ruby ([place])\par \par Medications:\par ASpirin \par Plavix\par Amlodipine 10mg daily \par Ramipril 10mg daily \par Diclofenac\par Pantoprazole 40mg daily\par HCTZ 25 (not taking all the time)\par Coreg 3.125mg BID (ran out) \par \par Gabapentin (not taking)\par Metformin  (not taking, only sometimes)\par Atorvastatin (ordered, but not taking)\par \par \par 68 y/o F with h/o DM, HTN, HLD PAD with RT SFA stenting in 2016. Initially presented with worsening left claudication. Post left le intervention with SAMUEL /stent of left SFA, PTA of left pop. PTA and stent left tibioperoneal and prox peroneal.  Presented back to the hospital April 2018 with acute limb underwent catheter directed lysis and PTA / stent of left SFA, Popliteal, peroneal.  \par \par Pt with continued severe disabling claudication. Went for angiogram 12/13 found to have 90% ISR of L SFA and pop s/p L SFA stenting, SAMUEL to SFA and pop. Noted to have 80% stenosis in RIGHT CFA\par \par H/o CABG in reports frequent palpitations and occasional 'pain everywhere' unrelated to physical activity.  \par \par Cath 12/13/18 - intervention on LEFT SFA and POP ISR.  s/p stent to sfa and SAMUEL to prox SFA and pop\par \par For f/u today reports left foot/toes pain at night now for 2 weeks occasional dangles it of the bed for relief. Reports exercising in the gym for 30 mins on the stationary bicycle with no Sx.    \par \par \par  \par

## 2022-07-15 NOTE — PHYSICAL EXAM
[General Appearance - Well Developed] : well developed [General Appearance - Well Nourished] : well nourished [Normal Conjunctiva] : the conjunctiva exhibited no abnormalities [Normal Oral Mucosa] : normal oral mucosa [Normal Jugular Venous V Waves Present] : normal jugular venous V waves present [] : no respiratory distress [Heart Sounds] : normal S1 and S2 [1+] : left 1+ [0] : right 0 [2+] : left 2+ [Bowel Sounds] : normal bowel sounds [Nail Clubbing] : no clubbing of the fingernails [Skin Color & Pigmentation] : normal skin color and pigmentation [Oriented To Time, Place, And Person] : oriented to person, place, and time [FreeTextEntry1] : Audible DP and PT bilaterally.  R 3rd digit healed

## 2022-07-15 NOTE — DISCUSSION/SUMMARY
[FreeTextEntry1] : 70 y/o F with h/o DM, HTN, HLD, persistent claudication bilateral calves\par PAD s/p bilateral interventions complicated by acute limb ischemia April 2018 of left leg s/p CDT \par Most recent peripheral angiogram Dec 2018 for lifestyle limiting claudication: \par RIGHT - 80% CFA, 50% SFA, \par LEFT - 90% ISR SFA, 90% ISR POP\par INTERVENTION -  LEFT: SFA PANTERA, prox SFA SAMUEL, popliteal SAMUEL\par post CABG now \par R CFA, SFA, pop intervention Summer 2022\par  \par \par Plan: \par 1. Labwork today - she is fatigued.  Will also check renin and adela given YANNA seen on angio\par 2.  LE arterial duplex for surveillance, given calf pains bilaterally\par 3.  Renal artery duplex to assess for stenosis seen on angio\par 4.  Home BP monitoring and call in a week for BP readings and med-list\par 5.  Daily foot monitoring\par 6.  Return in 3 months.

## 2022-07-18 DIAGNOSIS — D64.9 ANEMIA, UNSPECIFIED: ICD-10-CM

## 2022-07-18 LAB
ALBUMIN SERPL ELPH-MCNC: 4.2 G/DL
ALDOSTERONE SERUM: 9.3 NG/DL
ALP BLD-CCNC: 163 U/L
ALT SERPL-CCNC: 9 U/L
ANION GAP SERPL CALC-SCNC: 12 MMOL/L
AST SERPL-CCNC: 16 U/L
BASOPHILS # BLD AUTO: 0.03 K/UL
BASOPHILS NFR BLD AUTO: 0.4 %
BILIRUB SERPL-MCNC: 0.5 MG/DL
BUN SERPL-MCNC: 14 MG/DL
CALCIUM SERPL-MCNC: 9.2 MG/DL
CHLORIDE SERPL-SCNC: 108 MMOL/L
CHOLEST SERPL-MCNC: 228 MG/DL
CO2 SERPL-SCNC: 25 MMOL/L
CREAT SERPL-MCNC: 0.89 MG/DL
EGFR: 68 ML/MIN/1.73M2
EOSINOPHIL # BLD AUTO: 0.21 K/UL
EOSINOPHIL NFR BLD AUTO: 2.9 %
GLUCOSE SERPL-MCNC: 96 MG/DL
HCT VFR BLD CALC: 28.3 %
HDLC SERPL-MCNC: 53 MG/DL
HGB BLD-MCNC: 8.8 G/DL
IMM GRANULOCYTES NFR BLD AUTO: 0.3 %
LDLC SERPL CALC-MCNC: 149 MG/DL
LYMPHOCYTES # BLD AUTO: 2.13 K/UL
LYMPHOCYTES NFR BLD AUTO: 28.9 %
MAN DIFF?: NORMAL
MCHC RBC-ENTMCNC: 23.8 PG
MCHC RBC-ENTMCNC: 31.1 GM/DL
MCV RBC AUTO: 76.5 FL
MONOCYTES # BLD AUTO: 0.52 K/UL
MONOCYTES NFR BLD AUTO: 7.1 %
NEUTROPHILS # BLD AUTO: 4.45 K/UL
NEUTROPHILS NFR BLD AUTO: 60.4 %
NONHDLC SERPL-MCNC: 176 MG/DL
PLATELET # BLD AUTO: 324 K/UL
POTASSIUM SERPL-SCNC: 4.5 MMOL/L
PROT SERPL-MCNC: 7 G/DL
RBC # BLD: 3.7 M/UL
RBC # FLD: 19.9 %
SODIUM SERPL-SCNC: 145 MMOL/L
TRIGL SERPL-MCNC: 133 MG/DL
TSH SERPL-ACNC: 2 UIU/ML
WBC # FLD AUTO: 7.36 K/UL

## 2022-07-22 LAB — RENIN ACTIVITY, PLASMA: 0.2 NG/ML/HR

## 2022-07-29 ENCOUNTER — NON-APPOINTMENT (OUTPATIENT)
Age: 72
End: 2022-07-29

## 2022-08-26 LAB
BASOPHILS # BLD AUTO: 0.05 K/UL
BASOPHILS NFR BLD AUTO: 0.7 %
EOSINOPHIL # BLD AUTO: 0.38 K/UL
EOSINOPHIL NFR BLD AUTO: 5.5 %
HCT VFR BLD CALC: 29.3 %
HGB BLD-MCNC: 8.6 G/DL
IMM GRANULOCYTES NFR BLD AUTO: 0.1 %
LYMPHOCYTES # BLD AUTO: 1.75 K/UL
LYMPHOCYTES NFR BLD AUTO: 25.3 %
MAN DIFF?: NORMAL
MCHC RBC-ENTMCNC: 22.6 PG
MCHC RBC-ENTMCNC: 29.4 GM/DL
MCV RBC AUTO: 77.1 FL
MONOCYTES # BLD AUTO: 0.41 K/UL
MONOCYTES NFR BLD AUTO: 5.9 %
NEUTROPHILS # BLD AUTO: 4.31 K/UL
NEUTROPHILS NFR BLD AUTO: 62.5 %
PLATELET # BLD AUTO: 294 K/UL
RBC # BLD: 3.8 M/UL
RBC # FLD: 19.5 %
WBC # FLD AUTO: 6.91 K/UL

## 2022-08-29 ENCOUNTER — NON-APPOINTMENT (OUTPATIENT)
Age: 72
End: 2022-08-29

## 2022-09-12 ENCOUNTER — RESULT REVIEW (OUTPATIENT)
Age: 72
End: 2022-09-12

## 2022-09-12 ENCOUNTER — APPOINTMENT (OUTPATIENT)
Dept: ULTRASOUND IMAGING | Facility: CLINIC | Age: 72
End: 2022-09-12

## 2022-09-12 ENCOUNTER — OUTPATIENT (OUTPATIENT)
Dept: OUTPATIENT SERVICES | Facility: HOSPITAL | Age: 72
LOS: 1 days | End: 2022-09-12
Payer: COMMERCIAL

## 2022-09-12 DIAGNOSIS — Z95.828 PRESENCE OF OTHER VASCULAR IMPLANTS AND GRAFTS: Chronic | ICD-10-CM

## 2022-09-12 DIAGNOSIS — Z98.89 OTHER SPECIFIED POSTPROCEDURAL STATES: Chronic | ICD-10-CM

## 2022-09-12 DIAGNOSIS — I73.9 PERIPHERAL VASCULAR DISEASE, UNSPECIFIED: Chronic | ICD-10-CM

## 2022-09-12 DIAGNOSIS — Z98.84 BARIATRIC SURGERY STATUS: Chronic | ICD-10-CM

## 2022-09-12 DIAGNOSIS — Z98.890 OTHER SPECIFIED POSTPROCEDURAL STATES: Chronic | ICD-10-CM

## 2022-09-12 DIAGNOSIS — Z98.891 HISTORY OF UTERINE SCAR FROM PREVIOUS SURGERY: Chronic | ICD-10-CM

## 2022-09-12 DIAGNOSIS — Z95.1 PRESENCE OF AORTOCORONARY BYPASS GRAFT: Chronic | ICD-10-CM

## 2022-09-12 DIAGNOSIS — Z00.00 ENCOUNTER FOR GENERAL ADULT MEDICAL EXAMINATION WITHOUT ABNORMAL FINDINGS: ICD-10-CM

## 2022-09-12 PROCEDURE — 93925 LOWER EXTREMITY STUDY: CPT | Mod: 26

## 2022-09-12 PROCEDURE — 93975 VASCULAR STUDY: CPT | Mod: 26

## 2022-09-12 PROCEDURE — 93975 VASCULAR STUDY: CPT

## 2022-09-12 PROCEDURE — 93925 LOWER EXTREMITY STUDY: CPT

## 2022-09-20 RX ORDER — CHLORHEXIDINE GLUCONATE 4 %
325 (65 FE) LIQUID (ML) TOPICAL DAILY
Qty: 90 | Refills: 0 | Status: ACTIVE | COMMUNITY
Start: 2022-07-18 | End: 1900-01-01

## 2022-10-08 ENCOUNTER — TRANSCRIPTION ENCOUNTER (OUTPATIENT)
Age: 72
End: 2022-10-08

## 2022-10-08 ENCOUNTER — INPATIENT (INPATIENT)
Facility: HOSPITAL | Age: 72
LOS: 2 days | Discharge: HOME | DRG: 378 | End: 2022-10-11
Attending: INTERNAL MEDICINE | Admitting: HOSPITALIST
Payer: COMMERCIAL

## 2022-10-08 VITALS
DIASTOLIC BLOOD PRESSURE: 74 MMHG | RESPIRATION RATE: 20 BRPM | TEMPERATURE: 98 F | SYSTOLIC BLOOD PRESSURE: 193 MMHG | HEIGHT: 65 IN | HEART RATE: 72 BPM | OXYGEN SATURATION: 99 %

## 2022-10-08 DIAGNOSIS — Z95.828 PRESENCE OF OTHER VASCULAR IMPLANTS AND GRAFTS: Chronic | ICD-10-CM

## 2022-10-08 DIAGNOSIS — Z98.89 OTHER SPECIFIED POSTPROCEDURAL STATES: Chronic | ICD-10-CM

## 2022-10-08 DIAGNOSIS — Z98.890 OTHER SPECIFIED POSTPROCEDURAL STATES: Chronic | ICD-10-CM

## 2022-10-08 DIAGNOSIS — Z98.84 BARIATRIC SURGERY STATUS: Chronic | ICD-10-CM

## 2022-10-08 DIAGNOSIS — Z95.1 PRESENCE OF AORTOCORONARY BYPASS GRAFT: Chronic | ICD-10-CM

## 2022-10-08 DIAGNOSIS — I73.9 PERIPHERAL VASCULAR DISEASE, UNSPECIFIED: Chronic | ICD-10-CM

## 2022-10-08 DIAGNOSIS — Z98.891 HISTORY OF UTERINE SCAR FROM PREVIOUS SURGERY: Chronic | ICD-10-CM

## 2022-10-08 LAB
ALBUMIN SERPL ELPH-MCNC: 4 G/DL — SIGNIFICANT CHANGE UP (ref 3.3–5)
ALP SERPL-CCNC: 138 U/L — HIGH (ref 40–120)
ALT FLD-CCNC: 7 U/L — LOW (ref 10–45)
ANION GAP SERPL CALC-SCNC: 10 MMOL/L — SIGNIFICANT CHANGE UP (ref 5–17)
ANISOCYTOSIS BLD QL: SIGNIFICANT CHANGE UP
APTT BLD: 32.2 SEC — SIGNIFICANT CHANGE UP (ref 27.5–35.5)
AST SERPL-CCNC: 16 U/L — SIGNIFICANT CHANGE UP (ref 10–40)
BASOPHILS # BLD AUTO: 0 K/UL — SIGNIFICANT CHANGE UP (ref 0–0.2)
BASOPHILS NFR BLD AUTO: 0 % — SIGNIFICANT CHANGE UP (ref 0–2)
BILIRUB SERPL-MCNC: 0.5 MG/DL — SIGNIFICANT CHANGE UP (ref 0.2–1.2)
BLD GP AB SCN SERPL QL: NEGATIVE — SIGNIFICANT CHANGE UP
BUN SERPL-MCNC: 20 MG/DL — SIGNIFICANT CHANGE UP (ref 7–23)
CALCIUM SERPL-MCNC: 9 MG/DL — SIGNIFICANT CHANGE UP (ref 8.4–10.5)
CHLORIDE SERPL-SCNC: 106 MMOL/L — SIGNIFICANT CHANGE UP (ref 96–108)
CO2 SERPL-SCNC: 27 MMOL/L — SIGNIFICANT CHANGE UP (ref 22–31)
CREAT SERPL-MCNC: 1.06 MG/DL — SIGNIFICANT CHANGE UP (ref 0.5–1.3)
DACRYOCYTES BLD QL SMEAR: SLIGHT — SIGNIFICANT CHANGE UP
EGFR: 55 ML/MIN/1.73M2 — LOW
ELLIPTOCYTES BLD QL SMEAR: SLIGHT — SIGNIFICANT CHANGE UP
EOSINOPHIL # BLD AUTO: 0.34 K/UL — SIGNIFICANT CHANGE UP (ref 0–0.5)
EOSINOPHIL NFR BLD AUTO: 4.4 % — SIGNIFICANT CHANGE UP (ref 0–6)
GLUCOSE SERPL-MCNC: 120 MG/DL — HIGH (ref 70–99)
HCT VFR BLD CALC: 24.1 % — LOW (ref 34.5–45)
HCT VFR BLD CALC: 30.6 % — LOW (ref 34.5–45)
HGB BLD-MCNC: 7.3 G/DL — LOW (ref 11.5–15.5)
HGB BLD-MCNC: 9.4 G/DL — LOW (ref 11.5–15.5)
HYPOCHROMIA BLD QL: SLIGHT — SIGNIFICANT CHANGE UP
INR BLD: 1.17 RATIO — HIGH (ref 0.88–1.16)
LYMPHOCYTES # BLD AUTO: 2.08 K/UL — SIGNIFICANT CHANGE UP (ref 1–3.3)
LYMPHOCYTES # BLD AUTO: 27.2 % — SIGNIFICANT CHANGE UP (ref 13–44)
MACROCYTES BLD QL: SLIGHT — SIGNIFICANT CHANGE UP
MANUAL SMEAR VERIFICATION: SIGNIFICANT CHANGE UP
MCHC RBC-ENTMCNC: 23.2 PG — LOW (ref 27–34)
MCHC RBC-ENTMCNC: 23.7 PG — LOW (ref 27–34)
MCHC RBC-ENTMCNC: 30.3 GM/DL — LOW (ref 32–36)
MCHC RBC-ENTMCNC: 30.7 GM/DL — LOW (ref 32–36)
MCV RBC AUTO: 76.8 FL — LOW (ref 80–100)
MCV RBC AUTO: 77.1 FL — LOW (ref 80–100)
MONOCYTES # BLD AUTO: 0.27 K/UL — SIGNIFICANT CHANGE UP (ref 0–0.9)
MONOCYTES NFR BLD AUTO: 3.5 % — SIGNIFICANT CHANGE UP (ref 2–14)
NEUTROPHILS # BLD AUTO: 4.95 K/UL — SIGNIFICANT CHANGE UP (ref 1.8–7.4)
NEUTROPHILS NFR BLD AUTO: 64.9 % — SIGNIFICANT CHANGE UP (ref 43–77)
NRBC # BLD: 0 /100 WBCS — SIGNIFICANT CHANGE UP (ref 0–0)
OVALOCYTES BLD QL SMEAR: SLIGHT — SIGNIFICANT CHANGE UP
PLAT MORPH BLD: NORMAL — SIGNIFICANT CHANGE UP
PLATELET # BLD AUTO: 262 K/UL — SIGNIFICANT CHANGE UP (ref 150–400)
PLATELET # BLD AUTO: 317 K/UL — SIGNIFICANT CHANGE UP (ref 150–400)
POIKILOCYTOSIS BLD QL AUTO: SLIGHT — SIGNIFICANT CHANGE UP
POTASSIUM SERPL-MCNC: 3.9 MMOL/L — SIGNIFICANT CHANGE UP (ref 3.5–5.3)
POTASSIUM SERPL-SCNC: 3.9 MMOL/L — SIGNIFICANT CHANGE UP (ref 3.5–5.3)
PROT SERPL-MCNC: 6.8 G/DL — SIGNIFICANT CHANGE UP (ref 6–8.3)
PROTHROM AB SERPL-ACNC: 13.5 SEC — HIGH (ref 10.5–13.4)
RBC # BLD: 3.14 M/UL — LOW (ref 3.8–5.2)
RBC # BLD: 3.97 M/UL — SIGNIFICANT CHANGE UP (ref 3.8–5.2)
RBC # FLD: 23 % — HIGH (ref 10.3–14.5)
RBC # FLD: 23.2 % — HIGH (ref 10.3–14.5)
RBC BLD AUTO: ABNORMAL
RH IG SCN BLD-IMP: POSITIVE — SIGNIFICANT CHANGE UP
SCHISTOCYTES BLD QL AUTO: SLIGHT — SIGNIFICANT CHANGE UP
SODIUM SERPL-SCNC: 143 MMOL/L — SIGNIFICANT CHANGE UP (ref 135–145)
TARGETS BLD QL SMEAR: SIGNIFICANT CHANGE UP
WBC # BLD: 6.29 K/UL — SIGNIFICANT CHANGE UP (ref 3.8–10.5)
WBC # BLD: 7.63 K/UL — SIGNIFICANT CHANGE UP (ref 3.8–10.5)
WBC # FLD AUTO: 6.29 K/UL — SIGNIFICANT CHANGE UP (ref 3.8–10.5)
WBC # FLD AUTO: 7.63 K/UL — SIGNIFICANT CHANGE UP (ref 3.8–10.5)

## 2022-10-08 PROCEDURE — 93010 ELECTROCARDIOGRAM REPORT: CPT

## 2022-10-08 PROCEDURE — 71045 X-RAY EXAM CHEST 1 VIEW: CPT | Mod: 26

## 2022-10-08 PROCEDURE — 74177 CT ABD & PELVIS W/CONTRAST: CPT | Mod: 26,MA

## 2022-10-08 PROCEDURE — 99285 EMERGENCY DEPT VISIT HI MDM: CPT

## 2022-10-08 RX ORDER — SODIUM CHLORIDE 9 MG/ML
1000 INJECTION, SOLUTION INTRAVENOUS
Refills: 0 | Status: DISCONTINUED | OUTPATIENT
Start: 2022-10-08 | End: 2022-10-09

## 2022-10-08 RX ORDER — ONDANSETRON 8 MG/1
4 TABLET, FILM COATED ORAL ONCE
Refills: 0 | Status: DISCONTINUED | OUTPATIENT
Start: 2022-10-08 | End: 2022-10-09

## 2022-10-08 RX ORDER — DEXTROSE 50 % IN WATER 50 %
50 SYRINGE (ML) INTRAVENOUS ONCE
Refills: 0 | Status: COMPLETED | OUTPATIENT
Start: 2022-10-08 | End: 2022-10-08

## 2022-10-08 RX ORDER — ACETAMINOPHEN 500 MG
1000 TABLET ORAL ONCE
Refills: 0 | Status: DISCONTINUED | OUTPATIENT
Start: 2022-10-08 | End: 2022-10-09

## 2022-10-08 RX ADMIN — Medication 50 MILLILITER(S): at 20:05

## 2022-10-08 RX ADMIN — SODIUM CHLORIDE 100 MILLILITER(S): 9 INJECTION, SOLUTION INTRAVENOUS at 20:54

## 2022-10-08 NOTE — ED PROVIDER NOTE - CLINICAL SUMMARY MEDICAL DECISION MAKING FREE TEXT BOX
Elderly pt on plavix here for BRBPR. VSS. Exam w/ some mild abdominal tenderness. Will work up GIB, obtain labs type, fecal occult, cxr, ekg, tba for colonoscopy. Possible transfusion.

## 2022-10-08 NOTE — ED PROVIDER NOTE - ATTENDING CONTRIBUTION TO CARE
72F hx HTN, HLD, DM, CABG on ASA/ Plavix, sickle cell trait, baseline Hgb 10, Recent Hgb 2 weeks ago 8.5 and started on iron, denies tarry stools, prior GI bleeding, has had a hx of gastric bypass ~20 years ago, last colonoscopy ~2 years ago and told hemorrhoids and diverticula, was due for EGD in July but was deferred d/t uncontrolled HTN, No upper abd pain at this time, no vomiting. Has had 4-5 bloody bowel mvmts today including 1 in ED. HD stable. Does endorse dizziness and palps however. On exam she is pale appearing, No murmur, lungs CTA BL. abd non peritoneal, RLQ and suprapubic TTP, no grr, rectal exam w/o ext hemorrhoids or fissures, no active blood coming from rectum, MAURICIO w BRB. Less concern for gastric bypass complication, more likely lower GI bleed. Will check H/H, transfuse PRN, Check bleeding scan. Consults to gen surg vs GI vs IR as dictated by scan.

## 2022-10-08 NOTE — ED PROVIDER NOTE - PHYSICAL EXAMINATION
General: NAD  HEENT: NCAT  Cardiac: RRR, 2+ radial pulses  Chest: CTA  Abdomen: +mild periumbilical tenderness  Rectal: chaperoned by ***  Extremities: no peripheral edema, calf tenderness, or leg size discrepancies  Skin: no rashes  Neuro: AAOx3, motor and sensory grossly intact  Psych: mood and affect appropriate

## 2022-10-08 NOTE — ED PROVIDER NOTE - PROGRESS NOTE DETAILS
Gen surg consulted for GI bleed. GI emailed for consult. IR paged. Attending Kalin:  surg wrote recs, calling to ensure recs, discussed r vs b with prior provider at s/o about r vs b of ddavp, and agree to hold off on this for now. Attending Ameliaom:  paged surg again

## 2022-10-08 NOTE — ED PROVIDER NOTE - OBJECTIVE STATEMENT
73yo F hx of HTN, HLD, DM, CABG on plavix here for rectal bleeding. 1x brbpr yesterday, x4 more today, feeling a bit of headache, some mild sob. Some mid abdominal pain. Otherwise no chest pain, fever, n/v, change in urination. 71yo F hx of HTN, HLD, DM, CABG on plavix, gastric bypass 21 years ago here for rectal bleeding. 1x brbpr yesterday, x4 more today, feeling a bit of headache, some mild sob. Some mid abdominal pain. Otherwise no chest pain, fever, n/v, change in urination.

## 2022-10-09 DIAGNOSIS — E11.40 TYPE 2 DIABETES MELLITUS WITH DIABETIC NEUROPATHY, UNSPECIFIED: ICD-10-CM

## 2022-10-09 DIAGNOSIS — I73.9 PERIPHERAL VASCULAR DISEASE, UNSPECIFIED: ICD-10-CM

## 2022-10-09 DIAGNOSIS — I10 ESSENTIAL (PRIMARY) HYPERTENSION: ICD-10-CM

## 2022-10-09 DIAGNOSIS — K92.2 GASTROINTESTINAL HEMORRHAGE, UNSPECIFIED: ICD-10-CM

## 2022-10-09 DIAGNOSIS — E78.5 HYPERLIPIDEMIA, UNSPECIFIED: ICD-10-CM

## 2022-10-09 DIAGNOSIS — Z29.9 ENCOUNTER FOR PROPHYLACTIC MEASURES, UNSPECIFIED: ICD-10-CM

## 2022-10-09 DIAGNOSIS — Z86.2 PERSONAL HISTORY OF DISEASES OF THE BLOOD AND BLOOD-FORMING ORGANS AND CERTAIN DISORDERS INVOLVING THE IMMUNE MECHANISM: ICD-10-CM

## 2022-10-09 DIAGNOSIS — E11.9 TYPE 2 DIABETES MELLITUS WITHOUT COMPLICATIONS: ICD-10-CM

## 2022-10-09 LAB
A1C WITH ESTIMATED AVERAGE GLUCOSE RESULT: 6 % — HIGH (ref 4–5.6)
ESTIMATED AVERAGE GLUCOSE: 126 MG/DL — HIGH (ref 68–114)
HCT VFR BLD CALC: 32.9 % — LOW (ref 34.5–45)
HCT VFR BLD CALC: 38.6 % — SIGNIFICANT CHANGE UP (ref 34.5–45)
HGB BLD-MCNC: 10.8 G/DL — LOW (ref 11.5–15.5)
HGB BLD-MCNC: 12.4 G/DL — SIGNIFICANT CHANGE UP (ref 11.5–15.5)
MCHC RBC-ENTMCNC: 26.1 PG — LOW (ref 27–34)
MCHC RBC-ENTMCNC: 26.2 PG — LOW (ref 27–34)
MCHC RBC-ENTMCNC: 32.1 GM/DL — SIGNIFICANT CHANGE UP (ref 32–36)
MCHC RBC-ENTMCNC: 32.8 GM/DL — SIGNIFICANT CHANGE UP (ref 32–36)
MCV RBC AUTO: 79.7 FL — LOW (ref 80–100)
MCV RBC AUTO: 81.1 FL — SIGNIFICANT CHANGE UP (ref 80–100)
NRBC # BLD: 0 /100 WBCS — SIGNIFICANT CHANGE UP (ref 0–0)
NRBC # BLD: 0 /100 WBCS — SIGNIFICANT CHANGE UP (ref 0–0)
PLATELET # BLD AUTO: 209 K/UL — SIGNIFICANT CHANGE UP (ref 150–400)
PLATELET # BLD AUTO: 236 K/UL — SIGNIFICANT CHANGE UP (ref 150–400)
RBC # BLD: 4.13 M/UL — SIGNIFICANT CHANGE UP (ref 3.8–5.2)
RBC # BLD: 4.76 M/UL — SIGNIFICANT CHANGE UP (ref 3.8–5.2)
RBC # FLD: 20 % — HIGH (ref 10.3–14.5)
RBC # FLD: 20.7 % — HIGH (ref 10.3–14.5)
SARS-COV-2 RNA SPEC QL NAA+PROBE: SIGNIFICANT CHANGE UP
WBC # BLD: 6.87 K/UL — SIGNIFICANT CHANGE UP (ref 3.8–10.5)
WBC # BLD: 7.5 K/UL — SIGNIFICANT CHANGE UP (ref 3.8–10.5)
WBC # FLD AUTO: 6.87 K/UL — SIGNIFICANT CHANGE UP (ref 3.8–10.5)
WBC # FLD AUTO: 7.5 K/UL — SIGNIFICANT CHANGE UP (ref 3.8–10.5)

## 2022-10-09 PROCEDURE — 99223 1ST HOSP IP/OBS HIGH 75: CPT | Mod: GC

## 2022-10-09 PROCEDURE — 99221 1ST HOSP IP/OBS SF/LOW 40: CPT

## 2022-10-09 RX ORDER — LANOLIN ALCOHOL/MO/W.PET/CERES
3 CREAM (GRAM) TOPICAL AT BEDTIME
Refills: 0 | Status: DISCONTINUED | OUTPATIENT
Start: 2022-10-09 | End: 2022-10-09

## 2022-10-09 RX ORDER — DEXTROSE 50 % IN WATER 50 %
15 SYRINGE (ML) INTRAVENOUS ONCE
Refills: 0 | Status: DISCONTINUED | OUTPATIENT
Start: 2022-10-09 | End: 2022-10-11

## 2022-10-09 RX ORDER — CARVEDILOL PHOSPHATE 80 MG/1
6.25 CAPSULE, EXTENDED RELEASE ORAL EVERY 12 HOURS
Refills: 0 | Status: DISCONTINUED | OUTPATIENT
Start: 2022-10-09 | End: 2022-10-11

## 2022-10-09 RX ORDER — SOD SULF/SODIUM/NAHCO3/KCL/PEG
1000 SOLUTION, RECONSTITUTED, ORAL ORAL ONCE
Refills: 0 | Status: COMPLETED | OUTPATIENT
Start: 2022-10-09 | End: 2022-10-09

## 2022-10-09 RX ORDER — AMLODIPINE BESYLATE 2.5 MG/1
10 TABLET ORAL DAILY
Refills: 0 | Status: DISCONTINUED | OUTPATIENT
Start: 2022-10-09 | End: 2022-10-11

## 2022-10-09 RX ORDER — PANTOPRAZOLE SODIUM 20 MG/1
40 TABLET, DELAYED RELEASE ORAL
Refills: 0 | Status: DISCONTINUED | OUTPATIENT
Start: 2022-10-09 | End: 2022-10-11

## 2022-10-09 RX ORDER — DEXTROSE 50 % IN WATER 50 %
25 SYRINGE (ML) INTRAVENOUS ONCE
Refills: 0 | Status: DISCONTINUED | OUTPATIENT
Start: 2022-10-09 | End: 2022-10-11

## 2022-10-09 RX ORDER — ATORVASTATIN CALCIUM 80 MG/1
40 TABLET, FILM COATED ORAL AT BEDTIME
Refills: 0 | Status: DISCONTINUED | OUTPATIENT
Start: 2022-10-09 | End: 2022-10-11

## 2022-10-09 RX ORDER — DEXTROSE 50 % IN WATER 50 %
12.5 SYRINGE (ML) INTRAVENOUS ONCE
Refills: 0 | Status: DISCONTINUED | OUTPATIENT
Start: 2022-10-09 | End: 2022-10-11

## 2022-10-09 RX ORDER — INSULIN LISPRO 100/ML
VIAL (ML) SUBCUTANEOUS AT BEDTIME
Refills: 0 | Status: DISCONTINUED | OUTPATIENT
Start: 2022-10-09 | End: 2022-10-10

## 2022-10-09 RX ORDER — GLUCAGON INJECTION, SOLUTION 0.5 MG/.1ML
1 INJECTION, SOLUTION SUBCUTANEOUS ONCE
Refills: 0 | Status: DISCONTINUED | OUTPATIENT
Start: 2022-10-09 | End: 2022-10-11

## 2022-10-09 RX ORDER — AMOXICILLIN 250 MG/5ML
1 SUSPENSION, RECONSTITUTED, ORAL (ML) ORAL
Qty: 0 | Refills: 0 | DISCHARGE

## 2022-10-09 RX ORDER — GABAPENTIN 400 MG/1
100 CAPSULE ORAL
Refills: 0 | Status: DISCONTINUED | OUTPATIENT
Start: 2022-10-09 | End: 2022-10-11

## 2022-10-09 RX ORDER — ASPIRIN/CALCIUM CARB/MAGNESIUM 324 MG
81 TABLET ORAL DAILY
Refills: 0 | Status: DISCONTINUED | OUTPATIENT
Start: 2022-10-09 | End: 2022-10-11

## 2022-10-09 RX ORDER — INSULIN LISPRO 100/ML
VIAL (ML) SUBCUTANEOUS
Refills: 0 | Status: DISCONTINUED | OUTPATIENT
Start: 2022-10-09 | End: 2022-10-10

## 2022-10-09 RX ADMIN — AMLODIPINE BESYLATE 10 MILLIGRAM(S): 2.5 TABLET ORAL at 13:27

## 2022-10-09 RX ADMIN — Medication 81 MILLIGRAM(S): at 17:58

## 2022-10-09 RX ADMIN — Medication 1000 MILLILITER(S): at 17:03

## 2022-10-09 RX ADMIN — CARVEDILOL PHOSPHATE 6.25 MILLIGRAM(S): 80 CAPSULE, EXTENDED RELEASE ORAL at 13:27

## 2022-10-09 RX ADMIN — SODIUM CHLORIDE 100 MILLILITER(S): 9 INJECTION, SOLUTION INTRAVENOUS at 06:52

## 2022-10-09 NOTE — PROGRESS NOTE ADULT - ASSESSMENT
72y Female with hx of CABG on Plavix and gastric bypass, p/w multiple bloody bowel movements and a drop of Hgb from 9.4 to 7.3. Found to have CTA abdomen/pelvis concerning for an active bleed in the descending colon. Remains hemodynamically stable s/p 2 U PRBCs requiring further GI evaluation. 72y Female with hx of CABG on Plavix and gastric bypass, p/w multiple bloody bowel movements and a drop of Hgb from 9.4 to 7.3. Found to have CTA abdomen/pelvis concerning for an active bleed in the descending colon. Remains hemodynamically stable s/p 2 U PRBCs, pending colonoscopy for further evaluation and intervention.

## 2022-10-09 NOTE — H&P ADULT - NSICDXPASTMEDICALHX_GEN_ALL_CORE_FT
Patient declined      10/31/19 1100   Activity/Group Checklist   Group   (IMR/ Resiliency )   Attendance Did not attend   Attendance Duration (min) 46-60   Affect/Mood IRMA PAST MEDICAL HISTORY:  Diabetes     DM (diabetes mellitus)     High cholesterol     HTN (hypertension)     LEILANI (obstructive sleep apnea) does not use CPAP since gastric bypass surgery    PAD (peripheral artery disease)     Ulcer of toe of right foot 3rd

## 2022-10-09 NOTE — H&P ADULT - PROBLEM SELECTOR PLAN 4
- Last A 1c unknown but per daughter diabetes is well controlled. Patient previously on metformin but discontinued to incidence of hypoglycemia  - Last lipid panel with LDL < 190  - Low dose sliding scale for now  - Monitor finger sticks  - F/U A1c. - Last A 1c unknown but per daughter diabetes is well controlled. Patient previously on metformin but discontinued to incidence of hypoglycemia  - C/b diabetic neuropathy  - Last lipid panel with LDL < 190  - Low dose sliding scale for now  - Monitor finger sticks  - F/U A1c.

## 2022-10-09 NOTE — H&P ADULT - NSHPPHYSICALEXAM_GEN_ALL_CORE
VITALS:   T(C): 36.3 (10-08-22 @ 23:50), Max: 36.9 (10-08-22 @ 21:00)  HR: 65 (10-08-22 @ 23:50) (65 - 79)  BP: 165/60 (10-08-22 @ 23:50) (145/90 - 193/74)  RR: 13 (10-08-22 @ 23:50) (13 - 20)  SpO2: 100% (10-08-22 @ 23:50) (99% - 100%)    GENERAL: NAD, lying in bed comfortably  HEAD:  Atraumatic, normocephalic  EYES: EOMI, PERRLA, conjunctiva and sclera clear  ENT: Moist mucous membranes  NECK: Supple, no JVD  HEART: Regular rate and rhythm, no murmurs, rubs, or gallops  LUNGS: Unlabored respirations.  Clear to auscultation bilaterally, no crackles, wheezing, or rhonchi  ABDOMEN: Tenderness to the LLQ. No rebound/guarding. Soft nondistended, +BS  EXTREMITIES: 2+ peripheral pulses bilaterally. No clubbing, cyanosis, or edema  NERVOUS SYSTEM:  A&Ox3, no focal deficits   SKIN: No rashes or lesions  Psych: Normal. normal behavior. normal speech

## 2022-10-09 NOTE — H&P ADULT - ASSESSMENT
72y Female with hx of CABG on Plavix and gastric bypass, p/w multiple bloody bowel movements and a drop of Hgb from 9.4 to 7.3. Found to have CTA abdomen/pelvis concerning for an active bleed in the descending colon. Remains hemodynamically stable s/p 2 U PRBCs requiring further GI evaluation.

## 2022-10-09 NOTE — ED ADULT NURSE REASSESSMENT NOTE - NS ED NURSE REASSESS COMMENT FT1
Report received from [facility]. Pt A&Ox4. Does not appear to be in any acute distress. Blood transfsuion complete at 0805. VS documented. Comfort and safety maintained.

## 2022-10-09 NOTE — H&P ADULT - PROBLEM SELECTOR PLAN 1
- Patient with history of abdominal pain, now with Lower GI bleed. Possibly 2/2 Diverticulitis vs colitis.   - CT AP significant for an area of gastrointestinal bleed in the descending colon.  - Continue fluid resuscitation. S/P 2U PRBCs  - Transfuse Hb > 8  given CAD s/p CABG  - CBC Q4hrs  - Continue to monitor hemodynamics  - NPO Pending GI evaluation   - CRS and IR consulted. Appreciate recs. No plans for urgent intervention at this time.   - GI eval pending. - Patient with history of abdominal pain, now with Lower GI bleed. Possibly 2/2 Diverticulitis vs colitis.   - CT AP significant for an area of gastrointestinal bleed in the descending colon.  - Continue fluid resuscitation. S/P 2U PRBCs  - Transfuse Hb > 8  given CAD s/p CABG  - CBC Q4hrs  - Continue to monitor hemodynamics  - NPO Pending GI evaluation   - CRS and IR consulted. Appreciate recs. No plans for urgent intervention at this time.   - GI eval pending.  - Low threshold for MICU consult given precipitatus drop in Hemoglobin

## 2022-10-09 NOTE — H&P ADULT - PROBLEM SELECTOR PLAN 7
Diet: NPO pending GI eval  DVT ppx: Hold off on ppx given GI bleed  Dispo: Pending GI Eval - Baseline hemoglobin ~ 9  - Continue to monitor H/H

## 2022-10-09 NOTE — H&P ADULT - PROBLEM SELECTOR PLAN 3
- History of Treatment resistant Hypertension  - Would hold BP meds for now given concern for GI hemorrhage - History of Treatment resistant Hypertension  - BP currently  SBP < 180  - Would hold BP meds for now given concern for GI hemorrhage

## 2022-10-09 NOTE — PROGRESS NOTE ADULT - PROBLEM SELECTOR PLAN 1
- Patient with history of abdominal pain, now with Lower GI bleed. Possibly 2/2 Diverticulitis vs colitis.   - CT AP significant for an area of gastrointestinal bleed in the descending colon.  - Continue fluid resuscitation. S/P 2U PRBCs  - Transfuse Hb > 8  given CAD s/p CABG  - CBC Q4hrs  - Continue to monitor hemodynamics  - NPO Pending GI evaluation   - CRS and IR consulted. Appreciate recs. No plans for urgent intervention at this time.   - GI eval pending.  - Low threshold for MICU consult given precipitatus drop in Hemoglobin - Patient with history of abdominal pain, now with Lower GI bleed. Possibly 2/2 Diverticulitis vs colitis.   - CT AP significant for an area of gastrointestinal bleed in the descending colon.  - Continue fluid resuscitation. S/P 2U PRBCs -> Hb 10.8  - Transfuse Hb > 8  given CAD s/p CABG  - CBC Q4hrs  - Continue to monitor hemodynamics  - NPO Pending GI evaluation   - CRS and IR consulted. Appreciate recs. No plans for urgent intervention at this time.   - GI consulted, recs appreciated     -colonoscopy tomorrow  - Low threshold for MICU consult given precipitous drop in Hemoglobin

## 2022-10-09 NOTE — CONSULT NOTE ADULT - ASSESSMENT
Interventional Radiology    Evaluate for Procedure: evaluation for possible angiogram / embolization for acute GIB    HPI: 72y Female with hx of CABG on Plavix and gastric bypass, p/w multiple bloody bowel movements and a drop of Hgb from 9.4 to 7.3. CTA abdomen/pelvis is questionable for an active bleed in the descending colon. IR consulted for evaluation.     Allergies: Cipro (Headache)    Medications (Abx/Cardiac/Anticoagulation/Blood Products)      Data:  165.1  T(C): 36.3  HR: 65  BP: 165/60  RR: 13  SpO2: 100%    -WBC 6.29 / HgB 7.3 / Hct 24.1 / Plt 262  -Na 143 / Cl 106 / BUN 20 / Glucose 120  -K 3.9 / CO2 27 / Cr 1.06  -ALT 7 / Alk Phos 138 / T.Bili 0.5  -INR 1.17 / PTT 32.2      Radiology: CTA abdomen/pelvis reviewed    Assessment/Plan:   72y Female with hx of CABG on Plavix and gastric bypass, p/w multiple bloody bowel movements and a drop of Hgb from 9.4 to 7.3. CTA abdomen/pelvis is questionable for an active bleed in the descending colon. IR consulted for evaluation.   -- imaging reviewed; questionable extravasation in descending colon (no intraluminal pooling)   -- patient is currently hemodynamically stable (Systolic BP 140s, no tachycardia)  -- recommend continued resuscitation (2U PRBC ordered) and trend hgb   -- recommend GI recs for possible colonoscopy   -- IR available for more urgent intervention if clinical status deteriorates   -- discussed with team     --  Zach Gar DO/FEILPA, PGY-4  Vascular and Interventional Radiology   Available on [employer] Teams    - Non-emergent consults: Place IR consult order in [place]  - Emergent issues (pager): [facility] [phone]; [facility] [phone]; [phone]  - Scheduling questions: [facility] [phone]; [facility] [phone]  - Clinic/outpatient booking: [facility] [phone]; [facility] [phone]

## 2022-10-09 NOTE — H&P ADULT - ATTENDING COMMENTS
73yo F hx of HTN, HLD, DM, CABG on DAPT, gastric bypass 21 years ago pw rectal bleeding, admitted for further management of acute lower GI bleed     # Acute Lower GIB   - P/w BRBPR, multiple episodes of hematochezia, hemodynamically stable   - DDX: diverticulosis vs diverticulitis vs colitis vs angiodysplasia  - Hg 9.4 > 7.3 > 2u prbc   - CT A/P w/ctx show GIB in descending colon   - Apprec Colorec surg rec: no acute surgical interventions  - Apprec IR rec:  no acute IR intervention, trend H&H, GI consult  - Trend H/H q4, transfuse to keep hg >8 (cardiac hx)  - PPI not indicated as lower GIB    - Hold antihypertensive given GIB   - Hold AC given GIB, defer to GI/ Vascular rg when to restart AC   - GI consult for possible colonoscopy  - NPO except med   - Low threshold for ICU admission/ urgent surgical &/or IR re-consult if deteriorates

## 2022-10-09 NOTE — PROGRESS NOTE ADULT - ATTENDING COMMENTS
73yo F hx of HTN, HLD, DM, CABG on DAPT, gastric bypass 21 years ago pw rectal bleeding, admitted for further management of acute lower GI bleed     She is in good spirits - descending colon likely diverticular bleed (known history of diverticulosis per pt and daughter at bedside) seen on CT A/P appears to be slowing down as patient sates BMs have been less bloody now  Dizziness and orthostasis improved today    Monitor cbc, stop Plavix, continue aspirin given recent stents of leg  BP hyperintensive - normally would not continue BP meds but her BP has been severely hypertensive and do not want this to contribute to bleeding phenomenon - thus will continue home BP meds    Per GI prep for colonoscopy 10/10    Rest of plan as per above

## 2022-10-09 NOTE — H&P ADULT - HISTORY OF PRESENT ILLNESS
Patient is a 72 year old female with PMHx of HTN, HOLD, DM, CABG, sickle cell trait at baseline, PAD s/p lower extremity stents in June 2022 on DAPT (ASA and Plavix), presenting with BRBPR. States she has had 4-5 episodes of rectal bleeding in the past day. She initially had one BM with bloody streaks and then contacted her PMD who advised her to proceed to ED. However, while pending transportation she had several order BMs which were terrence red blood. Symptoms are associated with mild headache, fatigue, mild palpitations when laying on her left side, and SOB. Of note, for about 2-3 months, she had been having LLQ abdominal pain which has been keeping her awake at nights intermittently with constipation. She had consulted with her GI doctor and plans were made to scope. However, Cards clearance was required for persistent HTN. She was recently started on oral iron replacement therapy.  She denies any CP, diarrhea, weight loss, dizziness, hematemesis, low back pain, vaginal bleeding.     In the ED patient hemodynamically stable. POCT BG 60 (s/p dextrose). CT A/P in ED demonstrating GI bleed in descending colon. In ED, patient's initial Hgb was 9.4, dropped to 7.3 on repeat labs approximately 4 hours later. Receiving 1 unit pRBC. CRS was consulted and indicated no acute intervention necessary. IR also consulted and no acute intervention recommended. Currently pending GI eval.

## 2022-10-09 NOTE — H&P ADULT - NSHPSOCIALHISTORY_GEN_ALL_CORE
Marital Status:  ( x )    (   ) Single    (   )    (  )   Lives with: (  ) alone  (  ) children   ( x ) spouse   (  ) parents  (  ) other  Recent Travel: No recent travel  Mobility: No disability  Funcational status: Does ADLs and IADLs   Substance Use (street drugs): ( x ) never used  (  ) other:  Tobacco Usage:  ( x  ) never smoked   (   ) former smoker   (   ) current smoker  (     ) pack year  Alcohol Usage: None

## 2022-10-09 NOTE — H&P ADULT - NSHPLABSRESULTS_GEN_ALL_CORE
.  LABS:                         7.3    6.29  )-----------( 262      ( 08 Oct 2022 22:03 )             24.1     10-08    143  |  106  |  20  ----------------------------<  120<H>  3.9   |  27  |  1.06    Ca    9.0      08 Oct 2022 18:18    TPro  6.8  /  Alb  4.0  /  TBili  0.5  /  DBili  x   /  AST  16  /  ALT  7<L>  /  AlkPhos  138<H>  10-08    PT/INR - ( 08 Oct 2022 18:18 )   PT: 13.5 sec;   INR: 1.17 ratio         PTT - ( 08 Oct 2022 18:18 )  PTT:32.2 sec          RADIOLOGY, EKG & ADDITIONAL TESTS: Reviewed.

## 2022-10-09 NOTE — CONSULT NOTE ADULT - ASSESSMENT
72 year old female with lower GI bleed. Area of hemorrhage identified in descending colon on CT imaging.    Recommendations:  - No acute surgical interventions at this time  - Medicine admission  - GI and IR consults  - Trend H/H, transfuse prn    Discussed with colorectal surgery fellow, Dr. Pérez, on behalf of colorectal surgery attending on call Dr. Valle.      [facility]  [id number] 72 year old female with lower GI bleed. Area of hemorrhage identified in descending colon on CT imaging.    Recommendations:  - No acute surgical interventions at this time  - Medicine admission  - GI and IR consults  - Trend H/H, transfuse prn  - Consider risks and benefits of DAPT    Discussed with colorectal surgery fellow, Dr. Pérez, on behalf of colorectal surgery attending on call Dr. Valle.      [facility] Surgery  [id number]

## 2022-10-09 NOTE — CONSULT NOTE ADULT - ATTENDING COMMENTS
pt with blood per rectum multiple times at home. On plavix last dose on Fri    last bloody BM was this am around 3am, none since    [id number]  abd soft    CT with localization to desc colon    no need for urgent surgical intervention at this point, not currently bleeding, f/u h/h after transfusion  GI consult for possible scope  hold plavix  will follow
Resume home dose of HCTZ 25mg daily   Ok to hold plavix given GI bleed  D/W Dr. Worrell - plan for endoscopy later today      Jose [phone]

## 2022-10-09 NOTE — PATIENT PROFILE ADULT - FALL HARM RISK - RISK INTERVENTIONS

## 2022-10-09 NOTE — PROGRESS NOTE ADULT - PROBLEM SELECTOR PLAN 2
- HIstory of severe PAD c/b lower extremity ulcer in setting of diabetes  - S/P multiple stents. Last stent placed June 2022  - Hold DAPT give concern for GI hemorrhage. Can consider restarting once hemodynamically stable giving risk of stent thrombosis.   - Consider Vascular consult (Dr. Garcia Primary) regarding risk vs benefit of DAPT in setting of GI bleed  - Continue Statin - HIstory of severe PAD c/b lower extremity ulcer in setting of diabetes  - S/P multiple stents. Last stent placed June 2022  - Hold DAPT give concern for GI hemorrhage. Can consider restarting once hemodynamically stable giving risk of stent thrombosis.   - Vascular cards consulted re: continuation of DAPT given recent vascular intervention  - Continue Statin

## 2022-10-09 NOTE — PROGRESS NOTE ADULT - ASSESSMENT
rectal bleed  likely diverticular  chronic anemai  no bleeding this am  will prep for colon otomrrow

## 2022-10-09 NOTE — H&P ADULT - NSHPREVIEWOFSYSTEMS_GEN_ALL_CORE
REVIEW OF SYSTEMS:    CONSTITUTIONAL: No weakness, fevers or chills  EYES: PEERLA  ENT: No visual changes;  No vertigo or throat pain   NECK: No pain or stiffness  RESPIRATORY: No cough, wheezing, hemoptysis; +shortness of breath  CARDIOVASCULAR: No chest pain. + palpitations  GASTROINTESTINAL: +abdominal. +nausea, vomiting. No hematemesis; No diarrhea. + Constipation. + hematochezia.  GENITOURINARY: No dysuria, frequency or hematuria  NEUROLOGICAL: No numbness or weakness  SKIN: No itching, rashes  Psych: No anxiety. No depression

## 2022-10-09 NOTE — PROGRESS NOTE ADULT - PROBLEM SELECTOR PLAN 3
- History of Treatment resistant Hypertension  - BP currently  SBP < 180  - Would hold BP meds for now given concern for GI hemorrhage - History of Treatment resistant Hypertension  - BP currently  SBP < 180  - Holding HCTZ, ramipril  - c/w amlodipine 10 qd, coreg 6.25 BID

## 2022-10-09 NOTE — PROGRESS NOTE ADULT - SUBJECTIVE AND OBJECTIVE BOX
Patient is a 72y Female     Patient is a 72y old  Female who presents with a chief complaint of GI Bleed (09 Oct 2022 07:04)      HPI:  Patient is a 72 year old female with PMHx of HTN, HOLD, DM, CABG, sickle cell trait at baseline, PAD s/p lower extremity stents in 2022 on DAPT (ASA and Plavix), presenting with BRBPR. States she has had 4-5 episodes of rectal bleeding in the past day. She initially had one BM with bloody streaks and then contacted her PMD who advised her to proceed to ED. However, while pending transportation she had several order BMs which were terrence red blood. Symptoms are associated with mild headache, fatigue, mild palpitations when laying on her left side, and SOB. Of note, for about 2-3 months, she had been having LLQ abdominal pain which has been keeping her awake at nights intermittently with constipation. She had consulted with her GI doctor and plans were made to scope. However, Cards clearance was required for persistent HTN. She was recently started on oral iron replacement therapy.  She denies any CP, diarrhea, weight loss, dizziness, hematemesis, low back pain, vaginal bleeding.     In the ED patient hemodynamically stable. POCT BG 60 (s/p dextrose). CT A/P in ED demonstrating GI bleed in descending colon. In ED, patient's initial Hgb was 9.4, dropped to 7.3 on repeat labs approximately 4 hours later. Receiving 1 unit pRBC. CRS was consulted and indicated no acute intervention necessary. IR also consulted and no acute intervention recommended. Currently pending GI eval.  (09 Oct 2022 04:35)      PAST MEDICAL & SURGICAL HISTORY:  DM (diabetes mellitus)      Ulcer of toe of right foot  3rd      LEILANI (obstructive sleep apnea)  does not use CPAP since gastric bypass surgery      Diabetes      HTN (hypertension)      High cholesterol      PAD (peripheral artery disease)      S/P gastric bypass      H/O bilateral breast reduction surgery      H/O abdominoplasty      S/P  section      History of intravascular stent placement      H/O  section      H/O gastric bypass      Peripheral arterial disease  s/p Right leg stent x 1 , left leg stent x 3      S/P CABG x 3            MEDICATIONS  (STANDING):  atorvastatin 40 milliGRAM(s) Oral at bedtime  dextrose 10% + sodium chloride 0.9%. 1000 milliLiter(s) (100 mL/Hr) IV Continuous <Continuous>  dextrose 50% Injectable 25 Gram(s) IV Push once  dextrose 50% Injectable 12.5 Gram(s) IV Push once  dextrose 50% Injectable 25 Gram(s) IV Push once  dextrose Oral Gel 15 Gram(s) Oral once  gabapentin 100 milliGRAM(s) Oral two times a day  glucagon  Injectable 1 milliGRAM(s) IntraMuscular once  insulin lispro (ADMELOG) corrective regimen sliding scale   SubCutaneous three times a day before meals  insulin lispro (ADMELOG) corrective regimen sliding scale   SubCutaneous at bedtime  pantoprazole    Tablet 40 milliGRAM(s) Oral before breakfast      Allergies    Cipro (Headache)    Intolerances        SOCIAL HISTORY:  Denies ETOh,Smoking,     FAMILY HISTORY:  No pertinent family history in first degree relatives        REVIEW OF SYSTEMS:    CONSTITUTIONAL: No weakness, fevers or chills  EYES/ENT: No visual changes;  No vertigo or throat pain   NECK: No pain or stiffness  RESPIRATORY: No cough, wheezing, hemoptysis; No shortness of breath  CARDIOVASCULAR: No chest pain or palpitations  GASTROINTESTINAL: No abdominal or epigastric pain. No nausea, vomiting, or hematemesis; No diarrhea or constipation. No melena or hematochezia.  GENITOURINARY: No dysuria, frequency or hematuria  NEUROLOGICAL: No numbness or weakness  SKIN: No itching, burning, rashes, or lesions   All other review of systems is negative unless indicated above.    VITAL:  T(C): , Max: 36.9 (10-08-22 @ 21:00)  T(F): , Max: 98.4 (10-08-22 @ 21:00)  HR: 58 (10-09-22 @ 08:05)  BP: 139/116 (10-09-22 @ 08:05)  BP(mean): --  RR: 17 (10-09-22 @ 08:05)  SpO2: 100% (10-09-22 @ 08:05)  Wt(kg): --    I and O's:    Height (cm): 165.1 (10-08 @ 15:07)    PHYSICAL EXAM:    Constitutional: NAD  HEENT: PERRLA,   Neck: No JVD  Respiratory: CTA B/L  Cardiovascular: S1 and S2  Gastrointestinal: BS+, soft, NT/ND  Extremities: No peripheral edema  Neurological: A/O x 3, no focal deficits  Psychiatric: Normal mood, normal affect  : No Watson  Skin: No rashes  Access: Not applicable  Back: No CVA tenderness    LABS:                        7.3    6.29  )-----------( 262      ( 08 Oct 2022 22:03 )             24.1     10-08    143  |  106  |  20  ----------------------------<  120<H>  3.9   |  27  |  1.06    Ca    9.0      08 Oct 2022 18:18    TPro  6.8  /  Alb  4.0  /  TBili  0.5  /  DBili  x   /  AST  16  /  ALT  7<L>  /  AlkPhos  138<H>  10-08          RADIOLOGY & ADDITIONAL STUDIES:

## 2022-10-09 NOTE — PROGRESS NOTE ADULT - PROBLEM SELECTOR PLAN 4
- Last A 1c unknown but per daughter diabetes is well controlled. Patient previously on metformin but discontinued to incidence of hypoglycemia  - C/b diabetic neuropathy  - Last lipid panel with LDL < 190  - Low dose sliding scale for now  - Monitor finger sticks  - F/U A1c. - Last A 1c unknown but per daughter diabetes is well controlled. Patient previously on metformin but discontinued to incidence of hypoglycemia  - C/b diabetic neuropathy  - Last lipid panel with LDL < 190  - Low dose sliding scale for now  - Monitor finger sticks  - F/U A1c

## 2022-10-09 NOTE — CONSULT NOTE ADULT - ASSESSMENT
Patient is a 72 year old female with PMHx of HTN, HLD, DM, CABG, sickle cell trait at baseline, PAD s/p lower extremity stents in June 2022 on DAPT (ASA and Plavix), presenting with BRBPR. Vascular cardiology was consulted because patient is on DAPT and she has rectal bleeding.     #PAD s/p stents  -Continue ASA 81mg daily  -Stop plavix 75mg for now  -Can hold her anti-hypertensive medication for now as the has had a bleed. Can resume when hemodynamic stability is certain  -Vascular cardiology will continue to follow    Rex Torres MD  Cardiology fellow

## 2022-10-09 NOTE — CONSULT NOTE ADULT - SUBJECTIVE AND OBJECTIVE BOX
General Surgery Consult  Consulting surgical team: Colorectal/Red Surgery  Consulting attending: Dr. Valle    HPI:  Patient is a 72 year old female with PMHx of HTN, HOLD, DM, CABG, sickle cell trait at baseline, PAD s/p lower extremity stents in 2022 on DAPT (ASA and Plavix), presenting with BRBPR. States she has had 4-5 episodes of rectal bleeding in the past day. Has associated symptoms of headaches, mild SOB, and abdominal pain.    CT A/P in ED demonstrating GI bleed in descending colon. In ED, patient's initial Hgb was 9.4, dropped to 7.3 on repeat labs approximately 4 hours later. Receiving 1 unit pRBC.    Colorectal surgery consulted for evaluation and recommendations for this lower GI bleed.      PAST MEDICAL HISTORY:  HTN (hypertension)    DM (diabetes mellitus)    Ulcer of toe of right foot    LEILANI (obstructive sleep apnea)    Diabetes    HTN (hypertension)    High cholesterol    PAD (peripheral artery disease)        PAST SURGICAL HISTORY:  S/P gastric bypass    H/O bilateral breast reduction surgery    H/O abdominoplasty    S/P  section    History of intravascular stent placement    H/O  section    H/O gastric bypass    Peripheral arterial disease    S/P CABG x 3        MEDICATIONS:  acetaminophen   IVPB .. 1000 milliGRAM(s) IV Intermittent once  dextrose 10% + sodium chloride 0.9%. 1000 milliLiter(s) IV Continuous <Continuous>  ondansetron Injectable 4 milliGRAM(s) IV Push once      ALLERGIES:  Cipro (Headache)      VITALS & I/Os:  Vital Signs Last 24 Hrs  T(C): 36.9 (08 Oct 2022 21:00), Max: 36.9 (08 Oct 2022 21:00)  T(F): 98.4 (08 Oct 2022 21:00), Max: 98.4 (08 Oct 2022 21:00)  HR: 79 (08 Oct 2022 21:00) (72 - 79)  BP: 145/90 (08 Oct 2022 21:00) (145/90 - 193/74)  BP(mean): --  RR: 20 (08 Oct 2022 21:00) (20 - 20)  SpO2: 100% (08 Oct 2022 21:00) (99% - 100%)    Parameters below as of 08 Oct 2022 21:00  Patient On (Oxygen Delivery Method): room air        I&O's Summary      PHYSICAL EXAM:  Gen: NAD  CV: Regular rate  Resp: Nonlabored breathing on room air  Abd: Soft, nondistended, diffuse mild tenderness to palpation  Rectal: Bright blood noted per rectum  Ext: Warm and perfused        LABS:                        7.3    6.29  )-----------( 262      ( 08 Oct 2022 22:03 )             24.1     10-08    143  |  106  |  20  ----------------------------<  120<H>  3.9   |  27  |  1.06    Ca    9.0      08 Oct 2022 18:18    TPro  6.8  /  Alb  4.0  /  TBili  0.5  /  DBili  x   /  AST  16  /  ALT  7<L>  /  AlkPhos  138<H>  10-08    Lactate:    PT/INR - ( 08 Oct 2022 18:18 )   PT: 13.5 sec;   INR: 1.17 ratio         PTT - ( 08 Oct 2022 18:18 )  PTT:32.2 sec              IMAGING:  < from: CT Abdomen and Pelvis w/ IV Cont (10.08.22 @ 19:15) >    ACC: [id number] EXAM:  CT ABDOMEN AND PELVIS IC                          PROCEDURE DATE:  10/08/2022          INTERPRETATION:  CLINICAL INFORMATION: Lower GI bleed.    COMPARISON: None.    CONTRAST/COMPLICATIONS:  IV Contrast: Omnipaque 350  90 cc administered   0 cc discarded  Oral Contrast: NONE  Complications: None reported at time of study completion    PROCEDURE:  CT of the Abdomen and Pelvis was performed.  Precontrast, Arterial and Delayed phases were performed.  Sagittal and coronal reformats were performed.    FINDINGS:  LOWER CHEST: Calcified granulomas are present within the left breast.   There are coronary calcifications.    LIVER: Within normal limits.  BILE DUCTS: Normal caliber.  GALLBLADDER: Cholecystectomy.  SPLEEN: Within normal limits.  PANCREAS: Within normal limits.  ADRENALS: Within normal limits.  KIDNEYS/URETERS: Bilateral renal cysts are present. There are varying   degrees of cortical thinning. No hydronephrosis.    BLADDER: Within normal limits.  REPRODUCTIVE ORGANS: Calcified uterine leiomyomas.    BOWEL: Keely-en-Y reconstruction. There is no bowel obstruction. Colonic   diverticulosis without diverticulitis. There is an area of hemorrhage in   the descending colon (3:69 and 4:69).  PERITONEUM: No ascites.  VESSELS: Atherosclerotic changes.  RETROPERITONEUM/LYMPH NODES: No lymphadenopathy.  ABDOMINAL WALL: Calcifications are present within bilateral gluteal   tissues.  BONES: Degenerative changes.    IMPRESSION:  There is an area of gastrointestinal bleed in the descending colon.    findings were discussed by Dr. Alejandro with  Dr. ORI Cid, of the ED   on 10/8/2022 7:57 PM with read back confirmation.    --- End of Report ---           RAYMUNDO ALEJANDRO MD; Resident Radiologist  This document has been electronically signed.  ROSSY AMANDA MD; Attending Radiologist  This document has been electronically signed. Oct  8 2022  8:09PM    < end of copied text >  
Patient seen and evaluated at bedside    Chief Complaint:    HPI:  Patient is a 72 year old female with PMHx of HTN, HOLD, DM, CABG, sickle cell trait at baseline, PAD s/p lower extremity stents in 2022 on DAPT (ASA and Plavix), presenting with BRBPR. States she has had 4-5 episodes of rectal bleeding in the past day. She initially had one BM with bloody streaks and then contacted her PMD who advised her to proceed to ED. However, while pending transportation she had several order BMs which were terrence red blood. Symptoms are associated with mild headache, fatigue, mild palpitations when laying on her left side, and SOB. Of note, for about 2-3 months, she had been having LLQ abdominal pain which has been keeping her awake at nights intermittently with constipation. She had consulted with her GI doctor and plans were made to scope. However, Cards clearance was required for persistent HTN. She was recently started on oral iron replacement therapy.  She denies any CP, diarrhea, weight loss, dizziness, hematemesis, low back pain, vaginal bleeding.     In the ED patient hemodynamically stable. POCT BG 60 (s/p dextrose). CT A/P in ED demonstrating GI bleed in descending colon. In ED, patient's initial Hgb was 9.4, dropped to 7.3 on repeat labs approximately 4 hours later. Receiving 1 unit pRBC. CRS was consulted and indicated no acute intervention necessary. IR also consulted and no acute intervention recommended. Currently pending GI eval.  (09 Oct 2022 04:35)      PMHx:   HTN (hypertension)    DM (diabetes mellitus)    Ulcer of toe of right foot    LEILANI (obstructive sleep apnea)    Diabetes    HTN (hypertension)    High cholesterol    PAD (peripheral artery disease)        PSHx:   S/P gastric bypass    H/O bilateral breast reduction surgery    H/O abdominoplasty    S/P  section    History of intravascular stent placement    H/O  section    H/O gastric bypass    Peripheral arterial disease    S/P CABG x 3        Allergies:  Cipro (Headache)      Home Meds:    Current Medications:   amLODIPine   Tablet 10 milliGRAM(s) Oral daily  atorvastatin 40 milliGRAM(s) Oral at bedtime  carvedilol 6.25 milliGRAM(s) Oral every 12 hours  dextrose 50% Injectable 25 Gram(s) IV Push once  dextrose 50% Injectable 12.5 Gram(s) IV Push once  dextrose 50% Injectable 25 Gram(s) IV Push once  dextrose Oral Gel 15 Gram(s) Oral once  gabapentin 100 milliGRAM(s) Oral two times a day  glucagon  Injectable 1 milliGRAM(s) IntraMuscular once  insulin lispro (ADMELOG) corrective regimen sliding scale   SubCutaneous three times a day before meals  insulin lispro (ADMELOG) corrective regimen sliding scale   SubCutaneous at bedtime  pantoprazole    Tablet 40 milliGRAM(s) Oral before breakfast  polyethylene glycol/electrolyte Solution 1000 milliLiter(s) Oral once      FAMILY HISTORY:  No pertinent family history in first degree relatives        Social History:  Smoking History:  Alcohol Use:  Drug Use:    REVIEW OF SYSTEMS:  Constitutional:     [ ] negative [ ] fevers [ ] chills [ ] weight loss [ ] weight gain  HEENT:                  [ ] negative [ ] dry eyes [ ] eye irritation [ ] postnasal drip [ ] nasal congestion  CV:                         [ ] negative  [ ] chest pain [ ] orthopnea [ ] palpitations [ ] murmur  Resp:                     [ ] negative [ ] cough [ ] shortness of breath [ ] dyspnea [ ] wheezing [ ] sputum [ ]hemoptysis  GI:                          [ ] negative [ ] nausea [ ] vomiting [ ] diarrhea [ ] constipation [ ] abd pain [ ] dysphagia   :                        [ ] negative [ ] dysuria [ ] nocturia [ ] hematuria [ ] increased urinary frequency  Musculoskeletal: [ ] negative [ ] back pain [ ] myalgias [ ] arthralgias [ ] fracture  Skin:                       [ ] negative [ ] rash [ ] itch  Neurological:        [ ] negative [ ] headache [ ] dizziness [ ] syncope [ ] weakness [ ] numbness  Psychiatric:           [ ] negative [ ] anxiety [ ] depression  Endocrine:            [ ] negative [ ] diabetes [ ] thyroid problem  Heme/Lymph:      [ ] negative [ ] anemia [ ] bleeding problem  Allergic/Immune: [ ] negative [ ] itchy eyes [ ] nasal discharge [ ] hives [ ] angioedema    [ ] All other systems negative  [ ] Unable to assess ROS due to      Physical Exam:  T(F): 98.2 (10-09), Max: 98.4 (10-08)  HR: 80 (10-09) (58 - 80)  BP: 194/80 (10-09) (139/116 - 194/80)  RR: 18 (10-09)  SpO2: 99% (10-09)  GENERAL: No acute distress, well-developed  HEAD:  Atraumatic, Normocephalic  ENT: EOMI, PERRLA, conjunctiva and sclera clear, Neck supple, No JVD, moist mucosa  CHEST/LUNG: Clear to auscultation bilaterally; No wheeze, equal breath sounds bilaterally   BACK: No spinal tenderness  HEART: Regular rate and rhythm; No murmurs, rubs, or gallops  ABDOMEN: Soft, Nontender, Nondistended; Bowel sounds present  EXTREMITIES:  No clubbing, cyanosis, or edema  PSYCH: Nl behavior, nl affect  NEUROLOGY: AAOx3, non-focal, cranial nerves intact  SKIN: Normal color, No rashes or lesions  LINES:    Cardiovascular Diagnostic Testing:    ECG: Personally reviewed:    Echo: Personally reviewed:    Stress Testing:    Cath:    Imaging:    CXR: Personally reviewed    Labs: Personally reviewed                        10.8   6.87  )-----------( 209      ( 09 Oct 2022 10:38 )             32.9     10-08    143  |  106  |  20  ----------------------------<  120<H>  3.9   |  27  |  1.06    Ca    9.0      08 Oct 2022 18:18    TPro  6.8  /  Alb  4.0  /  TBili  0.5  /  DBili  x   /  AST  16  /  ALT  7<L>  /  AlkPhos  138<H>  10-08    PT/INR - ( 08 Oct 2022 18:18 )   PT: 13.5 sec;   INR: 1.17 ratio         PTT - ( 08 Oct 2022 18:18 )  PTT:32.2 sec                Cipro (Headache)    amLODIPine   Tablet 10 milliGRAM(s) Oral daily  atorvastatin 40 milliGRAM(s) Oral at bedtime  carvedilol 6.25 milliGRAM(s) Oral every 12 hours  dextrose 50% Injectable 25 Gram(s) IV Push once  dextrose 50% Injectable 12.5 Gram(s) IV Push once  dextrose 50% Injectable 25 Gram(s) IV Push once  dextrose Oral Gel 15 Gram(s) Oral once  gabapentin 100 milliGRAM(s) Oral two times a day  glucagon  Injectable 1 milliGRAM(s) IntraMuscular once  insulin lispro (ADMELOG) corrective regimen sliding scale   SubCutaneous three times a day before meals  insulin lispro (ADMELOG) corrective regimen sliding scale   SubCutaneous at bedtime  pantoprazole    Tablet 40 milliGRAM(s) Oral before breakfast  polyethylene glycol/electrolyte Solution 1000 milliLiter(s) Oral once    T(F): 98.2 (10-09), Max: 98.4 (10-08)  HR: 80 (10-09) (58 - 80)  BP: 194/80 (10-09) (139/116 - 194/80)  RR: 18 (10-09)  SpO2: 99% (10-09)
Patient seen and evaluated at bedside    Chief Complaint:    HPI:  Patient is a 72 year old female with PMHx of HTN, HLD, DM, CABG, sickle cell trait at baseline, PAD s/p lower extremity stents in 2022 on DAPT (ASA and Plavix), presenting with BRBPR. States she has had 4-5 episodes of rectal bleeding in the past day. She initially had one BM with bloody streaks and then contacted her PMD who advised her to proceed to ED. However, while pending transportation she had several order BMs which were terrence red blood. Symptoms are associated with mild headache, fatigue, mild palpitations when laying on her left side, and SOB. Of note, for about 2-3 months, she had been having LLQ abdominal pain which has been keeping her awake at nights intermittently with constipation. She had consulted with her GI doctor and plans were made to scope. However, Cards clearance was required for persistent HTN. She was recently started on oral iron replacement therapy.  She denies any CP, diarrhea, weight loss, dizziness, hematemesis, low back pain, vaginal bleeding.     In the ED patient hemodynamically stable. POCT BG 60 (s/p dextrose). CT A/P in ED demonstrating GI bleed in descending colon. In ED, patient's initial Hgb was 9.4, dropped to 7.3 on repeat labs approximately 4 hours later. Receiving 1 unit pRBC. CRS was consulted and indicated no acute intervention necessary. IR also consulted and no acute intervention recommended. Currently pending GI eval.  (09 Oct 2022 04:35)      PMHx:   HTN (hypertension)    DM (diabetes mellitus)    Ulcer of toe of right foot    LEILANI (obstructive sleep apnea)    Diabetes    HTN (hypertension)    High cholesterol    PAD (peripheral artery disease)        PSHx:   S/P gastric bypass    H/O bilateral breast reduction surgery    H/O abdominoplasty    S/P  section    History of intravascular stent placement    H/O  section    H/O gastric bypass    Peripheral arterial disease    S/P CABG x 3        Allergies:  Cipro (Headache)      Home Meds:    atorvastatin 40 mg oral tablet: Last Dose Taken:  , 1 tab(s) orally once a day (at bedtime)  · 	hydroCHLOROthiazide 25 mg oral tablet: Last Dose Taken:  , 1 tab(s) orally once a day  · 	ramipril 10 mg oral capsule: Last Dose Taken:  , 1 cap(s) orally once a day  · 	clopidogrel 75 mg oral tablet: Last Dose Taken:  , 1 tab(s) orally once a day  · 	diclofenac sodium 100 mg oral tablet, extended release: Last Dose Taken:  , 1 tab(s) orally once a day  · 	amLODIPine 10 mg oral tablet: Last Dose Taken:  , 1 tab(s) orally once a day  · 	omeprazole 20 mg oral delayed release capsule: Last Dose Taken:  , 1 cap(s) orally once a day  · 	gabapentin 100 mg oral capsule: Last Dose Taken:  , 1 cap(s) orally 2 times a day  · 	diclofenac 1% topical gel: Last Dose Taken:  , Apply topically to affected area 3 times a day  · 	Coreg 6.25 mg oral tablet: Last Dose Taken:  , 1 tab(s) orally once a day  · 	Vitamin B12 1000 mcg/mL injectable solution: Last Dose Taken:  , 1 dose(s) injectable once a month      Current Medications:   amLODIPine   Tablet 10 milliGRAM(s) Oral daily  atorvastatin 40 milliGRAM(s) Oral at bedtime  carvedilol 6.25 milliGRAM(s) Oral every 12 hours  dextrose 50% Injectable 25 Gram(s) IV Push once  dextrose 50% Injectable 12.5 Gram(s) IV Push once  dextrose 50% Injectable 25 Gram(s) IV Push once  dextrose Oral Gel 15 Gram(s) Oral once  gabapentin 100 milliGRAM(s) Oral two times a day  glucagon  Injectable 1 milliGRAM(s) IntraMuscular once  insulin lispro (ADMELOG) corrective regimen sliding scale   SubCutaneous three times a day before meals  insulin lispro (ADMELOG) corrective regimen sliding scale   SubCutaneous at bedtime  pantoprazole    Tablet 40 milliGRAM(s) Oral before breakfast  polyethylene glycol/electrolyte Solution 1000 milliLiter(s) Oral once      FAMILY HISTORY:  No pertinent family history in first degree relatives        REVIEW OF SYSTEMS:  Constitutional:     [ ] negative [ ] fevers [ ] chills [ ] weight loss [ ] weight gain  HEENT:                  [ ] negative [ ] dry eyes [ ] eye irritation [ ] postnasal drip [ ] nasal congestion  CV:                         [ ] negative  [ ] chest pain [ ] orthopnea [ ] palpitations [ ] murmur  Resp:                     [ ] negative [ ] cough [ ] shortness of breath [ ] dyspnea [ ] wheezing [ ] sputum [ ]hemoptysis  GI:                          [ ] negative [ ] nausea [ ] vomiting [ ] diarrhea [ ] constipation [ ] abd pain [ ] dysphagia   :                        [ ] negative [ ] dysuria [ ] nocturia [ ] hematuria [ ] increased urinary frequency  Musculoskeletal: [ ] negative [ ] back pain [ ] myalgias [ ] arthralgias [ ] fracture  Skin:                       [ ] negative [ ] rash [ ] itch  Neurological:        [ ] negative [ ] headache [ ] dizziness [ ] syncope [ ] weakness [ ] numbness  Psychiatric:           [ ] negative [ ] anxiety [ ] depression  Endocrine:            [ ] negative [ ] diabetes [ ] thyroid problem  Heme/Lymph:      [ ] negative [ ] anemia [ ] bleeding problem  Allergic/Immune: [ ] negative [ ] itchy eyes [ ] nasal discharge [ ] hives [ ] angioedema    [ ] All other systems negative  [ ] Unable to assess ROS due to      Physical Exam:  T(F): 98.2 (10-09), Max: 98.4 (10-08)  HR: 80 (10-09) (58 - 80)  BP: 194/80 (10-09) (139/116 - 194/80)  RR: 18 (10-09)  SpO2: 99% (10-09)  GENERAL: No acute distress, well-developed  HEAD:  Atraumatic, Normocephalic  ENT: EOMI, PERRLA, conjunctiva and sclera clear, Neck supple, No JVD, moist mucosa  CHEST/LUNG: Clear to auscultation bilaterally; No wheeze, equal breath sounds bilaterally   BACK: No spinal tenderness  HEART: Regular rate and rhythm; No murmurs, rubs, or gallops  ABDOMEN: Soft, Nontender, Nondistended; Bowel sounds present  EXTREMITIES:  No clubbing, cyanosis, or edema  PSYCH: Nl behavior, nl affect  NEUROLOGY: AAOx3, non-focal, cranial nerves intact  SKIN: Normal color, No rashes or lesions  LINES:    Cardiovascular Diagnostic Testing:    ECG: Personally reviewed: Normal sinus with rate of 65. She has evidence of LVH    Echo: Personally reviewed:    Conclusions:  Mild concentric left ventricular hypertrophy.  Normal left ventricular systolic function. No segmental  wall motion abnormalities.  ------------------------------------------------------------------------  Confirmed on  2021 - 14:51:25 by Donald Luz MD,  [facility]      Imaging:    CXR: Personally reviewed    Labs: Personally reviewed                        10.8   6.87  )-----------( 209      ( 09 Oct 2022 10:38 )             32.9     10    143  |  106  |  20  ----------------------------<  120<H>  3.9   |  27  |  1.06    Ca    9.0      08 Oct 2022 18:18    TPro  6.8  /  Alb  4.0  /  TBili  0.5  /  DBili  x   /  AST  16  /  ALT  7<L>  /  AlkPhos  138<H>  10-08    PT/INR - ( 08 Oct 2022 18:18 )   PT: 13.5 sec;   INR: 1.17 ratio         PTT - ( 08 Oct 2022 18:18 )  PTT:32.2 sec

## 2022-10-09 NOTE — PROGRESS NOTE ADULT - SUBJECTIVE AND OBJECTIVE BOX
Johanna Huntley MD  PGY 1 Department of Internal Medicine        Patient is a 72y old  Female who presents with a chief complaint of GI Bleed (09 Oct 2022 04:35)      SUBJECTIVE / OVERNIGHT EVENTS: Pt seen and examined. No acute overnight events. Denies fevers, chills, CP, SOB, Abdominal pain, N/V, Constipation, Diarrhea        MEDICATIONS  (STANDING):  atorvastatin 40 milliGRAM(s) Oral at bedtime  dextrose 10% + sodium chloride 0.9%. 1000 milliLiter(s) (100 mL/Hr) IV Continuous <Continuous>  dextrose 50% Injectable 25 Gram(s) IV Push once  dextrose 50% Injectable 12.5 Gram(s) IV Push once  dextrose 50% Injectable 25 Gram(s) IV Push once  dextrose Oral Gel 15 Gram(s) Oral once  gabapentin 100 milliGRAM(s) Oral two times a day  glucagon  Injectable 1 milliGRAM(s) IntraMuscular once  insulin lispro (ADMELOG) corrective regimen sliding scale   SubCutaneous three times a day before meals  insulin lispro (ADMELOG) corrective regimen sliding scale   SubCutaneous at bedtime  pantoprazole    Tablet 40 milliGRAM(s) Oral before breakfast    MEDICATIONS  (PRN):      I&O's Summary      Vital Signs Last 24 Hrs  T(C): 36 (09 Oct 2022 05:27), Max: 36.9 (08 Oct 2022 21:00)  T(F): 96.8 (09 Oct 2022 05:27), Max: 98.4 (08 Oct 2022 21:00)  HR: 67 (09 Oct 2022 06:12) (65 - 79)  BP: 157/72 (09 Oct 2022 06:12) (145/90 - 193/74)  BP(mean): --  RR: 16 (09 Oct 2022 06:12) (13 - 20)  SpO2: 100% (09 Oct 2022 06:12) (99% - 100%)    Parameters below as of 09 Oct 2022 05:27  Patient On (Oxygen Delivery Method): room air        CAPILLARY BLOOD GLUCOSE      POCT Blood Glucose.: 78 mg/dL (09 Oct 2022 06:39)  POCT Blood Glucose.: 140 mg/dL (08 Oct 2022 20:24)  POCT Blood Glucose.: 60 mg/dL (08 Oct 2022 19:35)      PHYSICAL EXAM:  GENERAL: NAD,   HEAD:  Atraumatic, Normocephalic  EYES: EOMI, PERRL, conjunctiva and sclera clear  NECK: No JVD  CHEST/LUNG: Clear to auscultation bilaterally; No wheeze  HEART: Regular rate and rhythm; No murmurs, rubs, or gallops  ABDOMEN: Soft, Nontender, Nondistended; Bowel sounds present  EXTREMITIES:  2+ Peripheral Pulses, No clubbing, cyanosis, or edema  PSYCH: AAOx3  NEUROLOGY: non-focal  SKIN: No rashes or lesions       LABS:                        7.3    6.29  )-----------( 262      ( 08 Oct 2022 22:03 )             24.1     Auto Eosinophil # x     / Auto Eosinophil % x     / Auto Neutrophil # x     / Auto Neutrophil % x     / BANDS % x                            9.4    7.63  )-----------( 317      ( 08 Oct 2022 18:18 )             30.6     Auto Eosinophil # 0.34  / Auto Eosinophil % 4.4   / Auto Neutrophil # 4.95  / Auto Neutrophil % 64.9  / BANDS % x        10-08    143  |  106  |  20  ----------------------------<  120<H>  3.9   |  27  |  1.06    Ca    9.0      08 Oct 2022 18:18  TPro  6.8  /  Alb  4.0  /  TBili  0.5  /  DBili  x   /  AST  16  /  ALT  7<L>  /  AlkPhos  138<H>  10-08    PT/INR - ( 08 Oct 2022 18:18 )   PT: 13.5 sec;   INR: 1.17 ratio         PTT - ( 08 Oct 2022 18:18 )  PTT:32.2 sec              RADIOLOGY & ADDITIONAL TESTS:    Imaging Personally Reviewed:    Consultant(s) Notes Reviewed:      Care Discussed with Consultants/Other Providers:   Johanna Huntley MD  PGY 1 Department of Internal Medicine        Patient is a 72y old  Female who presents with a chief complaint of GI Bleed (09 Oct 2022 04:35)      SUBJECTIVE / OVERNIGHT EVENTS: Pt seen and examined. Had 1x loose bloody BM. Denies fevers, chills, CP, SOB, Abdominal pain, N/V, Constipation, Diarrhea        MEDICATIONS  (STANDING):  atorvastatin 40 milliGRAM(s) Oral at bedtime  dextrose 10% + sodium chloride 0.9%. 1000 milliLiter(s) (100 mL/Hr) IV Continuous <Continuous>  dextrose 50% Injectable 25 Gram(s) IV Push once  dextrose 50% Injectable 12.5 Gram(s) IV Push once  dextrose 50% Injectable 25 Gram(s) IV Push once  dextrose Oral Gel 15 Gram(s) Oral once  gabapentin 100 milliGRAM(s) Oral two times a day  glucagon  Injectable 1 milliGRAM(s) IntraMuscular once  insulin lispro (ADMELOG) corrective regimen sliding scale   SubCutaneous three times a day before meals  insulin lispro (ADMELOG) corrective regimen sliding scale   SubCutaneous at bedtime  pantoprazole    Tablet 40 milliGRAM(s) Oral before breakfast    MEDICATIONS  (PRN):      I&O's Summary      Vital Signs Last 24 Hrs  T(C): 36 (09 Oct 2022 05:27), Max: 36.9 (08 Oct 2022 21:00)  T(F): 96.8 (09 Oct 2022 05:27), Max: 98.4 (08 Oct 2022 21:00)  HR: 67 (09 Oct 2022 06:12) (65 - 79)  BP: 157/72 (09 Oct 2022 06:12) (145/90 - 193/74)  BP(mean): --  RR: 16 (09 Oct 2022 06:12) (13 - 20)  SpO2: 100% (09 Oct 2022 06:12) (99% - 100%)    Parameters below as of 09 Oct 2022 05:27  Patient On (Oxygen Delivery Method): room air        CAPILLARY BLOOD GLUCOSE      POCT Blood Glucose.: 78 mg/dL (09 Oct 2022 06:39)  POCT Blood Glucose.: 140 mg/dL (08 Oct 2022 20:24)  POCT Blood Glucose.: 60 mg/dL (08 Oct 2022 19:35)      PHYSICAL EXAM:  GENERAL: NAD,   HEAD:  Atraumatic, Normocephalic  EYES: EOMI, PERRL, conjunctiva and sclera clear  NECK: No JVD  CHEST/LUNG: Clear to auscultation bilaterally; No wheeze  HEART: Regular rate and rhythm; No murmurs, rubs, or gallops  ABDOMEN: (+) Soft, LLQ tenderness, Nondistended; Bowel sounds  EXTREMITIES:  2+ Peripheral Pulses, No clubbing, cyanosis, or edema  PSYCH: AAOx3  NEUROLOGY: non-focal  SKIN: No rashes or lesions       LABS:                        7.3    6.29  )-----------( 262      ( 08 Oct 2022 22:03 )             24.1     Auto Eosinophil # x     / Auto Eosinophil % x     / Auto Neutrophil # x     / Auto Neutrophil % x     / BANDS % x                            9.4    7.63  )-----------( 317      ( 08 Oct 2022 18:18 )             30.6     Auto Eosinophil # 0.34  / Auto Eosinophil % 4.4   / Auto Neutrophil # 4.95  / Auto Neutrophil % 64.9  / BANDS % x        10-08    143  |  106  |  20  ----------------------------<  120<H>  3.9   |  27  |  1.06    Ca    9.0      08 Oct 2022 18:18  TPro  6.8  /  Alb  4.0  /  TBili  0.5  /  DBili  x   /  AST  16  /  ALT  7<L>  /  AlkPhos  138<H>  10-08    PT/INR - ( 08 Oct 2022 18:18 )   PT: 13.5 sec;   INR: 1.17 ratio         PTT - ( 08 Oct 2022 18:18 )  PTT:32.2 sec

## 2022-10-09 NOTE — H&P ADULT - PROBLEM SELECTOR PLAN 2
- HIstory of severe PAD c/b lower extremity ulcer in setting of diabetes  - S/P multiple stents. Last stent placed June 2022  - Hold DAPT give concern for GI hemorrhage. Can consider restarting once hemodynamically stable giving risk of stent thrombosis.   - Recommend Vascular consult for guidance regarding risk vs benefit of DAPT in setting of GI bleed  - Continue Statin - HIstory of severe PAD c/b lower extremity ulcer in setting of diabetes  - S/P multiple stents. Last stent placed June 2022  - Hold DAPT give concern for GI hemorrhage. Can consider restarting once hemodynamically stable giving risk of stent thrombosis.   - Consider Vascular consult (Dr. Garcia Primary) regarding risk vs benefit of DAPT in setting of GI bleed  - Continue Statin

## 2022-10-10 ENCOUNTER — RESULT REVIEW (OUTPATIENT)
Age: 72
End: 2022-10-10

## 2022-10-10 ENCOUNTER — TRANSCRIPTION ENCOUNTER (OUTPATIENT)
Age: 72
End: 2022-10-10

## 2022-10-10 LAB
ANION GAP SERPL CALC-SCNC: 12 MMOL/L — SIGNIFICANT CHANGE UP (ref 5–17)
BUN SERPL-MCNC: 10 MG/DL — SIGNIFICANT CHANGE UP (ref 7–23)
CALCIUM SERPL-MCNC: 9 MG/DL — SIGNIFICANT CHANGE UP (ref 8.4–10.5)
CHLORIDE SERPL-SCNC: 107 MMOL/L — SIGNIFICANT CHANGE UP (ref 96–108)
CO2 SERPL-SCNC: 25 MMOL/L — SIGNIFICANT CHANGE UP (ref 22–31)
CREAT SERPL-MCNC: 0.79 MG/DL — SIGNIFICANT CHANGE UP (ref 0.5–1.3)
EGFR: 78 ML/MIN/1.73M2 — SIGNIFICANT CHANGE UP
GLUCOSE BLDC GLUCOMTR-MCNC: 197 MG/DL — HIGH (ref 70–99)
GLUCOSE BLDC GLUCOMTR-MCNC: 396 MG/DL — HIGH (ref 70–99)
GLUCOSE BLDC GLUCOMTR-MCNC: 67 MG/DL — LOW (ref 70–99)
GLUCOSE BLDC GLUCOMTR-MCNC: 82 MG/DL — SIGNIFICANT CHANGE UP (ref 70–99)
GLUCOSE BLDC GLUCOMTR-MCNC: 95 MG/DL — SIGNIFICANT CHANGE UP (ref 70–99)
GLUCOSE SERPL-MCNC: 103 MG/DL — HIGH (ref 70–99)
HCT VFR BLD CALC: 36.6 % — SIGNIFICANT CHANGE UP (ref 34.5–45)
HCV AB S/CO SERPL IA: 0.14 S/CO — SIGNIFICANT CHANGE UP (ref 0–0.99)
HCV AB SERPL-IMP: SIGNIFICANT CHANGE UP
HGB BLD-MCNC: 11.8 G/DL — SIGNIFICANT CHANGE UP (ref 11.5–15.5)
MAGNESIUM SERPL-MCNC: 1.4 MG/DL — LOW (ref 1.6–2.6)
MCHC RBC-ENTMCNC: 25.5 PG — LOW (ref 27–34)
MCHC RBC-ENTMCNC: 32.2 GM/DL — SIGNIFICANT CHANGE UP (ref 32–36)
MCV RBC AUTO: 79.2 FL — LOW (ref 80–100)
NRBC # BLD: 0 /100 WBCS — SIGNIFICANT CHANGE UP (ref 0–0)
PHOSPHATE SERPL-MCNC: 2.7 MG/DL — SIGNIFICANT CHANGE UP (ref 2.5–4.5)
PLATELET # BLD AUTO: 237 K/UL — SIGNIFICANT CHANGE UP (ref 150–400)
POTASSIUM SERPL-MCNC: 3.6 MMOL/L — SIGNIFICANT CHANGE UP (ref 3.5–5.3)
POTASSIUM SERPL-SCNC: 3.6 MMOL/L — SIGNIFICANT CHANGE UP (ref 3.5–5.3)
RBC # BLD: 4.62 M/UL — SIGNIFICANT CHANGE UP (ref 3.8–5.2)
RBC # FLD: 21.1 % — HIGH (ref 10.3–14.5)
SODIUM SERPL-SCNC: 144 MMOL/L — SIGNIFICANT CHANGE UP (ref 135–145)
WBC # BLD: 6.32 K/UL — SIGNIFICANT CHANGE UP (ref 3.8–10.5)
WBC # FLD AUTO: 6.32 K/UL — SIGNIFICANT CHANGE UP (ref 3.8–10.5)

## 2022-10-10 PROCEDURE — 99233 SBSQ HOSP IP/OBS HIGH 50: CPT | Mod: GC

## 2022-10-10 PROCEDURE — 99223 1ST HOSP IP/OBS HIGH 75: CPT

## 2022-10-10 PROCEDURE — 88305 TISSUE EXAM BY PATHOLOGIST: CPT | Mod: 26

## 2022-10-10 DEVICE — NET RETRV ROT ROTH 2.5MMX230CM: Type: IMPLANTABLE DEVICE | Status: FUNCTIONAL

## 2022-10-10 RX ORDER — LISINOPRIL 2.5 MG/1
40 TABLET ORAL DAILY
Refills: 0 | Status: DISCONTINUED | OUTPATIENT
Start: 2022-10-10 | End: 2022-10-11

## 2022-10-10 RX ORDER — HYDROCHLOROTHIAZIDE 25 MG
25 TABLET ORAL DAILY
Refills: 0 | Status: DISCONTINUED | OUTPATIENT
Start: 2022-10-10 | End: 2022-10-11

## 2022-10-10 RX ORDER — HYDROCHLOROTHIAZIDE 25 MG
25 TABLET ORAL DAILY
Refills: 0 | Status: DISCONTINUED | OUTPATIENT
Start: 2022-10-10 | End: 2022-10-10

## 2022-10-10 RX ORDER — DEXTROSE 10 % IN WATER 10 %
1000 INTRAVENOUS SOLUTION INTRAVENOUS
Refills: 0 | Status: DISCONTINUED | OUTPATIENT
Start: 2022-10-10 | End: 2022-10-10

## 2022-10-10 RX ORDER — INFLUENZA VIRUS VACCINE 15; 15; 15; 15 UG/.5ML; UG/.5ML; UG/.5ML; UG/.5ML
0.7 SUSPENSION INTRAMUSCULAR ONCE
Refills: 0 | Status: COMPLETED | OUTPATIENT
Start: 2022-10-10 | End: 2022-10-10

## 2022-10-10 RX ORDER — SODIUM CHLORIDE 9 MG/ML
1000 INJECTION, SOLUTION INTRAVENOUS
Refills: 0 | Status: DISCONTINUED | OUTPATIENT
Start: 2022-10-10 | End: 2022-10-10

## 2022-10-10 RX ADMIN — Medication 25 MILLIGRAM(S): at 12:21

## 2022-10-10 RX ADMIN — CARVEDILOL PHOSPHATE 6.25 MILLIGRAM(S): 80 CAPSULE, EXTENDED RELEASE ORAL at 05:33

## 2022-10-10 RX ADMIN — ATORVASTATIN CALCIUM 40 MILLIGRAM(S): 80 TABLET, FILM COATED ORAL at 21:37

## 2022-10-10 RX ADMIN — CARVEDILOL PHOSPHATE 6.25 MILLIGRAM(S): 80 CAPSULE, EXTENDED RELEASE ORAL at 21:36

## 2022-10-10 RX ADMIN — AMLODIPINE BESYLATE 10 MILLIGRAM(S): 2.5 TABLET ORAL at 05:33

## 2022-10-10 RX ADMIN — Medication 81 MILLIGRAM(S): at 21:37

## 2022-10-10 NOTE — PROGRESS NOTE ADULT - ATTENDING COMMENTS
Patient seen and evaluated at bedside. Hungry. Was lightheaded when hypogycemic this AM, now improved.    #Hypoglycmeia  -continue with dextrose 10% gtt until procedure, can stop when patient eats post-op.   -trend FS q6hr while npo.    #acute blood loss anemia 2/2 lower GIB  -for colonoscopy today.     #PAD   -appreciate vacsular cardiology, can hold plavix until patient is seen as outpatient.    pending colnonoscpy results, d/c planning next 24-48 hours. Patient seen and evaluated at bedside. Hungry. Was lightheaded when hypoglycemic this AM, now improved.    #Hypoglycemia  -continue with dextrose 10% gtt until procedure, can stop when patient eats post-op.   -trend FS q6hr while npo.    #acute blood loss anemia 2/2 lower GIB  -for colonoscopy today.     #PAD   -appreciate vascular cardiology, can hold plavix until patient is seen as outpatient.    #HTN  -asymptomatic.   -start home meds.   -given recent literature on adverse events on treating asymptomatic hypertension inpatient unless needed by anestehsia will not be overly aggressive.    pending colonoscopy results, d/c planning next 24-48 hours.

## 2022-10-10 NOTE — DISCHARGE NOTE PROVIDER - NSDCCPTREATMENT_GEN_ALL_CORE_FT
PRINCIPAL PROCEDURE  Procedure: CT  Findings and Treatment: EXAM:  CT ABDOMEN AND PELVIS IC                        PROCEDURE DATE:  10/08/2022    INTERPRETATION:  CLINICAL INFORMATION: Lower GI bleed.  COMPARISON: None.  CONTRAST/COMPLICATIONS:  IV Contrast: Omnipaque 350  90 cc administered   0 cc discarded  Oral Contrast: NONE  Complications: None reported at time of study completion  PROCEDURE:  CT of the Abdomen and Pelvis was performed.  Precontrast, Arterial and Delayed phases were performed.  Sagittal and coronal reformats were performed.  FINDINGS:  LOWER CHEST: Calcified granulomas are present within the left breast.   There are coronary calcifications.  LIVER: Within normal limits.  BILE DUCTS: Normal caliber.  GALLBLADDER: Cholecystectomy.  SPLEEN: Within normal limits.  PANCREAS: Within normal limits.  ADRENALS: Within normal limits.  KIDNEYS/URETERS: Bilateral renal cysts are present. There are varying   degrees of cortical thinning. No hydronephrosis.  BLADDER: Within normal limits.  REPRODUCTIVE ORGANS: Calcified uterine leiomyomas.  BOWEL: Keely-en-Y reconstruction. There is no bowel obstruction. Colonic   diverticulosis without diverticulitis. There is an area of hemorrhage in   the descending colon (3:69 and 4:69).  PERITONEUM: No ascites.  VESSELS: Atherosclerotic changes.  RETROPERITONEUM/LYMPH NODES: No lymphadenopathy.  ABDOMINAL WALL: Calcifications are present within bilateral gluteal   tissues.  BONES: Degenerative changes.  IMPRESSION:  There is an area of gastrointestinal bleed in the descending colon.

## 2022-10-10 NOTE — DISCHARGE NOTE PROVIDER - HOSPITAL COURSE
Pt is a 72y Female with hx of CABG, PAD s/p LE stent placement in 06/22 on DAPT (aspirin + plavix) and gastric bypass, p/w multiple bloody bowel movements and a drop of Hgb from 9.4 to 7.3. Found to have CTA abdomen/pelvis concerning for an active bleed in the descending colon, admitted for lower GI bleed. Required 2U pRBCs during admission with appropriate correction and no further episodes of bleeding after admission day 1. Pt was prepped for colonoscopy for further intervention. Vascular cardiology consulted for recommendations for anticoagulation i/s/o recent stent placement.     Pt now stable and medically cleared for discharge. 72F w/ hx CABG, PAD s/p LE stent placement 06/22 on DAPT (aspirin + plavix) and gastric bypass who presented w/ multiple bloody bowel movements and a Hgb drop from 9.4 to 7.3. CTA abdomen/pelvis concerning for an active bleed in the descending colon, patient admitted for further management. 2U pRBCs given with appropriate correction and no further bleeding was noted after day 1. EGD + colonoscopy performed by Dr. Worrell w/ normal appearing esophagus/stomach/duodenum,  Required 2U pRBCs during admission with appropriate correction and no further episodes of bleeding after admission day 1. Pt was prepped for colonoscopy for further intervention. Vascular cardiology consulted for recommendations for anticoagulation i/s/o recent stent placement.     Pt now stable and medically cleared for discharge. 72F w/ hx CABG, PAD s/p LE stent placement 06/22 on DAPT (aspirin + plavix) and gastric bypass who presented w/ multiple bloody bowel movements and a Hgb drop from 9.4 to 7.3. CTA abdomen/pelvis concerning for an active bleed in the descending colon, patient admitted for further management. 2U pRBCs given with appropriate correction and no further bleeding was noted after day 1. EGD + colonoscopy performed by Dr. Worrell w/ normal appearing esophagus/stomach/duodenum,  some diverticulosis, no active signs of bleeding, and possible ulcerations at the colon. No further interventions required. Vascular cardiology consulted for recommendations for anticoagulation i/s/o recent stent placement recommended continuing aspirin and holding plavix until outpatient follow up.     Pt now stable and medically cleared for discharge with vascular cardiology and gastroenterology follow up.

## 2022-10-10 NOTE — PROGRESS NOTE ADULT - SUBJECTIVE AND OBJECTIVE BOX
Subjective:   Patient seen at bedside this AM. Denies any further B    Objective:  Vital Signs  T(C): 36.6 (10-10 @ 05:29), Max: 36.8 (10-09 @ 12:33)  HR: 75 (10-10 @ 05:29) (65 - 80)  BP: 182/82 (10-10 @ 05:29) (169/64 - 194/80)  RR: 18 (10-10 @ 05:29) (18 - 20)  SpO2: 99% (10-10 @ 05:29) (94% - 100%)    Physical Exam:  GEN: resting in bed comfortably in NAD  RESP: no increased WOB  ABD: soft, non-distended, non-tender without rebound tenderness or guarding. Incision sites clean, dry, and intact without erythema or edema.  EXTR: warm, well-perfused, no edema  NEURO: awake, alert    Labs:                        11.8   6.32  )-----------( 237      ( 10 Oct 2022 08:25 )             36.6   10-10    144  |  107  |  10  ----------------------------<  103<H>  3.6   |  25  |  0.79    Ca    9.0      10 Oct 2022 08:23  Phos  2.7     10-10  Mg     1.4     10-10    TPro  6.8  /  Alb  4.0  /  TBili  0.5  /  DBili  x   /  AST  16  /  ALT  7<L>  /  AlkPhos  138<H>  10-08    CAPILLARY BLOOD GLUCOSE      POCT Blood Glucose.: 66 mg/dL (10 Oct 2022 08:43)  POCT Blood Glucose.: 70 mg/dL (09 Oct 2022 22:27)  POCT Blood Glucose.: 76 mg/dL (09 Oct 2022 17:53)  POCT Blood Glucose.: 121 mg/dL (09 Oct 2022 13:14)      Imaging:     Subjective:   Patient seen at bedside this AM. Denies any further BRBPR.    Objective:  Vital Signs  T(C): 36.6 (10-10 @ 05:29), Max: 36.8 (10-09 @ 12:33)  HR: 75 (10-10 @ 05:29) (65 - 80)  BP: 182/82 (10-10 @ 05:29) (169/64 - 194/80)  RR: 18 (10-10 @ 05:29) (18 - 20)  SpO2: 99% (10-10 @ 05:29) (94% - 100%)    Physical Exam:  GEN: resting in bed comfortably in NAD  RESP: no increased WOB  ABD: soft, non-distended, non-tender  EXTR: warm, well-perfused, no edema  NEURO: awake, alert    Labs:                        11.8   6.32  )-----------( 237      ( 10 Oct 2022 08:25 )             36.6   10-10    144  |  107  |  10  ----------------------------<  103<H>  3.6   |  25  |  0.79    Ca    9.0      10 Oct 2022 08:23  Phos  2.7     10-10  Mg     1.4     10-10    TPro  6.8  /  Alb  4.0  /  TBili  0.5  /  DBili  x   /  AST  16  /  ALT  7<L>  /  AlkPhos  138<H>  10-08    CAPILLARY BLOOD GLUCOSE      POCT Blood Glucose.: 66 mg/dL (10 Oct 2022 08:43)  POCT Blood Glucose.: 70 mg/dL (09 Oct 2022 22:27)  POCT Blood Glucose.: 76 mg/dL (09 Oct 2022 17:53)  POCT Blood Glucose.: 121 mg/dL (09 Oct 2022 13:14)      Imaging:     30

## 2022-10-10 NOTE — DISCHARGE NOTE PROVIDER - NSDCFUSCHEDAPPT_GEN_ALL_CORE_FT
Blaine Garcia Physician [facility]  CARDIOLOGY [address]  Scheduled Appointment: 10/28/2022

## 2022-10-10 NOTE — PROGRESS NOTE ADULT - ASSESSMENT
pt unhappy with prep  explained to patient can sign off and get another gastroenterolgost  pt for colon later in day if finises.

## 2022-10-10 NOTE — PROGRESS NOTE ADULT - PROBLEM SELECTOR PLAN 1
- Patient with history of abdominal pain, now with Lower GI bleed. Possibly 2/2 Diverticulitis vs colitis.   - CT AP significant for an area of gastrointestinal bleed in the descending colon.  - Continue fluid resuscitation. S/P 2U PRBCs -> Hb 10.8  - Transfuse Hb > 8  given CAD s/p CABG  - CBC Q4hrs  - Continue to monitor hemodynamics  - NPO Pending GI evaluation   - CRS and IR consulted. Appreciate recs. No plans for urgent intervention at this time.   - GI consulted, recs appreciated     -colonoscopy tomorrow  - Low threshold for MICU consult given precipitous drop in Hemoglobin - Patient with history of abdominal pain, now with lower GI bleed. Possibly 2/2 Diverticulitis vs colitis.   - CT AP significant for an area of gastrointestinal bleed in the descending colon.  - Continue fluid resuscitation. S/P 2U PRBCs -> Hb 10.8  - Transfuse Hb > 8 given CAD s/p CABG  - CBC Q4hrs, CTM hemodynamics  - NPO pending GI evaluation   - CRS and IR consulted. Appreciate recs. No plans for urgent intervention at this time.   - GI consulted, recs appreciated     -colonoscopy today  - Low threshold for MICU consult given precipitous drop in Hemoglobin Patient with history of abdominal pain, now with lower GI bleed. Possibly 2/2 Diverticulitis vs colitis.   - CT AP significant for an area of gastrointestinal bleed in the descending colon.  - Continue fluid resuscitation. S/P 2U PRBCs -> Hb 10.8  - CBC Q4hrs, CTM hemodynamics, transfuse Hgb > 8 given hx CAD s/p CABG  - anticipate colonoscopy today, NPO since MN  - CRS and IR consulted. Appreciate recs. No plans for urgent intervention at this time

## 2022-10-10 NOTE — PROGRESS NOTE ADULT - PROBLEM SELECTOR PLAN 2
- HIstory of severe PAD c/b lower extremity ulcer in setting of diabetes  - S/P multiple stents. Last stent placed June 2022  - Hold DAPT give concern for GI hemorrhage. Can consider restarting once hemodynamically stable giving risk of stent thrombosis.   - Vascular cards consulted re: continuation of DAPT given recent vascular intervention  - Continue Statin - History of severe PAD c/b lower extremity ulcer in setting of diabetes  - S/P multiple stents. Last stent placed June 2022  - Hold DAPT give concern for GI hemorrhage. Can consider restarting once hemodynamically stable giving risk of stent thrombosis.   - Vascular cards consulted re: continuation of DAPT given recent vascular intervention  - Continue statin History of severe PAD c/b lower extremity ulcer in setting of diabetes  - S/P multiple stents. Last stent placed June 2022  - Hold DAPT give concern for GI hemorrhage. Discussed timeline for restarting given risk of stent thrombosis w/ cardiology, recommending restart as outpatient  - Continue statin

## 2022-10-10 NOTE — PRE PROCEDURE NOTE - PRE PROCEDURE EVALUATION
Patient is a 72y Female     Patient is a 72y old  Female who presents with a chief complaint of GI Bleed (10 Oct 2022 09:58)      HPI:  Patient is a 72 year old female with PMHx of HTN, HOLD, DM, CABG, sickle cell trait at baseline, PAD s/p lower extremity stents in 2022 on DAPT (ASA and Plavix), presenting with BRBPR. States she has had 4-5 episodes of rectal bleeding in the past day. She initially had one BM with bloody streaks and then contacted her PMD who advised her to proceed to ED. However, while pending transportation she had several order BMs which were terrence red blood. Symptoms are associated with mild headache, fatigue, mild palpitations when laying on her left side, and SOB. Of note, for about 2-3 months, she had been having LLQ abdominal pain which has been keeping her awake at nights intermittently with constipation. She had consulted with her GI doctor and plans were made to scope. However, Cards clearance was required for persistent HTN. She was recently started on oral iron replacement therapy.  She denies any CP, diarrhea, weight loss, dizziness, hematemesis, low back pain, vaginal bleeding.     In the ED patient hemodynamically stable. POCT BG 60 (s/p dextrose). CT A/P in ED demonstrating GI bleed in descending colon. In ED, patient's initial Hgb was 9.4, dropped to 7.3 on repeat labs approximately 4 hours later. Receiving 1 unit pRBC. CRS was consulted and indicated no acute intervention necessary. IR also consulted and no acute intervention recommended. Currently pending GI eval.  (09 Oct 2022 04:35)      PAST MEDICAL & SURGICAL HISTORY:  DM (diabetes mellitus)      Ulcer of toe of right foot  3rd      LEILANI (obstructive sleep apnea)  does not use CPAP since gastric bypass surgery      Diabetes      HTN (hypertension)      High cholesterol      PAD (peripheral artery disease)      S/P gastric bypass      H/O bilateral breast reduction surgery      H/O abdominoplasty      S/P  section      History of intravascular stent placement      H/O  section      H/O gastric bypass      Peripheral arterial disease  s/p Right leg stent x 1 , left leg stent x 3      S/P CABG x 3            MEDICATIONS  (STANDING):  amLODIPine   Tablet 10 milliGRAM(s) Oral daily  aspirin  chewable 81 milliGRAM(s) Oral daily  atorvastatin 40 milliGRAM(s) Oral at bedtime  carvedilol 6.25 milliGRAM(s) Oral every 12 hours  dextrose 10%. 1000 milliLiter(s) (50 mL/Hr) IV Continuous <Continuous>  dextrose 50% Injectable 25 Gram(s) IV Push once  dextrose 50% Injectable 12.5 Gram(s) IV Push once  dextrose 50% Injectable 25 Gram(s) IV Push once  dextrose Oral Gel 15 Gram(s) Oral once  gabapentin 100 milliGRAM(s) Oral two times a day  glucagon  Injectable 1 milliGRAM(s) IntraMuscular once  hydrochlorothiazide 25 milliGRAM(s) Oral daily  influenza  Vaccine (HIGH DOSE) 0.7 milliLiter(s) IntraMuscular once  lisinopril 40 milliGRAM(s) Oral daily  pantoprazole    Tablet 40 milliGRAM(s) Oral before breakfast      Allergies    Cipro (Headache)    Intolerances        SOCIAL HISTORY:  Denies ETOh,Smoking,     FAMILY HISTORY:  No pertinent family history in first degree relatives        REVIEW OF SYSTEMS:    CONSTITUTIONAL: No weakness, fevers or chills  EYES/ENT: No visual changes;  No vertigo or throat pain   NECK: No pain or stiffness  RESPIRATORY: No cough, wheezing, hemoptysis; No shortness of breath  CARDIOVASCULAR: No chest pain or palpitations  GASTROINTESTINAL: No abdominal or epigastric pain. No nausea, vomiting, or hematemesis; No diarrhea or constipation. No melena or hematochezia.  GENITOURINARY: No dysuria, frequency or hematuria  NEUROLOGICAL: No numbness or weakness  SKIN: No itching, burning, rashes, or lesions   All other review of systems is negative unless indicated above.    VITAL:  T(C): , Max: 36.6 (10-09-22 @ 23:19)  T(F): , Max: 97.9 (10-09-22 @ 23:19)  HR: 59 (10-10-22 @ 13:42)  BP: 165/76 (10-10-22 @ 13:42)  BP(mean): --  RR: 18 (10-10-22 @ 13:42)  SpO2: 96% (10-10-22 @ 13:42)  Wt(kg): --    I and O's:        PHYSICAL EXAM:    Constitutional: NAD  HEENT: PERRLA,   Neck: No JVD  Respiratory: CTA B/L  Cardiovascular: S1 and S2  Gastrointestinal: BS+, soft, NT/ND  Extremities: No peripheral edema  Neurological: A/O x 3, no focal deficits  Psychiatric: Normal mood, normal affect  : No Watson  Skin: No rashes  Access: Not applicable  Back: No CVA tenderness    LABS:                        11.8   6.32  )-----------( 237      ( 10 Oct 2022 08:25 )             36.6     10-10    144  |  107  |  10  ----------------------------<  103<H>  3.6   |  25  |  0.79    Ca    9.0      10 Oct 2022 08:23  Phos  2.7     10-10  Mg     1.4     10-10    TPro  6.8  /  Alb  4.0  /  TBili  0.5  /  DBili  x   /  AST  16  /  ALT  7<L>  /  AlkPhos  138<H>  10-08          RADIOLOGY & ADDITIONAL STUDIES:

## 2022-10-10 NOTE — PROGRESS NOTE ADULT - PROBLEM SELECTOR PLAN 8
Diet: NPO pending GI eval  DVT ppx: Hold off on ppx given GI bleed  Dispo: Pending GI Eval Diet: NPO pending GI eval  DVT ppx: Hold off on ppx given GI bleed  Dispo: pending scope today

## 2022-10-10 NOTE — DISCHARGE NOTE PROVIDER - NSDCFUADDAPPT_GEN_ALL_CORE_FT
Please follow up with your interventional cardiologist Dr. Garcia regarding resuming plavix   Please follow up with gastroenterology Dr. Worrell for recent bleeding

## 2022-10-10 NOTE — PROGRESS NOTE ADULT - SUBJECTIVE AND OBJECTIVE BOX
Internal Medicine   Kenrick Bell | PGY-1    OVERNIGHT EVENTS: No acute overnight events.    SUBJECTIVE:       MEDICATIONS  (STANDING):  amLODIPine   Tablet 10 milliGRAM(s) Oral daily  aspirin  chewable 81 milliGRAM(s) Oral daily  atorvastatin 40 milliGRAM(s) Oral at bedtime  carvedilol 6.25 milliGRAM(s) Oral every 12 hours  dextrose 50% Injectable 25 Gram(s) IV Push once  dextrose 50% Injectable 12.5 Gram(s) IV Push once  dextrose 50% Injectable 25 Gram(s) IV Push once  dextrose Oral Gel 15 Gram(s) Oral once  gabapentin 100 milliGRAM(s) Oral two times a day  glucagon  Injectable 1 milliGRAM(s) IntraMuscular once  influenza  Vaccine (HIGH DOSE) 0.7 milliLiter(s) IntraMuscular once  insulin lispro (ADMELOG) corrective regimen sliding scale   SubCutaneous three times a day before meals  insulin lispro (ADMELOG) corrective regimen sliding scale   SubCutaneous at bedtime  pantoprazole    Tablet 40 milliGRAM(s) Oral before breakfast    MEDICATIONS  (PRN):    VITALS:  T(F): 97.8 (10-10-22 @ 05:29), Max: 98.2 (10-09-22 @ 12:33)  HR: 75 (10-10-22 @ 05:29) (58 - 80)  BP: 182/82 (10-10-22 @ 05:29) (139/116 - 194/80)  BP(mean): --  RR: 18 (10-10-22 @ 05:29) (17 - 20)  SpO2: 99% (10-10-22 @ 05:29) (94% - 100%)    PHYSICAL EXAM:   GENERAL: NAD, lying in bed comfortably  HEAD: Atraumatic, normocephalic  EYES: EOMI, conjunctiva and sclera clear, no nystagmus noted  ENT: Moist mucous membranes  CHEST/LUNG: Clear to auscultation bilaterally; no rales, rhonchi, wheezing, or rubs; unlabored respirations  HEART: Regular rate and rhythm; no murmurs, rubs, or gallops, normal S1/S2  ABDOMEN: Normal bowel sounds; soft, nontender, nondistended  EXTREMITIES: No clubbing, cyanosis, or edema  MSK: No gross deformities noted   Neurological: No focal deficits   SKIN: No rashes or lesions  PSYCH: Normal mood, affect     LABS:                      12.4   7.50  )-----------( 236      ( 09 Oct 2022 20:58 )             38.6     10-08  143  |  106  |  20  ----------------------------<  120<H>  3.9   |  27  |  1.06    Ca    9.0      08 Oct 2022 18:18    TPro  6.8  /  Alb  4.0  /  TBili  0.5  /  DBili  x   /  AST  16  /  ALT  7<L>  /  AlkPhos  138<H>  10-08    PT/INR - ( 08 Oct 2022 18:18 )   PT: 13.5 sec;   INR: 1.17 ratio    PTT - ( 08 Oct 2022 18:18 )  PTT:32.2 sec    Creatinine Trend: 1.06<-- Internal Medicine   Kenrick Bell | PGY-1    OVERNIGHT EVENTS: No acute overnight events.    SUBJECTIVE: Patient assessed at bedside. No acute or new concerns. States she is hungry and awaiting colonoscopy. Colonoscopy prep completed per RN.    MEDICATIONS  (STANDING):  amLODIPine   Tablet 10 milliGRAM(s) Oral daily  aspirin  chewable 81 milliGRAM(s) Oral daily  atorvastatin 40 milliGRAM(s) Oral at bedtime  carvedilol 6.25 milliGRAM(s) Oral every 12 hours  dextrose 50% Injectable 25 Gram(s) IV Push once  dextrose 50% Injectable 12.5 Gram(s) IV Push once  dextrose 50% Injectable 25 Gram(s) IV Push once  dextrose Oral Gel 15 Gram(s) Oral once  gabapentin 100 milliGRAM(s) Oral two times a day  glucagon  Injectable 1 milliGRAM(s) IntraMuscular once  influenza  Vaccine (HIGH DOSE) 0.7 milliLiter(s) IntraMuscular once  insulin lispro (ADMELOG) corrective regimen sliding scale   SubCutaneous three times a day before meals  insulin lispro (ADMELOG) corrective regimen sliding scale   SubCutaneous at bedtime  pantoprazole    Tablet 40 milliGRAM(s) Oral before breakfast    MEDICATIONS  (PRN):    VITALS:  T(F): 97.8 (10-10-22 @ 05:29), Max: 98.2 (10-09-22 @ 12:33)  HR: 75 (10-10-22 @ 05:29) (58 - 80)  BP: 182/82 (10-10-22 @ 05:29) (139/116 - 194/80)  BP(mean): --  RR: 18 (10-10-22 @ 05:29) (17 - 20)  SpO2: 99% (10-10-22 @ 05:29) (94% - 100%)    PHYSICAL EXAM:   GENERAL: NAD, sitting upright in bed, awake and conversational  HEAD: Atraumatic, normocephalic  EYES: EOMI, conjunctiva and sclera clear, no nystagmus noted  ENT: Moist mucous membranes  CHEST/LUNG: Clear to auscultation bilaterally; no rales, rhonchi, wheezing, or rubs; unlabored respirations  HEART: Regular rate and rhythm; no murmurs, rubs, or gallops, normal S1/S2  ABDOMEN: Normal bowel sounds; soft, nontender, nondistended  EXTREMITIES: No clubbing, cyanosis, or edema  MSK: No gross deformities noted   Neurological: Grossly nonfocal  SKIN: No rashes or lesions  PSYCH: Appropriate mood, affect     LABS:                      12.4   7.50  )-----------( 236      ( 09 Oct 2022 20:58 )             38.6     10-08  143  |  106  |  20  ----------------------------<  120<H>  3.9   |  27  |  1.06    Ca    9.0      08 Oct 2022 18:18    TPro  6.8  /  Alb  4.0  /  TBili  0.5  /  DBili  x   /  AST  16  /  ALT  7<L>  /  AlkPhos  138<H>  10-08    PT/INR - ( 08 Oct 2022 18:18 )   PT: 13.5 sec;   INR: 1.17 ratio    PTT - ( 08 Oct 2022 18:18 )  PTT:32.2 sec    Creatinine Trend: 1.06<--

## 2022-10-10 NOTE — PROGRESS NOTE ADULT - ASSESSMENT
72 year old female with lower GI bleed. Area of hemorrhage identified in descending colon on CT imaging.    Recommendations:  - GI planning for colonoscopy today, f/u findings  - Trend H/H, transfuse prn  - No surgical intervention at this time    [facility]  [id number]

## 2022-10-10 NOTE — DISCHARGE NOTE PROVIDER - NSDCCPCAREPLAN_GEN_ALL_CORE_FT
PRINCIPAL DISCHARGE DIAGNOSIS  Diagnosis: GIB (gastrointestinal bleeding)  Assessment and Plan of Treatment: You had a GI bleed: bleeding of your digestive tract. We took a CT scan and found that your colon was the source. We prepared you for colonoscopy to investigate the source of the bleed and to stop any active bleeding from happening. We also transfused you with 2 units of blood because you had a large drop in your blood count. Please follow up outpatient with your gastroenterologist for further evaluation and treatment. If you have similar symptoms of high volume bleeding while passing stool, please report to the emergency department.       PRINCIPAL DISCHARGE DIAGNOSIS  Diagnosis: GIB (gastrointestinal bleeding)  Assessment and Plan of Treatment: You had a GI bleed: bleeding of your digestive tract. We took a CT scan and found that your colon was the source. We prepared you for colonoscopy to investigate the source of the bleed and to stop any active bleeding from happening. We also transfused you with 2 units of blood because you had a large drop in your blood count.  We found that there was some diverticulosis in your imaging as well as possible signs of ulcerations in the colon. Please follow up outpatient with your gastroenterologist for further evaluation and treatment. If you have similar symptoms of high volume bleeding while passing stool, please report to the emergency department. If you have black stool please call your doctor.      SECONDARY DISCHARGE DIAGNOSES  Diagnosis: PAD (peripheral artery disease)  Assessment and Plan of Treatment: You had a peripheral stent placed for PAD, for which you were on aspirin and plavix. Since you had a recent episode of bleeding, we spoke to the cardiology team and you can be discharged on Aspirin and follow up with cardiology regarding resume plavix. If you experience sudden onset of pain or blue coloring of your toes, or loss of sensation or motor to your feet please call your doctor.

## 2022-10-10 NOTE — DISCHARGE NOTE PROVIDER - NSDCMRMEDTOKEN_GEN_ALL_CORE_FT
amLODIPine 10 mg oral tablet: 1 tab(s) orally once a day  atorvastatin 40 mg oral tablet: 1 tab(s) orally once a day (at bedtime)  clopidogrel 75 mg oral tablet: 1 tab(s) orally once a day  Coreg 6.25 mg oral tablet: 1 tab(s) orally once a day  diclofenac 1% topical gel: Apply topically to affected area 3 times a day  diclofenac sodium 100 mg oral tablet, extended release: 1 tab(s) orally once a day  gabapentin 100 mg oral capsule: 1 cap(s) orally 2 times a day  hydroCHLOROthiazide 25 mg oral tablet: 1 tab(s) orally once a day  omeprazole 20 mg oral delayed release capsule: 1 cap(s) orally once a day  oxycodone-acetaminophen 5 mg-325 mg oral tablet: 1 tab(s) orally every 6 hours, As Needed  ramipril 10 mg oral capsule: 1 cap(s) orally once a day  Vitamin B12 1000 mcg/mL injectable solution: 1 dose(s) injectable once a month   amLODIPine 10 mg oral tablet: 1 tab(s) orally once a day  aspirin 81 mg oral tablet, chewable: 1 tab(s) orally once a day  atorvastatin 40 mg oral tablet: 1 tab(s) orally once a day (at bedtime)  bacitracin 500 units/g topical ointment: 1 application topically once a day  Coreg 6.25 mg oral tablet: 1 tab(s) orally once a day  diclofenac 1% topical gel: Apply topically to affected area 3 times a day  gabapentin 100 mg oral capsule: 1 cap(s) orally 2 times a day  hydroCHLOROthiazide 25 mg oral tablet: 1 tab(s) orally once a day  omeprazole 20 mg oral delayed release capsule: 1 cap(s) orally once a day  petrolatum topical ointment: 1 application topically once a day  ramipril 10 mg oral capsule: 1 cap(s) orally once a day  Vitamin B12 1000 mcg/mL injectable solution: 1 dose(s) injectable once a month   amLODIPine 10 mg oral tablet: 1 tab(s) orally once a day  aspirin 81 mg oral tablet, chewable: 1 tab(s) orally once a day  atorvastatin 40 mg oral tablet: 1 tab(s) orally once a day (at bedtime)  bacitracin 500 units/g topical ointment: 1 application topically once a day  Coreg 6.25 mg oral tablet: 1 tab(s) orally 2 times a day  diclofenac 1% topical gel: Apply topically to affected area 3 times a day  gabapentin 100 mg oral capsule: 1 cap(s) orally 2 times a day  hydroCHLOROthiazide 25 mg oral tablet: 1 tab(s) orally once a day  omeprazole 20 mg oral delayed release capsule: 1 cap(s) orally once a day  petrolatum topical ointment: 1 application topically once a day  ramipril 10 mg oral capsule: 1 cap(s) orally once a day  Vitamin B12 1000 mcg/mL injectable solution: 1 dose(s) injectable once a month

## 2022-10-10 NOTE — DISCHARGE NOTE PROVIDER - CARE PROVIDER_API CALL
Devin Worrell (DO)  Gastroenterology; Internal Medicine  [address] [place] [address]  Phone: [phone]  Fax: [phone]  Follow Up Time:

## 2022-10-10 NOTE — PACU DISCHARGE NOTE - NAUSEA/VOMITING:
ANTICOAGULATION FOLLOW-UP CLINIC VISIT    Patient Name:  Owen Sahni  Date:  2019  Contact Type:  Face to Face    SUBJECTIVE:         OBJECTIVE    INR Protime   Date Value Ref Range Status   2019 2.5 (A) 0.86 - 1.14 Final       ASSESSMENT / PLAN  INR assessment THER    Recheck INR In: 1 WEEK    INR Location Clinic      Anticoagulation Summary  As of 2019    INR goal:   2.0-3.0   TTR:   100.0 % (4 d)   INR used for dosin.5 (2019)   Warfarin maintenance plan:   7.5 mg (5 mg x 1.5) every Mon, Thu; 10 mg (5 mg x 2) all other days   Full warfarin instructions:   7.5 mg every Mon, Thu; 10 mg all other days   Weekly warfarin total:   65 mg   No change documented:   Anjelica Harris RN   Plan last modified:   Sushila Velazquez RN (2019)   Next INR check:   2019   Target end date:       Indications    Atrial fibrillation (H) [I48.91]  Long term (current) use of anticoagulants [Z79.01]             Anticoagulation Episode Summary     INR check location:       Preferred lab:       Send INR reminders to:   VIDA EPPERSON    Comments:         Anticoagulation Care Providers     Provider Role Specialty Phone number    Evelio Oliver MD Referring Cardiology [phone]            See the Encounter Report to view Anticoagulation Flowsheet and Dosing Calendar (Go to Encounters tab in chart review, and find the Anticoagulation Therapy Visit)        Anjelica Harris RN                  None

## 2022-10-10 NOTE — PROGRESS NOTE ADULT - PROBLEM SELECTOR PLAN 4
- Last A 1c unknown but per daughter diabetes is well controlled. Patient previously on metformin but discontinued to incidence of hypoglycemia  - C/b diabetic neuropathy  - Last lipid panel with LDL < 190  - Low dose sliding scale for now  - Monitor finger sticks  - F/U A1c - Last A1c unknown but per daughter diabetes is well controlled. Patient previously on metformin but discontinued to incidence of hypoglycemia  - C/b diabetic neuropathy  - Last lipid panel with LDL < 190  - Low dose sliding scale for now  - Monitor finger sticks  - F/U A1c A1c 6.0. Per daughter diabetes has been well controlled. Patient previously on metformin but discontinued due to incidence of hypoglycemia.  - C/b diabetic neuropathy  - Low dose SSI for now  - Monitor finger sticks

## 2022-10-10 NOTE — PROGRESS NOTE ADULT - ASSESSMENT
72y Female with hx of CABG on Plavix and gastric bypass, p/w multiple bloody bowel movements and a drop of Hgb from 9.4 to 7.3. Found to have CTA abdomen/pelvis concerning for an active bleed in the descending colon. Remains hemodynamically stable s/p 2 U PRBCs, pending colonoscopy for further evaluation and intervention.  72F w/ hx of CABG on Plavix and gastric bypass, p/w multiple bloody bowel movements and Hgb 9.4->7.3. CTA abdomen/pelvis w/ concern for active bleed in the descending colon. Remains hemodynamically stable s/p 2U pRBCs, pending colonoscopy for further evaluation and intervention.  72F w/ hx of CABG on plavix, gastric bypass, presented w/ multiple bloody bowel movements and Hgb 9.4->7.3. CTA abdomen/pelvis w/ concern for active bleed in the descending colon. Remains hemodynamically stable s/p 2U pRBCs 10/8, pending colonoscopy for further evaluation and intervention.

## 2022-10-10 NOTE — PROGRESS NOTE ADULT - PROBLEM SELECTOR PLAN 3
- History of Treatment resistant Hypertension  - BP currently  SBP < 180  - Holding HCTZ, ramipril  - c/w amlodipine 10 qd, coreg 6.25 BID - History of treatment resistant Hypertension  - BP currently  SBP < 180  - Holding HCTZ, ramipril  - c/w amlodipine 10 qd, coreg 6.25 BID History of treatment resistant HTN, SBP > 180 this AM  - holding ramipril  - add HCTZ 25mg QD given HTN today  - c/w amlodipine 10 qd, coreg 6.25 BID

## 2022-10-10 NOTE — PROGRESS NOTE ADULT - SUBJECTIVE AND OBJECTIVE BOX
Patient is a 72y Female     Patient is a 72y old  Female who presents with a chief complaint of GI Bleed (10 Oct 2022 07:26)      HPI:  Patient is a 72 year old female with PMHx of HTN, HOLD, DM, CABG, sickle cell trait at baseline, PAD s/p lower extremity stents in 2022 on DAPT (ASA and Plavix), presenting with BRBPR. States she has had 4-5 episodes of rectal bleeding in the past day. She initially had one BM with bloody streaks and then contacted her PMD who advised her to proceed to ED. However, while pending transportation she had several order BMs which were terrence red blood. Symptoms are associated with mild headache, fatigue, mild palpitations when laying on her left side, and SOB. Of note, for about 2-3 months, she had been having LLQ abdominal pain which has been keeping her awake at nights intermittently with constipation. She had consulted with her GI doctor and plans were made to scope. However, Cards clearance was required for persistent HTN. She was recently started on oral iron replacement therapy.  She denies any CP, diarrhea, weight loss, dizziness, hematemesis, low back pain, vaginal bleeding.     In the ED patient hemodynamically stable. POCT BG 60 (s/p dextrose). CT A/P in ED demonstrating GI bleed in descending colon. In ED, patient's initial Hgb was 9.4, dropped to 7.3 on repeat labs approximately 4 hours later. Receiving 1 unit pRBC. CRS was consulted and indicated no acute intervention necessary. IR also consulted and no acute intervention recommended. Currently pending GI eval.  (09 Oct 2022 04:35)      PAST MEDICAL & SURGICAL HISTORY:  DM (diabetes mellitus)      Ulcer of toe of right foot  3rd      LEILANI (obstructive sleep apnea)  does not use CPAP since gastric bypass surgery      Diabetes      HTN (hypertension)      High cholesterol      PAD (peripheral artery disease)      S/P gastric bypass      H/O bilateral breast reduction surgery      H/O abdominoplasty      S/P  section      History of intravascular stent placement      H/O  section      H/O gastric bypass      Peripheral arterial disease  s/p Right leg stent x 1 , left leg stent x 3      S/P CABG x 3            MEDICATIONS  (STANDING):  amLODIPine   Tablet 10 milliGRAM(s) Oral daily  aspirin  chewable 81 milliGRAM(s) Oral daily  atorvastatin 40 milliGRAM(s) Oral at bedtime  carvedilol 6.25 milliGRAM(s) Oral every 12 hours  dextrose 5% + lactated ringers. 1000 milliLiter(s) (50 mL/Hr) IV Continuous <Continuous>  dextrose 50% Injectable 25 Gram(s) IV Push once  dextrose 50% Injectable 12.5 Gram(s) IV Push once  dextrose 50% Injectable 25 Gram(s) IV Push once  dextrose Oral Gel 15 Gram(s) Oral once  gabapentin 100 milliGRAM(s) Oral two times a day  glucagon  Injectable 1 milliGRAM(s) IntraMuscular once  influenza  Vaccine (HIGH DOSE) 0.7 milliLiter(s) IntraMuscular once  insulin lispro (ADMELOG) corrective regimen sliding scale   SubCutaneous three times a day before meals  insulin lispro (ADMELOG) corrective regimen sliding scale   SubCutaneous at bedtime  lisinopril 40 milliGRAM(s) Oral daily  pantoprazole    Tablet 40 milliGRAM(s) Oral before breakfast      Allergies    Cipro (Headache)    Intolerances        SOCIAL HISTORY:  Denies ETOh,Smoking,     FAMILY HISTORY:  No pertinent family history in first degree relatives        REVIEW OF SYSTEMS:    CONSTITUTIONAL: No weakness, fevers or chills  EYES/ENT: No visual changes;  No vertigo or throat pain   NECK: No pain or stiffness  RESPIRATORY: No cough, wheezing, hemoptysis; No shortness of breath  CARDIOVASCULAR: No chest pain or palpitations  GASTROINTESTINAL: No abdominal or epigastric pain. No nausea, vomiting, or hematemesis; No diarrhea or constipation. No melena or hematochezia.  GENITOURINARY: No dysuria, frequency or hematuria  NEUROLOGICAL: No numbness or weakness  SKIN: No itching, burning, rashes, or lesions   All other review of systems is negative unless indicated above.    VITAL:  T(C): , Max: 36.8 (10-09-22 @ 12:33)  T(F): , Max: 98.2 (10-09-22 @ 12:33)  HR: 75 (10-10-22 @ 05:29)  BP: 182/82 (10-10-22 @ 05:29)  BP(mean): --  RR: 18 (10-10-22 @ 05:29)  SpO2: 99% (10-10-22 @ 05:29)  Wt(kg): --    I and O's:        PHYSICAL EXAM:    Constitutional: NAD  HEENT: PERRLA,   Neck: No JVD  Respiratory: CTA B/L  Cardiovascular: S1 and S2  Gastrointestinal: BS+, soft, NT/ND  Extremities: No peripheral edema  Neurological: A/O x 3, no focal deficits  Psychiatric: Normal mood, normal affect  : No Watson  Skin: No rashes  Access: Not applicable  Back: No CVA tenderness    LABS:                        11.8   6.32  )-----------( 237      ( 10 Oct 2022 08:25 )             36.6     10-10    144  |  107  |  10  ----------------------------<  103<H>  3.6   |  25  |  0.79    Ca    9.0      10 Oct 2022 08:23  Phos  2.7     10-10  Mg     1.4     10-10    TPro  6.8  /  Alb  4.0  /  TBili  0.5  /  DBili  x   /  AST  16  /  ALT  7<L>  /  AlkPhos  138<H>  10-08          RADIOLOGY & ADDITIONAL STUDIES:

## 2022-10-11 ENCOUNTER — TRANSCRIPTION ENCOUNTER (OUTPATIENT)
Age: 72
End: 2022-10-11

## 2022-10-11 VITALS
RESPIRATION RATE: 18 BRPM | DIASTOLIC BLOOD PRESSURE: 60 MMHG | HEART RATE: 69 BPM | SYSTOLIC BLOOD PRESSURE: 120 MMHG | TEMPERATURE: 98 F | OXYGEN SATURATION: 99 %

## 2022-10-11 LAB
ANION GAP SERPL CALC-SCNC: 11 MMOL/L — SIGNIFICANT CHANGE UP (ref 5–17)
BUN SERPL-MCNC: 10 MG/DL — SIGNIFICANT CHANGE UP (ref 7–23)
CALCIUM SERPL-MCNC: 8.3 MG/DL — LOW (ref 8.4–10.5)
CHLORIDE SERPL-SCNC: 106 MMOL/L — SIGNIFICANT CHANGE UP (ref 96–108)
CO2 SERPL-SCNC: 25 MMOL/L — SIGNIFICANT CHANGE UP (ref 22–31)
CREAT SERPL-MCNC: 0.79 MG/DL — SIGNIFICANT CHANGE UP (ref 0.5–1.3)
EGFR: 78 ML/MIN/1.73M2 — SIGNIFICANT CHANGE UP
GLUCOSE BLDC GLUCOMTR-MCNC: 100 MG/DL — HIGH (ref 70–99)
GLUCOSE BLDC GLUCOMTR-MCNC: 94 MG/DL — SIGNIFICANT CHANGE UP (ref 70–99)
GLUCOSE SERPL-MCNC: 86 MG/DL — SIGNIFICANT CHANGE UP (ref 70–99)
HCT VFR BLD CALC: 31.5 % — LOW (ref 34.5–45)
HGB BLD-MCNC: 10.4 G/DL — LOW (ref 11.5–15.5)
MAGNESIUM SERPL-MCNC: 1.3 MG/DL — LOW (ref 1.6–2.6)
MCHC RBC-ENTMCNC: 26.4 PG — LOW (ref 27–34)
MCHC RBC-ENTMCNC: 33 GM/DL — SIGNIFICANT CHANGE UP (ref 32–36)
MCV RBC AUTO: 79.9 FL — LOW (ref 80–100)
NRBC # BLD: 0 /100 WBCS — SIGNIFICANT CHANGE UP (ref 0–0)
PHOSPHATE SERPL-MCNC: 3.3 MG/DL — SIGNIFICANT CHANGE UP (ref 2.5–4.5)
PLATELET # BLD AUTO: 227 K/UL — SIGNIFICANT CHANGE UP (ref 150–400)
POTASSIUM SERPL-MCNC: 3.2 MMOL/L — LOW (ref 3.5–5.3)
POTASSIUM SERPL-SCNC: 3.2 MMOL/L — LOW (ref 3.5–5.3)
RBC # BLD: 3.94 M/UL — SIGNIFICANT CHANGE UP (ref 3.8–5.2)
RBC # FLD: 21.7 % — HIGH (ref 10.3–14.5)
SODIUM SERPL-SCNC: 142 MMOL/L — SIGNIFICANT CHANGE UP (ref 135–145)
WBC # BLD: 8.45 K/UL — SIGNIFICANT CHANGE UP (ref 3.8–10.5)
WBC # FLD AUTO: 8.45 K/UL — SIGNIFICANT CHANGE UP (ref 3.8–10.5)

## 2022-10-11 PROCEDURE — 99239 HOSP IP/OBS DSCHRG MGMT >30: CPT | Mod: GC

## 2022-10-11 PROCEDURE — 99232 SBSQ HOSP IP/OBS MODERATE 35: CPT

## 2022-10-11 RX ORDER — ASPIRIN/CALCIUM CARB/MAGNESIUM 324 MG
1 TABLET ORAL
Qty: 30 | Refills: 0
Start: 2022-10-11 | End: 2022-11-09

## 2022-10-11 RX ORDER — POTASSIUM CHLORIDE 20 MEQ
40 PACKET (EA) ORAL EVERY 4 HOURS
Refills: 0 | Status: DISCONTINUED | OUTPATIENT
Start: 2022-10-11 | End: 2022-10-11

## 2022-10-11 RX ORDER — PETROLATUM,WHITE
1 JELLY (GRAM) TOPICAL
Qty: 0 | Refills: 0 | DISCHARGE
Start: 2022-10-11

## 2022-10-11 RX ORDER — CARVEDILOL PHOSPHATE 80 MG/1
1 CAPSULE, EXTENDED RELEASE ORAL
Qty: 0 | Refills: 0 | DISCHARGE

## 2022-10-11 RX ORDER — BACITRACIN ZINC 500 UNIT/G
1 OINTMENT IN PACKET (EA) TOPICAL
Qty: 0 | Refills: 0 | DISCHARGE
Start: 2022-10-11

## 2022-10-11 RX ORDER — DICLOFENAC SODIUM 75 MG/1
1 TABLET, DELAYED RELEASE ORAL
Qty: 0 | Refills: 0 | DISCHARGE

## 2022-10-11 RX ORDER — BACITRACIN ZINC 500 UNIT/G
1 OINTMENT IN PACKET (EA) TOPICAL DAILY
Refills: 0 | Status: DISCONTINUED | OUTPATIENT
Start: 2022-10-11 | End: 2022-10-11

## 2022-10-11 RX ORDER — PETROLATUM,WHITE
1 JELLY (GRAM) TOPICAL DAILY
Refills: 0 | Status: DISCONTINUED | OUTPATIENT
Start: 2022-10-11 | End: 2022-10-11

## 2022-10-11 RX ORDER — CLOPIDOGREL BISULFATE 75 MG/1
1 TABLET, FILM COATED ORAL
Qty: 0 | Refills: 0 | DISCHARGE

## 2022-10-11 RX ORDER — MAGNESIUM SULFATE 500 MG/ML
2 VIAL (ML) INJECTION ONCE
Refills: 0 | Status: COMPLETED | OUTPATIENT
Start: 2022-10-11 | End: 2022-10-11

## 2022-10-11 RX ADMIN — Medication 1 APPLICATION(S): at 13:04

## 2022-10-11 RX ADMIN — Medication 25 MILLIGRAM(S): at 06:20

## 2022-10-11 RX ADMIN — Medication 40 MILLIEQUIVALENT(S): at 10:03

## 2022-10-11 RX ADMIN — CARVEDILOL PHOSPHATE 6.25 MILLIGRAM(S): 80 CAPSULE, EXTENDED RELEASE ORAL at 06:20

## 2022-10-11 RX ADMIN — LISINOPRIL 40 MILLIGRAM(S): 2.5 TABLET ORAL at 06:21

## 2022-10-11 RX ADMIN — Medication 40 MILLIEQUIVALENT(S): at 13:05

## 2022-10-11 RX ADMIN — Medication 81 MILLIGRAM(S): at 12:45

## 2022-10-11 RX ADMIN — AMLODIPINE BESYLATE 10 MILLIGRAM(S): 2.5 TABLET ORAL at 06:20

## 2022-10-11 RX ADMIN — Medication 25 GRAM(S): at 10:02

## 2022-10-11 NOTE — DISCHARGE NOTE NURSING/CASE MANAGEMENT/SOCIAL WORK - PATIENT PORTAL LINK FT
You can access the FollowMyHealth Patient Portal offered by [facility] by registering at the following website: [email or web address]. By joining [employer]’s FollowMyHealth portal, you will also be able to view your health information using other applications (apps) compatible with our system.

## 2022-10-11 NOTE — PROGRESS NOTE ADULT - PROBLEM SELECTOR PLAN 7
- Baseline hemoglobin ~ 9  - Continue to monitor H/H

## 2022-10-11 NOTE — PROGRESS NOTE ADULT - PROBLEM SELECTOR PLAN 3
History of treatment resistant HTN, SBP > 180 this AM  - holding ramipril  - add HCTZ 25mg QD given HTN today  - c/w amlodipine 10 qd, coreg 6.25 BID History of treatment resistant HTN, SBP > 180 this admission  - holding home ramipril  - continue home HCTZ 25mg QD, amlodipine 10mg QD  - continue coreg 6.25 BID (home dose QD) History of treatment resistant HTN, SBP > 180 this admission  - holding home ramipril  - continue home HCTZ 25mg QD, amlodipine 10mg QD, coreg 6.25 BID

## 2022-10-11 NOTE — PROGRESS NOTE ADULT - PROBLEM SELECTOR PLAN 2
History of severe PAD c/b lower extremity ulcer in setting of diabetes  - S/P multiple stents. Last stent placed June 2022  - Hold DAPT give concern for GI hemorrhage. Discussed timeline for restarting given risk of stent thrombosis w/ cardiology, recommending restart as outpatient  - Continue statin

## 2022-10-11 NOTE — DISCHARGE NOTE NURSING/CASE MANAGEMENT/SOCIAL WORK - NSDCPEFALRISK_GEN_ALL_CORE
For information on Fall & Injury Prevention, visit: [email or web address] OR  [email or web address] OR  https://www.cdc.gov/steadi/patient.html

## 2022-10-11 NOTE — PROGRESS NOTE ADULT - PROBLEM SELECTOR PLAN 8
Diet: NPO pending GI eval  DVT ppx: Hold off on ppx given GI bleed  Dispo: pending scope today Diet: DASH/TLC  DVT ppx: Hold off on ppx given GI bleed  Dispo: anticipate d/c today

## 2022-10-11 NOTE — PROGRESS NOTE ADULT - PROVIDER SPECIALTY LIST ADULT
Gastroenterology
Surgery
Surgery
Vascular Cardiology
Gastroenterology
Gastroenterology
Internal Medicine

## 2022-10-11 NOTE — PROGRESS NOTE ADULT - SUBJECTIVE AND OBJECTIVE BOX
Vascular Cardiology Consult Note    DIRECT PROVIDER SERVICE LINE/CONSULT #: [phone]             OFFICE [phone]    CC:  GI Bleed    Interval Events:  Denies C/P or SOB.  No rest pain. No LE wounds.  No further GI bleeding noted.  EGD/ colonoscopy noted, no acute active bleed noted on studies.    Allergies  Cipro (Headache)    MEDICATIONS:  amLODIPine   Tablet 10 milliGRAM(s) Oral daily  aspirin  chewable 81 milliGRAM(s) Oral daily  carvedilol 6.25 milliGRAM(s) Oral every 12 hours  hydrochlorothiazide 25 milliGRAM(s) Oral daily  lisinopril 40 milliGRAM(s) Oral daily  gabapentin 100 milliGRAM(s) Oral two times a day  pantoprazole    Tablet 40 milliGRAM(s) Oral before breakfast  atorvastatin 40 milliGRAM(s) Oral at bedtime  glucagon  Injectable 1 milliGRAM(s) IntraMuscular once  BACItracin   Ointment 1 Application(s) Topical daily  influenza  Vaccine (HIGH DOSE) 0.7 milliLiter(s) IntraMuscular once  petrolatum white Ointment 1 Application(s) Topical daily  potassium chloride    Tablet ER 40 milliEquivalent(s) Oral every 4 hours    PAST MEDICAL & SURGICAL HISTORY:  DM (diabetes mellitus)  Ulcer of toe of right foot - resolved  LEILANI (obstructive sleep apnea)  Diabetes  HTN (hypertension)  High cholesterol  PAD (peripheral artery disease)  S/P gastric bypass  H/O bilateral breast reduction surgery  H/O abdominoplasty  S/P  section  History of intravascular stent placement  H/O  section  H/O gastric bypass  Peripheral arterial disease  S/P CABG x 3      FAMILY HISTORY:  No pertinent family history in first degree relatives    SOCIAL HISTORY:  unchanged    REVIEW OF SYSTEMS:  CONSTITUTIONAL: No fever  EYES: No eye pain  ENT:  No throat pain  NECK: No pain   RESPIRATORY: No SOB   CARDIOVASCULAR: No C/P  GASTROINTESTINAL: No abdominal pain  GENITOURINARY: No hematuria  NEUROLOGICAL: No memory loss  SKIN: No rash  LYMPH Nodes: No enlarged glands noted  ENDOCRINE: No heat or cold intolerance noted  MUSCULOSKELETAL: No LE edema  PSYCHIATRIC: No depression, anxiety  HEME/LYMPH: No  bleeding gums  ALLERGY AND IMMUNOLOGIC: No hives    [ x] All others negative	    PHYSICAL EXAM:  T(C): 36.8 (10-11-22 @ 10:36), Max: 36.9 (10-10-22 @ 21:03)  HR: 72 (10-11-22 @ 10:36) (58 - 79)  BP: 130/74 (10-11-22 @ 10:36) (130/74 - 168/71)  RR: 18 (10-11-22 @ 10:36) (12 - 21)  SpO2: 97% (10-11-22 @ 10:36) (97% - 100%)  I&O's Summary    10 Oct 2022 07:  -  11 Oct 2022 07:00  --------------------------------------------------------  IN: 460 mL / OUT: 0 mL / NET: 460 mL    11 Oct 2022 07:01  -  11 Oct 2022 14:15  --------------------------------------------------------  IN: 200 mL / OUT: 0 mL / NET: 200 mL    Appearance: NAD 	  HEENT:  NC/AT  Cardiovascular: RRR, S1 and S2   Respiratory: CTA B/L  Psychiatry:  AAO x 3  Gastrointestinal:  Soft, Non-tender, + BS	  Skin: No rashes, No ecchymoses, No cyanosis	  Neurologic: no focal deficit noted    Extremities: no LE edema, no LE wounds    Vascular Pulse Exam:  Right DP: audible    Left DP :  audible  Right PT: audible     Left PT: audible    LABS:	 	    CBC Full  -  ( 11 Oct 2022 08:08 )  WBC Count : 8.45 K/uL  Hemoglobin : 10.4 g/dL  Hematocrit : 31.5 %  Platelet Count - Automated : 227 K/uL  Mean Cell Volume : 79.9 fl  Mean Cell Hemoglobin : 26.4 pg  Mean Cell Hemoglobin Concentration : 33.0 gm/dL  Auto Neutrophil # : x  Auto Lymphocyte # : x  Auto Monocyte # : x  Auto Eosinophil # : x  Auto Basophil # : x  Auto Neutrophil % : x  Auto Lymphocyte % : x  Auto Monocyte % : x  Auto Eosinophil % : x  Auto Basophil % : x    10-11    142  |  106  |  10  ----------------------------<  86  3.2<L>   |  25  |  0.79  10-10    144  |  107  |  10  ----------------------------<  103<H>  3.6   |  25  |  0.79    Ca    8.3<L>      11 Oct 2022 08:08  Ca    9.0      10 Oct 2022 08:23  Phos  3.3     10-11  Phos  2.7     10-10  Mg     1.3     10-11  Mg     1.4     10-10      Assessment:  1. Lower GI bleeding on admission  2. PAD s/p prior intervention      Last in 2022 to R LE  3. CAD s/p CABG  4. HTN  5. HLD  6. DM  7. Sickle Cell Trait      Plan:  1. Continue ASA. Continue Lipitor.  2. Plavix to remain on hold in setting of recent GI bleeding. Do not resume on discharge. We will re-evaluate as outpatient.  3. Continue HCTZ, Coreg, Lisinopril. BP improved.  4. Appreciate GI, IR and surgery evaluation. Further input and evaluation per GI.  5. No rest pain and no LE wounds.   6. Replete hypokalemia and hypomagnesemia.   7. Appreciate care by Dr. Guillory. Discussed with primary team.     Thank you  CLINTON Dover, MS, [facility]  Vascular Cardiology Service    Please call with any questions:   DIRECT SERVICE NUMBER: [phone]  Office [phone]

## 2022-10-11 NOTE — PROGRESS NOTE ADULT - ASSESSMENT
gastric bypass on endosocpy  colon with ulceration around icv, normal ileum? ishcmeic  await path  can consider capsule to complete workup  d/w daughter np and pt today

## 2022-10-11 NOTE — PROGRESS NOTE ADULT - ATTENDING COMMENTS
Patient seen and evaluated. No further bleeding. No dizziness, lightheadedness. I personally spoke to Dr. Worrell, he would not do capsule as inpatient, only as outpatient. Clear from GI perspective to be discharged. Will be discharged off of plavix, continue ASA and current blood pressure medications. Patient advised to follow up with Dr. Worrell for capsule study and to follow up with biopsy results as outpatient.   d/c planning 31 minutes.

## 2022-10-11 NOTE — PROGRESS NOTE ADULT - SUBJECTIVE AND OBJECTIVE BOX
Internal Medicine   Kenrick Bell | PGY-1    OVERNIGHT EVENTS: No acute overnight events.    SUBJECTIVE:       MEDICATIONS  (STANDING):  amLODIPine   Tablet 10 milliGRAM(s) Oral daily  aspirin  chewable 81 milliGRAM(s) Oral daily  atorvastatin 40 milliGRAM(s) Oral at bedtime  carvedilol 6.25 milliGRAM(s) Oral every 12 hours  dextrose 50% Injectable 25 Gram(s) IV Push once  dextrose 50% Injectable 12.5 Gram(s) IV Push once  dextrose 50% Injectable 25 Gram(s) IV Push once  dextrose Oral Gel 15 Gram(s) Oral once  gabapentin 100 milliGRAM(s) Oral two times a day  glucagon  Injectable 1 milliGRAM(s) IntraMuscular once  hydrochlorothiazide 25 milliGRAM(s) Oral daily  influenza  Vaccine (HIGH DOSE) 0.7 milliLiter(s) IntraMuscular once  lisinopril 40 milliGRAM(s) Oral daily  pantoprazole    Tablet 40 milliGRAM(s) Oral before breakfast    MEDICATIONS  (PRN):      VITALS:  T(F): 98 (10-11-22 @ 05:50), Max: 98.4 (10-10-22 @ 21:03)  HR: 71 (10-11-22 @ 05:50) (58 - 79)  BP: 146/72 (10-11-22 @ 05:50) (132/64 - 188/76)  BP(mean): --  RR: 18 (10-11-22 @ 05:50) (12 - 21)  SpO2: 98% (10-11-22 @ 05:50) (96% - 100%)    PHYSICAL EXAM:   GENERAL: NAD, lying in bed comfortably  HEAD: Atraumatic, normocephalic  EYES: EOMI, conjunctiva and sclera clear, no nystagmus noted  ENT: Moist mucous membranes  CHEST/LUNG: Clear to auscultation bilaterally; no rales, rhonchi, wheezing, or rubs; unlabored respirations  HEART: Regular rate and rhythm; no murmurs, rubs, or gallops, normal S1/S2  ABDOMEN: Normal bowel sounds; soft, nontender, nondistended  EXTREMITIES: No clubbing, cyanosis, or edema  MSK: No gross deformities noted   Neurological: No focal deficits   SKIN: No rashes or lesions  PSYCH: Normal mood, affect     LABS:                      11.8   6.32  )-----------( 237      ( 10 Oct 2022 08:25 )             36.6     10-10  144  |  107  |  10  ----------------------------<  103<H>  3.6   |  25  |  0.79    Ca    9.0      10 Oct 2022 08:23  Phos  2.7     10-10  Mg     1.4     10-10    Creatinine Trend: 0.79<--, 1.06<--    I&O's Summary  10 Oct 2022 07:01  -  11 Oct 2022 07:00  --------------------------------------------------------  IN: 360 mL / OUT: 0 mL / NET: 360 mL   Internal Medicine   Kenrick Bell | PGY-1    OVERNIGHT EVENTS: No acute overnight events.    SUBJECTIVE: Reports rectal pain since colonoscopy, requesting bacitracin ointment. Denies bleeding though is very worried about recurring bleeding as no clear source was treated. Denies fevers, chest pain, shortness of breath.      MEDICATIONS  (STANDING):  amLODIPine   Tablet 10 milliGRAM(s) Oral daily  aspirin  chewable 81 milliGRAM(s) Oral daily  atorvastatin 40 milliGRAM(s) Oral at bedtime  carvedilol 6.25 milliGRAM(s) Oral every 12 hours  dextrose 50% Injectable 25 Gram(s) IV Push once  dextrose 50% Injectable 12.5 Gram(s) IV Push once  dextrose 50% Injectable 25 Gram(s) IV Push once  dextrose Oral Gel 15 Gram(s) Oral once  gabapentin 100 milliGRAM(s) Oral two times a day  glucagon  Injectable 1 milliGRAM(s) IntraMuscular once  hydrochlorothiazide 25 milliGRAM(s) Oral daily  influenza  Vaccine (HIGH DOSE) 0.7 milliLiter(s) IntraMuscular once  lisinopril 40 milliGRAM(s) Oral daily  pantoprazole    Tablet 40 milliGRAM(s) Oral before breakfast    MEDICATIONS  (PRN):      VITALS:  T(F): 98 (10-11-22 @ 05:50), Max: 98.4 (10-10-22 @ 21:03)  HR: 71 (10-11-22 @ 05:50) (58 - 79)  BP: 146/72 (10-11-22 @ 05:50) (132/64 - 188/76)  BP(mean): --  RR: 18 (10-11-22 @ 05:50) (12 - 21)  SpO2: 98% (10-11-22 @ 05:50) (96% - 100%)    PHYSICAL EXAM:   GENERAL: NAD, lying in bed comfortably, alert, conversational  HEAD: Atraumatic, normocephalic  EYES: EOMI, conjunctiva and sclera clear  CHEST/LUNG: Clear to auscultation bilaterally; no rales, rhonchi, wheezing, or rubs; unlabored respirations  HEART: Regular rate and rhythm; normal S1 S2; systolic flow murmur noted in aortic regions, no rubs or gallops  ABDOMEN: Normal bowel sounds; tender to deep palpation in RLQ, otherwise soft, nontender, nondistended  EXTREMITIES: No clubbing, cyanosis, or edema  MSK: No gross deformities noted   Neurological: No focal deficits   SKIN: No rashes or lesions  PSYCH: Normal mood, affect    LABS:                      11.8   6.32  )-----------( 237      ( 10 Oct 2022 08:25 )             36.6     10-10  144  |  107  |  10  ----------------------------<  103<H>  3.6   |  25  |  0.79    Ca    9.0      10 Oct 2022 08:23  Phos  2.7     10-10  Mg     1.4     10-10    Creatinine Trend: 0.79<--, 1.06<--    I&O's Summary  10 Oct 2022 07:01  -  11 Oct 2022 07:00  --------------------------------------------------------  IN: 360 mL / OUT: 0 mL / NET: 360 mL    < from: Upper Endoscopy (10.10.22 @ 16:26) >  Impression:          - Normal esophagus.                       - Normal stomach.                - Normal examined duodenum.                       - No specimens collected.  Recommendation:      - Await pathology results.  < end of copied text >    < from: Colonoscopy (10.10.22 @ 16:27) >  Impression:          - Diverticulosis in the entire examined colon. There wasno evidence of                        diverticular bleeding.                       - normal ti, narrowing, slight near ic valve with ulceration ? ischemiac  Recommendation:      - Resume regular diet.                       - Repeat colonoscopy in 10 years no.  < end of copied text >

## 2022-10-11 NOTE — PROGRESS NOTE ADULT - ASSESSMENT
72 year old female with lower GI bleed. Area of hemorrhage identified in descending colon on CT imaging.    Recommendations:  - Pt is s/p EGD and colonoscopy yesterday with no findings of acute bleeding.   - GI planning for capsule endoscopy to better assess  - No surgical intervention planned  - After discharge, if pt wishes to see a colorectal surgeon, please provide office information for Dr. Valle    [facility]  [phone]

## 2022-10-11 NOTE — PROGRESS NOTE ADULT - ASSESSMENT
72F w/ hx of CABG on plavix, gastric bypass, presented w/ multiple bloody bowel movements and Hgb 9.4->7.3. CTA abdomen/pelvis w/ concern for active bleed in the descending colon. Remains hemodynamically stable s/p 2U pRBCs 10/8, pending colonoscopy for further evaluation and intervention.  72F w/ hx of CABG on plavix, gastric bypass, presented w/ multiple bloody bowel movements and Hgb 9.4->7.3. CTA abdomen/pelvis w/ concern for active bleed in the descending colon. Remains hemodynamically stable s/p 2U pRBCs 10/8, EGD/colonoscopy notable for diverticulosis and ?ulceration near ileocecal valve.

## 2022-10-11 NOTE — PROGRESS NOTE ADULT - SUBJECTIVE AND OBJECTIVE BOX
Patient is a 72y Female     Patient is a 72y old  Female who presents with a chief complaint of GI Bleed (10 Oct 2022 09:58)      HPI:  Patient is a 72 year old female with PMHx of HTN, HOLD, DM, CABG, sickle cell trait at baseline, PAD s/p lower extremity stents in 2022 on DAPT (ASA and Plavix), presenting with BRBPR. States she has had 4-5 episodes of rectal bleeding in the past day. She initially had one BM with bloody streaks and then contacted her PMD who advised her to proceed to ED. However, while pending transportation she had several order BMs which were terrence red blood. Symptoms are associated with mild headache, fatigue, mild palpitations when laying on her left side, and SOB. Of note, for about 2-3 months, she had been having LLQ abdominal pain which has been keeping her awake at nights intermittently with constipation. She had consulted with her GI doctor and plans were made to scope. However, Cards clearance was required for persistent HTN. She was recently started on oral iron replacement therapy.  She denies any CP, diarrhea, weight loss, dizziness, hematemesis, low back pain, vaginal bleeding.     In the ED patient hemodynamically stable. POCT BG 60 (s/p dextrose). CT A/P in ED demonstrating GI bleed in descending colon. In ED, patient's initial Hgb was 9.4, dropped to 7.3 on repeat labs approximately 4 hours later. Receiving 1 unit pRBC. CRS was consulted and indicated no acute intervention necessary. IR also consulted and no acute intervention recommended. Currently pending GI eval.  (09 Oct 2022 04:35)      PAST MEDICAL & SURGICAL HISTORY:  DM (diabetes mellitus)      Ulcer of toe of right foot  3rd      LEILANI (obstructive sleep apnea)  does not use CPAP since gastric bypass surgery      Diabetes      HTN (hypertension)      High cholesterol      PAD (peripheral artery disease)      S/P gastric bypass      H/O bilateral breast reduction surgery      H/O abdominoplasty      S/P  section      History of intravascular stent placement      H/O  section      H/O gastric bypass      Peripheral arterial disease  s/p Right leg stent x 1 , left leg stent x 3      S/P CABG x 3            MEDICATIONS  (STANDING):  amLODIPine   Tablet 10 milliGRAM(s) Oral daily  aspirin  chewable 81 milliGRAM(s) Oral daily  atorvastatin 40 milliGRAM(s) Oral at bedtime  carvedilol 6.25 milliGRAM(s) Oral every 12 hours  dextrose 50% Injectable 25 Gram(s) IV Push once  dextrose 50% Injectable 12.5 Gram(s) IV Push once  dextrose 50% Injectable 25 Gram(s) IV Push once  dextrose Oral Gel 15 Gram(s) Oral once  gabapentin 100 milliGRAM(s) Oral two times a day  glucagon  Injectable 1 milliGRAM(s) IntraMuscular once  hydrochlorothiazide 25 milliGRAM(s) Oral daily  influenza  Vaccine (HIGH DOSE) 0.7 milliLiter(s) IntraMuscular once  lisinopril 40 milliGRAM(s) Oral daily  pantoprazole    Tablet 40 milliGRAM(s) Oral before breakfast      Allergies    Cipro (Headache)    Intolerances        SOCIAL HISTORY:  Denies ETOh,Smoking,     FAMILY HISTORY:  No pertinent family history in first degree relatives        REVIEW OF SYSTEMS:    CONSTITUTIONAL: No weakness, fevers or chills  EYES/ENT: No visual changes;  No vertigo or throat pain   NECK: No pain or stiffness  RESPIRATORY: No cough, wheezing, hemoptysis; No shortness of breath  CARDIOVASCULAR: No chest pain or palpitations  GASTROINTESTINAL: No abdominal or epigastric pain. No nausea, vomiting, or hematemesis; No diarrhea or constipation. No melena or hematochezia.  GENITOURINARY: No dysuria, frequency or hematuria  NEUROLOGICAL: No numbness or weakness  SKIN: No itching, burning, rashes, or lesions   All other review of systems is negative unless indicated above.    VITAL:  T(C): , Max: 36.9 (10-10-22 @ 21:03)  T(F): , Max: 98.4 (10-10-22 @ 21:03)  HR: 71 (10-11-22 @ 05:50)  BP: 146/72 (10-11-22 @ 05:50)  BP(mean): --  RR: 18 (10-11-22 @ 05:50)  SpO2: 98% (10-11-22 @ 05:50)  Wt(kg): --    I and O's:    10-10 @ 07:01  -  10-11 @ 07:00  --------------------------------------------------------  IN: 360 mL / OUT: 0 mL / NET: 360 mL      Height (cm): 165.1 (10-10 @ 17:13)    PHYSICAL EXAM:    Constitutional: NAD  HEENT: PERRLA,   Neck: No JVD  Respiratory: CTA B/L  Cardiovascular: S1 and S2  Gastrointestinal: BS+, soft, NT/ND  Extremities: No peripheral edema  Neurological: A/O x 3, no focal deficits  Psychiatric: Normal mood, normal affect  : No Watson  Skin: No rashes  Access: Not applicable  Back: No CVA tenderness    LABS:                        11.8   6.32  )-----------( 237      ( 10 Oct 2022 08:25 )             36.6     10-10    144  |  107  |  10  ----------------------------<  103<H>  3.6   |  25  |  0.79    Ca    9.0      10 Oct 2022 08:23  Phos  2.7     10-10  Mg     1.4     10-10            RADIOLOGY & ADDITIONAL STUDIES:

## 2022-10-11 NOTE — PROGRESS NOTE ADULT - PROBLEM SELECTOR PLAN 1
Patient with history of abdominal pain, now with lower GI bleed. Possibly 2/2 Diverticulitis vs colitis.   - CT AP significant for an area of gastrointestinal bleed in the descending colon.  - Continue fluid resuscitation. S/P 2U PRBCs -> Hb 10.8  - CBC Q4hrs, CTM hemodynamics, transfuse Hgb > 8 given hx CAD s/p CABG  - anticipate colonoscopy today, NPO since MN  - CRS and IR consulted. Appreciate recs. No plans for urgent intervention at this time Patient with history of abdominal pain, presented with significant lower GI bleed (9.4->7.3). No recurrent bleed since first day. CTAP suggestive of bleed in descending colon. EGD/colonoscopy significant for diverticulosis which may provide source of bleeding but no obvious location found.  - f/u gastric bypass bx  - s/p 2U pRBCs w/ adequate response  - CTM hemodynamics, CBC; transfuse Hgb > 8 given hx CAD s/p CABG  - CRS and IR consulted. Appreciate recs. No plans for urgent intervention at this time

## 2022-10-11 NOTE — PROGRESS NOTE ADULT - PROBLEM SELECTOR PLAN 4
A1c 6.0. Per daughter diabetes has been well controlled. Patient previously on metformin but discontinued due to incidence of hypoglycemia.  - C/b diabetic neuropathy  - Low dose SSI for now  - Monitor finger sticks A1c 6.0. Per daughter diabetes has been well controlled. Patient previously on metformin but discontinued due to incidence of hypoglycemia.  - C/b diabetic neuropathy  - Monitor finger sticks; holding SSI for now given recent hypoglycemia

## 2022-10-12 LAB — SURGICAL PATHOLOGY STUDY: SIGNIFICANT CHANGE UP

## 2022-10-20 PROCEDURE — 86900 BLOOD TYPING SEROLOGIC ABO: CPT

## 2022-10-20 PROCEDURE — 80048 BASIC METABOLIC PNL TOTAL CA: CPT

## 2022-10-20 PROCEDURE — 84100 ASSAY OF PHOSPHORUS: CPT

## 2022-10-20 PROCEDURE — 83036 HEMOGLOBIN GLYCOSYLATED A1C: CPT

## 2022-10-20 PROCEDURE — 85027 COMPLETE CBC AUTOMATED: CPT

## 2022-10-20 PROCEDURE — 36430 TRANSFUSION BLD/BLD COMPNT: CPT

## 2022-10-20 PROCEDURE — 80053 COMPREHEN METABOLIC PANEL: CPT

## 2022-10-20 PROCEDURE — 87635 SARS-COV-2 COVID-19 AMP PRB: CPT

## 2022-10-20 PROCEDURE — 86803 HEPATITIS C AB TEST: CPT

## 2022-10-20 PROCEDURE — 96374 THER/PROPH/DIAG INJ IV PUSH: CPT

## 2022-10-20 PROCEDURE — 83735 ASSAY OF MAGNESIUM: CPT

## 2022-10-20 PROCEDURE — 36415 COLL VENOUS BLD VENIPUNCTURE: CPT

## 2022-10-20 PROCEDURE — 86923 COMPATIBILITY TEST ELECTRIC: CPT

## 2022-10-20 PROCEDURE — P9016: CPT

## 2022-10-20 PROCEDURE — 85610 PROTHROMBIN TIME: CPT

## 2022-10-20 PROCEDURE — 71045 X-RAY EXAM CHEST 1 VIEW: CPT

## 2022-10-20 PROCEDURE — 88305 TISSUE EXAM BY PATHOLOGIST: CPT

## 2022-10-20 PROCEDURE — 86901 BLOOD TYPING SEROLOGIC RH(D): CPT

## 2022-10-20 PROCEDURE — 82962 GLUCOSE BLOOD TEST: CPT

## 2022-10-20 PROCEDURE — 74177 CT ABD & PELVIS W/CONTRAST: CPT | Mod: MA

## 2022-10-20 PROCEDURE — 85025 COMPLETE CBC W/AUTO DIFF WBC: CPT

## 2022-10-20 PROCEDURE — 85730 THROMBOPLASTIN TIME PARTIAL: CPT

## 2022-10-20 PROCEDURE — 99285 EMERGENCY DEPT VISIT HI MDM: CPT | Mod: 25

## 2022-10-20 PROCEDURE — 86850 RBC ANTIBODY SCREEN: CPT

## 2022-10-28 ENCOUNTER — APPOINTMENT (OUTPATIENT)
Dept: CARDIOLOGY | Facility: CLINIC | Age: 72
End: 2022-10-28

## 2022-10-28 VITALS
SYSTOLIC BLOOD PRESSURE: 144 MMHG | DIASTOLIC BLOOD PRESSURE: 72 MMHG | OXYGEN SATURATION: 96 % | HEIGHT: 65 IN | HEART RATE: 69 BPM | BODY MASS INDEX: 23.82 KG/M2 | WEIGHT: 143 LBS

## 2022-10-28 VITALS — OXYGEN SATURATION: 98 %

## 2022-10-28 PROCEDURE — 99214 OFFICE O/P EST MOD 30 MIN: CPT

## 2022-10-28 NOTE — DISCUSSION/SUMMARY
[FreeTextEntry1] : 68 y/o F with h/o DM, HTN, HLD, persistent claudication bilateral calves\par PAD s/p bilateral interventions complicated by acute limb ischemia April 2018 of left leg s/p CDT \par Most recent peripheral angiogram Dec 2018 for lifestyle limiting claudication: \par RIGHT - 80% CFA, 50% SFA, \par LEFT - 90% ISR SFA, 90% ISR POP\par INTERVENTION -  LEFT: SFA PANTERA, prox SFA SAMUEL, popliteal SAMUEL\par post CABG now \par R CFA, SFA, pop intervention Summer 2022\par Continued claudication - b/l LE arterial duplex 9/12/22 with improved appearance of R femoral and popliteal artery with improved flow in PT, DP, and AT artery. L PT occluded.\par \par Plan: \par 1. Continue ASA 81mg daily and atorvastatin 40mg daily\par 2. Will check CBC today and will decide on whether to resume plavix 75mg daily depending on this CBC result. \par 3. Continue current anti-hypertensive meds with routine BP monitoring at home\par 4. Daily foot monitoring at home.\par 5. Return in 3 months.

## 2022-10-28 NOTE — HISTORY OF PRESENT ILLNESS
[FreeTextEntry1] : 10/28/22\par \par US LE b/l 9/12/22: Improved appearance of the right femoral artery and popliteal artery with stents in place with decreasing velocities status post intervention. Improved visualization of flow is seen within the posterior tibial artery and peroneal/anterior tibial artery Left lower extremity shows patent stent with monophasic waveforms without elevated within the femoral artery, popliteal artery. Posterior tibial artery appears be occluded. The peroneal artery is identified.\par \par US renal 9/12/22: No evidence of a significant renal artery stenosis.\par \par Endorses L lower back pain. No neurologic deficits. No calf pain with exertion. No rest pain. No non-healing LE ulcers.\par \par Of note, was hospitalized early October with GIB s/p EGD and colonoscopy, no active bleeding was seen. Plavix was discontinued at that time and not resumed. No further complaint of GIB including hematochezia or melena. On protonix. \par \par ROS otherwise normal. \par \par Medications:\par Aspirin 81\par Amlodipine 10mg daily \par Ramipril 10mg daily \par Diclofenac\par Pantoprazole 40mg daily\par Coreg 6.25 mg BID  \par Atorvastatin\par \par BP well controlled at home. \par \par 7/15/2022\par \par Had intevention with Dr. Brooke\par R CFA, SFA, POP and TPT\par also found  to have renal artery stenosis\par The wound on the right 3rd digit is healing/healed.  \par She still has calf pains bilaterally\par No blood in the urine or stool\par She has audible signal bilaterally, monophasic. \par Pain in the r toes is better now\par She feels tired and fatigued\par \par 3/16/2022\par \par ECHO:   11/1/2021 - mild concentric LVH, normal LV function \par Carotid 10/4/2021:  L ICA :  50-79%.  Right ICA :  16-49%\par RIGHT subclavian stenosis \par Coreg stopped 1 month ago - ran out of meds.  \par \par Complains of left sided foot numbness and claudication. \par R leg also with claudication. \par \par She can walk 2 blocks.  After 2 blocks she starts having pain in the calf.  Resolves with rest.  Resolves after 1 minute.  She is not walking much because its cold.  \par \par \par PCP:  Dr. Tung Cherrychris\par \par \par 70 y/o F with h/o DM, HTN, HLD PAD with RT SFA stenting in 2016. Initially presented with worsening left claudication. Post left le intervention with SAMUEL /stent of left SFA, PTA of left pop. PTA and stent left tibioperoneal and prox peroneal.  Presented back to the hospital April 2018 with acute limb underwent catheter directed lysis and PTA / stent of left SFA, Popliteal, peroneal.  \par \par Pt with continued severe disabling claudication. Went for angiogram 12/13 found to have 90% ISR of L SFA and pop s/p L SFA stenting, SAMUEL to SFA and pop. Noted to have 80% stenosis in RIGHT CFA\par \par H/o CABG in reports frequent palpitations and occasional 'pain everywhere' unrelated to physical activity.  \par \par Cath 12/13/18 - intervention on LEFT SFA and POP ISR.  s/p stent to sfa and SAMUEL to prox SFA and pop\par \par For f/u today reports left foot/toes pain at night now for 2 weeks occasional dangles it of the bed for relief. Reports exercising in the gym for 30 mins on the stationary bicycle with no Sx.    \par \par \par  \par

## 2022-10-28 NOTE — PHYSICAL EXAM
[General Appearance - Well Developed] : well developed [General Appearance - Well Nourished] : well nourished [Normal Conjunctiva] : the conjunctiva exhibited no abnormalities [Normal Oral Mucosa] : normal oral mucosa [Normal Jugular Venous V Waves Present] : normal jugular venous V waves present [] : no respiratory distress [Heart Sounds] : normal S1 and S2 [1+] : left 1+ [0] : right 0 [2+] : left 2+ [Bowel Sounds] : normal bowel sounds [Nail Clubbing] : no clubbing of the fingernails [Skin Color & Pigmentation] : normal skin color and pigmentation [Oriented To Time, Place, And Person] : oriented to person, place, and time [FreeTextEntry1] : Audible DP and PT on R, monophasic. R pedal arch with audible signal.  R 3rd digit healed with overlying callus. L DP and PT audible and monophasic. L plantar arch audible.

## 2022-10-31 ENCOUNTER — NON-APPOINTMENT (OUTPATIENT)
Age: 72
End: 2022-10-31

## 2022-10-31 LAB
ALBUMIN SERPL ELPH-MCNC: 3.8 G/DL
ALP BLD-CCNC: 142 U/L
ALT SERPL-CCNC: 11 U/L
ANION GAP SERPL CALC-SCNC: 12 MMOL/L
AST SERPL-CCNC: 18 U/L
BASOPHILS # BLD AUTO: 0.05 K/UL
BASOPHILS NFR BLD AUTO: 0.8 %
BILIRUB SERPL-MCNC: 0.5 MG/DL
BUN SERPL-MCNC: 16 MG/DL
CALCIUM SERPL-MCNC: 9.3 MG/DL
CHLORIDE SERPL-SCNC: 103 MMOL/L
CO2 SERPL-SCNC: 27 MMOL/L
CREAT SERPL-MCNC: 1.07 MG/DL
EGFR: 55 ML/MIN/1.73M2
EOSINOPHIL # BLD AUTO: 0.23 K/UL
EOSINOPHIL NFR BLD AUTO: 3.5 %
GLUCOSE SERPL-MCNC: 99 MG/DL
HCT VFR BLD CALC: 36.4 %
HGB BLD-MCNC: 11.8 G/DL
IMM GRANULOCYTES NFR BLD AUTO: 0.2 %
LYMPHOCYTES # BLD AUTO: 1.76 K/UL
LYMPHOCYTES NFR BLD AUTO: 26.5 %
MAN DIFF?: NORMAL
MCHC RBC-ENTMCNC: 27.1 PG
MCHC RBC-ENTMCNC: 32.4 GM/DL
MCV RBC AUTO: 83.7 FL
MONOCYTES # BLD AUTO: 0.61 K/UL
MONOCYTES NFR BLD AUTO: 9.2 %
NEUTROPHILS # BLD AUTO: 3.98 K/UL
NEUTROPHILS NFR BLD AUTO: 59.8 %
PLATELET # BLD AUTO: 260 K/UL
POTASSIUM SERPL-SCNC: 4.7 MMOL/L
PROT SERPL-MCNC: 6.3 G/DL
RBC # BLD: 4.35 M/UL
RBC # FLD: 19.9 %
SODIUM SERPL-SCNC: 142 MMOL/L
WBC # FLD AUTO: 6.64 K/UL

## 2022-11-01 NOTE — DISCHARGE NOTE ADULT - NS DC ANGIO PCI YN
on the discharge service for the patient. I have reviewed and made amendments to the documentation where necessary. yes

## 2023-01-01 NOTE — ED CLERICAL - BED REQUESTED
23-Mar-2017 03:42 Home Oxygen Evaluation    Oxygen Saturation 92% at Rest on Room Air  Oxygen Saturation 92% at Rest on 0LPM Oxygen      Oxygen Saturation 90% with Activity on Room Air  Oxygen Saturation 90% with Activity on 0LPM Oxygen      Evaluation completed using a Finger Probe/Forehead Probe and Continuous Flow Oxygen. Recommendation patient does not qualify for home oxygen.

## 2023-01-11 ENCOUNTER — TRANSCRIPTION ENCOUNTER (OUTPATIENT)
Age: 73
End: 2023-01-11

## 2023-01-31 ENCOUNTER — APPOINTMENT (OUTPATIENT)
Dept: CARDIOLOGY | Facility: CLINIC | Age: 73
End: 2023-01-31
Payer: MEDICARE

## 2023-01-31 VITALS
HEIGHT: 65 IN | DIASTOLIC BLOOD PRESSURE: 76 MMHG | BODY MASS INDEX: 23.16 KG/M2 | WEIGHT: 139 LBS | OXYGEN SATURATION: 100 % | SYSTOLIC BLOOD PRESSURE: 159 MMHG | HEART RATE: 68 BPM

## 2023-01-31 DIAGNOSIS — Z95.1 PRESENCE OF AORTOCORONARY BYPASS GRAFT: ICD-10-CM

## 2023-01-31 DIAGNOSIS — I10 ESSENTIAL (PRIMARY) HYPERTENSION: ICD-10-CM

## 2023-01-31 LAB — TROPONIN-T, HIGH SENSITIVITY: 20 NG/L

## 2023-01-31 PROCEDURE — 99214 OFFICE O/P EST MOD 30 MIN: CPT

## 2023-01-31 NOTE — PHYSICAL EXAM
[General Appearance - Well Developed] : well developed [General Appearance - Well Nourished] : well nourished [Normal Conjunctiva] : the conjunctiva exhibited no abnormalities [Normal Oral Mucosa] : normal oral mucosa [Normal Jugular Venous V Waves Present] : normal jugular venous V waves present [] : no respiratory distress [Heart Sounds] : normal S1 and S2 [1+] : left 1+ [0] : right 0 [2+] : left 2+ [Bowel Sounds] : normal bowel sounds [Nail Clubbing] : no clubbing of the fingernails [Oriented To Time, Place, And Person] : oriented to person, place, and time [No Skin Ulcers] : no skin ulcer [FreeTextEntry1] : Audible DP and PT on R.  R 3rd digit healed with overlying callus. L DP and PT audible. L plantar arch audible.

## 2023-01-31 NOTE — DISCUSSION/SUMMARY
[FreeTextEntry1] : Assessment:\par 73 y/o F with h/o DM, HTN, HLD, persistent claudication bilateral calves\par PAD s/p bilateral interventions complicated by acute limb ischemia April 2018 of left leg s/p CDT \par Peripheral angiogram Dec 2018 for lifestyle limiting claudication: \par RIGHT - 80% CFA, 50% SFA, \par LEFT - 90% ISR SFA, 90% ISR POP\par INTERVENTION -  LEFT: SFA PANTERA, prox SFA SAMUEL, popliteal SAMUEL\par post CABG \par R CFA, SFA, pop intervention Summer 2022\par Continued claudication - b/l LE arterial duplex 9/12/22 with improved appearance of R femoral and popliteal artery with improved flow in PT, DP, and AT artery. L PT occluded.\par Recent hospitalization for GI bleed with elevated Troponin thought due to demand ischemia \par \par Plan: \par 1. Continue Plavix. Recommend to resume Statin.\par 2. Transthoracic Echocardiogram for structural evaluation.\par 3. Repeat LE arterial duplex for PAD.\par 4. Stop Diclofenac. Tylenol and Gabapentin PRN.\par 5. Labs today including CBC and Troponin. \par 6. Follow-up with GI.\par 7. Low salt diet. Home BP monitoring. Continue BP regimen.\par 8. Follow-up in 3 months. Will call with tests results. Call with any questions.

## 2023-01-31 NOTE — HISTORY OF PRESENT ILLNESS
[FreeTextEntry1] : 1/31/23\par \par Denies C/P or SOB.\par Reports back pain may radiate to her legs. More in R LE than in L.\par Reports L LE numbness.\par Reports these symptoms are chronic.\par No LE wounds. \par Reports home BP 140s-150s/70s.\par \par Recently admitted this month to [facility] for GI bleeding noted to have elevated Troponin. \par CTA Abd/Pelvis showed acute diverticulitis, L ovarian cyst (pelvic US recommended), R breast nodule (correlate with mammogram), short stenosis of celiac artery.  \par \par Recieved IV ABX and IV Iron.\par Troponin I was 426 to 397 to 428.\par Recieved IV Magnesium for low Mg. \par Hgb on 9.3 on 1/14. \par Held Plavix in the hospital and then resumed. \par Currently not taking ASA. \par TTE was done. Not in chart with patient.\par Denies SOB on climbing 1 flight of stairs.\par Denies any bleeding since discharge. \par \par Medications:\par Plavix\par Amlodipine\par Ramipril \par Diclofenac\par Pantoprazole \par Coreg \par Atorvastatin (not taking)\par \par 10/31\par \par Hgb normal, above 11.5.\par Resume Plavix.\par Repeat CBC in 1 week.\par \par 10/28/22\par \par US LE b/l 9/12/22: Improved appearance of the right femoral artery and popliteal artery with stents in place with decreasing velocities status post intervention. Improved visualization of flow is seen within the posterior tibial artery and peroneal/anterior tibial artery Left lower extremity shows patent stent with monophasic waveforms without elevated within the femoral artery, popliteal artery. Posterior tibial artery appears be occluded. The peroneal artery is identified.\par \par US renal 9/12/22: No evidence of a significant renal artery stenosis.\par \par Endorses L lower back pain. No neurologic deficits. No calf pain with exertion. No rest pain. No non-healing LE ulcers.\par \par Of note, was hospitalized early October with GIB s/p EGD and colonoscopy, no active bleeding was seen. Plavix was discontinued at that time and not resumed. No further complaint of GIB including hematochezia or melena. On protonix. \par \par ROS otherwise normal. \par \par Medications:\par Aspirin 81\par Amlodipine 10mg daily \par Ramipril 10mg daily \par Diclofenac\par Pantoprazole 40mg daily\par Coreg 6.25 mg BID  \par Atorvastatin\par \par BP well controlled at home. \par \par 7/15/2022\par \par Had intevention with Dr. Brooke\par R CFA, SFA, POP and TPT\par also found  to have renal artery stenosis\par The wound on the right 3rd digit is healing/healed.  \par She still has calf pains bilaterally\par No blood in the urine or stool\par She has audible signal bilaterally, monophasic. \par Pain in the r toes is better now\par She feels tired and fatigued\par \par 3/16/2022\par \par ECHO:   11/1/2021 - mild concentric LVH, normal LV function \par Carotid 10/4/2021:  L ICA :  50-79%.  Right ICA :  16-49%\par RIGHT subclavian stenosis \par Coreg stopped 1 month ago - ran out of meds.  \par \par Complains of left sided foot numbness and claudication. \par R leg also with claudication. \par \par She can walk 2 blocks.  After 2 blocks she starts having pain in the calf.  Resolves with rest.  Resolves after 1 minute.  She is not walking much because its cold.  \par \par \par PCP:  Dr. Tung CherryHagerman\par \par \par 68 y/o F with h/o DM, HTN, HLD PAD with RT SFA stenting in 2016. Initially presented with worsening left claudication. Post left le intervention with SAMUEL /stent of left SFA, PTA of left pop. PTA and stent left tibioperoneal and prox peroneal.  Presented back to the hospital April 2018 with acute limb underwent catheter directed lysis and PTA / stent of left SFA, Popliteal, peroneal.  \par \par Pt with continued severe disabling claudication. Went for angiogram 12/13 found to have 90% ISR of L SFA and pop s/p L SFA stenting, SAMUEL to SFA and pop. Noted to have 80% stenosis in RIGHT CFA\par \par H/o CABG in reports frequent palpitations and occasional 'pain everywhere' unrelated to physical activity.  \par \par Cath 12/13/18 - intervention on LEFT SFA and POP ISR.  s/p stent to sfa and SAMUEL to prox SFA and pop\par \par For f/u today reports left foot/toes pain at night now for 2 weeks occasional dangles it of the bed for relief. Reports exercising in the gym for 30 mins on the stationary bicycle with no Sx.    \par \par \par  \par

## 2023-02-01 LAB
ALBUMIN SERPL ELPH-MCNC: 4.3 G/DL
ALP BLD-CCNC: 130 U/L
ALT SERPL-CCNC: 15 U/L
ANION GAP SERPL CALC-SCNC: 18 MMOL/L
AST SERPL-CCNC: 21 U/L
BASOPHILS # BLD AUTO: 0.04 K/UL
BASOPHILS NFR BLD AUTO: 0.7 %
BILIRUB SERPL-MCNC: 0.5 MG/DL
BUN SERPL-MCNC: 17 MG/DL
CALCIUM SERPL-MCNC: 9.8 MG/DL
CHLORIDE SERPL-SCNC: 106 MMOL/L
CO2 SERPL-SCNC: 22 MMOL/L
CREAT SERPL-MCNC: 0.97 MG/DL
EGFR: 61 ML/MIN/1.73M2
EOSINOPHIL # BLD AUTO: 0.25 K/UL
EOSINOPHIL NFR BLD AUTO: 4.1 %
GLUCOSE SERPL-MCNC: 109 MG/DL
HCT VFR BLD CALC: 35.3 %
HGB BLD-MCNC: 11 G/DL
IMM GRANULOCYTES NFR BLD AUTO: 0.2 %
LYMPHOCYTES # BLD AUTO: 1.61 K/UL
LYMPHOCYTES NFR BLD AUTO: 26.4 %
MAN DIFF?: NORMAL
MCHC RBC-ENTMCNC: 27.4 PG
MCHC RBC-ENTMCNC: 31.2 GM/DL
MCV RBC AUTO: 87.8 FL
MONOCYTES # BLD AUTO: 0.31 K/UL
MONOCYTES NFR BLD AUTO: 5.1 %
NEUTROPHILS # BLD AUTO: 3.89 K/UL
NEUTROPHILS NFR BLD AUTO: 63.5 %
NT-PROBNP SERPL-MCNC: 1037 PG/ML
PLATELET # BLD AUTO: 358 K/UL
POTASSIUM SERPL-SCNC: 3.9 MMOL/L
PROT SERPL-MCNC: 7.1 G/DL
RBC # BLD: 4.02 M/UL
RBC # FLD: 16.3 %
SODIUM SERPL-SCNC: 146 MMOL/L
TROPONIN I SERPL-MCNC: 0.01 NG/ML
WBC # FLD AUTO: 6.11 K/UL

## 2023-03-10 ENCOUNTER — OUTPATIENT (OUTPATIENT)
Dept: OUTPATIENT SERVICES | Facility: HOSPITAL | Age: 73
LOS: 1 days | End: 2023-03-10
Payer: COMMERCIAL

## 2023-03-10 ENCOUNTER — APPOINTMENT (OUTPATIENT)
Dept: ULTRASOUND IMAGING | Facility: HOSPITAL | Age: 73
End: 2023-03-10

## 2023-03-10 DIAGNOSIS — I73.9 PERIPHERAL VASCULAR DISEASE, UNSPECIFIED: ICD-10-CM

## 2023-03-10 DIAGNOSIS — Z98.84 BARIATRIC SURGERY STATUS: Chronic | ICD-10-CM

## 2023-03-10 DIAGNOSIS — Z95.828 PRESENCE OF OTHER VASCULAR IMPLANTS AND GRAFTS: Chronic | ICD-10-CM

## 2023-03-10 DIAGNOSIS — Z98.890 OTHER SPECIFIED POSTPROCEDURAL STATES: Chronic | ICD-10-CM

## 2023-03-10 DIAGNOSIS — Z98.891 HISTORY OF UTERINE SCAR FROM PREVIOUS SURGERY: Chronic | ICD-10-CM

## 2023-03-10 DIAGNOSIS — Z95.1 PRESENCE OF AORTOCORONARY BYPASS GRAFT: Chronic | ICD-10-CM

## 2023-03-10 DIAGNOSIS — I73.9 PERIPHERAL VASCULAR DISEASE, UNSPECIFIED: Chronic | ICD-10-CM

## 2023-03-10 DIAGNOSIS — Z98.89 OTHER SPECIFIED POSTPROCEDURAL STATES: Chronic | ICD-10-CM

## 2023-03-10 PROCEDURE — 93925 LOWER EXTREMITY STUDY: CPT

## 2023-03-10 PROCEDURE — 93925 LOWER EXTREMITY STUDY: CPT | Mod: 26

## 2023-03-21 ENCOUNTER — NON-APPOINTMENT (OUTPATIENT)
Age: 73
End: 2023-03-21

## 2023-03-21 VITALS
OXYGEN SATURATION: 100 % | SYSTOLIC BLOOD PRESSURE: 119 MMHG | RESPIRATION RATE: 16 BRPM | HEART RATE: 60 BPM | DIASTOLIC BLOOD PRESSURE: 79 MMHG

## 2023-03-21 LAB
ALBUMIN SERPL ELPH-MCNC: 3.8 G/DL
ALP BLD-CCNC: 134 U/L
ALT SERPL-CCNC: 10 U/L
ANION GAP SERPL CALC-SCNC: 14 MMOL/L
APTT BLD: 32.8 SEC
AST SERPL-CCNC: 18 U/L
BASOPHILS # BLD AUTO: 0.02 K/UL
BASOPHILS NFR BLD AUTO: 0.3 %
BILIRUB SERPL-MCNC: 0.7 MG/DL
BUN SERPL-MCNC: 28 MG/DL
CALCIUM SERPL-MCNC: 9.5 MG/DL
CHLORIDE SERPL-SCNC: 103 MMOL/L
CO2 SERPL-SCNC: 25 MMOL/L
CREAT SERPL-MCNC: 1.41 MG/DL
EGFR: 39 ML/MIN/1.73M2
EOSINOPHIL # BLD AUTO: 0.23 K/UL
EOSINOPHIL NFR BLD AUTO: 3.5 %
GLUCOSE SERPL-MCNC: 100 MG/DL
HCT VFR BLD CALC: 38.1 %
HGB BLD-MCNC: 11.9 G/DL
IMM GRANULOCYTES NFR BLD AUTO: 0.2 %
INR PPP: 1.07 RATIO
LYMPHOCYTES # BLD AUTO: 1.14 K/UL
LYMPHOCYTES NFR BLD AUTO: 17.3 %
MAN DIFF?: NORMAL
MCHC RBC-ENTMCNC: 27.3 PG
MCHC RBC-ENTMCNC: 31.2 GM/DL
MCV RBC AUTO: 87.4 FL
MONOCYTES # BLD AUTO: 0.48 K/UL
MONOCYTES NFR BLD AUTO: 7.3 %
NEUTROPHILS # BLD AUTO: 4.71 K/UL
NEUTROPHILS NFR BLD AUTO: 71.4 %
PLATELET # BLD AUTO: 283 K/UL
POTASSIUM SERPL-SCNC: 4 MMOL/L
PROT SERPL-MCNC: 6.2 G/DL
PT BLD: 12.4 SEC
RBC # BLD: 4.36 M/UL
RBC # FLD: 14.3 %
SODIUM SERPL-SCNC: 142 MMOL/L
WBC # FLD AUTO: 6.59 K/UL

## 2023-03-21 NOTE — H&P CARDIOLOGY - HISTORY OF PRESENT ILLNESS
Patient is a 72yr old female with PMh of DM, HTN, LEILANI, CABG X3, HLD, PAD, with Rt SFA stenting in 2016 who has been having symptoms of worsening claudication.  Patient was recently admitted 1/2023 to [facility] for GI bleeding noted to have elevated Troponin. During that time patient had CTA Abd/Pelvis that showed acute diverticulitis, L ovarian cyst (pelvic US recommended), R breast nodule (correlate with mammogram), short stenosis of celiac artery. Patient states she has been having back pain that is radiating to her legs; moreseo the right over the left with LLE numbness.  Patient reports new R hallux wound. Had Arterial duplex showed: R CFA severe stenosis, R SFA severe ISR, R popliteal severe stenosis.  Patient is here for planned for R LE peripheral angiogram.  Sherwood classification 5.      < from: [facility] Duplex Lower Extrem Arterial, Bilat (03.10.23 @ 14:26) >    IMPRESSION:    Diffuse calcified plaque with progressive high-grade stenosis in the   right common femoral artery.    Patency of bilateral arterial stents but with new high-grade in-stent   stenoses in both superficial femoral arteries and the right popliteal   artery    Occlusion of the right anterior tibial and left posterior tibial arteries.    Hemodynamically significant stenosis in the right posterior tibial artery.      Vital Signs Last 24 Hrs  T(C): --  T(F): --  HR: --  BP: --  BP(mean): --  RR: --  SpO2: --      Bleeding Risk: 5.3%  	    EF:   EKG:  73 y/o female presents today for LLE angiogram.  Pt complains of worsening LLE pain with mild ambulation and at  rest.  Pain described as sharp, 8-10/10. PMHx of HTN, HLD, DM, CABG, sickle cell trait at baseline, PAD s/p lower extremity stents in June 2022 on DAPT (ASA and Plavix).     6/2022   INTERVENTIONS:  - Successful PTA of of right CFA with 7.0 x 60 mm [place] DCB   - successful PTA of right SFA and Popliteal Artery with 4.0 x 220 mm [place] OTW balloon, 5.0 x 200 mm [place] Drug Coated Balloon, 6.0 x 80 mm Epic stent   - Successful PTA of right Popliteal Artery with 4.0 x 220 mm Franko OTW balloon, and 5.0 x 200 mm [place] Drug Coated Balloon.  - Successful PTA of of right Tibioperoneal Trunk with Two 3.5 - 6.0 mm x 6 mm Tack Stents     PERIPHERAL DIAGNOSTIC ANGIOGRAM/INTERVENTION SUMMARY:  - severe right CFA, SFA, Popliteal, and TP trunk disease with one vessel distal run off, s/p successful PTA of CFA, SFA, Popliteal Artery and TP trunk as above.      Patient is here for planned for L LE peripheral angiogram.  Peri classification 5.      < from: [facility] Duplex Lower Extrem Arterial, Bilat (03.10.23 @ 14:26) >    IMPRESSION:    Diffuse calcified plaque with progressive high-grade stenosis in the   right common femoral artery.    Patency of bilateral arterial stents but with new high-grade in-stent   stenoses in both superficial femoral arteries and the right popliteal   artery    Occlusion of the right anterior tibial and left posterior tibial arteries.    Hemodynamically significant stenosis in the right posterior tibial artery.      REVIEW OF SYSTEMS:  CONSTITUTIONAL: No fever, weight loss, or fatigue  CARDIOLOGY: PAtient denies chest pain, shortness of breath or syncopal episodes.   RESPIRATORY: denies shortness of breath, wheezing  NEUROLOGICAL: NO weakness, no focal deficits to report.  ENDOCRINOLOGICAL: no recent change in diabetic medications.   GI: no BRBPR, no N,V,diarrhea.     PHYSICAL EXAM:  · CONSTITUTIONAL:	Well-developed, well nourished    ·RESPIRATORY:   airway patent; breath sounds equal; good air movement; respirations non-labored; clear to auscultation bilaterally; no chest wall tenderness; no intercostal retractions; no rales,rhonchi or wheeze  · CARDIOVASCULAR	regular rate and rhythm  no rub  no murmur  normal PMI  · EXTREMITIES: great toe and  3rd toe ulcer,  No edema  · VASCULAR: 	LLE:  DP/PT via doppler                                      RLE: + DP/ PT    Bleeding Risk: 5.3%

## 2023-03-21 NOTE — H&P CARDIOLOGY - SOURCE
Condition:: Rash Please Describe Your Condition:: Itchy, red, scaly rash on right forehead and right inferior eyelid, present for 2 weeks. Currently worsening in severity.  Uses  blaise's cream and glycerin soap which is not helping. She has a cat at home. Has a history of ring worm, possibly from cat. chart/Patient/Other

## 2023-03-23 ENCOUNTER — TRANSCRIPTION ENCOUNTER (OUTPATIENT)
Age: 73
End: 2023-03-23

## 2023-03-23 ENCOUNTER — INPATIENT (INPATIENT)
Facility: HOSPITAL | Age: 73
LOS: 0 days | Discharge: HOME | DRG: 271 | End: 2023-03-24
Attending: INTERNAL MEDICINE | Admitting: INTERNAL MEDICINE
Payer: MEDICARE

## 2023-03-23 DIAGNOSIS — I70.223 ATHEROSCLEROSIS OF NATIVE ARTERIES OF EXTREMITIES WITH REST PAIN, BILATERAL LEGS: ICD-10-CM

## 2023-03-23 DIAGNOSIS — Z80.49 FAMILY HISTORY OF MALIGNANT NEOPLASM OF OTHER GENITAL ORGANS: ICD-10-CM

## 2023-03-23 DIAGNOSIS — Z98.89 OTHER SPECIFIED POSTPROCEDURAL STATES: Chronic | ICD-10-CM

## 2023-03-23 DIAGNOSIS — Z79.899 OTHER LONG TERM (CURRENT) DRUG THERAPY: ICD-10-CM

## 2023-03-23 DIAGNOSIS — E78.00 PURE HYPERCHOLESTEROLEMIA, UNSPECIFIED: ICD-10-CM

## 2023-03-23 DIAGNOSIS — I73.9 PERIPHERAL VASCULAR DISEASE, UNSPECIFIED: ICD-10-CM

## 2023-03-23 DIAGNOSIS — I73.9 PERIPHERAL VASCULAR DISEASE, UNSPECIFIED: Chronic | ICD-10-CM

## 2023-03-23 DIAGNOSIS — Z95.820 PERIPHERAL VASCULAR ANGIOPLASTY STATUS WITH IMPLANTS AND GRAFTS: ICD-10-CM

## 2023-03-23 DIAGNOSIS — Z98.891 HISTORY OF UTERINE SCAR FROM PREVIOUS SURGERY: Chronic | ICD-10-CM

## 2023-03-23 DIAGNOSIS — Z88.1 ALLERGY STATUS TO OTHER ANTIBIOTIC AGENTS STATUS: ICD-10-CM

## 2023-03-23 DIAGNOSIS — I10 ESSENTIAL (PRIMARY) HYPERTENSION: ICD-10-CM

## 2023-03-23 DIAGNOSIS — K56.609 UNSPECIFIED INTESTINAL OBSTRUCTION, UNSPECIFIED AS TO PARTIAL VERSUS COMPLETE OBSTRUCTION: ICD-10-CM

## 2023-03-23 DIAGNOSIS — Z79.02 LONG TERM (CURRENT) USE OF ANTITHROMBOTICS/ANTIPLATELETS: ICD-10-CM

## 2023-03-23 DIAGNOSIS — T82.856A STENOSIS OF PERIPHERAL VASCULAR STENT, INITIAL ENCOUNTER: ICD-10-CM

## 2023-03-23 DIAGNOSIS — I70.92 CHRONIC TOTAL OCCLUSION OF ARTERY OF THE EXTREMITIES: ICD-10-CM

## 2023-03-23 DIAGNOSIS — Z98.84 BARIATRIC SURGERY STATUS: Chronic | ICD-10-CM

## 2023-03-23 DIAGNOSIS — Z79.82 LONG TERM (CURRENT) USE OF ASPIRIN: ICD-10-CM

## 2023-03-23 DIAGNOSIS — G47.33 OBSTRUCTIVE SLEEP APNEA (ADULT) (PEDIATRIC): ICD-10-CM

## 2023-03-23 DIAGNOSIS — Z98.84 BARIATRIC SURGERY STATUS: ICD-10-CM

## 2023-03-23 DIAGNOSIS — D57.3 SICKLE-CELL TRAIT: ICD-10-CM

## 2023-03-23 DIAGNOSIS — N88.2 STRICTURE AND STENOSIS OF CERVIX UTERI: ICD-10-CM

## 2023-03-23 DIAGNOSIS — E11.51 TYPE 2 DIABETES MELLITUS WITH DIABETIC PERIPHERAL ANGIOPATHY WITHOUT GANGRENE: ICD-10-CM

## 2023-03-23 DIAGNOSIS — Z98.890 OTHER SPECIFIED POSTPROCEDURAL STATES: Chronic | ICD-10-CM

## 2023-03-23 DIAGNOSIS — Z95.828 PRESENCE OF OTHER VASCULAR IMPLANTS AND GRAFTS: Chronic | ICD-10-CM

## 2023-03-23 DIAGNOSIS — Z95.1 PRESENCE OF AORTOCORONARY BYPASS GRAFT: Chronic | ICD-10-CM

## 2023-03-23 DIAGNOSIS — Z95.1 PRESENCE OF AORTOCORONARY BYPASS GRAFT: ICD-10-CM
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
## 2023-03-23 DIAGNOSIS — N95.0 POSTMENOPAUSAL BLEEDING: ICD-10-CM
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
## 2023-03-23 DIAGNOSIS — Z53.09 PROCEDURE AND TREATMENT NOT CARRIED OUT BECAUSE OF OTHER CONTRAINDICATION: ICD-10-CM

## 2023-03-23 LAB
ANION GAP SERPL CALC-SCNC: 10 MMOL/L — SIGNIFICANT CHANGE UP (ref 7–14)
BUN SERPL-MCNC: 13 MG/DL — SIGNIFICANT CHANGE UP (ref 10–20)
CALCIUM SERPL-MCNC: 9 MG/DL — SIGNIFICANT CHANGE UP (ref 8.4–10.5)
CHLORIDE SERPL-SCNC: 105 MMOL/L — SIGNIFICANT CHANGE UP (ref 98–110)
CO2 SERPL-SCNC: 27 MMOL/L — SIGNIFICANT CHANGE UP (ref 17–32)
CREAT SERPL-MCNC: 1 MG/DL — SIGNIFICANT CHANGE UP (ref 0.7–1.5)
EGFR: 59 ML/MIN/1.73M2 — LOW
GLUCOSE SERPL-MCNC: 102 MG/DL — HIGH (ref 70–99)
HCT VFR BLD CALC: 34.5 % — LOW (ref 37–47)
HGB BLD-MCNC: 11.3 G/DL — LOW (ref 12–16)
MCHC RBC-ENTMCNC: 27.8 PG — SIGNIFICANT CHANGE UP (ref 27–31)
MCHC RBC-ENTMCNC: 32.8 G/DL — SIGNIFICANT CHANGE UP (ref 32–37)
MCV RBC AUTO: 84.8 FL — SIGNIFICANT CHANGE UP (ref 81–99)
NRBC # BLD: 0 /100 WBCS — SIGNIFICANT CHANGE UP (ref 0–0)
PLATELET # BLD AUTO: 255 K/UL — SIGNIFICANT CHANGE UP (ref 130–400)
POTASSIUM SERPL-MCNC: 4.7 MMOL/L — SIGNIFICANT CHANGE UP (ref 3.5–5)
POTASSIUM SERPL-SCNC: 4.7 MMOL/L — SIGNIFICANT CHANGE UP (ref 3.5–5)
RBC # BLD: 4.07 M/UL — LOW (ref 4.2–5.4)
RBC # FLD: 14.1 % — SIGNIFICANT CHANGE UP (ref 11.5–14.5)
SODIUM SERPL-SCNC: 142 MMOL/L — SIGNIFICANT CHANGE UP (ref 135–146)
WBC # BLD: 6.42 K/UL — SIGNIFICANT CHANGE UP (ref 4.8–10.8)
WBC # FLD AUTO: 6.42 K/UL — SIGNIFICANT CHANGE UP (ref 4.8–10.8)

## 2023-03-23 PROCEDURE — 83735 ASSAY OF MAGNESIUM: CPT

## 2023-03-23 PROCEDURE — 36415 COLL VENOUS BLD VENIPUNCTURE: CPT

## 2023-03-23 PROCEDURE — 93005 ELECTROCARDIOGRAM TRACING: CPT

## 2023-03-23 PROCEDURE — 80048 BASIC METABOLIC PNL TOTAL CA: CPT

## 2023-03-23 PROCEDURE — 76830 TRANSVAGINAL US NON-OB: CPT

## 2023-03-23 PROCEDURE — 85027 COMPLETE CBC AUTOMATED: CPT

## 2023-03-23 RX ORDER — CHLORHEXIDINE GLUCONATE 213 G/1000ML
1 SOLUTION TOPICAL
Refills: 0 | Status: DISCONTINUED | OUTPATIENT
Start: 2023-03-23 | End: 2023-03-24

## 2023-03-23 RX ORDER — ACETAMINOPHEN 500 MG
650 TABLET ORAL EVERY 6 HOURS
Refills: 0 | Status: DISCONTINUED | OUTPATIENT
Start: 2023-03-23 | End: 2023-03-24

## 2023-03-23 RX ORDER — CARVEDILOL PHOSPHATE 80 MG/1
6.25 CAPSULE, EXTENDED RELEASE ORAL EVERY 12 HOURS
Refills: 0 | Status: DISCONTINUED | OUTPATIENT
Start: 2023-03-23 | End: 2023-03-24

## 2023-03-23 RX ORDER — AMLODIPINE BESYLATE 2.5 MG/1
10 TABLET ORAL DAILY
Refills: 0 | Status: DISCONTINUED | OUTPATIENT
Start: 2023-03-23 | End: 2023-03-24

## 2023-03-23 RX ORDER — ATORVASTATIN CALCIUM 80 MG/1
40 TABLET, FILM COATED ORAL AT BEDTIME
Refills: 0 | Status: DISCONTINUED | OUTPATIENT
Start: 2023-03-23 | End: 2023-03-24

## 2023-03-23 RX ORDER — GABAPENTIN 400 MG/1
1 CAPSULE ORAL
Qty: 0 | Refills: 0 | DISCHARGE

## 2023-03-23 RX ORDER — LISINOPRIL 2.5 MG/1
40 TABLET ORAL DAILY
Refills: 0 | Status: DISCONTINUED | OUTPATIENT
Start: 2023-03-23 | End: 2023-03-24

## 2023-03-23 RX ORDER — INFLUENZA VIRUS VACCINE 15; 15; 15; 15 UG/.5ML; UG/.5ML; UG/.5ML; UG/.5ML
0.7 SUSPENSION INTRAMUSCULAR ONCE
Refills: 0 | Status: DISCONTINUED | OUTPATIENT
Start: 2023-03-23 | End: 2023-03-24

## 2023-03-23 RX ORDER — DICLOFENAC SODIUM 30 MG/G
1 GEL TOPICAL
Qty: 0 | Refills: 0 | DISCHARGE

## 2023-03-23 RX ORDER — ASPIRIN/CALCIUM CARB/MAGNESIUM 324 MG
81 TABLET ORAL DAILY
Refills: 0 | Status: DISCONTINUED | OUTPATIENT
Start: 2023-03-24 | End: 2023-03-24

## 2023-03-23 RX ORDER — SODIUM CHLORIDE 9 MG/ML
1000 INJECTION INTRAMUSCULAR; INTRAVENOUS; SUBCUTANEOUS
Refills: 0 | Status: DISCONTINUED | OUTPATIENT
Start: 2023-03-23 | End: 2023-03-24

## 2023-03-23 RX ORDER — CLOPIDOGREL BISULFATE 75 MG/1
75 TABLET, FILM COATED ORAL DAILY
Refills: 0 | Status: DISCONTINUED | OUTPATIENT
Start: 2023-03-24 | End: 2023-03-24

## 2023-03-23 RX ORDER — PANTOPRAZOLE SODIUM 20 MG/1
40 TABLET, DELAYED RELEASE ORAL
Refills: 0 | Status: DISCONTINUED | OUTPATIENT
Start: 2023-03-23 | End: 2023-03-24

## 2023-03-23 RX ORDER — SODIUM CHLORIDE 9 MG/ML
1000 INJECTION INTRAMUSCULAR; INTRAVENOUS; SUBCUTANEOUS
Refills: 0 | Status: DISCONTINUED | OUTPATIENT
Start: 2023-03-23 | End: 2023-03-23

## 2023-03-23 RX ADMIN — Medication 650 MILLIGRAM(S): at 21:39

## 2023-03-23 RX ADMIN — SODIUM CHLORIDE 75 MILLILITER(S): 9 INJECTION INTRAMUSCULAR; INTRAVENOUS; SUBCUTANEOUS at 18:12

## 2023-03-23 RX ADMIN — CARVEDILOL PHOSPHATE 6.25 MILLIGRAM(S): 80 CAPSULE, EXTENDED RELEASE ORAL at 18:12

## 2023-03-23 RX ADMIN — Medication 650 MILLIGRAM(S): at 20:39

## 2023-03-23 NOTE — DISCHARGE NOTE PROVIDER - NSDCCPTREATMENT_GEN_ALL_CORE_FT
PRINCIPAL PROCEDURE  Procedure: Angiogram, peripheral, with PTA if indicated  Findings and Treatment: Medications:  - DO NOT stop your dual antiplatelet medication (ie: Plavix/clopidogrel, Aspirin), unless directed by your Cardiologist  - If you are diabetic and taking medication containing Metformin, do not take them for 48 hours after the procedure.   - Soreness or tenderness at the site is possible, it will diminish over time. You may take Tylenol every 4-6 hours as needed. Nothing stronger should be required.   Activity:  - Do not drive for 3 days.  - Support the groin site with your hand when you  sneeze or cough. No heavy lifting (objects more than 30 pounds) x 2 weeks.  - May resume routine walking today and increase activity as tolerated on Monday.  Hygiene:  - Leave dressing in place for 24-48 hours, if does not fall off on own may remove after 48 hours.   - After 24 hours, you may shower. Do not tub bathe for one week. Do not rub or apply lotion, cream, powder to the affected site. Leave it open to air.   Diet:   - You may resume your regular diet.   - Drink extra fluid unless othrwise advised.   Special Instructions:  - Bruising or black and blue at the puncture site is possible.  - If there is bleeding from the puncture site apply direct, firm pressure on the site and call 911.  - Any sudden swelling, redness, fever, discharge or severe pain, call your physician or call the cath lab.  - If you notice any scab formation in the area avoid touching the site and allow it to heal.  - If Numbness or "pins and needle" sensation occurs in the affected leg; or if the affected site becomes cool to touch or pale that persists for an extended period of time, call your physician immediately to be checked.   - Some swelling to the leg is expected.    - Inform your Dentist or Surgeon if you are taking Aspirin or any antiplatelet medications. Report any bleeding in your urine or stool.   Follow-up:  Please follow up with your Cardiologist in 2 weeks after discharge.       PRINCIPAL PROCEDURE  Procedure: Angiogram, peripheral, with PTA if indicated  Findings and Treatment: Medications:  - DO NOT stop your dual antiplatelet medication,  Plavix, Aspirin), unless directed by your Cardiologist  - Soreness or tenderness at the site is possible, it will diminish over time. You may take Tylenol every 4-6 hours as needed. Nothing stronger should be required.   Activity:  - Do not drive for 3 days.  - Support the groin site with your hand when you  sneeze or cough. No heavy lifting (objects more than 30 pounds) x 2 weeks.  - May resume routine walking today and increase activity as tolerated on Monday.  Hygiene:  - Leave dressing in place for 24-48 hours, if does not fall off on own may remove after 48 hours.   - After 24 hours, you may shower. Do not tub bathe for one week. Do not rub or apply lotion, cream, powder to the affected site. Leave it open to air.   Diet:   - You may resume your regular diet.   - Drink extra fluid unless othrwise advised.   Special Instructions:  - Bruising or black and blue at the puncture site is possible.  - If there is bleeding from the puncture site apply direct, firm pressure on the site and call 911.  - Any sudden swelling, redness, fever, discharge or severe pain, call your physician or call the cath lab.  - If you notice any scab formation in the area avoid touching the site and allow it to heal.  - If Numbness or "pins and needle" sensation occurs in the affected leg; or if the affected site becomes cool to touch or pale that persists for an extended period of time, call your physician immediately to be checked.   - Some swelling to the leg is expected.    - Inform your Dentist or Surgeon if you are taking Aspirin or any antiplatelet medications. Report any bleeding in your urine or stool.   Follow-up:  Please follow up with your Cardiologist in 2 weeks after discharge.

## 2023-03-23 NOTE — DISCHARGE NOTE PROVIDER - ATTENDING DISCHARGE PHYSICAL EXAMINATION:
Physical Exam  T(C): 36.7 (03-24-23 @ 15:26), Max: 36.7 (03-24-23 @ 00:00)  HR: 67 (03-24-23 @ 15:26) (65 - 72)  BP: 145/63 (03-24-23 @ 15:26) (140/62 - 162/70)  RR: 17 (03-24-23 @ 15:26) (17 - 24)  SpO2: 97% (03-24-23 @ 15:26) (97% - 98%)  Gen: NAD  CV: RRR, nl S1 and S2, no m/r/g, no LE edema, no JVD  Pulm: CTAB, no crackles  GI: soft, nontender  MSK: normal ROM  Extremities: warm  Neuro: A+Ox3  Psych: cooperative

## 2023-03-23 NOTE — DISCHARGE NOTE PROVIDER - NSDCCPCAREPLAN_GEN_ALL_CORE_FT
PRINCIPAL DISCHARGE DIAGNOSIS  Diagnosis: PAD (peripheral artery disease)  Assessment and Plan of Treatment:        PRINCIPAL DISCHARGE DIAGNOSIS  Diagnosis: PAD (peripheral artery disease)  Assessment and Plan of Treatment:       SECONDARY DISCHARGE DIAGNOSES  Diagnosis: Vaginal bleeding, abnormal  Assessment and Plan of Treatment: transvaginal ultrasound  obtained shows  Please followup with your gynecologist as outpatient     PRINCIPAL DISCHARGE DIAGNOSIS  Diagnosis: PAD (peripheral artery disease)  Assessment and Plan of Treatment:       SECONDARY DISCHARGE DIAGNOSES  Diagnosis: Vaginal bleeding, abnormal  Assessment and Plan of Treatment: transvaginal ultrasound  obtained.   Please followup with your gynecologist as outpatient

## 2023-03-23 NOTE — DISCHARGE NOTE PROVIDER - NSDCFUSCHEDAPPT_GEN_ALL_CORE_FT
[facility]  ECHOCARD [address]  Scheduled Appointment: 04/17/2023    Blaine Garcia  [facility]  CARDIOLOGY [address]  Scheduled Appointment: 05/05/2023

## 2023-03-23 NOTE — DISCHARGE NOTE PROVIDER - NSDCMRMEDTOKEN_GEN_ALL_CORE_FT
amLODIPine 10 mg oral tablet: 1 tab(s) orally once a day  atorvastatin 40 mg oral tablet: 1 tab(s) orally once a day (at bedtime)  Coreg 6.25 mg oral tablet: 1 tab(s) orally 2 times a day  hydroCHLOROthiazide 25 mg oral tablet: 1 tab(s) orally once a day  omeprazole 20 mg oral delayed release capsule: 1 cap(s) orally once a day  Plavix 75 mg oral tablet: 1 tab(s) orally once a day  ramipril 10 mg oral capsule: 1 cap(s) orally once a day  Vitamin B12 1000 mcg/mL injectable solution: 1 dose(s) injectable once a month   amLODIPine 10 mg oral tablet: 1 tab(s) orally once a day  aspirin 81 mg oral delayed release tablet: 1 tab(s) orally once a day  atorvastatin 40 mg oral tablet: 1 tab(s) orally once a day (at bedtime)  Coreg 6.25 mg oral tablet: 1 tab(s) orally 2 times a day  hydroCHLOROthiazide 25 mg oral tablet: 1 tab(s) orally once a day  omeprazole 20 mg oral delayed release capsule: 1 cap(s) orally once a day  Plavix 75 mg oral tablet: 1 tab(s) orally once a day  ramipril 10 mg oral capsule: 1 cap(s) orally once a day  Vitamin B12 1000 mcg/mL injectable solution: 1 dose(s) injectable once a month

## 2023-03-23 NOTE — PATIENT PROFILE ADULT - NSPROEXTENSIONSOFSELF_GEN_A_NUR
none Body Location Override (Optional - Billing Will Still Be Based On Selected Body Map Location If Applicable): right lateral inferior chest

## 2023-03-23 NOTE — PATIENT PROFILE ADULT - FALL HARM RISK - RISK INTERVENTIONS

## 2023-03-23 NOTE — DISCHARGE NOTE PROVIDER - HOSPITAL COURSE
73 y/o female pMHx of HTN, HLD, DM, CABG, sickle cell trait at baseline, PAD [place] stage 4 s/p lower extremity stents in June 2022 on DAPT (ASA and Plavix), presented on 3/23/23 for LLE angiogram.  Pt complains of worsening LLE pain with mild ambulation and at  rest.  Pain described as sharp, 8-10/10. Patient underwent PTA of the Left SFA with angiojet thrombectomy, balloon angioplasty and one PANTERA, PTA of L Popliteal artery with angiojet, balloon thrombectomy and angioplasty, and PTA of the L tibio-peroneal trunk with angiojet and balloon angioplasty. Patient tolerated the procedure well and was admitted to cardiac telemetry overnight for monitory. Access site through the right groin was C/D/I wihout hematoma, echymoses or bleeding. Bilateral dorsalis pedis and pedal pulses are 2+ and regular, with + Doppler. Patient is stable to be discharged in the AM. Will be discharged on ASA/Plavix and will follow up with Dr. Brooke in 2 weeks.       PERIPHERAL ANGIOGRAM SUMMARY:  - 100% stenosis of left Superficial Femoral Artery  - 100% stenosis of left Popliteal Artery  - 90% stenosis of left Tibio-Peroneal Trunk  - 100% chronic total occlusions of left Anterior Tibial Artery, and left Posterior Tibial Artery  - Severe disease with 90% stenosis of left Deep Femoral Artery  - 75% stenosis of right Common Femoral Artery   73 y/o female pMHx of HTN, HLD, DM, CABG, sickle cell trait at baseline, PAD Peri stage 4 s/p lower extremity stents in June 2022 on DAPT (ASA and Plavix), presented on 3/23/23 for LLE angiogram.  Pt complains of worsening LLE pain with mild ambulation and at  rest.  Pain described as sharp, 8-10/10. Patient underwent PTA of the Left SFA with angiojet thrombectomy, balloon angioplasty and one PANTERA, PTA of L Popliteal artery with angiojet, balloon thrombectomy and angioplasty, and PTA of the L tibio-peroneal trunk with angiojet and balloon angioplasty. Patient tolerated the procedure well and was admitted to cardiac telemetry overnight for monitory. Access site through the right groin was C/D/I wihout hematoma, echymoses or bleeding. Bilateral dorsalis pedis and pedal pulses are 2+ and regular, with + Doppler.     POD1, pt noted some vaginal bleeding, non related to Hitchcock insertion, urine output in hitchcock bag was clear, zabrina color. Patient denies history of any gynecological/oncology disease. Transvaginal ultrasound was obtain which shows _______________.  Gynecologist consult was obtained and recommended _ _______.  Patient is stable to be discharged on ASA/Plavix and will follow up with Dr. Brooke in 2 weeks.       PERIPHERAL ANGIOGRAM SUMMARY:  - 100% stenosis of left Superficial Femoral Artery  - 100% stenosis of left Popliteal Artery  - 90% stenosis of left Tibio-Peroneal Trunk  - 100% chronic total occlusions of left Anterior Tibial Artery, and left Posterior Tibial Artery  - Severe disease with 90% stenosis of left Deep Femoral Artery  - 75% stenosis of right Common Femoral Artery   71 y/o female pMHx of HTN, HLD, DM, CABG, sickle cell trait at baseline, PAD Peri stage 4 s/p lower extremity stents in June 2022 on DAPT (ASA and Plavix), presented on 3/23/23 for LLE angiogram.  Pt complains of worsening LLE pain with mild ambulation and at  rest.  Pain described as sharp, 8-10/10. Patient underwent PTA of the Left SFA with angiojet thrombectomy, balloon angioplasty and one PANTERA, PTA of L Popliteal artery with angiojet, balloon thrombectomy and angioplasty, and PTA of the L tibio-peroneal trunk with angiojet and balloon angioplasty. Patient tolerated the procedure well and was admitted to cardiac telemetry overnight for monitory. Access site through the right groin was C/D/I wihout hematoma, echymoses or bleeding. Bilateral dorsalis pedis and pedal pulses are 2+ and regular, with + Doppler.     POD1, pt noted some vaginal bleeding, non related to Hitchcock insertion, urine output in hitchcock bag was clear, zabrina color. Patient denies history of any gynecological/oncology disease. Transvaginal ultrasound was obtain which shows _______________.  Gynecologist consult was obtained and recommended outpatient followup. Repeat H/H remains stable.  Patient is stable to be discharged on ASA/Plavix and will follow up with Dr. Brooke in 2 weeks.       PERIPHERAL ANGIOGRAM SUMMARY:  - 100% stenosis of left Superficial Femoral Artery  - 100% stenosis of left Popliteal Artery  - 90% stenosis of left Tibio-Peroneal Trunk  - 100% chronic total occlusions of left Anterior Tibial Artery, and left Posterior Tibial Artery  - Severe disease with 90% stenosis of left Deep Femoral Artery  - 75% stenosis of right Common Femoral Artery   73 y/o female pMHx of HTN, HLD, DM, CABG, sickle cell trait at baseline, PAD Peri stage 4 s/p lower extremity stents in June 2022 on DAPT (ASA and Plavix), presented on 3/23/23 for LLE angiogram.  Pt complains of worsening LLE pain with mild ambulation and at  rest.  Pain described as sharp, 8-10/10. Patient underwent PTA of the Left SFA with angiojet thrombectomy, balloon angioplasty and one PANTERA, PTA of L Popliteal artery with angiojet, balloon thrombectomy and angioplasty, and PTA of the L tibio-peroneal trunk with angiojet and balloon angioplasty. Patient tolerated the procedure well and was admitted to cardiac telemetry overnight for monitory. Access site through the right groin was C/D/I wihout hematoma, echymoses or bleeding. Bilateral dorsalis pedis and pedal pulses are 2+ and regular, with + Doppler.     POD1, pt noted some vaginal bleeding, non related to Hitchcock insertion, urine output in hitchcock bag was clear, zabrina color. Patient denies history of any gynecological/oncology disease. Transvaginal ultrasound was obtained, F/U result.  Gynecologist consult was obtained and recommended outpatient followup for endometrial biopsy. No acute gyn intervention at this time. Repeat H/H remains stable.  Patient is stable to be discharged on ASA/Plavix and will follow up with Dr. Brooke in 2 weeks.       PERIPHERAL ANGIOGRAM SUMMARY:  - 100% stenosis of left Superficial Femoral Artery  - 100% stenosis of left Popliteal Artery  - 90% stenosis of left Tibio-Peroneal Trunk  - 100% chronic total occlusions of left Anterior Tibial Artery, and left Posterior Tibial Artery  - Severe disease with 90% stenosis of left Deep Femoral Artery  - 75% stenosis of right Common Femoral Artery

## 2023-03-23 NOTE — CHART NOTE - NSCHARTNOTEFT_GEN_A_CORE
INDICATION: CLI, Peri class 4, presented for LLE angiogram. Pt complains of worsening left lower extremity pain with mild ambulation and at  rest. PMHx of HTN, HLD, DM, CABG, sickle cell trait at baseline. Pt. with history of prior PTA of right lower extremity in June 2022 on DAPT (ASA and Plavix). Pt. is here for left lower extremity angiogram and intervention.    PHYSICIAN: Dr. Brooke  ASSISTANT/FELLOW: Dr. Levi    ACCESS: Ultrasound Guided  right femoral artery access    VASCULAR CLOSURE DEVICE: manual compression    ANGIOGRAM:  Selective Abdominal Aorta, Right Iliac, Right SFA, Left Iliac, Left SFA, and Left Lower Extremity DSA angiography     DETAILED PROCEDURE SUMMARY:  After obtaining informed consent, the patient was brought to the catheterization suite in a post- absorptive and non-sedated state. After this, a timeout was performed and I administered moderate sedation throughout this 45-minute procedure. An independent trained observer pushed medications at my direction, and monitored the patient’s level of consciousness and physiological status throughout. The patient was prepped and draped in the usual sterile manner. 2% lidocaine was used for local anesthesia and the patient was sedated as per endovascular lab protocol. Access was obtained in the right Femoral Artery using the modified Seldinger technique 5 [place] Sheath was placed in the artery under fluoroscopy and ultrasound guidance which was utilized for assessment of arterial patency and actual real-time visualization of needle passage to the arterial lumen. The sheath was exchanged for 6 Fr 45 cm destination sheath prior to the interventional part of the procedure.      CONTRAST: 110 ml    DIAGNOSTIC FINDINGS    Abdominal Aorta: Patent, mild disease  Right Common Iliac Artery: Patent, mild disease  Right External Iliac Artery: Patent, mild disease  Right Common Femoral Artery: severe atherosclerosis, 75% stenosis  Left Common Iliac Artery: Patent, mild disease  Left External Iliac Artery: Patent, mild disease  Left Common Femoral Artery: Patent, mild disease  Left SFA Artery: 100% stenosis  Left Profunda Artery: Patent, severe disease, 90% stenosis  Left Popliteal Artery: 100% stenosis  Left Tibial Peroneal Trunk Artery: 90% stenosis  Left Anterior Tibial Artery: 100% stenosis, Chronic Total Occlusion  Left Peroneal Artery: Patent, moderate disease  Left Posterior Tibial Artery: 100% stenosis, Chronic Total Occlusion      PERIPHERAL ANGIOGRAM SUMMARY:  - 100% stenosis of left Superficial Femoral Artery  - 100% stenosis of left Popliteal Artery  - 90% stenosis of left Tibio-Peroneal Trunk  - 100% chronic total occlusions of left Anterior Tibial Artery, and left Posterior Tibial Artery  - Severe disease with 90% stenosis of left Deep Femoral Artery  - 75% stenosis of right Common Femoral Artery    PERIPHERAL INTERVENTION SUMMARY:  - Successful PTA of left Superficial Femoral Artery with Angiojet thrombectomy, Penumbra CAT 6 Thrombectomy, balloon angioplasty using 4.0 mm, and 5.0 mm [place] balloons, drug coated balloon angioplasty using 5.0 x 200 mm [place] Drug Coated Balloon, and placement of one 6.0 mm Rosa M Drug Eluting Stent.  - Successful PTA of left Popliteal Artery with Angiojet thrombectomy, Penumbra CAT 6 Thrombectomy, balloon angioplasty using 4.0 mm, and 5.0 mm [place] balloons, and drug coated balloon angioplasty using 5.0 x 200 mm [place] Drug Coated Balloon.  - Successful PTA of left Tibio-Peroneal Trunk with Angiojet thrombectomy, Penumbra CAT 6 Thrombectomy, balloon angioplasty using a 4.0 mm [place] balloon.      COMPLICATIONS: None      RECOMMENDATIONS:  IV fluids: NS@75cc/hr x 8 hours  Antiplatelets: ASA, and Plavix  Aggressive risk factor modification  Disposition: Monitor on cardiac telemetry

## 2023-03-23 NOTE — DISCHARGE NOTE PROVIDER - CARE PROVIDER_API CALL
Mark Anthony Brooke (MD)  Cardiovascular Disease; Endovascular Medicine; Interventional Cardiology; Vascular Medicine  [address] [place] [address], [place] [address]  Phone: [phone]  Fax: [phone]  Follow Up Time: 2 weeks

## 2023-03-24 ENCOUNTER — TRANSCRIPTION ENCOUNTER (OUTPATIENT)
Age: 73
End: 2023-03-24

## 2023-03-24 VITALS
TEMPERATURE: 98 F | RESPIRATION RATE: 17 BRPM | OXYGEN SATURATION: 97 % | DIASTOLIC BLOOD PRESSURE: 63 MMHG | SYSTOLIC BLOOD PRESSURE: 145 MMHG | HEART RATE: 67 BPM

## 2023-03-24 LAB
ANION GAP SERPL CALC-SCNC: 12 MMOL/L — SIGNIFICANT CHANGE UP (ref 7–14)
BUN SERPL-MCNC: 14 MG/DL — SIGNIFICANT CHANGE UP (ref 10–20)
CALCIUM SERPL-MCNC: 8.5 MG/DL — SIGNIFICANT CHANGE UP (ref 8.4–10.5)
CHLORIDE SERPL-SCNC: 110 MMOL/L — SIGNIFICANT CHANGE UP (ref 98–110)
CO2 SERPL-SCNC: 22 MMOL/L — SIGNIFICANT CHANGE UP (ref 17–32)
CREAT SERPL-MCNC: 0.8 MG/DL — SIGNIFICANT CHANGE UP (ref 0.7–1.5)
EGFR: 77 ML/MIN/1.73M2 — SIGNIFICANT CHANGE UP
GLUCOSE SERPL-MCNC: 95 MG/DL — SIGNIFICANT CHANGE UP (ref 70–99)
HCT VFR BLD CALC: 28.7 % — LOW (ref 37–47)
HCT VFR BLD CALC: 28.8 % — LOW (ref 37–47)
HGB BLD-MCNC: 9.4 G/DL — LOW (ref 12–16)
HGB BLD-MCNC: 9.4 G/DL — LOW (ref 12–16)
MAGNESIUM SERPL-MCNC: 1.3 MG/DL — LOW (ref 1.8–2.4)
MCHC RBC-ENTMCNC: 27.8 PG — SIGNIFICANT CHANGE UP (ref 27–31)
MCHC RBC-ENTMCNC: 28.1 PG — SIGNIFICANT CHANGE UP (ref 27–31)
MCHC RBC-ENTMCNC: 32.6 G/DL — SIGNIFICANT CHANGE UP (ref 32–37)
MCHC RBC-ENTMCNC: 32.8 G/DL — SIGNIFICANT CHANGE UP (ref 32–37)
MCV RBC AUTO: 85.2 FL — SIGNIFICANT CHANGE UP (ref 81–99)
MCV RBC AUTO: 85.7 FL — SIGNIFICANT CHANGE UP (ref 81–99)
NRBC # BLD: 0 /100 WBCS — SIGNIFICANT CHANGE UP (ref 0–0)
NRBC # BLD: 0 /100 WBCS — SIGNIFICANT CHANGE UP (ref 0–0)
PLATELET # BLD AUTO: 214 K/UL — SIGNIFICANT CHANGE UP (ref 130–400)
PLATELET # BLD AUTO: 234 K/UL — SIGNIFICANT CHANGE UP (ref 130–400)
POTASSIUM SERPL-MCNC: 3.9 MMOL/L — SIGNIFICANT CHANGE UP (ref 3.5–5)
POTASSIUM SERPL-SCNC: 3.9 MMOL/L — SIGNIFICANT CHANGE UP (ref 3.5–5)
RBC # BLD: 3.35 M/UL — LOW (ref 4.2–5.4)
RBC # BLD: 3.38 M/UL — LOW (ref 4.2–5.4)
RBC # FLD: 13.9 % — SIGNIFICANT CHANGE UP (ref 11.5–14.5)
RBC # FLD: 14.3 % — SIGNIFICANT CHANGE UP (ref 11.5–14.5)
SODIUM SERPL-SCNC: 144 MMOL/L — SIGNIFICANT CHANGE UP (ref 135–146)
WBC # BLD: 6.3 K/UL — SIGNIFICANT CHANGE UP (ref 4.8–10.8)
WBC # BLD: 8.45 K/UL — SIGNIFICANT CHANGE UP (ref 4.8–10.8)
WBC # FLD AUTO: 6.3 K/UL — SIGNIFICANT CHANGE UP (ref 4.8–10.8)
WBC # FLD AUTO: 8.45 K/UL — SIGNIFICANT CHANGE UP (ref 4.8–10.8)

## 2023-03-24 PROCEDURE — 99239 HOSP IP/OBS DSCHRG MGMT >30: CPT

## 2023-03-24 PROCEDURE — 76830 TRANSVAGINAL US NON-OB: CPT | Mod: 26

## 2023-03-24 PROCEDURE — 93010 ELECTROCARDIOGRAM REPORT: CPT

## 2023-03-24 RX ORDER — ASPIRIN/CALCIUM CARB/MAGNESIUM 324 MG
1 TABLET ORAL
Qty: 30 | Refills: 0
Start: 2023-03-24 | End: 2023-04-22

## 2023-03-24 RX ORDER — MAGNESIUM SULFATE 500 MG/ML
2 VIAL (ML) INJECTION ONCE
Refills: 0 | Status: COMPLETED | OUTPATIENT
Start: 2023-03-24 | End: 2023-03-24

## 2023-03-24 RX ADMIN — Medication 25 GRAM(S): at 12:13

## 2023-03-24 RX ADMIN — Medication 81 MILLIGRAM(S): at 12:12

## 2023-03-24 RX ADMIN — Medication 650 MILLIGRAM(S): at 16:13

## 2023-03-24 RX ADMIN — AMLODIPINE BESYLATE 10 MILLIGRAM(S): 2.5 TABLET ORAL at 05:31

## 2023-03-24 RX ADMIN — CARVEDILOL PHOSPHATE 6.25 MILLIGRAM(S): 80 CAPSULE, EXTENDED RELEASE ORAL at 05:30

## 2023-03-24 RX ADMIN — CLOPIDOGREL BISULFATE 75 MILLIGRAM(S): 75 TABLET, FILM COATED ORAL at 12:12

## 2023-03-24 RX ADMIN — CARVEDILOL PHOSPHATE 6.25 MILLIGRAM(S): 80 CAPSULE, EXTENDED RELEASE ORAL at 18:34

## 2023-03-24 RX ADMIN — Medication 650 MILLIGRAM(S): at 16:29

## 2023-03-24 RX ADMIN — LISINOPRIL 40 MILLIGRAM(S): 2.5 TABLET ORAL at 05:30

## 2023-03-24 RX ADMIN — CHLORHEXIDINE GLUCONATE 1 APPLICATION(S): 213 SOLUTION TOPICAL at 05:36

## 2023-03-24 RX ADMIN — PANTOPRAZOLE SODIUM 40 MILLIGRAM(S): 20 TABLET, DELAYED RELEASE ORAL at 08:05

## 2023-03-24 NOTE — CONSULT NOTE ADULT - SUBJECTIVE AND OBJECTIVE BOX
OBGYN PGY1    Chief Complaint: vaginal bleeding    HPI: 73yo , post menopausal, with a PMHx of HTN, T2DM, PAD, CAD s/p triple bypass, h/o dulce maria-en-y, with an episode of vaginal bleeding last night after vascular procedure yesterday. For the procedure she received heparin and Plavix. She reports it was one episode of bright red vaginal bleeding, she was unable to quantify it. She has not had any vaginal bleeding since menopause. When she had menses they were regular, short, with minimal bleeding. She denies palpitations, SOB, chest pain, dizziness. Denies pelvic pain and cramping. She reports that she has not seen a GYN in 3 years, they have been following her up for a cyst.    Ob/Gyn History:    h/o 2 FT  deliveries  h/o 2 eTOP, 2 D+C    GynHx: h/o fibroids and ovarian cysts, both conservatively managed. Denies history of abnormal paps or STIs    Last Pap Smear - 3 years ago  Last Mammogram - 3 years ago  Last Colonoscopy - 10/2022    Denies the following: constitutional symptoms, visual symptoms, cardiovascular symptoms, respiratory symptoms, GI symptoms, musculoskeletal symptoms, skin symptoms, neurologic symptoms, hematologic symptoms, allergic symptoms, psychiatric symptoms  Except any pertinent positives listed.     Home Medications:  amLODIPine 10 mg oral tablet: 1 tab(s) orally once a day (23 Mar 2023 09:59)  Coreg 6.25 mg oral tablet: 1 tab(s) orally 2 times a day (23 Mar 2023 09:59)  omeprazole 20 mg oral delayed release capsule: 1 cap(s) orally once a day (23 Mar 2023 09:59)  Plavix 75 mg oral tablet: 1 tab(s) orally once a day (23 Mar 2023 13:15)  ramipril 10 mg oral capsule: 1 cap(s) orally once a day (23 Mar 2023 09:59)  Vitamin B12 1000 mcg/mL injectable solution: 1 dose(s) injectable once a month (23 Mar 2023 09:59)    Allergies  Cipro (Headache)    PAST MEDICAL & SURGICAL HISTORY:  DM (diabetes mellitus)  Ulcer of toe of right foot - 3rd  Diabetes  HTN (hypertension)  High cholesterol  PAD (peripheral artery disease)  GI bleed  S/P gastric bypass  H/O bilateral breast reduction surgery  H/O abdominoplasty  S/P  section  History of intravascular stent placement  H/O  section  H/O gastric bypass  Peripheral arterial disease - s/p Right leg stent x 1 , left leg stent x 3  S/P CABG x 3 -     FAMILY HISTORY:  uterine cancer (mom)    SOCIAL HISTORY: Denies cigarette use, alcohol use, or illicit drug use    Vital Signs Last 24 Hrs  T(F): 98.1 (24 Mar 2023 15:26), Max: 98.1 (24 Mar 2023 04:45)  HR: 67 (24 Mar 2023 15:26) (65 - 72)  BP: 145/63 (24 Mar 2023 15:26) (140/62 - 162/70)  RR: 17 (24 Mar 2023 15:26) (17 - 24)    General Appearance - AAOx3, NAD  Heart - S1S2 regular rate and rhythm  Lung - CTA Bilaterally  Abdomen - Soft, nontender, nondistended, no rebound, no rigidity, no guarding, bowel sounds present    GYN/Pelvis:  External genitalia: normal external genitalia  Vagina: atrophic, consistent with postmenopausal changes, no vaginal bleeding  Cervix: stenotic, flush with vagina, no blood from cervix  Uterus: no fundal tenderness  Adnexa: no fullness, masses, tenderness palpated bilaterally    Discussed r/b/a with patient regarding endometrial biopsy. Pt demonstrated informed consent, consent obtained. Pt positioned in bed in the dorsal lithotomy using a bedpan. Speculum inserted. Cervical os visualized, cleaned with betadine X3. Endometrial biopsy Pipelle attempted to be passed through cervical os, unable to pass through os. Blind biopsy attempted, unsuccessful due to stenosis.     LABS:                        9.4    8.45  )-----------( 234      ( 24 Mar 2023 17:50 )             28.7         144  |  110  |  14  ----------------------------<  95  3.9   |  22  |  0.8    Ca    8.5      24 Mar 2023 05:40  Mg     1.3         RADIOLOGY & ADDITIONAL STUDIES:  < from: CT Abdomen and Pelvis w/ IV Cont (10.08.22 @ 19:15) >    ACC: [id number] EXAM:  CT ABDOMEN AND PELVIS IC                          PROCEDURE DATE:  10/08/2022          INTERPRETATION:  CLINICAL INFORMATION: Lower GI bleed.    COMPARISON: None.    CONTRAST/COMPLICATIONS:  IV Contrast: Omnipaque 350  90 cc administered   0 cc discarded  Oral Contrast: NONE  Complications: None reported at time of study completion    PROCEDURE:  CT of the Abdomen and Pelvis was performed.  Precontrast, Arterial and Delayed phases were performed.  Sagittal and coronal reformats were performed.    FINDINGS:  LOWER CHEST: Calcified granulomas are present within the left breast.   There are coronary calcifications.    LIVER: Within normal limits.  BILE DUCTS: Normal caliber.  GALLBLADDER: Cholecystectomy.  SPLEEN: Within normal limits.  PANCREAS: Within normal limits.  ADRENALS: Within normal limits.  KIDNEYS/URETERS: Bilateral renal cysts are present. There are varying   degrees of cortical thinning. No hydronephrosis.    BLADDER: Within normal limits.  REPRODUCTIVE ORGANS: Calcified uterine leiomyomas.    BOWEL: Dulce Maria-en-Y reconstruction. There is no bowel obstruction. Colonic   diverticulosis without diverticulitis. There is an area of hemorrhage in   the descending colon (3:69 and 4:69).  PERITONEUM: No ascites.  VESSELS: Atherosclerotic changes.  RETROPERITONEUM/LYMPH NODES: No lymphadenopathy.  ABDOMINAL WALL: Calcifications are present within bilateral gluteal   tissues.  BONES: Degenerative changes.    IMPRESSION:  There is an area of gastrointestinal bleed in the descending colon.    findings were discussed by Dr. Alejandro with  Dr. ORI Cid, of the ED   on 10/8/2022 7:57 PM with read back confirmation.    --- End of Report ---           RAYMUNDO ALEJANDRO MD; Resident Radiologist  This document has been electronically signed.  ROSSY AMANDA MD; Attending Radiologist  This document has been electronically signed. Oct  8 2022  8:09PM    < end of copied text >

## 2023-03-24 NOTE — CONSULT NOTE ADULT - ASSESSMENT
A/P: 71yo P2, post menopausal, with a PMHx of HTN, T2DM, PAD, CAD s/p triple bypass, h/o dulce maria-en-y, with an episode of postmenopausal bleeding, clinically and hemodynamically stable    - no acute gyn intervention at this time  - recommend tumor markers (, CEA, CA 19-9, inhibin a/b, LDH)  - recommend following up outpatient for endometrial biopsy  - will follow up TV[facility]  - please provide information for [facility] on discharge  - care per primary team    Dr. Liu and Dr. Draper aware

## 2023-03-24 NOTE — DISCHARGE NOTE NURSING/CASE MANAGEMENT/SOCIAL WORK - NSDCPEFALRISK_GEN_ALL_CORE
For information on Fall & Injury Prevention, visit: [email or web address] OR  [email or web address] OR  https://www.cdc.gov/steadi/patient.html

## 2023-03-24 NOTE — DISCHARGE NOTE NURSING/CASE MANAGEMENT/SOCIAL WORK - PATIENT PORTAL LINK FT
You can access the FollowMyHealth Patient Portal offered by [facility] by registering at the following website: [email or web address]. By joining [employer]’s FollowMyHealth portal, you will also be able to view your health information using other applications (apps) compatible with our system.

## 2023-04-16 NOTE — PRE-ANESTHESIA EVALUATION ADULT - MALLAMPATI CLASS
Class II - visualization of the soft palate, fauces, and uvula
Yes - the patient is able to be screened

## 2023-06-02 ENCOUNTER — NON-APPOINTMENT (OUTPATIENT)
Age: 73
End: 2023-06-02

## 2023-06-02 ENCOUNTER — APPOINTMENT (OUTPATIENT)
Dept: CARDIOLOGY | Facility: CLINIC | Age: 73
End: 2023-06-02
Payer: MEDICARE

## 2023-06-02 VITALS
SYSTOLIC BLOOD PRESSURE: 122 MMHG | HEIGHT: 65 IN | OXYGEN SATURATION: 100 % | HEART RATE: 59 BPM | DIASTOLIC BLOOD PRESSURE: 68 MMHG | BODY MASS INDEX: 23.16 KG/M2 | WEIGHT: 139 LBS

## 2023-06-02 DIAGNOSIS — Z86.2 PERSONAL HISTORY OF DISEASES OF THE BLOOD AND BLOOD-FORMING ORGANS AND CERTAIN DISORDERS INVOLVING THE IMMUNE MECHANISM: ICD-10-CM

## 2023-06-02 DIAGNOSIS — Z87.891 PERSONAL HISTORY OF NICOTINE DEPENDENCE: ICD-10-CM

## 2023-06-02 PROBLEM — K92.2 GASTROINTESTINAL HEMORRHAGE, UNSPECIFIED: Chronic | Status: ACTIVE | Noted: 2023-03-23

## 2023-06-02 PROCEDURE — 99214 OFFICE O/P EST MOD 30 MIN: CPT

## 2023-06-02 NOTE — PHYSICAL EXAM
[General Appearance - Well Developed] : well developed [General Appearance - Well Nourished] : well nourished [Normal Conjunctiva] : the conjunctiva exhibited no abnormalities [Normal Oral Mucosa] : normal oral mucosa [Normal Jugular Venous V Waves Present] : normal jugular venous V waves present [] : no respiratory distress [Heart Sounds] : normal S1 and S2 [1+] : left 1+ [0] : right 0 [2+] : left 2+ [Bowel Sounds] : normal bowel sounds [Nail Clubbing] : no clubbing of the fingernails [Oriented To Time, Place, And Person] : oriented to person, place, and time [FreeTextEntry1] : see above

## 2023-06-02 NOTE — DISCUSSION/SUMMARY
[FreeTextEntry1] : Assessment:\par 73 y/o F with h/o DM, HTN, HLD, CAD s/p CABG\par PAD s/p bilateral interventions complicated by acute limb ischemia April 2018 of left leg s/p CDT \par Peripheral angiogram Dec 2018 for lifestyle limiting claudication: \par INTERVENTION -  LEFT: SFA PANTERA, prox SFA SAMUEL, popliteal SAMUEL\par R CFA, SFA, pop intervention Summer 2022\par Peripheral Angiogram 3/2023: L SFA, popliteal and TP-Trunk intervention \par History of hospitalization for GI bleed with elevated Troponin thought due to demand ischemia \par \par Plan: \par 1. Continue Plavix. Recommend to resume Statin.\par 2. Transthoracic Echocardiogram for structural evaluation.\par 3. Daily walking. Daily feet checks. Moisturize daily. \par 4. Recommend to avoid Diclofenac. Tylenol and Gabapentin PRN.\par 5. Labs today including CBC and CMP.\par 6.Low salt diet. Continue BP regimen.\par 7. Follow-up in 6 months. Will call with tests results. Call with any questions.

## 2023-06-02 NOTE — HISTORY OF PRESENT ILLNESS
[FreeTextEntry1] : 6/2/23\par \par Denies C/P or SOB.\par Reports left leg discomfort is improved post intervention.\par Reports L hallux wound healed.. \par \par Was on DAPT for 2 weeks post procedure.\par Currently on Plavix. No bleeding issues.\par \par \par 6/24 Peripheral Angiogram:\par PERIPHERAL ANGIOGRAM SUMMARY: \par - 100% stenosis of left Superficial Femoral Artery, thrombotic. \par - 100% stenosis of left Popliteal Artery, thrombotic \par - 90% stenosis of left Tibio-Peroneal Trunk, thormbotic. \par - 100% chronic total occlusions of left Anterior Tibial Artery, and\par left Posterior Tibial Artery\par - Severe disease with 90% stenosis of left Deep Femoral Artery \par - 75% stenosis of right Common Femoral Artery \par \par - Successful PTA, rheolytic and mechanical thrombectomy, and stenting\par of left Superficial Femoral Artery with Angiojet\par thrombectomy, Penumbra CAT 6 Thrombectomy, balloon angioplasty using\par 4.0 mm, and 5.0 mm [place] balloons, drug\par coated balloon angioplasty using 5.0 x 200 mm [place] Drug Coated\par Balloon, and placement of one 6.0 mm Rosa M Drug Eluting\par Stent. \par - Successful rheolytic thrombectomy, mechanical thormbectomy and PTA\par of left Popliteal Artery with Angiojet thrombectomy,\par Penumbra CAT 6 Thrombectomy, balloon angioplasty using 4.0 mm, and 5.0\par mm [place] balloons, and drug coated balloon\par angioplasty using 5.0 x 200 mm [place] Drug Coated Balloon. \par - Successful rheolytic thrombectomy, mechanical thrombectomy and PTA\par of left Tibio-Peroneal Trunk with Angiojet\par thrombectomy, Penumbra CAT 6 Thrombectomy, balloon angioplasty using a\par 4.0 mm [place] balloon\par \par Medications	\par amLODIPine 10 mg oral tablet: 1 tab(s) orally once a day\par atorvastatin 40 mg oral tablet: 1 tab(s) orally once a day (at bedtime) (reports not taking)\par Coreg 6.25 mg oral tablet: 1 tab(s) orally 2 times a day\par hydroCHLOROthiazide 25 mg oral tablet: 1 tab(s) orally once a day\par omeprazole PRN\par Plavix 75 mg oral tablet: 1 tab(s) orally once a day\par ramipril 10 mg oral capsule: 1 cap(s) orally once a day\par Vitamin B12 1000 mcg/mL injectable solution: 1 dose(s) injectable once a month\par Diclofenac PRN daily\par \par 3/17\par \par Patient reports new R hallux wound and rest pain. [place] classification 5.\par Arterial duplex showed: R CFA severe stenosis, R SFA severe ISR, R popliteal severe stenosis.\par Plan for R LE peripheral angiogram.\par \par 1/31/23\par \par Denies C/P or SOB.\par Reports back pain may radiate to her legs. More in R LE than in L.\par Reports L LE numbness.\par Reports these symptoms are chronic.\par No LE wounds. \par Reports home BP 140s-150s/70s.\par \par Recently admitted this month to [facility] for GI bleeding noted to have elevated Troponin. \par CTA Abd/Pelvis showed acute diverticulitis, L ovarian cyst (pelvic US recommended), R breast nodule (correlate with mammogram), short stenosis of celiac artery.  \par \par Recieved IV ABX and IV Iron.\par Troponin I was 426 to 397 to 428.\par Recieved IV Magnesium for low Mg. \par Hgb on 9.3 on 1/14. \par Held Plavix in the hospital and then resumed. \par Currently not taking ASA. \par TTE was done. Not in chart with patient.\par Denies SOB on climbing 1 flight of stairs.\par Denies any bleeding since discharge. \par \par Medications:\par Plavix\par Amlodipine\par Ramipril \par Diclofenac\par Pantoprazole \par Coreg \par Atorvastatin (not taking)\par \par 10/31\par \par Hgb normal, above 11.5.\par Resume Plavix.\par Repeat CBC in 1 week.\par \par 10/28/22\par \par US LE b/l 9/12/22: Improved appearance of the right femoral artery and popliteal artery with stents in place with decreasing velocities status post intervention. Improved visualization of flow is seen within the posterior tibial artery and peroneal/anterior tibial artery Left lower extremity shows patent stent with monophasic waveforms without elevated within the femoral artery, popliteal artery. Posterior tibial artery appears be occluded. The peroneal artery is identified.\par \par US renal 9/12/22: No evidence of a significant renal artery stenosis.\par \par Endorses L lower back pain. No neurologic deficits. No calf pain with exertion. No rest pain. No non-healing LE ulcers.\par \par Of note, was hospitalized early October with GIB s/p EGD and colonoscopy, no active bleeding was seen. Plavix was discontinued at that time and not resumed. No further complaint of GIB including hematochezia or melena. On protonix. \par \par ROS otherwise normal. \par \par Medications:\par Aspirin 81\par Amlodipine 10mg daily \par Ramipril 10mg daily \par Diclofenac\par Pantoprazole 40mg daily\par Coreg 6.25 mg BID  \par Atorvastatin\par \par BP well controlled at home. \par \par 7/15/2022\par \par Had intevention with Dr. Brooke\par R CFA, SFA, POP and TPT\par also found  to have renal artery stenosis\par The wound on the right 3rd digit is healing/healed.  \par She still has calf pains bilaterally\par No blood in the urine or stool\par She has audible signal bilaterally, monophasic. \par Pain in the r toes is better now\par She feels tired and fatigued\par \par 3/16/2022\par \par ECHO:   11/1/2021 - mild concentric LVH, normal LV function \par Carotid 10/4/2021:  L ICA :  50-79%.  Right ICA :  16-49%\par RIGHT subclavian stenosis \par Coreg stopped 1 month ago - ran out of meds.  \par \par Complains of left sided foot numbness and claudication. \par R leg also with claudication. \par \par She can walk 2 blocks.  After 2 blocks she starts having pain in the calf.  Resolves with rest.  Resolves after 1 minute.  She is not walking much because its cold.  \par \par \par PCP:  Dr. Tung powerslyn\par \par \par 68 y/o F with h/o DM, HTN, HLD PAD with RT SFA stenting in 2016. Initially presented with worsening left claudication. Post left le intervention with SAMUEL /stent of left SFA, PTA of left pop. PTA and stent left tibioperoneal and prox peroneal.  Presented back to the hospital April 2018 with acute limb underwent catheter directed lysis and PTA / stent of left SFA, Popliteal, peroneal.  \par \par Pt with continued severe disabling claudication. Went for angiogram 12/13 found to have 90% ISR of L SFA and pop s/p L SFA stenting, SAMUEL to SFA and pop. Noted to have 80% stenosis in RIGHT CFA\par \par H/o CABG in reports frequent palpitations and occasional 'pain everywhere' unrelated to physical activity.  \par \par Cath 12/13/18 - intervention on LEFT SFA and POP ISR.  s/p stent to sfa and SAMUEL to prox SFA and pop\par \par For f/u today reports left foot/toes pain at night now for 2 weeks occasional dangles it of the bed for relief. Reports exercising in the gym for 30 mins on the stationary bicycle with no Sx.    \par \par \par  \par

## 2023-06-04 LAB
ALBUMIN SERPL ELPH-MCNC: 3.9 G/DL
ALP BLD-CCNC: 136 U/L
ALT SERPL-CCNC: 11 U/L
ANION GAP SERPL CALC-SCNC: 11 MMOL/L
AST SERPL-CCNC: 21 U/L
BILIRUB SERPL-MCNC: 0.6 MG/DL
BUN SERPL-MCNC: 19 MG/DL
CALCIUM SERPL-MCNC: 9.2 MG/DL
CHLORIDE SERPL-SCNC: 109 MMOL/L
CO2 SERPL-SCNC: 25 MMOL/L
CREAT SERPL-MCNC: 1.23 MG/DL
EGFR: 46 ML/MIN/1.73M2
GLUCOSE SERPL-MCNC: 99 MG/DL
POTASSIUM SERPL-SCNC: 4.9 MMOL/L
PROT SERPL-MCNC: 6.4 G/DL
SODIUM SERPL-SCNC: 145 MMOL/L

## 2023-07-03 NOTE — ED PROVIDER NOTE - LATERALITY
Emergency Department Provider Note       PCP: No primary care provider on file. Age: 53 y.o. Sex: female     DISPOSITION     dc    ICD-10-CM    1. Syncope and collapse  R55       2. Urinary tract infection in female  N39.0           Medical Decision Making     Complexity of Problems Addressed:  1 or more acute illnesses that pose a threat to life or bodily function. Data Reviewed and Analyzed:  Category 1:   I independently ordered and reviewed each unique test.  I reviewed external records: Due to registration error, unable to reviewed any external records in the EMR. The patients assessment required an independent historian: sons. The reason they were needed is important historical information not provided by the patient. Category 2:   I independently ordered and interpreted the ED EKG in the absence of a Cardiologist.    Rate: 70  EKG Interpretation: Sinus rhythm  ST Segments: No STEMI  I interpreted the EKG, lab results, ct head with no acute process noted as read by radiologist..    Category 3: Discussion of management or test interpretation. As in HPI. 66-year-old female in ED with complaint of pain at the back of her head secondary to syncopal episode. States that she is anticoagulated. Patient states that she has been in her normal state of health until syncopal episode today. States shopping at the mall. States that she bent forward to look at shoes and passed out. She denies prodrome. States that she recalls standing up with bystanders around her. Bystanders state that she fell to the ground and was \"screaming and shaking. \"  He denies any urinary incontinence or any postictal period. States she called her cardiologist who advised to come to ED to have CT scan of her head due to anticoagulated state and head injury. On arrival, she is very well-appearing, alert and orient x3, hemodynamically stable and afebrile. She is denying all other planes.   Is ambulatory normal tandem gait
left

## 2023-08-14 ENCOUNTER — APPOINTMENT (OUTPATIENT)
Dept: CV DIAGNOSITCS | Facility: HOSPITAL | Age: 73
End: 2023-08-14

## 2023-12-05 ENCOUNTER — OUTPATIENT (OUTPATIENT)
Dept: OUTPATIENT SERVICES | Facility: HOSPITAL | Age: 73
LOS: 1 days | End: 2023-12-05
Payer: COMMERCIAL

## 2023-12-05 ENCOUNTER — APPOINTMENT (OUTPATIENT)
Dept: CV DIAGNOSITCS | Facility: HOSPITAL | Age: 73
End: 2023-12-05

## 2023-12-05 DIAGNOSIS — Z98.891 HISTORY OF UTERINE SCAR FROM PREVIOUS SURGERY: Chronic | ICD-10-CM

## 2023-12-05 DIAGNOSIS — Z98.89 OTHER SPECIFIED POSTPROCEDURAL STATES: Chronic | ICD-10-CM

## 2023-12-05 DIAGNOSIS — Z95.828 PRESENCE OF OTHER VASCULAR IMPLANTS AND GRAFTS: Chronic | ICD-10-CM

## 2023-12-05 DIAGNOSIS — Z95.1 PRESENCE OF AORTOCORONARY BYPASS GRAFT: ICD-10-CM

## 2023-12-05 DIAGNOSIS — I73.9 PERIPHERAL VASCULAR DISEASE, UNSPECIFIED: Chronic | ICD-10-CM

## 2023-12-05 DIAGNOSIS — Z98.84 BARIATRIC SURGERY STATUS: Chronic | ICD-10-CM

## 2023-12-05 DIAGNOSIS — Z98.890 OTHER SPECIFIED POSTPROCEDURAL STATES: Chronic | ICD-10-CM

## 2023-12-05 DIAGNOSIS — Z95.1 PRESENCE OF AORTOCORONARY BYPASS GRAFT: Chronic | ICD-10-CM

## 2023-12-05 PROCEDURE — 76376 3D RENDER W/INTRP POSTPROCES: CPT | Mod: 26

## 2023-12-05 PROCEDURE — 93306 TTE W/DOPPLER COMPLETE: CPT | Mod: 26

## 2023-12-05 PROCEDURE — 76376 3D RENDER W/INTRP POSTPROCES: CPT

## 2023-12-05 PROCEDURE — 93356 MYOCRD STRAIN IMG SPCKL TRCK: CPT

## 2023-12-05 PROCEDURE — 93306 TTE W/DOPPLER COMPLETE: CPT

## 2023-12-08 ENCOUNTER — NON-APPOINTMENT (OUTPATIENT)
Age: 73
End: 2023-12-08

## 2023-12-08 ENCOUNTER — APPOINTMENT (OUTPATIENT)
Dept: CARDIOLOGY | Facility: CLINIC | Age: 73
End: 2023-12-08
Payer: MEDICARE

## 2023-12-08 VITALS
HEIGHT: 65 IN | BODY MASS INDEX: 23.16 KG/M2 | WEIGHT: 139 LBS | OXYGEN SATURATION: 99 % | DIASTOLIC BLOOD PRESSURE: 77 MMHG | HEART RATE: 59 BPM | SYSTOLIC BLOOD PRESSURE: 148 MMHG

## 2023-12-08 PROCEDURE — 99214 OFFICE O/P EST MOD 30 MIN: CPT

## 2023-12-08 RX ORDER — PANTOPRAZOLE 40 MG/1
40 TABLET, DELAYED RELEASE ORAL DAILY
Qty: 30 | Refills: 0 | Status: ACTIVE | COMMUNITY
Start: 2019-02-08 | End: 1900-01-01

## 2024-01-14 NOTE — PROGRESS NOTE ADULT - SUBJECTIVE AND OBJECTIVE BOX
Subjective:   Pt is s/p colonoscopy and EGD with GI yesterday  Patient seen at bedside this AM. Tolerated CLD yesterday without issues. Denies any further BRBPR    Objective:  Vital Signs  T(C): 36.8 (10-11 @ 10:36), Max: 36.9 (10-10 @ 21:03)  HR: 72 (10-11 @ 10:36) (58 - 79)  BP: 130/74 (10-11 @ 10:36) (130/74 - 168/71)  RR: 18 (10-11 @ 10:36) (12 - 21)  SpO2: 97% (10-11 @ 10:36) (96% - 100%)      Physical Exam:  GEN: resting in bed comfortably in NAD  RESP: no increased WOB  ABD: soft, non-distended, non-tender  EXTR: warm, well-perfused, no edema  NEURO: awake, alert    Labs:                        10.4   8.45  )-----------( 227      ( 11 Oct 2022 08:08 )             31.5   10-11    142  |  106  |  10  ----------------------------<  86  3.2<L>   |  25  |  0.79    Ca    8.3<L>      11 Oct 2022 08:08  Phos  3.3     10-11  Mg     1.3     10-11      CAPILLARY BLOOD GLUCOSE      POCT Blood Glucose.: 94 mg/dL (11 Oct 2022 06:01)  POCT Blood Glucose.: 95 mg/dL (10 Oct 2022 21:27)  POCT Blood Glucose.: 197 mg/dL (10 Oct 2022 20:40)  POCT Blood Glucose.: 396 mg/dL (10 Oct 2022 19:10)  POCT Blood Glucose.: 82 mg/dL (10 Oct 2022 12:53)  POCT Blood Glucose.: 67 mg/dL (10 Oct 2022 12:20)      Imaging:  Upper Endoscopy (10.10.22 @ 16:26)  Findings:       The esophagus was normal.   The stomach was normal.       sp gastric bypass, bx taken                                                                                                        Impression:          - Normal esophagus.                       - Normal stomach.                - Normal examined duodenum.                       - No specimens collected.  Recommendation:      - Await pathology results.                                                                                                          Colonoscopy (10.10.22 @ 16:27)  Findings:       Multiple small and large-mouthed diverticula were found in the entire colon. There was no        evidence of diverticular bleeding. narrowing, mild at ic valve with ulceration? ischemia, bx                                                                                                        Impression:          - Diverticulosis in the entire examined colon. There wasno evidence of                        diverticular bleeding.                       - normal ti, narrowing, slight near ic valve with ulceration ? ischemiac  Recommendation:      - Resume regular diet.                       - Repeat colonoscopy in 10 years no.         No

## 2024-01-30 DIAGNOSIS — Z00.00 ENCOUNTER FOR GENERAL ADULT MEDICAL EXAMINATION W/OUT ABNORMAL FINDINGS: ICD-10-CM

## 2024-01-31 ENCOUNTER — NON-APPOINTMENT (OUTPATIENT)
Age: 74
End: 2024-01-31

## 2024-01-31 ENCOUNTER — OUTPATIENT (OUTPATIENT)
Dept: OUTPATIENT SERVICES | Facility: HOSPITAL | Age: 74
LOS: 1 days | End: 2024-01-31
Payer: COMMERCIAL

## 2024-01-31 ENCOUNTER — TRANSCRIPTION ENCOUNTER (OUTPATIENT)
Age: 74
End: 2024-01-31

## 2024-01-31 ENCOUNTER — RESULT REVIEW (OUTPATIENT)
Age: 74
End: 2024-01-31

## 2024-01-31 ENCOUNTER — APPOINTMENT (OUTPATIENT)
Dept: ULTRASOUND IMAGING | Facility: HOSPITAL | Age: 74
End: 2024-01-31

## 2024-01-31 DIAGNOSIS — Z98.84 BARIATRIC SURGERY STATUS: Chronic | ICD-10-CM

## 2024-01-31 DIAGNOSIS — Z98.890 OTHER SPECIFIED POSTPROCEDURAL STATES: Chronic | ICD-10-CM

## 2024-01-31 DIAGNOSIS — Z98.891 HISTORY OF UTERINE SCAR FROM PREVIOUS SURGERY: Chronic | ICD-10-CM

## 2024-01-31 DIAGNOSIS — I73.9 PERIPHERAL VASCULAR DISEASE, UNSPECIFIED: Chronic | ICD-10-CM

## 2024-01-31 DIAGNOSIS — Z98.89 OTHER SPECIFIED POSTPROCEDURAL STATES: Chronic | ICD-10-CM

## 2024-01-31 DIAGNOSIS — I73.9 PERIPHERAL VASCULAR DISEASE, UNSPECIFIED: ICD-10-CM

## 2024-01-31 DIAGNOSIS — Z95.828 PRESENCE OF OTHER VASCULAR IMPLANTS AND GRAFTS: Chronic | ICD-10-CM

## 2024-01-31 DIAGNOSIS — Z95.1 PRESENCE OF AORTOCORONARY BYPASS GRAFT: Chronic | ICD-10-CM

## 2024-01-31 LAB
ALBUMIN SERPL ELPH-MCNC: 3.7 G/DL
ALP BLD-CCNC: 183 U/L
ALT SERPL-CCNC: 29 U/L
ANION GAP SERPL CALC-SCNC: 12 MMOL/L
APTT BLD: 26.1 SEC
AST SERPL-CCNC: 24 U/L
BILIRUB SERPL-MCNC: 0.5 MG/DL
BUN SERPL-MCNC: 12 MG/DL
CALCIUM SERPL-MCNC: 9.3 MG/DL
CHLORIDE SERPL-SCNC: 102 MMOL/L
CO2 SERPL-SCNC: 27 MMOL/L
CREAT SERPL-MCNC: 0.85 MG/DL
EGFR: 71 ML/MIN/1.73M2
GLUCOSE SERPL-MCNC: 195 MG/DL
HCT VFR BLD CALC: 31.1 %
HGB BLD-MCNC: 10.2 G/DL
INR PPP: 1.05 RATIO
MCHC RBC-ENTMCNC: 25.4 PG
MCHC RBC-ENTMCNC: 32.8 GM/DL
MCV RBC AUTO: 77.6 FL
PLATELET # BLD AUTO: 368 K/UL
POTASSIUM SERPL-SCNC: 4.2 MMOL/L
PROT SERPL-MCNC: 6.8 G/DL
PT BLD: 11.8 SEC
RBC # BLD: 4.01 M/UL
RBC # FLD: 15.7 %
SODIUM SERPL-SCNC: 141 MMOL/L
WBC # FLD AUTO: 7.88 K/UL

## 2024-01-31 PROCEDURE — 93925 LOWER EXTREMITY STUDY: CPT

## 2024-01-31 PROCEDURE — 93925 LOWER EXTREMITY STUDY: CPT | Mod: 26

## 2024-01-31 RX ORDER — KETOROLAC TROMETHAMINE 10 MG/1
10 TABLET, FILM COATED ORAL
Qty: 15 | Refills: 0 | Status: ACTIVE | COMMUNITY
Start: 2024-01-31 | End: 1900-01-01

## 2024-01-31 NOTE — H&P ADULT - NSHPATTENDINGPLANDISCUSS_GEN_ALL_CORE
January 31, 2024     Patient: Marcus Fisher   YOB: 2012   Date of Visit: 1/31/2024       To Whom it May Concern:    Marcus Fisher was seen in my clinic on 1/31/2024 at 5:45 pm.     Please excuse Marcus for his absence from school on the date listed above to be able to make his appointment. He can return to school on Friday, 02/02/2024. No physical Education class for 1 week, may return on 02/09/2024.    Sincerely,         TERRY WILSON, ALYCE    Medical information is confidential and cannot be disclosed without the written consent of the patient or his representative.       Dr. Moise (Cardio Fellow)

## 2024-02-02 RX ORDER — PREGABALIN 225 MG/1
1 CAPSULE ORAL
Qty: 0 | Refills: 0 | DISCHARGE

## 2024-02-03 ENCOUNTER — OUTPATIENT (OUTPATIENT)
Dept: OUTPATIENT SERVICES | Facility: HOSPITAL | Age: 74
LOS: 1 days | End: 2024-02-03
Payer: COMMERCIAL

## 2024-02-03 ENCOUNTER — APPOINTMENT (OUTPATIENT)
Dept: CT IMAGING | Facility: IMAGING CENTER | Age: 74
End: 2024-02-03
Payer: MEDICARE

## 2024-02-03 DIAGNOSIS — Z98.89 OTHER SPECIFIED POSTPROCEDURAL STATES: Chronic | ICD-10-CM

## 2024-02-03 DIAGNOSIS — Z98.84 BARIATRIC SURGERY STATUS: Chronic | ICD-10-CM

## 2024-02-03 DIAGNOSIS — I73.9 PERIPHERAL VASCULAR DISEASE, UNSPECIFIED: ICD-10-CM

## 2024-02-03 DIAGNOSIS — I73.9 PERIPHERAL VASCULAR DISEASE, UNSPECIFIED: Chronic | ICD-10-CM

## 2024-02-03 DIAGNOSIS — Z98.890 OTHER SPECIFIED POSTPROCEDURAL STATES: Chronic | ICD-10-CM

## 2024-02-03 DIAGNOSIS — Z95.1 PRESENCE OF AORTOCORONARY BYPASS GRAFT: Chronic | ICD-10-CM

## 2024-02-03 DIAGNOSIS — Z95.828 PRESENCE OF OTHER VASCULAR IMPLANTS AND GRAFTS: Chronic | ICD-10-CM

## 2024-02-03 PROCEDURE — 75635 CT ANGIO ABDOMINAL ARTERIES: CPT | Mod: 26,76

## 2024-02-03 PROCEDURE — 75635 CT ANGIO ABDOMINAL ARTERIES: CPT

## 2024-02-04 VITALS — SYSTOLIC BLOOD PRESSURE: 197 MMHG | OXYGEN SATURATION: 94 % | HEART RATE: 74 BPM | DIASTOLIC BLOOD PRESSURE: 81 MMHG

## 2024-02-04 NOTE — H&P CARDIOLOGY - HISTORY OF PRESENT ILLNESS
Patient is a 73y Female presents today for LE angiogram.  Pt complains of......   PMHx of HTN, HLD, DM, CABG, sickle cell trait at baseline, PAD s/p lower extremity stents in June 2022 on DAPT (ASA and Plavix).     6/2022   INTERVENTIONS:  - Successful PTA of of right CFA with 7.0 x 60 mm [place] DCB   - successful PTA of right SFA and Popliteal Artery with 4.0 x 220 mm [place] OTW balloon, 5.0 x 200 mm [place] Drug Coated Balloon, 6.0 x 80 mm Epic stent   - Successful PTA of right Popliteal Artery with 4.0 x 220 mm [place] OTW balloon, and 5.0 x 200 mm [place] Drug Coated Balloon.  - Successful PTA of of right Tibioperoneal Trunk with Two 3.5 - 6.0 mm x 6 mm Tack Stents     PERIPHERAL DIAGNOSTIC ANGIOGRAM/INTERVENTION SUMMARY:  - severe right CFA, SFA, Popliteal, and TP trunk disease with one vessel distal run off, s/p successful PTA of CFA, SFA, Popliteal Artery and TP trunk as above.    [place] classification 5.     from: [facility] Duplex Lower Extrem Arterial, Bilat (03.10.23 @ 14:26) >    IMPRESSION:    Diffuse calcified plaque with progressive high-grade stenosis in the   right common femoral artery.    Patency of bilateral arterial stents but with new high-grade in-stent   stenoses in both superficial femoral arteries and the right popliteal   artery    Occlusion of the right anterior tibial and left posterior tibial arteries.    Hemodynamically significant stenosis in the right posterior tibial artery.    3/21/23   PERIPHERAL ANGIOGRAM SUMMARY:  - 100% stenosis of left Superficial Femoral Artery  - 100% stenosis of left Popliteal Artery  - 90% stenosis of left Tibio-Peroneal Trunk  - 100% chronic total occlusions of left Anterior Tibial Artery, and left Posterior Tibial Artery  - Severe disease with 90% stenosis of left Deep Femoral Artery  - 75% stenosis of right Common Femoral Artery    PERIPHERAL INTERVENTION SUMMARY:  - Successful PTA of left Superficial Femoral Artery with Angiojet thrombectomy, Penumbra CAT 6 Thrombectomy, balloon angioplasty using 4.0 mm, and 5.0 mm [place] balloons, drug coated balloon angioplasty using 5.0 x 200 mm [place] Drug Coated Balloon, and placement of one 6.0 mm Rosa M Drug Eluting Stent.  - Successful PTA of left Popliteal Artery with Angiojet thrombectomy, Penumbra CAT 6 Thrombectomy, balloon angioplasty using 4.0 mm, and 5.0 mm [place] balloons, and drug coated balloon angioplasty using 5.0 x 200 mm [place] Drug Coated Balloon.  - Successful PTA of left Tibio-Peroneal Trunk with Angiojet thrombectomy, Penumbra CAT 6 Thrombectomy, balloon angioplasty using a 4.0 mm [place] balloon.       who presents for peripheral angiogram with possible intervention.         Bleeding Risk:  3.9%    IV Hydration:      EF: 12/23 67 %  EKG:     	         Patient is a 73y Female presents today for LE angiogram.  Pt complains of......   PMHx of HTN, HLD, DM, CABG, sickle cell trait at baseline, PAD s/p lower extremity stents in June 2022 on DAPT (ASA and Plavix).     6/2022   INTERVENTIONS:  - Successful PTA of of right CFA with 7.0 x 60 mm [place] DCB   - successful PTA of right SFA and Popliteal Artery with 4.0 x 220 mm [place] OTW balloon, 5.0 x 200 mm [place] Drug Coated Balloon, 6.0 x 80 mm Epic stent   - Successful PTA of right Popliteal Artery with 4.0 x 220 mm [place] OTW balloon, and 5.0 x 200 mm [place] Drug Coated Balloon.  - Successful PTA of of right Tibioperoneal Trunk with Two 3.5 - 6.0 mm x 6 mm Tack Stents     PERIPHERAL DIAGNOSTIC ANGIOGRAM/INTERVENTION SUMMARY:  - severe right CFA, SFA, Popliteal, and TP trunk disease with one vessel distal run off, s/p successful PTA of CFA, SFA, Popliteal Artery and TP trunk as above.    [place] classification 5.     from: [facility] Duplex Lower Extrem Arterial, Bilat (03.10.23 @ 14:26) >    IMPRESSION:    Diffuse calcified plaque with progressive high-grade stenosis in the   right common femoral artery.    Patency of bilateral arterial stents but with new high-grade in-stent   stenoses in both superficial femoral arteries and the right popliteal   artery    Occlusion of the right anterior tibial and left posterior tibial arteries.    Hemodynamically significant stenosis in the right posterior tibial artery.    3/21/23   PERIPHERAL ANGIOGRAM SUMMARY:  - 100% stenosis of left Superficial Femoral Artery  - 100% stenosis of left Popliteal Artery  - 90% stenosis of left Tibio-Peroneal Trunk  - 100% chronic total occlusions of left Anterior Tibial Artery, and left Posterior Tibial Artery  - Severe disease with 90% stenosis of left Deep Femoral Artery  - 75% stenosis of right Common Femoral Artery    PERIPHERAL INTERVENTION SUMMARY:  - Successful PTA of left Superficial Femoral Artery with Angiojet thrombectomy, Penumbra CAT 6 Thrombectomy, balloon angioplasty using 4.0 mm, and 5.0 mm [place] balloons, drug coated balloon angioplasty using 5.0 x 200 mm [place] Drug Coated Balloon, and placement of one 6.0 mm Rosa M Drug Eluting Stent.  - Successful PTA of left Popliteal Artery with Angiojet thrombectomy, Penumbra CAT 6 Thrombectomy, balloon angioplasty using 4.0 mm, and 5.0 mm [place] balloons, and drug coated balloon angioplasty using 5.0 x 200 mm [place] Drug Coated Balloon.  - Successful PTA of left Tibio-Peroneal Trunk with Angiojet thrombectomy, Penumbra CAT 6 Thrombectomy, balloon angioplasty using a 4.0 mm [place] balloon.       who presents for peripheral angiogram with possible intervention.         Bleeding Risk:  3.9%    IV Hydration:  cc IV bolus x 1 over 1 hr precath hydration       EF: 12/23 67 %  EKG:     	         Patient is a 73y Female presents today for LE angiogram for [place] class V/non healing ulcer of R 3rd toe. Pt complains of severe R foot/toe pain (10/10). pt states noticed third right toe infection 1 week ago with foul smelling drainage, + necrosis, + night pain.    PMHx of HTN, HLD, DM, CABG x 31/2019 GI bleed 1-2 yrs ago, , sickle cell trait at baseline, PAD s/p lower extremity stents in June 2022 on plavix, pt states stopped taking aspirin 1 month after her last intervention. Pt also states does not take her statin due to multiple side effects.     6/2022   INTERVENTIONS:  - Successful PTA of of right CFA with 7.0 x 60 mm [place] DCB   - successful PTA of right SFA and Popliteal Artery with 4.0 x 220 mm [place] OTW balloon, 5.0 x 200 mm [place] Drug Coated Balloon, 6.0 x 80 mm Epic stent   - Successful PTA of right Popliteal Artery with 4.0 x 220 mm [place] OTW balloon, and 5.0 x 200 mm [place] Drug Coated Balloon.  - Successful PTA of of right Tibioperoneal Trunk with Two 3.5 - 6.0 mm x 6 mm Tack Stents     PERIPHERAL DIAGNOSTIC ANGIOGRAM/INTERVENTION SUMMARY:  - severe right CFA, SFA, Popliteal, and TP trunk disease with one vessel distal run off, s/p successful PTA of CFA, SFA, Popliteal Artery and TP trunk as above.    [place] classification 5.     from: [facility] Duplex Lower Extrem Arterial, Bilat (03.10.23 @ 14:26) >    IMPRESSION:    Diffuse calcified plaque with progressive high-grade stenosis in the   right common femoral artery.    Patency of bilateral arterial stents but with new high-grade in-stent   stenoses in both superficial femoral arteries and the right popliteal   artery    Occlusion of the right anterior tibial and left posterior tibial arteries.    Hemodynamically significant stenosis in the right posterior tibial artery.    3/21/23   PERIPHERAL ANGIOGRAM SUMMARY:  - 100% stenosis of left Superficial Femoral Artery  - 100% stenosis of left Popliteal Artery  - 90% stenosis of left Tibio-Peroneal Trunk  - 100% chronic total occlusions of left Anterior Tibial Artery, and left Posterior Tibial Artery  - Severe disease with 90% stenosis of left Deep Femoral Artery  - 75% stenosis of right Common Femoral Artery    PERIPHERAL INTERVENTION SUMMARY:  - Successful PTA of left Superficial Femoral Artery with Angiojet thrombectomy, Penumbra CAT 6 Thrombectomy, balloon angioplasty using 4.0 mm, and 5.0 mm [place] balloons, drug coated balloon angioplasty using 5.0 x 200 mm [place] Drug Coated Balloon, and placement of one 6.0 mm Rosa M Drug Eluting Stent.  - Successful PTA of left Popliteal Artery with Angiojet thrombectomy, Penumbra CAT 6 Thrombectomy, balloon angioplasty using 4.0 mm, and 5.0 mm [place] balloons, and drug coated balloon angioplasty using 5.0 x 200 mm [place] Drug Coated Balloon.  - Successful PTA of left Tibio-Peroneal Trunk with Angiojet thrombectomy, Penumbra CAT 6 Thrombectomy, balloon angioplasty using a 4.0 mm [place] balloon.       who presents for peripheral angiogram with possible intervention.         Bleeding Risk:  3.9%    IV Hydration:  cc IV bolus x 1 over 1 hr precath hydration       EF: 12/23 67 %

## 2024-02-04 NOTE — DISCHARGE NOTE ADULT - CONDITION (STATED IN TERMS THAT PERMIT A SPECIFIC MEASURABLE COMPARISON WITH CONDITION ON ADMISSION):
.
unknown
vs stable, groin stable ( w/o bleeding or hematoma) denies chest pain, okay to be DC home at this time after phone conversation with Jose Rodriges ( h/h trending up )

## 2024-02-04 NOTE — H&P CARDIOLOGY - NSICDXPASTMEDICALHX_GEN_ALL_CORE_FT
PAST MEDICAL HISTORY:  Diabetes     DM (diabetes mellitus)     GI bleed     High cholesterol     HTN (hypertension)     PAD (peripheral artery disease)     Ulcer of toe of right foot 3rd

## 2024-02-06 ENCOUNTER — INPATIENT (INPATIENT)
Facility: HOSPITAL | Age: 74
LOS: 0 days | Discharge: HOME HEALTH | DRG: 272 | End: 2024-02-07
Attending: STUDENT IN AN ORGANIZED HEALTH CARE EDUCATION/TRAINING PROGRAM | Admitting: STUDENT IN AN ORGANIZED HEALTH CARE EDUCATION/TRAINING PROGRAM
Payer: MEDICARE

## 2024-02-06 ENCOUNTER — TRANSCRIPTION ENCOUNTER (OUTPATIENT)
Age: 74
End: 2024-02-06

## 2024-02-06 DIAGNOSIS — I73.9 PERIPHERAL VASCULAR DISEASE, UNSPECIFIED: Chronic | ICD-10-CM

## 2024-02-06 DIAGNOSIS — Z98.89 OTHER SPECIFIED POSTPROCEDURAL STATES: Chronic | ICD-10-CM

## 2024-02-06 DIAGNOSIS — Z98.84 BARIATRIC SURGERY STATUS: Chronic | ICD-10-CM

## 2024-02-06 DIAGNOSIS — Z98.890 OTHER SPECIFIED POSTPROCEDURAL STATES: Chronic | ICD-10-CM

## 2024-02-06 DIAGNOSIS — Z95.1 PRESENCE OF AORTOCORONARY BYPASS GRAFT: Chronic | ICD-10-CM

## 2024-02-06 DIAGNOSIS — Z95.828 PRESENCE OF OTHER VASCULAR IMPLANTS AND GRAFTS: Chronic | ICD-10-CM

## 2024-02-06 DIAGNOSIS — Z98.891 HISTORY OF UTERINE SCAR FROM PREVIOUS SURGERY: Chronic | ICD-10-CM

## 2024-02-06 DIAGNOSIS — I70.221 ATHEROSCLEROSIS OF NATIVE ARTERIES OF EXTREMITIES WITH REST PAIN, RIGHT LEG: ICD-10-CM

## 2024-02-06 LAB
ANION GAP SERPL CALC-SCNC: 10 MMOL/L — SIGNIFICANT CHANGE UP (ref 7–14)
BUN SERPL-MCNC: 9 MG/DL — LOW (ref 10–20)
CALCIUM SERPL-MCNC: 9.2 MG/DL — SIGNIFICANT CHANGE UP (ref 8.4–10.5)
CHLORIDE SERPL-SCNC: 102 MMOL/L — SIGNIFICANT CHANGE UP (ref 98–110)
CO2 SERPL-SCNC: 28 MMOL/L — SIGNIFICANT CHANGE UP (ref 17–32)
CREAT SERPL-MCNC: 0.9 MG/DL — SIGNIFICANT CHANGE UP (ref 0.7–1.5)
EGFR: 67 ML/MIN/1.73M2 — SIGNIFICANT CHANGE UP
GLUCOSE BLDC GLUCOMTR-MCNC: 160 MG/DL — HIGH (ref 70–99)
GLUCOSE SERPL-MCNC: 118 MG/DL — HIGH (ref 70–99)
HCT VFR BLD CALC: 30.5 % — LOW (ref 37–47)
HGB BLD-MCNC: 9.8 G/DL — LOW (ref 12–16)
MCHC RBC-ENTMCNC: 25.4 PG — LOW (ref 27–31)
MCHC RBC-ENTMCNC: 32.1 G/DL — SIGNIFICANT CHANGE UP (ref 32–37)
MCV RBC AUTO: 79 FL — LOW (ref 81–99)
NRBC # BLD: 0 /100 WBCS — SIGNIFICANT CHANGE UP (ref 0–0)
PLATELET # BLD AUTO: 348 K/UL — SIGNIFICANT CHANGE UP (ref 130–400)
PMV BLD: 9.5 FL — SIGNIFICANT CHANGE UP (ref 7.4–10.4)
POTASSIUM SERPL-MCNC: 4 MMOL/L — SIGNIFICANT CHANGE UP (ref 3.5–5)
POTASSIUM SERPL-SCNC: 4 MMOL/L — SIGNIFICANT CHANGE UP (ref 3.5–5)
RBC # BLD: 3.86 M/UL — LOW (ref 4.2–5.4)
RBC # FLD: 15.7 % — HIGH (ref 11.5–14.5)
SODIUM SERPL-SCNC: 140 MMOL/L — SIGNIFICANT CHANGE UP (ref 135–146)
WBC # BLD: 8.8 K/UL — SIGNIFICANT CHANGE UP (ref 4.8–10.8)
WBC # FLD AUTO: 8.8 K/UL — SIGNIFICANT CHANGE UP (ref 4.8–10.8)

## 2024-02-06 PROCEDURE — 97162 PT EVAL MOD COMPLEX 30 MIN: CPT | Mod: GP

## 2024-02-06 PROCEDURE — 37229: CPT | Mod: RT

## 2024-02-06 PROCEDURE — 36415 COLL VENOUS BLD VENIPUNCTURE: CPT

## 2024-02-06 PROCEDURE — 85027 COMPLETE CBC AUTOMATED: CPT

## 2024-02-06 PROCEDURE — 83735 ASSAY OF MAGNESIUM: CPT

## 2024-02-06 PROCEDURE — 37227: CPT | Mod: RT

## 2024-02-06 PROCEDURE — 82962 GLUCOSE BLOOD TEST: CPT

## 2024-02-06 PROCEDURE — 80048 BASIC METABOLIC PNL TOTAL CA: CPT

## 2024-02-06 PROCEDURE — 75716 ARTERY X-RAYS ARMS/LEGS: CPT | Mod: 59

## 2024-02-06 PROCEDURE — 75625 CONTRAST EXAM ABDOMINL AORTA: CPT

## 2024-02-06 RX ORDER — OXYCODONE HYDROCHLORIDE 5 MG/1
5 TABLET ORAL ONCE
Refills: 0 | Status: DISCONTINUED | OUTPATIENT
Start: 2024-02-06 | End: 2024-02-06

## 2024-02-06 RX ORDER — ASPIRIN/CALCIUM CARB/MAGNESIUM 324 MG
81 TABLET ORAL DAILY
Refills: 0 | Status: DISCONTINUED | OUTPATIENT
Start: 2024-02-07 | End: 2024-02-07

## 2024-02-06 RX ORDER — RAMIPRIL 5 MG
1 CAPSULE ORAL
Qty: 0 | Refills: 0 | DISCHARGE

## 2024-02-06 RX ORDER — SODIUM CHLORIDE 9 MG/ML
250 INJECTION INTRAMUSCULAR; INTRAVENOUS; SUBCUTANEOUS ONCE
Refills: 0 | Status: COMPLETED | OUTPATIENT
Start: 2024-02-06 | End: 2024-02-06

## 2024-02-06 RX ORDER — FENTANYL CITRATE 50 UG/ML
25 INJECTION INTRAVENOUS ONCE
Refills: 0 | Status: DISCONTINUED | OUTPATIENT
Start: 2024-02-06 | End: 2024-02-06

## 2024-02-06 RX ORDER — CARVEDILOL PHOSPHATE 80 MG/1
6.25 CAPSULE, EXTENDED RELEASE ORAL EVERY 12 HOURS
Refills: 0 | Status: DISCONTINUED | OUTPATIENT
Start: 2024-02-06 | End: 2024-02-07

## 2024-02-06 RX ORDER — ASPIRIN/CALCIUM CARB/MAGNESIUM 324 MG
324 TABLET ORAL ONCE
Refills: 0 | Status: COMPLETED | OUTPATIENT
Start: 2024-02-06 | End: 2024-02-06

## 2024-02-06 RX ORDER — AMLODIPINE BESYLATE 2.5 MG/1
10 TABLET ORAL DAILY
Refills: 0 | Status: DISCONTINUED | OUTPATIENT
Start: 2024-02-06 | End: 2024-02-07

## 2024-02-06 RX ORDER — HYDRALAZINE HCL 50 MG
10 TABLET ORAL ONCE
Refills: 0 | Status: COMPLETED | OUTPATIENT
Start: 2024-02-06 | End: 2024-02-06

## 2024-02-06 RX ORDER — OXYCODONE AND ACETAMINOPHEN 5; 325 MG/1; MG/1
2 TABLET ORAL EVERY 12 HOURS
Refills: 0 | Status: DISCONTINUED | OUTPATIENT
Start: 2024-02-06 | End: 2024-02-07

## 2024-02-06 RX ORDER — CLOPIDOGREL BISULFATE 75 MG/1
75 TABLET, FILM COATED ORAL DAILY
Refills: 0 | Status: DISCONTINUED | OUTPATIENT
Start: 2024-02-07 | End: 2024-02-07

## 2024-02-06 RX ORDER — FAMOTIDINE 10 MG/ML
20 INJECTION INTRAVENOUS DAILY
Refills: 0 | Status: DISCONTINUED | OUTPATIENT
Start: 2024-02-06 | End: 2024-02-06

## 2024-02-06 RX ORDER — PANTOPRAZOLE SODIUM 20 MG/1
40 TABLET, DELAYED RELEASE ORAL
Refills: 0 | Status: DISCONTINUED | OUTPATIENT
Start: 2024-02-06 | End: 2024-02-07

## 2024-02-06 RX ORDER — OMEPRAZOLE 10 MG/1
1 CAPSULE, DELAYED RELEASE ORAL
Qty: 0 | Refills: 0 | DISCHARGE

## 2024-02-06 RX ORDER — LISINOPRIL 2.5 MG/1
40 TABLET ORAL DAILY
Refills: 0 | Status: DISCONTINUED | OUTPATIENT
Start: 2024-02-06 | End: 2024-02-07

## 2024-02-06 RX ORDER — CLOPIDOGREL BISULFATE 75 MG/1
300 TABLET, FILM COATED ORAL ONCE
Refills: 0 | Status: COMPLETED | OUTPATIENT
Start: 2024-02-06 | End: 2024-02-06

## 2024-02-06 RX ORDER — AMLODIPINE BESYLATE 2.5 MG/1
1 TABLET ORAL
Qty: 0 | Refills: 0 | DISCHARGE

## 2024-02-06 RX ORDER — OXYCODONE AND ACETAMINOPHEN 5; 325 MG/1; MG/1
1 TABLET ORAL EVERY 6 HOURS
Refills: 0 | Status: DISCONTINUED | OUTPATIENT
Start: 2024-02-06 | End: 2024-02-06

## 2024-02-06 RX ORDER — CARVEDILOL PHOSPHATE 80 MG/1
1 CAPSULE, EXTENDED RELEASE ORAL
Qty: 0 | Refills: 0 | DISCHARGE

## 2024-02-06 RX ORDER — CHLORHEXIDINE GLUCONATE 213 G/1000ML
1 SOLUTION TOPICAL ONCE
Refills: 0 | Status: DISCONTINUED | OUTPATIENT
Start: 2024-02-06 | End: 2024-02-07

## 2024-02-06 RX ADMIN — FAMOTIDINE 20 MILLIGRAM(S): 10 INJECTION INTRAVENOUS at 13:11

## 2024-02-06 RX ADMIN — Medication 324 MILLIGRAM(S): at 13:11

## 2024-02-06 RX ADMIN — OXYCODONE HYDROCHLORIDE 5 MILLIGRAM(S): 5 TABLET ORAL at 17:43

## 2024-02-06 RX ADMIN — FENTANYL CITRATE 25 MICROGRAM(S): 50 INJECTION INTRAVENOUS at 13:26

## 2024-02-06 RX ADMIN — SODIUM CHLORIDE 250 MILLILITER(S): 9 INJECTION INTRAMUSCULAR; INTRAVENOUS; SUBCUTANEOUS at 13:12

## 2024-02-06 RX ADMIN — CARVEDILOL PHOSPHATE 6.25 MILLIGRAM(S): 80 CAPSULE, EXTENDED RELEASE ORAL at 17:45

## 2024-02-06 RX ADMIN — Medication 10 MILLIGRAM(S): at 18:19

## 2024-02-06 RX ADMIN — CLOPIDOGREL BISULFATE 300 MILLIGRAM(S): 75 TABLET, FILM COATED ORAL at 13:10

## 2024-02-06 NOTE — PATIENT PROFILE ADULT - DO YOU EVER NEED HELP READING HOSPITAL MATERIALS?
Telephone Encounter by Marybel Bates RN at 05/23/18 03:53 PM     Author:  Marybel Bates RN Service:  (none) Author Type:  Registered Nurse     Filed:  05/23/18 03:57 PM Encounter Date:  5/23/2018 Status:  Signed     :  Marybel Bates RN (Registered Nurse)            Medication refilled per protocol.[NH1.1T]         Revision History        User Key Date/Time User Provider Type Action    > NH1.1 05/23/18 03:57 PM Marybel Bates RN Registered Nurse Sign    T - Template            
no

## 2024-02-06 NOTE — DISCHARGE NOTE PROVIDER - CARE PROVIDERS DIRECT ADDRESSES
[email or web address]

## 2024-02-06 NOTE — DISCHARGE NOTE PROVIDER - HOSPITAL COURSE
Patient is a 73y Female presents today for LE angiogram for [place] class V/non healing ulcer of R 3rd toe. Pt complains of severe R foot/toe pain (10/10). pt states noticed third right toe infection 1 week ago with foul smelling drainage, + necrosis, + night pain. Patient has PMHx of HTN, HLD, DM, CABG x 31/2019 GI bleed 1-2 yrs ago, sickle cell trait at baseline, PAD s/p lower extremity stents in June 2022 on plavix, pt states stopped taking aspirin 1 month after her last intervention. Pt also states does not take her statin due to multiple side effects. On 2/6 patient undernoted peripheral angiogram which revealed   Right Common Femoral Artery: 99% stenosis -->s/p laser and drug coated balloon of RCFA  Right SFA Artery: 99% stenosis -->s/p laser and drug coated balloon of distal RSFA and Rosa M stent [id number]  Right Popliteal Artery: 80% stenosis --> s/p laser and drug coated balloon of R popliteal  Right Tibial Peroneal Trunk Artery: s/p laser and drug coated balloon of Right Tibial Peroneal Trunk  Left SFA Artery: patent stent site  Left Peroneal Artery: 95% stenosis --> medical therapy unless symptomatic  Patient was monitored overnight. On POD 1 patient remains HD stable with no complaints. Patient remains in SR with no arrhythmias noted on tele. EKG performed showed no acute ST changes. Examination of Left common femoral artery showed a C/DI site with no hematoma, erythema or bruit. Distal pulses are 2+ bilaterally. Renal function remains stable post cath. Patient will be discharged home on DAPT with ASA and Plavix.  Patient is being DC home in stable condition. Patient is a 73y Female presents today for LE angiogram for [place] class V/non healing ulcer of R 3rd toe. Pt complains of severe R foot/toe pain (10/10). pt states noticed third right toe infection 1 week ago with foul smelling drainage, + necrosis, + night pain. Patient has PMHx of HTN, HLD, DM, CABG x 31/2019 GI bleed 1-2 yrs ago, sickle cell trait at baseline, PAD s/p lower extremity stents in June 2022 on plavix, pt states stopped taking aspirin 1 month after her last intervention. Pt also states does not take her statin due to multiple side effects.     On 2/6/24 patient underwent peripheral angiogram which revealed:   Right Common Femoral Artery: 99% stenosis -->s/p laser and drug coated balloon of R CFA  Right SFA Artery: 99% stenosis -->s/p laser and drug coated balloon of distal R SFA and Rosa M stent [id number]  Right Popliteal Artery: 80% stenosis --> s/p laser and drug coated balloon of R popliteal  Right Tibial Peroneal Trunk Artery: s/p laser and drug coated balloon of Right Tibial Peroneal Trunk  Left SFA Artery: patent stent site  Left Peroneal Artery: 95% stenosis --> medical therapy unless symptomatic    Patient was monitored overnight. On POD 1 patient remains HD stable with no complaints. Patient remains in SR with no arrhythmias noted on tele. EKG performed showed no acute ST changes. Examination of Left common femoral artery showed a C/D/I site with no hematoma, erythema or bruit. Distal pulses are 2+ bilaterally (palpable). Renal function remains stable post cath. Patient will be discharged home on DAPT with ASA and Plavix. Patient is being DC home in stable condition. Patient will follow-up with Dr. Garcia in 2 weeks as an outpatient. Pt will also follow-up with her podiatrist Dr. Young in [place], [place] on 2/21/24 at 9:45 AM.     Upon further questioning about statins, pt reports she was taking statins for a very short period of time however, stopped due to her concerns for the side effects of the medication. After discussion with Dr. Brooke, he noted pt was unable to tolerate statins and PSK9-i in the past therefore, was not resumed on discharge. Given her extensive PAD, to consider re-trialing statin vs PSK9-i as an outpatient. Patient is a 73y Female with PMHx of HTN, HLD, DM, CABG x 31/2019 GI bleed 1-2 yrs ago, sickle cell trait at baseline, PAD s/p lower extremity stents in June 2022 on plavix, pt states stopped taking aspirin 1 month after her last intervention. Pt also states does not take her statin due to multiple side effects.     On 2/6/24 patient underwent peripheral angiogram which revealed:   Right Common Femoral Artery: 99% stenosis -->s/p laser and drug coated balloon of R CFA  Right SFA Artery: 99% stenosis -->s/p laser and drug coated balloon of distal R SFA and Rosa M stent [id number]  Right Popliteal Artery: 80% stenosis --> s/p laser and drug coated balloon of R popliteal  Right Tibial Peroneal Trunk Artery: s/p laser and drug coated balloon of Right Tibial Peroneal Trunk  Left SFA Artery: patent stent site  Left Peroneal Artery: 95% stenosis --> medical therapy unless symptomatic    Patient was monitored overnight. On POD 1 patient remains HD stable with no complaints. Patient remains in SR with no arrhythmias noted on tele. EKG performed showed no acute ST changes. Examination of Left common femoral artery showed a C/D/I site with no hematoma, erythema or bruit. Distal pulses are 2+ bilaterally (palpable). Renal function remains stable post cath. Patient will be discharged home on DAPT with ASA and Plavix. Prior to discharge, pt with c/o pain of the R foot which improved by po Percocet. Gabapentin PO was offered at bedtime for pain however, reports side effect of dizziness with medication when she was previously prescribed. Patient is being DC home in stable condition. Patient will follow-up with Dr. Garcia in 2 weeks as an outpatient. Pt will also follow-up with her podiatrist Dr. Young in [place], [place] on 2/21/24 at 9:45 AM.     Upon further questioning about statins, pt reports she was taking statins for a very short period of time however, stopped due to her concerns for the side effects of the medication. After discussion with Dr. Brooke, he noted pt was unable to tolerate statins and PSK9-i in the past therefore, was not resumed on discharge. Given her extensive PAD, to consider re-trialing statin vs PSK9-i as an outpatient.

## 2024-02-06 NOTE — CHART NOTE - NSCHARTNOTEFT_GEN_A_CORE
INDICATION:                           CLI                          Peri class V    PHYSICIAN: Dr. Brooke  ASSISTANT/FELLOW: GRIFFIN Glynn    ACCESS: Ultrasound Guided Left femoral artery access    VASCULAR CLOSURE DEVICE: s/p Left femoral perclose    ANGIOGRAM:  Selective Abdominal Aorta, Right Iliac, Right SFA, Left Iliac, Left SFA, Right and Left Lower Extremity DSA angiography     DETAILED PROCEDURE SUMMARY:  After obtaining informed consent, the patient was brought to the catheterization suite in a post- absorptive and non-sedated state. After this, a timeout was performed and I administered moderate sedation throughout this 45-minute procedure. An independent trained observer pushed medications at my direction, and monitored the patient’s level of consciousness and physiological status throughout. The patient was prepped and draped in the usual sterile manner. 1% lidocaine was used for local anesthesia and the patient was sedated as per endovascular lab protocol. Access was obtained in the  Femoral Artery using the modified Seldinger technique 5/6/7 [place] Sheath was placed in the artery under fluoroscopy and ultrasound guidance which was utilized for assessment of arterial patency and actual real-time visualization of needle passage to the arterial lumen. The sheath was exchanged for 6 Fr 45 cm destination sheath prior to the interventional part of the procedure.      CONTRAST: 80 ml      PERIPHERAL ANGIOGRAM SUMMARY:  PERIPHERAL INTERVENTION SUMMARY:    Right Common Femoral Artery: 99% stenosis -->s/p laser and drug coated balloon of RCFA  Right SFA Artery: 99% stenosis -->s/p laser and drug coated balloon of distal RSFA and Rosa M stent [id number]  Right Popliteal Artery: 80% stenosis --> s/p laser and drug coated balloon of R popliteal  Right Tibial Peroneal Trunk Artery: s/p laser and drug coated balloon of Right Tibial Peroneal Trunk    Left SFA Artery: patent stent site  Left Peroneal Artery: 95% stenosis --> medical therapy unless symptomatic      COMPLICATIONS:  None      RECOMMENDATIONS:  IV fluids: NS @ 75ml/hr x 4hrs   Antiplatelets: ASA and Plavix  -Unable to tolerate statins  Aggressive risk factor modification  Disposition: Monitor on 4T  -Bedrest x 4 hrs

## 2024-02-06 NOTE — DISCHARGE NOTE PROVIDER - CARE PROVIDER_API CALL
Mark Anthony Brooke.  Interventional Cardiology  [address] [place] [id number]  Phone: [phone]  Fax: [phone]  Follow Up Time: 1 week   Blaine Garcia  Vascular Medicine  [address] [place] [address], [address] [place] [address]  Phone: [phone]  Fax: [phone]  Follow Up Time: 2 weeks    Wilfred Young  [address] [place] [address]  Phone: [phone]  Fax: (   )    -  Established Patient  Scheduled Appointment: 02/21/2024 09:45 AM

## 2024-02-06 NOTE — PATIENT PROFILE ADULT - FALL HARM RISK - HARM RISK INTERVENTIONS
Assistance with ambulation/Assistance OOB with selected safe patient handling equipment/Communicate Risk of Fall with Harm to all staff/Discuss with provider need for PT consult/Monitor gait and stability/Provide patient with walking aids - walker, cane, crutches/Reinforce activity limits and safety measures with patient and family/Sit up slowly, dangle for a short time, stand at bedside before walking/Tailored Fall Risk Interventions/Use of alarms - bed, chair and/or voice tab/Visual Cue: Yellow wristband and red socks/Bed in lowest position, wheels locked, appropriate side rails in place/Call bell, personal items and telephone in reach/Instruct patient to call for assistance before getting out of bed or chair/Non-slip footwear when patient is out of bed/[place] to call system/Physically safe environment - no spills, clutter or unnecessary equipment/Purposeful Proactive Rounding/Room/bathroom lighting operational, light cord in reach

## 2024-02-06 NOTE — DISCHARGE NOTE PROVIDER - NSDCCPTREATMENT_GEN_ALL_CORE_FT
PRINCIPAL PROCEDURE  Procedure: Angiogram, peripheral, with PTA if indicated  Findings and Treatment: Discharge Instructions as follows:  - Continue medical regimen as prescribed to prevent chest pain.  - Continue dual anti-platelet therapy, beta blocker, Statin.   - Instructed to call 911 if chest pain, shortness of breath or bleeding from access site.  - No heavy lifting > 10lbs x 1 week.  - No driving x 24 hours.  - No baths, swimming pools x 1 week; may shower.  - Follow-up with Dr. Brooke in 1 week.

## 2024-02-06 NOTE — PATIENT PROFILE ADULT - NSPRESCRUSEDDRG_GEN_A_NUR
January 17, 2024      [facility]  [address]  [address]  [place] [address]  Phone: [phone]       Patient: Neha Hilario   YOB: 1998  Date of Visit: 01/17/2024    To Whom It May Concern:    Homero Hilario  was at [facility] on 01/17/2024. The patient may return to work/school on 1/18/24 with no restrictions. If you have any questions or concerns, or if I can be of further assistance, please do not hesitate to contact me.    Sincerely,    Sung Ortiz MA      No

## 2024-02-06 NOTE — DISCHARGE NOTE PROVIDER - NSDCMRMEDTOKEN_GEN_ALL_CORE_FT
amLODIPine 10 mg oral tablet: 1 tab(s) orally once a day  Coreg 6.25 mg oral tablet: 1 tab(s) orally 2 times a day  hydroCHLOROthiazide 25 mg oral tablet: 1 tab(s) orally once a day  omeprazole 20 mg oral delayed release capsule: 1 cap(s) orally once a day  oxycodone-acetaminophen 10 mg-325 mg oral tablet: 1 tab(s) orally once a day (at bedtime) as needed for  moderate pain  Plavix 75 mg oral tablet: 1 tab(s) orally once a day  ramipril 10 mg oral capsule: 1 cap(s) orally once a day   amLODIPine 10 mg oral tablet: 1 tab(s) orally once a day  aspirin 81 mg oral delayed release tablet: 1 tab(s) orally once a day  clopidogrel 75 mg oral tablet: 1 tab(s) orally once a day  Coreg 6.25 mg oral tablet: 1 tab(s) orally 2 times a day  hydroCHLOROthiazide 25 mg oral tablet: 1 tab(s) orally once a day  omeprazole 20 mg oral delayed release capsule: 1 cap(s) orally once a day  oxycodone-acetaminophen 10 mg-325 mg oral tablet: 1 tab(s) orally once a day (at bedtime) as needed for  moderate pain  ramipril 10 mg oral capsule: 1 cap(s) orally once a day   amLODIPine 10 mg oral tablet: 1 tab(s) orally once a day  aspirin 81 mg oral delayed release tablet: 1 tab(s) orally once a day  clopidogrel 75 mg oral tablet: 1 tab(s) orally once a day  Coreg 6.25 mg oral tablet: 1 tab(s) orally 2 times a day  hydroCHLOROthiazide 25 mg oral tablet: 1 tab(s) orally once a day  omeprazole 20 mg oral delayed release capsule: 1 cap(s) orally once a day  oxycodone-acetaminophen 5 mg-325 mg oral tablet: 1 tab(s) orally every 6 hours as needed for Severe Pain (7 - 10) MDD: 20 mg  ramipril 10 mg oral capsule: 1 cap(s) orally once a day

## 2024-02-06 NOTE — DISCHARGE NOTE PROVIDER - PROVIDER TOKENS
PROVIDER:[TOKEN:[[id number]],FOLLOWUP:[1 week]] PROVIDER:[TOKEN:[[id number]],FOLLOWUP:[2 weeks]],FREE:[LAST:[Hector],FIRST:[Wilfred Feliz],PHONE:[phone]:[phone]   [phone]    -],ADDRESS:[[address] [place] [address]],SCHEDULEDAPPT:[02/21/2024],SCHEDULEDAPPTTIME:[09:45 AM],ESTABLISHEDPATIENT:[T]]

## 2024-02-07 ENCOUNTER — NON-APPOINTMENT (OUTPATIENT)
Age: 74
End: 2024-02-07

## 2024-02-07 ENCOUNTER — TRANSCRIPTION ENCOUNTER (OUTPATIENT)
Age: 74
End: 2024-02-07

## 2024-02-07 VITALS
TEMPERATURE: 99 F | SYSTOLIC BLOOD PRESSURE: 149 MMHG | DIASTOLIC BLOOD PRESSURE: 63 MMHG | OXYGEN SATURATION: 98 % | RESPIRATION RATE: 18 BRPM | HEART RATE: 66 BPM

## 2024-02-07 LAB
ANION GAP SERPL CALC-SCNC: 10 MMOL/L — SIGNIFICANT CHANGE UP (ref 7–14)
BUN SERPL-MCNC: 8 MG/DL — LOW (ref 10–20)
CALCIUM SERPL-MCNC: 8.8 MG/DL — SIGNIFICANT CHANGE UP (ref 8.4–10.5)
CHLORIDE SERPL-SCNC: 102 MMOL/L — SIGNIFICANT CHANGE UP (ref 98–110)
CO2 SERPL-SCNC: 27 MMOL/L — SIGNIFICANT CHANGE UP (ref 17–32)
CREAT SERPL-MCNC: 0.8 MG/DL — SIGNIFICANT CHANGE UP (ref 0.7–1.5)
EGFR: 77 ML/MIN/1.73M2 — SIGNIFICANT CHANGE UP
GLUCOSE BLDC GLUCOMTR-MCNC: 120 MG/DL — HIGH (ref 70–99)
GLUCOSE BLDC GLUCOMTR-MCNC: 185 MG/DL — HIGH (ref 70–99)
GLUCOSE SERPL-MCNC: 131 MG/DL — HIGH (ref 70–99)
HCT VFR BLD CALC: 27.4 % — LOW (ref 37–47)
HGB BLD-MCNC: 8.7 G/DL — LOW (ref 12–16)
MAGNESIUM SERPL-MCNC: 1.5 MG/DL — LOW (ref 1.8–2.4)
MCHC RBC-ENTMCNC: 24.9 PG — LOW (ref 27–31)
MCHC RBC-ENTMCNC: 31.8 G/DL — LOW (ref 32–37)
MCV RBC AUTO: 78.5 FL — LOW (ref 81–99)
NRBC # BLD: 0 /100 WBCS — SIGNIFICANT CHANGE UP (ref 0–0)
PLATELET # BLD AUTO: 303 K/UL — SIGNIFICANT CHANGE UP (ref 130–400)
PMV BLD: 9.8 FL — SIGNIFICANT CHANGE UP (ref 7.4–10.4)
POTASSIUM SERPL-MCNC: 4.1 MMOL/L — SIGNIFICANT CHANGE UP (ref 3.5–5)
POTASSIUM SERPL-SCNC: 4.1 MMOL/L — SIGNIFICANT CHANGE UP (ref 3.5–5)
RBC # BLD: 3.49 M/UL — LOW (ref 4.2–5.4)
RBC # FLD: 15.6 % — HIGH (ref 11.5–14.5)
SODIUM SERPL-SCNC: 139 MMOL/L — SIGNIFICANT CHANGE UP (ref 135–146)
WBC # BLD: 8.52 K/UL — SIGNIFICANT CHANGE UP (ref 4.8–10.8)
WBC # FLD AUTO: 8.52 K/UL — SIGNIFICANT CHANGE UP (ref 4.8–10.8)

## 2024-02-07 PROCEDURE — 99239 HOSP IP/OBS DSCHRG MGMT >30: CPT

## 2024-02-07 RX ORDER — OXYCODONE AND ACETAMINOPHEN 5; 325 MG/1; MG/1
1 TABLET ORAL
Refills: 0 | DISCHARGE

## 2024-02-07 RX ORDER — ASPIRIN/CALCIUM CARB/MAGNESIUM 324 MG
1 TABLET ORAL
Qty: 30 | Refills: 2
Start: 2024-02-07 | End: 2024-05-06

## 2024-02-07 RX ORDER — CLOPIDOGREL BISULFATE 75 MG/1
1 TABLET, FILM COATED ORAL
Qty: 0 | Refills: 0 | DISCHARGE

## 2024-02-07 RX ORDER — OXYCODONE AND ACETAMINOPHEN 5; 325 MG/1; MG/1
1 TABLET ORAL
Qty: 28 | Refills: 0
Start: 2024-02-07 | End: 2024-02-13

## 2024-02-07 RX ORDER — CLOPIDOGREL BISULFATE 75 MG/1
1 TABLET, FILM COATED ORAL
Qty: 30 | Refills: 2
Start: 2024-02-07 | End: 2024-05-06

## 2024-02-07 RX ADMIN — LISINOPRIL 40 MILLIGRAM(S): 2.5 TABLET ORAL at 05:19

## 2024-02-07 RX ADMIN — CARVEDILOL PHOSPHATE 6.25 MILLIGRAM(S): 80 CAPSULE, EXTENDED RELEASE ORAL at 05:14

## 2024-02-07 RX ADMIN — PANTOPRAZOLE SODIUM 40 MILLIGRAM(S): 20 TABLET, DELAYED RELEASE ORAL at 05:14

## 2024-02-07 RX ADMIN — AMLODIPINE BESYLATE 10 MILLIGRAM(S): 2.5 TABLET ORAL at 05:14

## 2024-02-07 RX ADMIN — CLOPIDOGREL BISULFATE 75 MILLIGRAM(S): 75 TABLET, FILM COATED ORAL at 12:19

## 2024-02-07 RX ADMIN — Medication 81 MILLIGRAM(S): at 12:19

## 2024-02-07 NOTE — PHYSICAL THERAPY INITIAL EVALUATION ADULT - PERTINENT HX OF CURRENT PROBLEM, REHAB EVAL
73y Female presents today for LE angiogram for Peri class V/non healing ulcer of R 3rd toe. Pt complains of severe R foot/toe pain (10/10). pt states noticed third right toe infection 1 week ago with foul smelling drainage, + necrosis, + night pain.    PMHx of HTN, HLD, DM, CABG x 31/2019 GI bleed 1-2 yrs ago, , sickle cell trait at baseline, PAD s/p lower extremity stents in June 2022 on plavix, pt states stopped taking aspirin 1 month after her last intervention. Pt also states does not take her statin due to multiple side effects.

## 2024-02-07 NOTE — DISCHARGE NOTE NURSING/CASE MANAGEMENT/SOCIAL WORK - NSTOBACCONEVERSMOKERY/N_GEN_A
From: Gary Claire  To: Katrin Starks  Sent: 2/7/2024 8:07 AM CST  Subject: School note    Would I be able to get a note for school for Gary? He missed Feb 5,6 and 7th due to his breathing. Thanks.   No

## 2024-02-07 NOTE — PHYSICAL THERAPY INITIAL EVALUATION ADULT - DID THE PATIENT HAVE SURGERY?
Selective Abdominal Aorta, Right Iliac, Right SFA, Left Iliac, Left SFA, Right and Left Lower Extremity DSA angiography/yes

## 2024-02-07 NOTE — DISCHARGE NOTE NURSING/CASE MANAGEMENT/SOCIAL WORK - PATIENT PORTAL LINK FT
You can access the FollowMyHealth Patient Portal offered by [facility] by registering at the following website: [email or web address]. By joining [facility]’s FollowMyHealth portal, you will also be able to view your health information using other applications (apps) compatible with our system.

## 2024-02-07 NOTE — PHYSICAL THERAPY INITIAL EVALUATION ADULT - GENERAL OBSERVATIONS, REHAB EVAL
16:00-16:30. chart reviewed. Pt received semi-muir at B/S, alert, oriented, able to follow multi-step instructions and agreeable to PT evaluation. + monitoring, CC R foot pain 5/10, R 3rd toe discoloration, R LE WBAT

## 2024-02-07 NOTE — PHYSICAL THERAPY INITIAL EVALUATION ADULT - ADDITIONAL COMMENTS
Pt resides with  in a private house using 3 steps to enter and 11 to access 2nd floor. Pt stated that she can stay in 1 st floor. Pt used to be independent with overall functional mobility able to ambulate using cane at baseline

## 2024-02-07 NOTE — PHYSICAL THERAPY INITIAL EVALUATION ADULT - GAIT TRAINING, PT EVAL
Pt will ambulate using RW or least restrictive AD for 100 ft with S/U by discharge to facilitate return to PLOF. Pt will negotiate 11 steps using 1 HR under supervision

## 2024-02-07 NOTE — DISCHARGE NOTE NURSING/CASE MANAGEMENT/SOCIAL WORK - NSDCPEFALRISK_GEN_ALL_CORE
For information on Fall & Injury Prevention, visit: [email or web address] OR  [email or web address] OR  https://www.cdc.gov/steadi/patient.html

## 2024-02-11 DIAGNOSIS — I70.223 ATHEROSCLEROSIS OF NATIVE ARTERIES OF EXTREMITIES WITH REST PAIN, BILATERAL LEGS: ICD-10-CM

## 2024-02-11 DIAGNOSIS — Z79.02 LONG TERM (CURRENT) USE OF ANTITHROMBOTICS/ANTIPLATELETS: ICD-10-CM

## 2024-02-11 DIAGNOSIS — Z98.84 BARIATRIC SURGERY STATUS: ICD-10-CM

## 2024-02-11 DIAGNOSIS — Z95.1 PRESENCE OF AORTOCORONARY BYPASS GRAFT: ICD-10-CM

## 2024-02-11 DIAGNOSIS — E11.51 TYPE 2 DIABETES MELLITUS WITH DIABETIC PERIPHERAL ANGIOPATHY WITHOUT GANGRENE: ICD-10-CM

## 2024-02-11 DIAGNOSIS — E78.5 HYPERLIPIDEMIA, UNSPECIFIED: ICD-10-CM

## 2024-02-11 DIAGNOSIS — I10 ESSENTIAL (PRIMARY) HYPERTENSION: ICD-10-CM

## 2024-02-28 ENCOUNTER — INPATIENT (INPATIENT)
Facility: HOSPITAL | Age: 74
LOS: 5 days | Discharge: HOME HEALTH | DRG: 616 | End: 2024-03-05
Attending: INTERNAL MEDICINE | Admitting: INTERNAL MEDICINE
Payer: COMMERCIAL

## 2024-02-28 ENCOUNTER — RESULT REVIEW (OUTPATIENT)
Age: 74
End: 2024-02-28

## 2024-02-28 VITALS
SYSTOLIC BLOOD PRESSURE: 135 MMHG | HEIGHT: 65 IN | WEIGHT: 136.03 LBS | TEMPERATURE: 98 F | HEART RATE: 65 BPM | OXYGEN SATURATION: 98 % | RESPIRATION RATE: 20 BRPM | DIASTOLIC BLOOD PRESSURE: 70 MMHG

## 2024-02-28 DIAGNOSIS — Z98.891 HISTORY OF UTERINE SCAR FROM PREVIOUS SURGERY: Chronic | ICD-10-CM

## 2024-02-28 DIAGNOSIS — Z95.1 PRESENCE OF AORTOCORONARY BYPASS GRAFT: Chronic | ICD-10-CM

## 2024-02-28 DIAGNOSIS — I73.9 PERIPHERAL VASCULAR DISEASE, UNSPECIFIED: Chronic | ICD-10-CM

## 2024-02-28 DIAGNOSIS — I96 GANGRENE, NOT ELSEWHERE CLASSIFIED: ICD-10-CM

## 2024-02-28 DIAGNOSIS — Z95.828 PRESENCE OF OTHER VASCULAR IMPLANTS AND GRAFTS: Chronic | ICD-10-CM

## 2024-02-28 DIAGNOSIS — Z98.890 OTHER SPECIFIED POSTPROCEDURAL STATES: Chronic | ICD-10-CM

## 2024-02-28 DIAGNOSIS — Z98.84 BARIATRIC SURGERY STATUS: Chronic | ICD-10-CM

## 2024-02-28 DIAGNOSIS — Z98.89 OTHER SPECIFIED POSTPROCEDURAL STATES: Chronic | ICD-10-CM

## 2024-02-28 LAB
ALBUMIN SERPL ELPH-MCNC: 3.9 G/DL — SIGNIFICANT CHANGE UP (ref 3.3–5)
ALP SERPL-CCNC: 156 U/L — HIGH (ref 40–120)
ALT FLD-CCNC: 9 U/L — LOW (ref 10–45)
ANION GAP SERPL CALC-SCNC: 14 MMOL/L — SIGNIFICANT CHANGE UP (ref 5–17)
APTT BLD: 32.2 SEC — SIGNIFICANT CHANGE UP (ref 24.5–35.6)
AST SERPL-CCNC: 20 U/L — SIGNIFICANT CHANGE UP (ref 10–40)
BASE EXCESS BLDV CALC-SCNC: 2.1 MMOL/L — SIGNIFICANT CHANGE UP (ref -2–3)
BASOPHILS # BLD AUTO: 0.03 K/UL — SIGNIFICANT CHANGE UP (ref 0–0.2)
BASOPHILS NFR BLD AUTO: 0.5 % — SIGNIFICANT CHANGE UP (ref 0–2)
BILIRUB SERPL-MCNC: 0.6 MG/DL — SIGNIFICANT CHANGE UP (ref 0.2–1.2)
BUN SERPL-MCNC: 13 MG/DL — SIGNIFICANT CHANGE UP (ref 7–23)
CA-I SERPL-SCNC: 1.23 MMOL/L — SIGNIFICANT CHANGE UP (ref 1.15–1.33)
CALCIUM SERPL-MCNC: 9.6 MG/DL — SIGNIFICANT CHANGE UP (ref 8.4–10.5)
CHLORIDE BLDV-SCNC: 106 MMOL/L — SIGNIFICANT CHANGE UP (ref 96–108)
CHLORIDE SERPL-SCNC: 102 MMOL/L — SIGNIFICANT CHANGE UP (ref 96–108)
CO2 BLDV-SCNC: 31 MMOL/L — HIGH (ref 22–26)
CO2 SERPL-SCNC: 27 MMOL/L — SIGNIFICANT CHANGE UP (ref 22–31)
CREAT SERPL-MCNC: 0.99 MG/DL — SIGNIFICANT CHANGE UP (ref 0.5–1.3)
CRP SERPL-MCNC: 11 MG/L — HIGH (ref 0–4)
CRP SERPL-MCNC: 11 MG/L — HIGH (ref 0–4)
EGFR: 59 ML/MIN/1.73M2 — LOW
EOSINOPHIL # BLD AUTO: 0.15 K/UL — SIGNIFICANT CHANGE UP (ref 0–0.5)
EOSINOPHIL NFR BLD AUTO: 2.3 % — SIGNIFICANT CHANGE UP (ref 0–6)
GAS PNL BLDV: 139 MMOL/L — SIGNIFICANT CHANGE UP (ref 136–145)
GAS PNL BLDV: SIGNIFICANT CHANGE UP
GLUCOSE BLDV-MCNC: 86 MG/DL — SIGNIFICANT CHANGE UP (ref 70–99)
GLUCOSE SERPL-MCNC: 90 MG/DL — SIGNIFICANT CHANGE UP (ref 70–99)
HCO3 BLDV-SCNC: 29 MMOL/L — SIGNIFICANT CHANGE UP (ref 22–29)
HCT VFR BLD CALC: 31.3 % — LOW (ref 34.5–45)
HCT VFR BLDA CALC: 28 % — LOW (ref 34.5–46.5)
HGB BLD CALC-MCNC: 9.4 G/DL — LOW (ref 11.7–16.1)
HGB BLD-MCNC: 9.8 G/DL — LOW (ref 11.5–15.5)
IMM GRANULOCYTES NFR BLD AUTO: 0.3 % — SIGNIFICANT CHANGE UP (ref 0–0.9)
INR BLD: 1.23 RATIO — HIGH (ref 0.85–1.18)
LACTATE BLDV-MCNC: 1.5 MMOL/L — SIGNIFICANT CHANGE UP (ref 0.5–2)
LYMPHOCYTES # BLD AUTO: 1.41 K/UL — SIGNIFICANT CHANGE UP (ref 1–3.3)
LYMPHOCYTES # BLD AUTO: 21.7 % — SIGNIFICANT CHANGE UP (ref 13–44)
MCHC RBC-ENTMCNC: 24.7 PG — LOW (ref 27–34)
MCHC RBC-ENTMCNC: 31.3 GM/DL — LOW (ref 32–36)
MCV RBC AUTO: 79 FL — LOW (ref 80–100)
MONOCYTES # BLD AUTO: 0.33 K/UL — SIGNIFICANT CHANGE UP (ref 0–0.9)
MONOCYTES NFR BLD AUTO: 5.1 % — SIGNIFICANT CHANGE UP (ref 2–14)
NEUTROPHILS # BLD AUTO: 4.57 K/UL — SIGNIFICANT CHANGE UP (ref 1.8–7.4)
NEUTROPHILS NFR BLD AUTO: 70.1 % — SIGNIFICANT CHANGE UP (ref 43–77)
NRBC # BLD: 0 /100 WBCS — SIGNIFICANT CHANGE UP (ref 0–0)
PCO2 BLDV: 56 MMHG — HIGH (ref 39–42)
PH BLDV: 7.32 — SIGNIFICANT CHANGE UP (ref 7.32–7.43)
PLATELET # BLD AUTO: 386 K/UL — SIGNIFICANT CHANGE UP (ref 150–400)
PO2 BLDV: 29 MMHG — SIGNIFICANT CHANGE UP (ref 25–45)
POTASSIUM BLDV-SCNC: 3.5 MMOL/L — SIGNIFICANT CHANGE UP (ref 3.5–5.1)
POTASSIUM SERPL-MCNC: 3.4 MMOL/L — LOW (ref 3.5–5.3)
POTASSIUM SERPL-SCNC: 3.4 MMOL/L — LOW (ref 3.5–5.3)
PROT SERPL-MCNC: 7.5 G/DL — SIGNIFICANT CHANGE UP (ref 6–8.3)
PROTHROM AB SERPL-ACNC: 12.8 SEC — SIGNIFICANT CHANGE UP (ref 9.5–13)
RBC # BLD: 3.96 M/UL — SIGNIFICANT CHANGE UP (ref 3.8–5.2)
RBC # FLD: 17.3 % — HIGH (ref 10.3–14.5)
SAO2 % BLDV: 41.2 % — LOW (ref 67–88)
SODIUM SERPL-SCNC: 143 MMOL/L — SIGNIFICANT CHANGE UP (ref 135–145)
WBC # BLD: 6.51 K/UL — SIGNIFICANT CHANGE UP (ref 3.8–10.5)
WBC # FLD AUTO: 6.51 K/UL — SIGNIFICANT CHANGE UP (ref 3.8–10.5)

## 2024-02-28 PROCEDURE — 73610 X-RAY EXAM OF ANKLE: CPT | Mod: 26,RT

## 2024-02-28 PROCEDURE — 99285 EMERGENCY DEPT VISIT HI MDM: CPT

## 2024-02-28 PROCEDURE — 73630 X-RAY EXAM OF FOOT: CPT | Mod: 26,RT

## 2024-02-28 PROCEDURE — 88305 TISSUE EXAM BY PATHOLOGIST: CPT | Mod: 26

## 2024-02-28 PROCEDURE — 73660 X-RAY EXAM OF TOE(S): CPT | Mod: 26,XE,RT

## 2024-02-28 PROCEDURE — 99223 1ST HOSP IP/OBS HIGH 75: CPT

## 2024-02-28 PROCEDURE — 88311 DECALCIFY TISSUE: CPT | Mod: 26

## 2024-02-28 RX ORDER — POTASSIUM CHLORIDE 20 MEQ
40 PACKET (EA) ORAL ONCE
Refills: 0 | Status: COMPLETED | OUTPATIENT
Start: 2024-02-28 | End: 2024-02-28

## 2024-02-28 RX ORDER — ACETAMINOPHEN 500 MG
650 TABLET ORAL EVERY 6 HOURS
Refills: 0 | Status: DISCONTINUED | OUTPATIENT
Start: 2024-02-28 | End: 2024-03-01

## 2024-02-28 RX ORDER — AMLODIPINE BESYLATE 2.5 MG/1
10 TABLET ORAL DAILY
Refills: 0 | Status: DISCONTINUED | OUTPATIENT
Start: 2024-02-28 | End: 2024-03-01

## 2024-02-28 RX ORDER — ASPIRIN/CALCIUM CARB/MAGNESIUM 324 MG
81 TABLET ORAL DAILY
Refills: 0 | Status: DISCONTINUED | OUTPATIENT
Start: 2024-02-28 | End: 2024-03-01

## 2024-02-28 RX ORDER — CLOPIDOGREL BISULFATE 75 MG/1
75 TABLET, FILM COATED ORAL DAILY
Refills: 0 | Status: DISCONTINUED | OUTPATIENT
Start: 2024-02-28 | End: 2024-03-01

## 2024-02-28 RX ORDER — VANCOMYCIN HCL 1 G
1000 VIAL (EA) INTRAVENOUS ONCE
Refills: 0 | Status: COMPLETED | OUTPATIENT
Start: 2024-02-28 | End: 2024-02-28

## 2024-02-28 RX ORDER — ACETAMINOPHEN WITH CODEINE 300MG-30MG
1 TABLET ORAL EVERY 6 HOURS
Refills: 0 | Status: DISCONTINUED | OUTPATIENT
Start: 2024-02-28 | End: 2024-03-01

## 2024-02-28 RX ORDER — PANTOPRAZOLE SODIUM 20 MG/1
40 TABLET, DELAYED RELEASE ORAL
Refills: 0 | Status: DISCONTINUED | OUTPATIENT
Start: 2024-02-28 | End: 2024-03-01

## 2024-02-28 RX ORDER — CARVEDILOL PHOSPHATE 80 MG/1
6.25 CAPSULE, EXTENDED RELEASE ORAL EVERY 12 HOURS
Refills: 0 | Status: DISCONTINUED | OUTPATIENT
Start: 2024-02-28 | End: 2024-03-01

## 2024-02-28 RX ORDER — PIPERACILLIN AND TAZOBACTAM 4; .5 G/20ML; G/20ML
3.38 INJECTION, POWDER, LYOPHILIZED, FOR SOLUTION INTRAVENOUS ONCE
Refills: 0 | Status: COMPLETED | OUTPATIENT
Start: 2024-02-28 | End: 2024-02-28

## 2024-02-28 RX ORDER — ENOXAPARIN SODIUM 100 MG/ML
40 INJECTION SUBCUTANEOUS EVERY 24 HOURS
Refills: 0 | Status: DISCONTINUED | OUTPATIENT
Start: 2024-02-28 | End: 2024-03-01

## 2024-02-28 RX ORDER — LISINOPRIL 2.5 MG/1
10 TABLET ORAL DAILY
Refills: 0 | Status: DISCONTINUED | OUTPATIENT
Start: 2024-02-28 | End: 2024-03-01

## 2024-02-28 RX ADMIN — PIPERACILLIN AND TAZOBACTAM 200 GRAM(S): 4; .5 INJECTION, POWDER, LYOPHILIZED, FOR SOLUTION INTRAVENOUS at 15:34

## 2024-02-28 RX ADMIN — Medication 40 MILLIEQUIVALENT(S): at 18:19

## 2024-02-28 RX ADMIN — CARVEDILOL PHOSPHATE 6.25 MILLIGRAM(S): 80 CAPSULE, EXTENDED RELEASE ORAL at 21:47

## 2024-02-28 RX ADMIN — ENOXAPARIN SODIUM 40 MILLIGRAM(S): 100 INJECTION SUBCUTANEOUS at 21:29

## 2024-02-28 RX ADMIN — Medication 250 MILLIGRAM(S): at 17:56

## 2024-02-28 NOTE — ED ADULT NURSE NOTE - NSFALLRISKFACTORS_ED_ALL_ED
Plavix/Coagulation: Bleeding disorder either through use of anticoagulants or underlying clinical condition(s)

## 2024-02-28 NOTE — ED PROVIDER NOTE - PROGRESS NOTE DETAILS
Attending MD Sweeney: Spoke with podiatry, aware of patient, requested pre-op labs and ESR, CRP, XR of R foot, and a consult with Dr. Garcia. Attending MD Sweeney: Seen in Emergency Department by Dr. Garcia, will be admitted to medicine Robert: podiatry saw pt and think pt ok to be admitted to medicine, pts pcp unattached, spoke with Dr. Ruiz for admission. Pt scheduled for the procedure on Friday.

## 2024-02-28 NOTE — H&P ADULT - NSHPPHYSICALEXAM_GEN_ALL_CORE
General: WN/WD NAD  PERRLA  Neurology: A&Ox3, nonfocal, HUSAIN x 4  Respiratory: CTA B/L  CV: RRR, S1S2, no murmurs, rubs or gallops  Abdominal: Soft, NT, ND +BS, Last BM  Extremities: foot dressing +

## 2024-02-28 NOTE — ED ADULT NURSE NOTE - OBJECTIVE STATEMENT
The patient is a 73y female presenting to the ED from home for complaints of toe pain. Patient presents with a PMH of DM, HLD, Plavix usage, Vascular Disorders, and HTN. Patient reports today she was called by her podiatrist and told to come to the ED. PT states she was scheduled The patient is a 73y female presenting to the ED from home for complaints of toe pain. Patient presents with a PMH of DM, HLD, Plavix usage, Vascular Disorders, and HTN. Patient reports today she was called by her podiatrist and told to come to the ED. PT states she was scheduled for an amputation of the right 3rd toe on Friday March 1st. Upon assessment the patient is breathing spontaneously and unlabored on room air. She is axo x4 and responds appropriately. Patient right 3rd toe is dressed and has an open raw area draining purulent like drainage @ this time. The exterior of the right 3rd toe also is seen to present with necrotic like tissue. PT endorsed pain 6/10 to the right 3rd toe and right anterior foot. The right foot  has full ROM and sensation intact to compared to the bilateral left. Pt denies chest pain, palpitations, shortness of breath, headache, visual disturbances, numbness/tingling, fever, chills, diaphoresis,  nausea, vomiting, constipation, diarrhea, or urinary symptoms. Safety and comfort measures provided, bed locked and in lowest position, side rails up for safety. Awaiting MD Reassessment and results.

## 2024-02-28 NOTE — H&P ADULT - ASSESSMENT
73-year-old female with past medical history of type 2 diabetes not on any medication diet controlled, high blood pressure, CAD status post CABG, vasculopath, sent in by Dr. Garcia her podiatrist to be admitted for scheduled right third toe amputation on Friday to be admitted to medicine and for Dr. Guerrero of podiatry to see while here.  Patient denies any fevers or chills, any chest pain shortness of breath, lightheadedness, palpitations, abdominal pain, nausea or vomiting, diarrhea.  Patient has intermittently been on Augmentin recently. 73-year-old female with past medical history of type 2 diabetes not on any medication diet controlled, high blood pressure, CAD status post CABG, vasculopath, sent in by Dr. Garcia her podiatrist to be admitted for scheduled right third toe amputation on Friday to be admitted to medicine and for Dr. Guerrero of podiatry to see while here.  Patient denies any fevers or chills, any chest pain shortness of breath, lightheadedness, palpitations, abdominal pain, nausea or vomiting, diarrhea.  Patient has intermittently been on Augmentin recently.    1 R 3rd toe OM  - here for amputation  - podiatry and vascular cards fu appreciated  - pain control  - sp ABX  - ID fu   - will defer abx to ID    2 HTN  - cw home meds  - DASH diet     3 CAD  - sp CABG  - cards following  - cw asa, plavix    4 DVT prophylaxis      73-year-old female with past medical history of type 2 diabetes not on any medication diet controlled, high blood pressure, CAD status post CABG, vasculopath, sent in by Dr. Garcia her podiatrist to be admitted for scheduled right third toe amputation on Friday to be admitted to medicine and for Dr. Guerrero of podiatry to see while here.  Patient denies any fevers or chills, any chest pain shortness of breath, lightheadedness, palpitations, abdominal pain, nausea or vomiting, diarrhea.  Patient has intermittently been on Augmentin recently.    1 R 3rd toe OM  - here for amputation  - podiatry and vascular cards fu appreciated  - pain control  - sp ABX  - ID fu   - will defer abx to ID    2 HTN  - cw home meds  - DASH diet     3 CAD  - sp CABG  - cards following  - cw asa, plavix    4 DVT prophylaxis     Pt medically cleared for OR

## 2024-02-28 NOTE — CONSULT NOTE ADULT - NS ATTEND AMEND GEN_ALL_CORE FT
Recent peripheral intervention with Dr. Edwardo HAZEL leg  with a palpable pedal pulse on exam    she is clear to proceed with podiatric surgery

## 2024-02-28 NOTE — CONSULT NOTE ADULT - ASSESSMENT
73F presents with right foot 3rd digit dry gangrene with wet changes proximally   - Patient seen and evaluated   - Afebrile, no leukocytosis, ESR pending, CRP 1.1  - Right foot 3rd digit dry gangrene with moderate malodor, no drainage. No open lesions or signs of infection of left foot.   - Right foot xray: 3rd proximal phalanx OM, gas at 3rd distal phalanx and middle phalanx   - After obtaining verbal consent, applied topical lidocaine prior to 10cc 1% lidocaine plain injection. Disarticulated 3rd digit at DIPJ with sterile 15 blade and sent to pathology. Flushed wound with copious amount of normal saline and packed with 1/4 inch iodoform packing. Patient tolerated procedure well.   - Right foot wound cultured   - Vasc recs, appreciated   - Pod plan: Right foot partial 3rd ray resection on Friday 9:30am with Dr. Young   - Please document medical clearance for podiatric procedure under anesthesia  - Discussed with attending  73F presents with right foot 3rd digit dry gangrene with wet changes proximally   - Patient seen and evaluated   - Afebrile, no leukocytosis, ESR pending, CRP 1.1  - Right foot 3rd digit dry gangrene with moderate malodor, no drainage. No open lesions or signs of infection of left foot.   - Right foot xray: 3rd proximal phalanx OM, gas at 3rd distal phalanx and middle phalanx   - After obtaining verbal consent, applied topical lidocaine prior to 10cc 1% lidocaine plain injection. Disarticulated 3rd digit at DIPJ with sterile 15 blade and sent to pathology. Flushed wound with copious amount of normal saline and packed with 1/4 inch iodoform packing. Patient tolerated procedure well.   - Recommend admission to medicine  - Recommend IV Vanco/ zosyn   - Recommend ID consult  - Right foot wound cultured   - Vasc recs, appreciated   - Pod plan: Right foot partial 3rd ray resection on Friday 9:30am with Dr. Young   - Please document medical clearance for podiatric procedure under anesthesia  - Discussed with attending

## 2024-02-28 NOTE — ED PROVIDER NOTE - ATTENDING CONTRIBUTION TO CARE
Attending MD Sweeney: I personally have seen and examined this patient.  Fellow note reviewed and agree on plan of care and except where noted.  See below for details.     Seen in [place] [address]  Podiatry Dr. Young    73F with PMH/PSH including CAD s/p CABG,     presents to the ED with R foot gangrene of toe, sent in by podiatry for amputation.  Denies fevers, chills.  Reports Attending MD Sweeney: I personally have seen and examined this patient.  Fellow note reviewed and agree on plan of care and except where noted.  See below for details.     Seen in [place] [id number]  Podiatry Dr. Young    73F with PMH/PSH including CAD s/p CABG,     presents to the ED with R foot gangrene of toe, sent in by podiatry for amputation on Friday.  Reports previously on Augmentin.      Denies fevers, chills.  Denies chest pain, shortness of breath, abdominal pain, nausea, vomiting, diarrhea, urinary complaints.     TO BE COMPLETED

## 2024-02-28 NOTE — ED PROVIDER NOTE - CLINICAL SUMMARY MEDICAL DECISION MAKING FREE TEXT BOX
Impression: Concern for dry gangrene of right third toe as well as possible deeper soft tissue infection given the exam, patient's vitals are reassuring patient overall looks well, will speak with podiatry, send inflammatory markers, labs, cultures, fluids, empiric antibiotics, admit to medicine.

## 2024-02-28 NOTE — ED ADULT NURSE NOTE - NSFALLHARMRISKINTERV_ED_ALL_ED

## 2024-02-28 NOTE — ED PROVIDER NOTE - EKG #1 DATE/TIME
Health Maintenance Due   Topic Date Due   â¢ Shingles Vaccine (1 of 2) 12/02/1993   â¢ Influenza Vaccine (1) 08/01/2018   â¢ Diabetes Eye Exam  10/26/2018       Patient is due for the topics as listed above and wishes to proceed with them. 28-Feb-2024 14:46

## 2024-02-28 NOTE — H&P ADULT - NSHPLABSRESULTS_GEN_ALL_CORE
Lab Results:  CBC  CBC Full  -  ( 28 Feb 2024 14:46 )  WBC Count : 6.51 K/uL  RBC Count : 3.96 M/uL  Hemoglobin : 9.8 g/dL  Hematocrit : 31.3 %  Platelet Count - Automated : 386 K/uL  Mean Cell Volume : 79.0 fl  Mean Cell Hemoglobin : 24.7 pg  Mean Cell Hemoglobin Concentration : 31.3 gm/dL  Auto Neutrophil # : 4.57 K/uL  Auto Lymphocyte # : 1.41 K/uL  Auto Monocyte # : 0.33 K/uL  Auto Eosinophil # : 0.15 K/uL  Auto Basophil # : 0.03 K/uL  Auto Neutrophil % : 70.1 %  Auto Lymphocyte % : 21.7 %  Auto Monocyte % : 5.1 %  Auto Eosinophil % : 2.3 %  Auto Basophil % : 0.5 %    .		Differential:	[] Automated		[] Manual  Chemistry                        9.8    6.51  )-----------( 386      ( 28 Feb 2024 14:46 )             31.3     02-28    143  |  102  |  13  ----------------------------<  90  3.4<L>   |  27  |  0.99    Ca    9.6      28 Feb 2024 14:46    TPro  7.5  /  Alb  3.9  /  TBili  0.6  /  DBili  x   /  AST  20  /  ALT  9<L>  /  AlkPhos  156<H>  02-28    LIVER FUNCTIONS - ( 28 Feb 2024 14:46 )  Alb: 3.9 g/dL / Pro: 7.5 g/dL / ALK PHOS: 156 U/L / ALT: 9 U/L / AST: 20 U/L / GGT: x           PT/INR - ( 28 Feb 2024 14:46 )   PT: 12.8 sec;   INR: 1.23 ratio         PTT - ( 28 Feb 2024 14:46 )  PTT:32.2 sec  Urinalysis Basic - ( 28 Feb 2024 14:46 )    Color: x / Appearance: x / SG: x / pH: x  Gluc: 90 mg/dL / Ketone: x  / Bili: x / Urobili: x   Blood: x / Protein: x / Nitrite: x   Leuk Esterase: x / RBC: x / WBC x   Sq Epi: x / Non Sq Epi: x / Bacteria: x            MICROBIOLOGY/CULTURES:      RADIOLOGY RESULTS: REVIEWED

## 2024-02-28 NOTE — ED ADULT NURSE NOTE - NSICDXPASTMEDICALHX_GEN_ALL_CORE_FT
PAST MEDICAL HISTORY:  CAD (coronary artery disease)     HTN (hypertension)     Type 2 diabetes mellitus

## 2024-02-28 NOTE — ED ADULT NURSE REASSESSMENT NOTE - NS ED NURSE REASSESS COMMENT FT1
Report received from Suzan BAIRD. PT AXOX4 breathing unlabored on room air and speaking in complete sentences. Pt and family updated on plan of care; awaiting bed. Safety and comfort measures maintained.

## 2024-02-28 NOTE — ED PROVIDER NOTE - CARE PLAN
1 Principal Discharge DX:	Gangrene of toe of right foot   Principal Discharge DX:	Gangrene of toe of right foot  Secondary Diagnosis:	Cellulitis

## 2024-02-28 NOTE — ED PROVIDER NOTE - OBJECTIVE STATEMENT
73-year-old female with past medical history of type 2 diabetes not on any medication diet controlled, high blood pressure, CAD status post CABG, vasculopath, sent in by Dr. Garcia her podiatrist to be admitted for scheduled right third toe amputation on Friday to be admitted to medicine and for Dr. Guerrero of podiatry to see while here.  Patient denies any fevers or chills, any chest pain shortness of breath, lightheadedness, palpitations, abdominal pain, nausea or vomiting, diarrhea.  Patient has intermittently been on Augmentin recently.

## 2024-02-28 NOTE — ED PROVIDER NOTE - PHYSICAL EXAMINATION
GENERAL: no acute distress, non-toxic appearing  HEENT: normal conjunctiva, oral mucosa moist  CARDIAC: regular rate and regular rhythm, palpable dp pulses bilaterally  PULM: clear to ascultation bilaterally, sats 100% on RA, no increased work of breathing  GI: abdomen nondistended, soft, nontender  : no suprapubic tenderness  NEURO: alert and oriented x 3, normal speech, moving all extremities without lateralization  MSK: no visible deformities, no peripheral edema, calf tenderness/redness/swelling  SKIN: dry gangrene to R 3rd toe, warmth and induration to soft tissue of R foot, no areas of fluctuance/pus draining/crepitus appreciated  PSYCH: appropriate mood and affect

## 2024-02-28 NOTE — ED PROVIDER NOTE - DISPOSITION TYPE
Monitor: The patient's malignant condition is newly identified.  Evaluation: managed by urology  Assessment/Treatment:  Continued management by specialist.  Refer to specialist per encounter note.  Condition will be reassessed per specialist managing the condition   ADMIT

## 2024-02-29 ENCOUNTER — TRANSCRIPTION ENCOUNTER (OUTPATIENT)
Age: 74
End: 2024-02-29

## 2024-02-29 LAB
ERYTHROCYTE [SEDIMENTATION RATE] IN BLOOD: 58 MM/HR — HIGH (ref 0–20)
ERYTHROCYTE [SEDIMENTATION RATE] IN BLOOD: 66 MM/HR — HIGH (ref 0–20)
GRAM STN FLD: ABNORMAL
HCV AB S/CO SERPL IA: 0.15 S/CO — SIGNIFICANT CHANGE UP (ref 0–0.99)
HCV AB SERPL-IMP: SIGNIFICANT CHANGE UP
SPECIMEN SOURCE: SIGNIFICANT CHANGE UP

## 2024-02-29 PROCEDURE — 73720 MRI LWR EXTREMITY W/O&W/DYE: CPT | Mod: 26,RT

## 2024-02-29 PROCEDURE — 99233 SBSQ HOSP IP/OBS HIGH 50: CPT

## 2024-02-29 PROCEDURE — 71045 X-RAY EXAM CHEST 1 VIEW: CPT | Mod: 26

## 2024-02-29 RX ORDER — PIPERACILLIN AND TAZOBACTAM 4; .5 G/20ML; G/20ML
3.38 INJECTION, POWDER, LYOPHILIZED, FOR SOLUTION INTRAVENOUS ONCE
Refills: 0 | Status: COMPLETED | OUTPATIENT
Start: 2024-02-29 | End: 2024-02-29

## 2024-02-29 RX ORDER — PIPERACILLIN AND TAZOBACTAM 4; .5 G/20ML; G/20ML
3.38 INJECTION, POWDER, LYOPHILIZED, FOR SOLUTION INTRAVENOUS EVERY 8 HOURS
Refills: 0 | Status: DISCONTINUED | OUTPATIENT
Start: 2024-02-29 | End: 2024-03-01

## 2024-02-29 RX ORDER — PIPERACILLIN AND TAZOBACTAM 4; .5 G/20ML; G/20ML
3.38 INJECTION, POWDER, LYOPHILIZED, FOR SOLUTION INTRAVENOUS EVERY 8 HOURS
Refills: 0 | Status: DISCONTINUED | OUTPATIENT
Start: 2024-02-29 | End: 2024-02-29

## 2024-02-29 RX ADMIN — Medication 1 TABLET(S): at 20:52

## 2024-02-29 RX ADMIN — PIPERACILLIN AND TAZOBACTAM 25 GRAM(S): 4; .5 INJECTION, POWDER, LYOPHILIZED, FOR SOLUTION INTRAVENOUS at 13:40

## 2024-02-29 RX ADMIN — PANTOPRAZOLE SODIUM 40 MILLIGRAM(S): 20 TABLET, DELAYED RELEASE ORAL at 05:23

## 2024-02-29 RX ADMIN — ENOXAPARIN SODIUM 40 MILLIGRAM(S): 100 INJECTION SUBCUTANEOUS at 20:56

## 2024-02-29 RX ADMIN — Medication 1 TABLET(S): at 00:35

## 2024-02-29 RX ADMIN — AMLODIPINE BESYLATE 10 MILLIGRAM(S): 2.5 TABLET ORAL at 05:23

## 2024-02-29 RX ADMIN — CARVEDILOL PHOSPHATE 6.25 MILLIGRAM(S): 80 CAPSULE, EXTENDED RELEASE ORAL at 18:03

## 2024-02-29 RX ADMIN — PIPERACILLIN AND TAZOBACTAM 25 GRAM(S): 4; .5 INJECTION, POWDER, LYOPHILIZED, FOR SOLUTION INTRAVENOUS at 20:56

## 2024-02-29 RX ADMIN — PIPERACILLIN AND TAZOBACTAM 200 GRAM(S): 4; .5 INJECTION, POWDER, LYOPHILIZED, FOR SOLUTION INTRAVENOUS at 03:59

## 2024-02-29 RX ADMIN — Medication 81 MILLIGRAM(S): at 13:37

## 2024-02-29 RX ADMIN — CLOPIDOGREL BISULFATE 75 MILLIGRAM(S): 75 TABLET, FILM COATED ORAL at 13:37

## 2024-02-29 RX ADMIN — Medication 1 TABLET(S): at 21:00

## 2024-02-29 RX ADMIN — CARVEDILOL PHOSPHATE 6.25 MILLIGRAM(S): 80 CAPSULE, EXTENDED RELEASE ORAL at 05:23

## 2024-02-29 RX ADMIN — PIPERACILLIN AND TAZOBACTAM 25 GRAM(S): 4; .5 INJECTION, POWDER, LYOPHILIZED, FOR SOLUTION INTRAVENOUS at 07:43

## 2024-02-29 NOTE — PROGRESS NOTE ADULT - SUBJECTIVE AND OBJECTIVE BOX
Patient is a 73y old  Female who presents with a chief complaint of toe OM (29 Feb 2024 14:35)    Date of servie : 02-29-24 @ 14:48  INTERVAL HPI/OVERNIGHT EVENTS:  T(C): 36.5 (02-29-24 @ 11:50), Max: 36.9 (02-29-24 @ 00:15)  HR: 64 (02-29-24 @ 11:50) (60 - 71)  BP: 156/69 (02-29-24 @ 11:50) (117/67 - 163/69)  RR: 18 (02-29-24 @ 11:50) (18 - 18)  SpO2: 97% (02-29-24 @ 11:50) (97% - 100%)  Wt(kg): --  I&O's Summary      LABS:                        9.8    6.51  )-----------( 386      ( 28 Feb 2024 14:46 )             31.3     02-28    143  |  102  |  13  ----------------------------<  90  3.4<L>   |  27  |  0.99    Ca    9.6      28 Feb 2024 14:46    TPro  7.5  /  Alb  3.9  /  TBili  0.6  /  DBili  x   /  AST  20  /  ALT  9<L>  /  AlkPhos  156<H>  02-28    PT/INR - ( 28 Feb 2024 14:46 )   PT: 12.8 sec;   INR: 1.23 ratio         PTT - ( 28 Feb 2024 14:46 )  PTT:32.2 sec  Urinalysis Basic - ( 28 Feb 2024 14:46 )    Color: x / Appearance: x / SG: x / pH: x  Gluc: 90 mg/dL / Ketone: x  / Bili: x / Urobili: x   Blood: x / Protein: x / Nitrite: x   Leuk Esterase: x / RBC: x / WBC x   Sq Epi: x / Non Sq Epi: x / Bacteria: x      CAPILLARY BLOOD GLUCOSE            Urinalysis Basic - ( 28 Feb 2024 14:46 )    Color: x / Appearance: x / SG: x / pH: x  Gluc: 90 mg/dL / Ketone: x  / Bili: x / Urobili: x   Blood: x / Protein: x / Nitrite: x   Leuk Esterase: x / RBC: x / WBC x   Sq Epi: x / Non Sq Epi: x / Bacteria: x        MEDICATIONS  (STANDING):  amLODIPine   Tablet 10 milliGRAM(s) Oral daily  aspirin enteric coated 81 milliGRAM(s) Oral daily  carvedilol 6.25 milliGRAM(s) Oral every 12 hours  clopidogrel Tablet 75 milliGRAM(s) Oral daily  enoxaparin Injectable 40 milliGRAM(s) SubCutaneous every 24 hours  hydrochlorothiazide 25 milliGRAM(s) Oral daily  lisinopril 10 milliGRAM(s) Oral daily  pantoprazole    Tablet 40 milliGRAM(s) Oral before breakfast  piperacillin/tazobactam IVPB.. 3.375 Gram(s) IV Intermittent every 8 hours    MEDICATIONS  (PRN):  acetaminophen     Tablet .. 650 milliGRAM(s) Oral every 6 hours PRN Temp greater or equal to 38C (100.4F), Mild Pain (1 - 3)  acetaminophen  300 mG/codeine 30 mG 1 Tablet(s) Oral every 6 hours PRN Moderate Pain (4 - 6)          PHYSICAL EXAM:  GENERAL: NAD, well-groomed, well-developed  HEAD:  Atraumatic, Normocephalic  CHEST/LUNG: Clear to percussion bilaterally; No rales, rhonchi, wheezing, or rubs  HEART: Regular rate and rhythm; No murmurs, rubs, or gallops  ABDOMEN: Soft, Nontender, Nondistended; Bowel sounds present  EXTREMITIES:  2+ Peripheral Pulses, No clubbing, cyanosis, or edema  LYMPH: No lymphadenopathy noted  SKIN: No rashes or lesions    Care Discussed with Consultants/Other Providers [ ] YES  [ ] NO

## 2024-02-29 NOTE — CONSULT NOTE ADULT - ASSESSMENT
Patient is a 73 year old female with PMH of DM2 not on any medication diet controlled, HTN, CAD s/p CABG, vasculopath, sent in by Dr. Garcia her podiatrist to be admitted for scheduled right third toe amputation on Friday.     R 3rd toe gangrene with OM  - R foot xray with findings c/w gangrene and stigmata of OM of 3rd proximal phalanx  - Podiatry following - noted with R foot 3rd toe dry gangrene with moderate malodor, no drainage   - 2/28 -Podiatry disarticulated 3rd digit at DIPJ with sterile 15 blade and sent to pathology. Flushed wound with copious amount of normal saline and packed with 1/4 inch iodoform packing  - Vascular surgery following, cleared to proceed with Podiatric surgery  - CRP 11, ESR 58, afebrile, WBC wnl    Recommendations:  Podiatry planning for OR tomorrow for R foot partial 3rd ray resection   - please send bone biopsy and cultures  Follow R foot wound culture -- gram stain with GNR  Continue on zosyn pending above   Discontinue vancomycin   Monitor temps/WBC      Tanya Das M.D.  [facility], Division of Infectious Diseases  [phone]  After 5pm on weekdays and all day on weekends - please call [phone]   Patient is a 73 year old female with PMH of DM2 not on any medication diet controlled, HTN, CAD s/p CABG, vasculopath, sent in by Dr. Garcia her podiatrist to be admitted for scheduled right third toe amputation on Friday.     R 3rd toe gangrene with OM  - R foot xray with findings c/w gangrene and stigmata of OM of 3rd proximal phalanx  - Podiatry following - noted with R foot 3rd toe dry gangrene with moderate malodor, no drainage   - 2/28 -Podiatry disarticulated 3rd digit at DIPJ with sterile 15 blade and sent to pathology. Flushed wound with copious amount of normal saline and packed with 1/4 inch iodoform packing  - Vascular surgery following, cleared to proceed with Podiatric surgery  - CRP 11, ESR 58, afebrile, WBC wnl    Recommendations:  Podiatry planning for OR tomorrow for R foot partial 3rd ray resection   - please send bone biopsy and cultures  Follow up Bcx - in process   Follow R foot wound culture -- gram stain with GNR  Follow up R foot MRI  Follow up path from 2/28, in process   Continue on zosyn pending above   Discontinue vancomycin   Monitor temps/WBC      Tanya Das M.D.  [facility], Division of Infectious Diseases  [phone]  After 5pm on weekdays and all day on weekends - please call [phone]

## 2024-02-29 NOTE — PROGRESS NOTE ADULT - SUBJECTIVE AND OBJECTIVE BOX
Patient seen and examined at bedside.    Overnight Events:   No events. Denies CP, palpitations, SOB.     REVIEW OF SYSTEMS:  Constitutional:     [x ] negative [ ] fevers [ ] chills [ ] weight loss [ ] weight gain  HEENT:                  [x ] negative [ ] dry eyes [ ] eye irritation [ ] postnasal drip [ ] nasal congestion  CV:                         [ x] negative  [ ] chest pain [ ] orthopnea [ ] palpitations [ ] murmur  Resp:                     [x ] negative [ ] cough [ ] shortness of breath [ ] dyspnea [ ] wheezing [ ] sputum [ ]hemoptysis  GI:                          [x ] negative [ ] nausea [ ] vomiting [ ] diarrhea [ ] constipation [ ] abd pain [ ] dysphagia   :                        [ x] negative [ ] dysuria [ ] nocturia [ ] hematuria [ ] increased urinary frequency  Musculoskeletal: [x ] negative [ ] back pain [ ] myalgias [ ] arthralgias [ ] fracture  Skin:                       [ x] negative [ ] rash [ ] itch  Neurological:        [ x] negative [ ] headache [ ] dizziness [ ] syncope [ ] weakness [ ] numbness  Psychiatric:           [ x] negative [ ] anxiety [ ] depression  Endocrine:            [ x] negative [ ] diabetes [ ] thyroid problem  Heme/Lymph:      [ x] negative [ ] anemia [ ] bleeding problem  Allergic/Immune: [ x] negative [ ] itchy eyes [ ] nasal discharge [ ] hives [ ] angioedema    [ x] All other systems negative          Current Meds:  acetaminophen     Tablet .. 650 milliGRAM(s) Oral every 6 hours PRN  acetaminophen  300 mG/codeine 30 mG 1 Tablet(s) Oral every 6 hours PRN  amLODIPine   Tablet 10 milliGRAM(s) Oral daily  aspirin enteric coated 81 milliGRAM(s) Oral daily  carvedilol 6.25 milliGRAM(s) Oral every 12 hours  clopidogrel Tablet 75 milliGRAM(s) Oral daily  enoxaparin Injectable 40 milliGRAM(s) SubCutaneous every 24 hours  hydrochlorothiazide 25 milliGRAM(s) Oral daily  lisinopril 10 milliGRAM(s) Oral daily  pantoprazole    Tablet 40 milliGRAM(s) Oral before breakfast  piperacillin/tazobactam IVPB.. 3.375 Gram(s) IV Intermittent every 8 hours      Vitals:  T(F): 97.7 (02-29), Max: 98.4 (02-29)  HR: 64 (02-29) (60 - 71)  BP: 156/69 (02-29) (117/67 - 163/69)  RR: 18 (02-29)  SpO2: 97% (02-29)  I&O's Summary    PHYSICAL EXAM:  Appearance: NAD  	  HEENT:  NC/AT  Cardiovascular: RRR, S1 and S2  Respiratory: CTA B/L  Psychiatry:  AAO x 3  Gastrointestinal:  Soft, Non-tender, + BS	  Skin: No cyanosis	  Neurologic: No focal deficit  Extremities: No LE edema  Vascular Pulse Exam: Palpable R DP and PT  Foot Exam: R 3rd digit with dry gangrene                           9.8    6.51  )-----------( 386      ( 28 Feb 2024 14:46 )             31.3     02-28    143  |  102  |  13  ----------------------------<  90  3.4<L>   |  27  |  0.99    Ca    9.6      28 Feb 2024 14:46    TPro  7.5  /  Alb  3.9  /  TBili  0.6  /  DBili  x   /  AST  20  /  ALT  9<L>  /  AlkPhos  156<H>  02-28    PT/INR - ( 28 Feb 2024 14:46 )   PT: 12.8 sec;   INR: 1.23 ratio         PTT - ( 28 Feb 2024 14:46 )  PTT:32.2 sec          DIAGNOSTIC/IMAGING:

## 2024-02-29 NOTE — PATIENT PROFILE ADULT - NSPROPTRIGHTSUPPORTPHONE_GEN_A_NUR
Patient will be able to perform bed mobility tasks independently, using least restrictive device, within 4 weeks.
[phone]

## 2024-02-29 NOTE — PROGRESS NOTE ADULT - ASSESSMENT
73F history of DM2, HTN, CAD s/p CABG who presents for third toe amputation.     Assessment:  1. PAD with multiple peripheral interventions      Most recent intervention this year with successful laser atherectomy and drug coated balloon of right common femoral artery. Successful  laser atherectomy drug coated balloon and Rosa M stenting of right superficial femoral artery. Successful laser atherectomy and drug coated balloon of R popliteal artery. Successful laser atherectomy and drug coated balloon of Right TPT.   2. R 3rd Digit Foot Gangrene  3. HTN  4. HLD  5. DM  6. CAD s/p CABG       Plan:  1. Patient with a palpable DP and PT post recent peripheral intervention this year.  2. She is cleared from a Vascular Cardiology perspective to proceed with Podiatry surgery.   3. Continue ASA, Plavix and Statin.   4. Appreciate excellent Podiatry care.

## 2024-02-29 NOTE — PROGRESS NOTE ADULT - ASSESSMENT
73-year-old female with past medical history of type 2 diabetes not on any medication diet controlled, high blood pressure, CAD status post CABG, vasculopath, sent in by Dr. Garcia her podiatrist to be admitted for scheduled right third toe amputation on Friday to be admitted to medicine and for Dr. Guerrero of podiatry to see while here.  Patient denies any fevers or chills, any chest pain shortness of breath, lightheadedness, palpitations, abdominal pain, nausea or vomiting, diarrhea.  Patient has intermittently been on Augmentin recently.    1 R 3rd toe OM  - here for amputation  - podiatry and vascular cards fu appreciated  - pain control  - sp ABX  - ID fu   - will defer abx to ID    2 HTN  - cw home meds  - DASH diet     3 CAD  - sp CABG  - cards following  - cw asa, plavix    4 DVT prophylaxis     Pt medically cleared for OR

## 2024-02-29 NOTE — CONSULT NOTE ADULT - SUBJECTIVE AND OBJECTIVE BOX
Vascular Cardiology Consult Note     DIRECT PROVIDER NUMBER: [phone]  / Available on TEAMS    CC:  R 3rd Digit Wound, Dry Gangrene    HPI: 72 y/o F with PMHX HTN, HLD, DM, CAD s/p CABG, PAD s/p PTA with bilateral lower extremity interventions, history of acute limb ischemia 2018 of left leg s/p catheter directed lytic therapy, with non-healing R ulcer, most recent peripheral angiogram this year with successful laser atherectomy and drug coated balloon of right common femoral artery. Successful  laser atherectomy drug coated balloon and Rosa M stenting of right superficial femoral artery. Successful laser atherectomy and drug coated balloon of R popliteal artery. Successful laser athrectomy and drug coated balloon of Right TPT. Left SFA Artery: patent stent. Left Popliteal: multiple focal areas of severe ISR. Left Peroneal Artery: Patent. Patient currently with dry gangrene to R 3rd hallux. Patient with palpable R DP and PT pulse post recent intervention. Vascular Cardiology consulted.      Allergies  Cipro (Headache)	    MEDICATIONS:  vancomycin  IVPB. 1000 milliGRAM(s) IV Intermittent once  potassium chloride    Tablet ER 40 milliEquivalent(s) Oral once    PAST MEDICAL & SURGICAL HISTORY:  HTN (hypertension)  Type 2 diabetes mellitus  CAD (coronary artery disease)    FAMILY HISTORY: see HPI    SOCIAL HISTORY:  unchanged    REVIEW OF SYSTEMS:  CONSTITUTIONAL: No fever  EYES: No eye pain  ENT:  No throat pain  NECK: No pain   RESPIRATORY: No SOB  CARDIOVASCULAR: No C/P  GASTROINTESTINAL: No abdominal pain  GENITOURINARY: No hematuria  NEUROLOGICAL: No memory loss  SKIN: R 3rd digit dry gangrene  LYMPH Nodes: No enlarged glands noted  ENDOCRINE: No heat or cold intolerance noted  MUSCULOSKELETAL: R 3rd digit pain   PSYCHIATRIC: No depression, anxiety  HEME/LYMPH: No bleeding gums  ALLERGY AND IMMUNOLOGIC: No hives    [ x] All others negative	    PHYSICAL EXAM:  T(C): 36.7 (02-28-24 @ 12:40), Max: 36.8 (02-28-24 @ 11:18)  HR: 60 (02-28-24 @ 12:40) (60 - 65)  BP: 135/75 (02-28-24 @ 12:40) (135/70 - 135/75)  RR: 19 (02-28-24 @ 12:40) (19 - 20)  SpO2: 100% (02-28-24 @ 12:40) (98% - 100%)  I&O's Summary    Appearance: NAD  	  HEENT:  NC/AT  Cardiovascular: RRR, S1 and S2  Respiratory: CTA B/L  Psychiatry:  AAO x 3  Gastrointestinal:  Soft, Non-tender, + BS	  Skin: No cyanosis	  Neurologic: No focal deficit  Extremities: No LE edema  Vascular Pulse Exam: Palpable R DP and PT  Foot Exam: R 3rd digit with dry gangrene     LABS:	 	    CBC Full  -  ( 28 Feb 2024 14:46 )  WBC Count : 6.51 K/uL  Hemoglobin : 9.8 g/dL  Hematocrit : 31.3 %  Platelet Count - Automated : 386 K/uL  Mean Cell Volume : 79.0 fl  Mean Cell Hemoglobin : 24.7 pg  Mean Cell Hemoglobin Concentration : 31.3 gm/dL  Auto Neutrophil # : 4.57 K/uL  Auto Lymphocyte # : 1.41 K/uL  Auto Monocyte # : 0.33 K/uL  Auto Eosinophil # : 0.15 K/uL  Auto Basophil # : 0.03 K/uL  Auto Neutrophil % : 70.1 %  Auto Lymphocyte % : 21.7 %  Auto Monocyte % : 5.1 %  Auto Eosinophil % : 2.3 %  Auto Basophil % : 0.5 %    02-28    143  |  102  |  13  ----------------------------<  90  3.4<L>   |  27  |  0.99    Ca    9.6      28 Feb 2024 14:46    TPro  7.5  /  Alb  3.9  /  TBili  0.6  /  DBili  x   /  AST  20  /  ALT  9<L>  /  AlkPhos  156<H>  02-28      Assessment:  1. PAD with multiple peripheral interventions      Most recent intervention this year with successful laser atherectomy and drug coated balloon of right common femoral artery. Successful  laser atherectomy drug coated balloon and Rosa M stenting of right superficial femoral artery. Successful laser atherectomy and drug coated balloon of R popliteal artery. Successful laser atherectomy and drug coated balloon of Right TPT.   2. R 3rd Digit Foot Gangrene  3. HTN  4. HLD  5. DM  6. CAD s/p CABG       Plan:  1. Patient with a palpable DP and PT post recent peripheral intervention this year.  2. She is cleared from a Vascular Cardiology perspective to proceed with Podiatry surgery.   3. Continue ASA, Plavix and Statin.   4. Appreciate excellent Podiatry care.        Thank you  Feyaz Perla, PA, MS, [facility]  Vascular Cardiology Service    Please call with any questions:   DIRECT SERVICE NUMBER: [phone] / Available on TEAMS
[facility] DIVISION OF INFECTIOUS DISEASES  PAUL Calderon S. Shah, Y. Patel, G. [facility]  [phone]  ([phone] - weekdays after 5pm and weekends)    HARVEY RIZZO  73y, Female  [id number]    HPI:  Patient is a 73 year old female with PMH of DM2 not on any medication diet controlled, HTN, CAD s/p CABG, vasculopath, sent in by Dr. Garcia her podiatrist to be admitted for scheduled right third toe amputation on Friday to be admitted to medicine and for Dr. Guerrero of podiatry to see while here.  Patient denies any fevers or chills, any chest pain shortness of breath, lightheadedness, palpitations, abdominal pain, nausea or vomiting, diarrhea.  Patient has intermittently been on Augmentin recently. (28 Feb 2024 18:29)  Patient seen and examined at bedside this morning in the ER. Patient reports R 3rd toe wound ongoing for few weeks and is planned for surgery Friday. She currently denies any fevers, chills, chest pain, abd pain, nausea, vomiting, diarrhea or any other complaints.   ROS: 14 point review of systems completed, pertinent positives and negatives as per HPI.    Allergies: Cipro (Headache)    PMH -- HTN (hypertension)  Type 2 diabetes mellitus  CAD (coronary artery disease)    PSH -- s/p CABG  FH -- noncontributory   Social History -- denies tobacco, alcohol or illicit drug use    Physical Exam--  Vital Signs Last 24 Hrs  T(F): 97.7 (29 Feb 2024 11:50), Max: 98.4 (29 Feb 2024 00:15)  HR: 64 (29 Feb 2024 11:50) (60 - 71)  BP: 156/69 (29 Feb 2024 11:50) (117/67 - 163/69)  RR: 18 (29 Feb 2024 11:50) (18 - 18)  SpO2: 97% (29 Feb 2024 11:50) (97% - 100%)  General: no acute distress  HEENT: NC/AT, EOMI, anicteric, neck supple  Lungs: Clear bilaterally without rales, wheezing or rhonchi  Heart: S1, S2 present, RRR. No murmur, rub or gallop.  Abdomen: Soft. Nondistended. Nontender. BS present.   Neuro: AAOx3, no obvious focal deficits   Extremities: No cyanosis. No edema. R foot dressing   Skin: Warm. Dry. No visible rash  Psychiatric: Appropriate affect and mood for situation.   Lines: PIV    Laboratory & Imaging Data--  CBC:                       9.8    6.51  )-----------( 386      ( 28 Feb 2024 14:46 )             31.3     WBC Count: 6.51 K/uL (02-28-24 @ 14:46)    CMP: 02-28    143  |  102  |  13  ----------------------------<  90  3.4<L>   |  27  |  0.99    Ca    9.6      28 Feb 2024 14:46    TPro  7.5  /  Alb  3.9  /  TBili  0.6  /  DBili  x   /  AST  20  /  ALT  9<L>  /  AlkPhos  156<H>  02-28    LIVER FUNCTIONS - ( 28 Feb 2024 14:46 )  Alb: 3.9 g/dL / Pro: 7.5 g/dL / ALK PHOS: 156 U/L / ALT: 9 U/L / AST: 20 U/L / GGT: x           Microbiology: reviewed  Culture - Abscess with Gram Stain (collected 02-28-24 @ 17:20)  Source: .Abscess R foot wound  Gram Stain (02-29-24 @ 02:41):    No polymorphonuclear cells seen per low power field    Rare Gram Negative Rods per oil power field    Radiology--reviewed    < from: Xray Toes, Right Foot, Ankle (02.28.24 @ 15:33) >    IMPRESSION:  Generalized soft tissue swelling with shrunken atrophied 3rd toe tip soft   tissues with underlying mottled gas collections over middle and distal   phalanges consistent with history of gangrene. Mottled osteopenia with   cortical erosive changes involving medial aspect of 3rd proximal phalanx   consistent with stigmata of osteomyelitis. No proximally tracking gas   collections beyond this region and no additional focal areas of   osteomyelitis.    2nd toe amputation defect through DIP joint level again noted with smooth   corticated stump margin and preserved overlying soft tissue coverage.    No acute fractures or dislocations.    Tarsometatarsal alignment maintained without evidence fora Lisfranc   injury. Congenitally fused 5th DIP joint preserved remaining joint spaces   and no joint margin erosions. Mild plantar calcaneal enthesopathic   changes.    Generalized osteopenia.    < end of copied text >    Active Medications--  acetaminophen     Tablet .. 650 milliGRAM(s) Oral every 6 hours PRN  acetaminophen  300 mG/codeine 30 mG 1 Tablet(s) Oral every 6 hours PRN  amLODIPine   Tablet 10 milliGRAM(s) Oral daily  aspirin enteric coated 81 milliGRAM(s) Oral daily  carvedilol 6.25 milliGRAM(s) Oral every 12 hours  clopidogrel Tablet 75 milliGRAM(s) Oral daily  enoxaparin Injectable 40 milliGRAM(s) SubCutaneous every 24 hours  hydrochlorothiazide 25 milliGRAM(s) Oral daily  lisinopril 10 milliGRAM(s) Oral daily  pantoprazole    Tablet 40 milliGRAM(s) Oral before breakfast  piperacillin/tazobactam IVPB.. 3.375 Gram(s) IV Intermittent every 8 hours    Current Antimicrobials:   piperacillin/tazobactam IVPB.. 3.375 Gram(s) IV Intermittent every 8 hours    Prior/Completed Antimicrobials:  piperacillin/tazobactam IVPB.  piperacillin/tazobactam IVPB.-  piperacillin/tazobactam IVPB...  vancomycin  IVPB.  
Patient is a 73y old  Female who presents with a chief complaint of R 3rd Digit Wound, Dry Gangrene (28 Feb 2024 16:21)      HPI:  73-year-old female with past medical history of type 2 diabetes not on any medication diet controlled, high blood pressure, CAD status post CABG, vasculopath, sent in by Dr. Garcia her podiatrist to be admitted for scheduled right third toe amputation on Friday to be admitted to medicine and for Dr. Guerrero of podiatry to see while here.  Patient denies any fevers or chills, any chest pain shortness of breath, lightheadedness, palpitations, abdominal pain, nausea or vomiting, diarrhea.  Patient has intermittently been on Augmentin recently.    PAST MEDICAL & SURGICAL HISTORY:  HTN (hypertension)      Type 2 diabetes mellitus      CAD (coronary artery disease)          MEDICATIONS  (STANDING):  potassium chloride    Tablet ER 40 milliEquivalent(s) Oral once  vancomycin  IVPB. 1000 milliGRAM(s) IV Intermittent once    MEDICATIONS  (PRN):      Allergies    Cipro (Headache)    Intolerances        VITALS:    Vital Signs Last 24 Hrs  T(C): 36.7 (28 Feb 2024 12:40), Max: 36.8 (28 Feb 2024 11:18)  T(F): 98 (28 Feb 2024 12:40), Max: 98.2 (28 Feb 2024 11:18)  HR: 60 (28 Feb 2024 12:40) (60 - 65)  BP: 135/75 (28 Feb 2024 12:40) (135/70 - 135/75)  BP(mean): 95 (28 Feb 2024 12:40) (95 - 95)  RR: 19 (28 Feb 2024 12:40) (19 - 20)  SpO2: 100% (28 Feb 2024 12:40) (98% - 100%)    Parameters below as of 28 Feb 2024 12:40  Patient On (Oxygen Delivery Method): room air        LABS:                          9.8    6.51  )-----------( 386      ( 28 Feb 2024 14:46 )             31.3       02-28    143  |  102  |  13  ----------------------------<  90  3.4<L>   |  27  |  0.99    Ca    9.6      28 Feb 2024 14:46    TPro  7.5  /  Alb  3.9  /  TBili  0.6  /  DBili  x   /  AST  20  /  ALT  9<L>  /  AlkPhos  156<H>  02-28      CAPILLARY BLOOD GLUCOSE          PT/INR - ( 28 Feb 2024 14:46 )   PT: 12.8 sec;   INR: 1.23 ratio         PTT - ( 28 Feb 2024 14:46 )  PTT:32.2 sec    LOWER EXTREMITY PHYSICAL EXAM:    Vascular: DP/PT 0/4, B/L, CFT <3 seconds B/L, Temperature gradient warm to cool, B/L.   Neuro: Epicritic sensation diminished to the level of digits, B/L.  Musculoskeletal/Ortho: unremarkable   Skin: Right foot 3rd digit dry gangrene with moderate malodor, no drainage. No open lesions or signs of infection of left foot.       RADIOLOGY & ADDITIONAL STUDIES:    < from: Xray Toes, Right Foot (02.28.24 @ 15:33) >    ACC: [id number] EXAM:  XR FOOT COMP MIN 3 VIEWS RT   ORDERED BY: ALVA THOMAS     ACC: [id number] EXAM:  XR ANKLE COMP MIN 3 VIEWS RT   ORDERED BY: AMELIA SPANN     ACC: [id number] EXAM:  XR TOE(S) MIN 2 VIEWS RT   ORDERED BY: AMELIA SPANN     PROCEDURE DATE:  02/28/2024          INTERPRETATION:  CLINICAL INDICATION: right 3rd toe gangrene    EXAM:  Frontal, oblique, and lateral right ankle and foot and separate frontal   and oblique right forefoot from 2/28/2024 at 1533. No similar prior   studies available for comparison.    IMPRESSION:  Generalized soft tissue swelling with shrunken atrophied 3rd toe tip soft   tissues with underlying mottled gas collections over middle and distal   phalanges consistent with history of gangrene. Mottled osteopenia with   cortical erosive changes involving medial aspect of 3rd proximal phalanx   consistent with stigmata of osteomyelitis. No proximally tracking gas   collections beyond this region and no additional focal areas of   osteomyelitis.    2nd toe amputation defect through DIP joint level again noted with smooth   corticated stump margin and preserved overlying soft tissue coverage.    No acute fractures or dislocations.    Tarsometatarsal alignment maintained without evidence fora Lisfranc   injury. Congenitally fused 5th DIP joint preserved remaining joint spaces   and no joint margin erosions. Mild plantar calcaneal enthesopathic   changes.    Generalized osteopenia.    --- End of Report ---    < end of copied text >

## 2024-02-29 NOTE — PROGRESS NOTE ADULT - SUBJECTIVE AND OBJECTIVE BOX
HARVEY RIZZO, MRN [id number], 73yfemale      Patient is a 73y old  Female who presents with a chief complaint of toe OM (29 Feb 2024 12:05)       INTERVAL HPI/OVERNIGHT EVENTS:  Patient seen and evaluated at bedside.  Pt is resting comfortable in NAD. Denies N/V/F/C.    Allergies    Cipro (Headache)    Intolerances        Vital Signs Last 24 Hrs  T(C): 36.5 (29 Feb 2024 11:50), Max: 36.9 (29 Feb 2024 00:15)  T(F): 97.7 (29 Feb 2024 11:50), Max: 98.4 (29 Feb 2024 00:15)  HR: 64 (29 Feb 2024 11:50) (60 - 71)  BP: 156/69 (29 Feb 2024 11:50) (117/67 - 163/69)  BP(mean): 100 (29 Feb 2024 00:15) (83 - 100)  RR: 18 (29 Feb 2024 11:50) (18 - 18)  SpO2: 97% (29 Feb 2024 11:50) (97% - 100%)    Parameters below as of 29 Feb 2024 11:50  Patient On (Oxygen Delivery Method): room air        LABS:                        9.8    6.51  )-----------( 386      ( 28 Feb 2024 14:46 )             31.3     02-28    143  |  102  |  13  ----------------------------<  90  3.4<L>   |  27  |  0.99    Ca    9.6      28 Feb 2024 14:46    TPro  7.5  /  Alb  3.9  /  TBili  0.6  /  DBili  x   /  AST  20  /  ALT  9<L>  /  AlkPhos  156<H>  02-28    PT/INR - ( 28 Feb 2024 14:46 )   PT: 12.8 sec;   INR: 1.23 ratio         PTT - ( 28 Feb 2024 14:46 )  PTT:32.2 sec  Urinalysis Basic - ( 28 Feb 2024 14:46 )    Color: x / Appearance: x / SG: x / pH: x  Gluc: 90 mg/dL / Ketone: x  / Bili: x / Urobili: x   Blood: x / Protein: x / Nitrite: x   Leuk Esterase: x / RBC: x / WBC x   Sq Epi: x / Non Sq Epi: x / Bacteria: x      CAPILLARY BLOOD GLUCOSE          Lower Extremity Physical Exam:    Vascular: DP/PT 0/4, B/L, CFT <3 seconds B/L, Temperature gradient warm to cool, B/L.   Neuro: Epicritic sensation diminished to the level of digits, B/L.  Musculoskeletal/Ortho: unremarkable   Skin:R foot 2/28 s/p bedside 3rd DIPJ disarticulation,  wound bed fibronecrotic , skin flap appears warm and viable, scant purulence.       RADIOLOGY & ADDITIONAL TESTS:

## 2024-02-29 NOTE — PROGRESS NOTE ADULT - ASSESSMENT
73F presents with right foot 3rd digit dry gangrene with wet changes proximally   - Patient seen and evaluated   - Afebrile, WBC 6.51, ESR 58, CRP 1.1  - R foot 2/28 s/p bedside 3rd DIPJ disarticulation,  wound bed fibronecrotic , skin flap appears warm and viable, scant purulence.   - Cards/Vasc recs appreciated, okay to proceed with Amp   - ID recs appreciated  - Pod plan: Right foot partial 3rd ray resection on Friday 3/1  9:30am with Dr. Young   - Please document Medical optimization for anesthesia for Podiatry surgery.   - Discussed with attending      73F presents with right foot 3rd digit dry gangrene with wet changes proximally   - Patient seen and evaluated   - Afebrile, WBC 6.51, ESR 58, CRP 1.1  - R foot 2/28 s/p bedside 3rd DIPJ disarticulation,  wound bed fibronecrotic , skin flap appears warm and viable, scant purulence.   - Cards/Vasc recs appreciated, okay to proceed with Amp   - ID recs appreciated  - Pod plan: Right foot partial 3rd ray resection on Friday 3/1  9:30am with Dr. Young   - Documented Medical optimization for anesthesia for Podiatry surgery, appreciated   - Discussed with attending

## 2024-02-29 NOTE — PATIENT PROFILE ADULT - FALL HARM RISK - HARM RISK INTERVENTIONS

## 2024-03-01 ENCOUNTER — TRANSCRIPTION ENCOUNTER (OUTPATIENT)
Age: 74
End: 2024-03-01

## 2024-03-01 ENCOUNTER — RESULT REVIEW (OUTPATIENT)
Age: 74
End: 2024-03-01

## 2024-03-01 LAB
-  AMOXICILLIN/CLAVULANIC ACID: SIGNIFICANT CHANGE UP
-  AMPICILLIN/SULBACTAM: SIGNIFICANT CHANGE UP
-  AMPICILLIN: SIGNIFICANT CHANGE UP
-  AZTREONAM: SIGNIFICANT CHANGE UP
-  CEFAZOLIN: SIGNIFICANT CHANGE UP
-  CEFEPIME: SIGNIFICANT CHANGE UP
-  CEFOXITIN: SIGNIFICANT CHANGE UP
-  CEFTRIAXONE: SIGNIFICANT CHANGE UP
-  CIPROFLOXACIN: SIGNIFICANT CHANGE UP
-  ERTAPENEM: SIGNIFICANT CHANGE UP
-  GENTAMICIN: SIGNIFICANT CHANGE UP
-  LEVOFLOXACIN: SIGNIFICANT CHANGE UP
-  MEROPENEM: SIGNIFICANT CHANGE UP
-  PIPERACILLIN/TAZOBACTAM: SIGNIFICANT CHANGE UP
-  TOBRAMYCIN: SIGNIFICANT CHANGE UP
-  TRIMETHOPRIM/SULFAMETHOXAZOLE: SIGNIFICANT CHANGE UP
ANION GAP SERPL CALC-SCNC: 9 MMOL/L — SIGNIFICANT CHANGE UP (ref 5–17)
APTT BLD: 35 SEC — SIGNIFICANT CHANGE UP (ref 24.5–35.6)
BASOPHILS # BLD AUTO: 0.03 K/UL — SIGNIFICANT CHANGE UP (ref 0–0.2)
BASOPHILS NFR BLD AUTO: 0.6 % — SIGNIFICANT CHANGE UP (ref 0–2)
BUN SERPL-MCNC: 14 MG/DL — SIGNIFICANT CHANGE UP (ref 7–23)
CALCIUM SERPL-MCNC: 9.1 MG/DL — SIGNIFICANT CHANGE UP (ref 8.4–10.5)
CHLORIDE SERPL-SCNC: 105 MMOL/L — SIGNIFICANT CHANGE UP (ref 96–108)
CO2 SERPL-SCNC: 26 MMOL/L — SIGNIFICANT CHANGE UP (ref 22–31)
CREAT SERPL-MCNC: 1.2 MG/DL — SIGNIFICANT CHANGE UP (ref 0.5–1.3)
EGFR: 47 ML/MIN/1.73M2 — LOW
EOSINOPHIL # BLD AUTO: 0.23 K/UL — SIGNIFICANT CHANGE UP (ref 0–0.5)
EOSINOPHIL NFR BLD AUTO: 4.5 % — SIGNIFICANT CHANGE UP (ref 0–6)
GLUCOSE BLDC GLUCOMTR-MCNC: 104 MG/DL — HIGH (ref 70–99)
GLUCOSE SERPL-MCNC: 98 MG/DL — SIGNIFICANT CHANGE UP (ref 70–99)
GRAM STN FLD: SIGNIFICANT CHANGE UP
HCT VFR BLD CALC: 27.9 % — LOW (ref 34.5–45)
HGB BLD-MCNC: 8.8 G/DL — LOW (ref 11.5–15.5)
IMM GRANULOCYTES NFR BLD AUTO: 0.2 % — SIGNIFICANT CHANGE UP (ref 0–0.9)
INR BLD: 1.1 RATIO — SIGNIFICANT CHANGE UP (ref 0.85–1.18)
LYMPHOCYTES # BLD AUTO: 1.75 K/UL — SIGNIFICANT CHANGE UP (ref 1–3.3)
LYMPHOCYTES # BLD AUTO: 34.2 % — SIGNIFICANT CHANGE UP (ref 13–44)
MCHC RBC-ENTMCNC: 24.6 PG — LOW (ref 27–34)
MCHC RBC-ENTMCNC: 31.5 GM/DL — LOW (ref 32–36)
MCV RBC AUTO: 77.9 FL — LOW (ref 80–100)
METHOD TYPE: SIGNIFICANT CHANGE UP
MONOCYTES # BLD AUTO: 0.35 K/UL — SIGNIFICANT CHANGE UP (ref 0–0.9)
MONOCYTES NFR BLD AUTO: 6.8 % — SIGNIFICANT CHANGE UP (ref 2–14)
MRSA PCR RESULT.: SIGNIFICANT CHANGE UP
NEUTROPHILS # BLD AUTO: 2.75 K/UL — SIGNIFICANT CHANGE UP (ref 1.8–7.4)
NEUTROPHILS NFR BLD AUTO: 53.7 % — SIGNIFICANT CHANGE UP (ref 43–77)
NRBC # BLD: 0 /100 WBCS — SIGNIFICANT CHANGE UP (ref 0–0)
PLATELET # BLD AUTO: 299 K/UL — SIGNIFICANT CHANGE UP (ref 150–400)
POTASSIUM SERPL-MCNC: 3.9 MMOL/L — SIGNIFICANT CHANGE UP (ref 3.5–5.3)
POTASSIUM SERPL-SCNC: 3.9 MMOL/L — SIGNIFICANT CHANGE UP (ref 3.5–5.3)
PROTHROM AB SERPL-ACNC: 12.1 SEC — SIGNIFICANT CHANGE UP (ref 9.5–13)
RBC # BLD: 3.58 M/UL — LOW (ref 3.8–5.2)
RBC # FLD: 16.9 % — HIGH (ref 10.3–14.5)
S AUREUS DNA NOSE QL NAA+PROBE: SIGNIFICANT CHANGE UP
SODIUM SERPL-SCNC: 140 MMOL/L — SIGNIFICANT CHANGE UP (ref 135–145)
SPECIMEN SOURCE: SIGNIFICANT CHANGE UP
WBC # BLD: 5.12 K/UL — SIGNIFICANT CHANGE UP (ref 3.8–10.5)
WBC # FLD AUTO: 5.12 K/UL — SIGNIFICANT CHANGE UP (ref 3.8–10.5)

## 2024-03-01 PROCEDURE — 88311 DECALCIFY TISSUE: CPT | Mod: 26

## 2024-03-01 PROCEDURE — 73630 X-RAY EXAM OF FOOT: CPT | Mod: 26,RT

## 2024-03-01 PROCEDURE — 88305 TISSUE EXAM BY PATHOLOGIST: CPT | Mod: 26

## 2024-03-01 PROCEDURE — 99232 SBSQ HOSP IP/OBS MODERATE 35: CPT

## 2024-03-01 RX ORDER — LISINOPRIL 2.5 MG/1
10 TABLET ORAL DAILY
Refills: 0 | Status: DISCONTINUED | OUTPATIENT
Start: 2024-03-01 | End: 2024-03-05

## 2024-03-01 RX ORDER — ENOXAPARIN SODIUM 100 MG/ML
40 INJECTION SUBCUTANEOUS EVERY 24 HOURS
Refills: 0 | Status: DISCONTINUED | OUTPATIENT
Start: 2024-03-01 | End: 2024-03-05

## 2024-03-01 RX ORDER — ASPIRIN/CALCIUM CARB/MAGNESIUM 324 MG
81 TABLET ORAL DAILY
Refills: 0 | Status: DISCONTINUED | OUTPATIENT
Start: 2024-03-01 | End: 2024-03-05

## 2024-03-01 RX ORDER — MULTIVIT-MIN/FERROUS GLUCONATE 9 MG/15 ML
1 LIQUID (ML) ORAL DAILY
Refills: 0 | Status: DISCONTINUED | OUTPATIENT
Start: 2024-03-01 | End: 2024-03-05

## 2024-03-01 RX ORDER — AMLODIPINE BESYLATE 2.5 MG/1
10 TABLET ORAL DAILY
Refills: 0 | Status: DISCONTINUED | OUTPATIENT
Start: 2024-03-01 | End: 2024-03-05

## 2024-03-01 RX ORDER — ACETAMINOPHEN 500 MG
650 TABLET ORAL EVERY 6 HOURS
Refills: 0 | Status: DISCONTINUED | OUTPATIENT
Start: 2024-03-01 | End: 2024-03-05

## 2024-03-01 RX ORDER — HYDROMORPHONE HYDROCHLORIDE 2 MG/ML
0.5 INJECTION INTRAMUSCULAR; INTRAVENOUS; SUBCUTANEOUS EVERY 4 HOURS
Refills: 0 | Status: DISCONTINUED | OUTPATIENT
Start: 2024-03-01 | End: 2024-03-05

## 2024-03-01 RX ORDER — CLOPIDOGREL BISULFATE 75 MG/1
75 TABLET, FILM COATED ORAL DAILY
Refills: 0 | Status: DISCONTINUED | OUTPATIENT
Start: 2024-03-01 | End: 2024-03-05

## 2024-03-01 RX ORDER — SODIUM CHLORIDE 9 MG/ML
1000 INJECTION, SOLUTION INTRAVENOUS
Refills: 0 | Status: DISCONTINUED | OUTPATIENT
Start: 2024-03-01 | End: 2024-03-01

## 2024-03-01 RX ORDER — CARVEDILOL PHOSPHATE 80 MG/1
6.25 CAPSULE, EXTENDED RELEASE ORAL EVERY 12 HOURS
Refills: 0 | Status: DISCONTINUED | OUTPATIENT
Start: 2024-03-01 | End: 2024-03-05

## 2024-03-01 RX ORDER — PANTOPRAZOLE SODIUM 20 MG/1
40 TABLET, DELAYED RELEASE ORAL
Refills: 0 | Status: DISCONTINUED | OUTPATIENT
Start: 2024-03-01 | End: 2024-03-05

## 2024-03-01 RX ORDER — ACETAMINOPHEN WITH CODEINE 300MG-30MG
1 TABLET ORAL EVERY 6 HOURS
Refills: 0 | Status: DISCONTINUED | OUTPATIENT
Start: 2024-03-01 | End: 2024-03-05

## 2024-03-01 RX ORDER — HYDROMORPHONE HYDROCHLORIDE 2 MG/ML
0.25 INJECTION INTRAMUSCULAR; INTRAVENOUS; SUBCUTANEOUS
Refills: 0 | Status: DISCONTINUED | OUTPATIENT
Start: 2024-03-01 | End: 2024-03-01

## 2024-03-01 RX ORDER — ASCORBIC ACID 60 MG
500 TABLET,CHEWABLE ORAL DAILY
Refills: 0 | Status: DISCONTINUED | OUTPATIENT
Start: 2024-03-01 | End: 2024-03-05

## 2024-03-01 RX ADMIN — Medication 1 TABLET(S): at 12:02

## 2024-03-01 RX ADMIN — Medication 1 TABLET(S): at 18:58

## 2024-03-01 RX ADMIN — CARVEDILOL PHOSPHATE 6.25 MILLIGRAM(S): 80 CAPSULE, EXTENDED RELEASE ORAL at 05:26

## 2024-03-01 RX ADMIN — Medication 1 TABLET(S): at 11:28

## 2024-03-01 RX ADMIN — HYDROMORPHONE HYDROCHLORIDE 0.5 MILLIGRAM(S): 2 INJECTION INTRAMUSCULAR; INTRAVENOUS; SUBCUTANEOUS at 15:36

## 2024-03-01 RX ADMIN — CLOPIDOGREL BISULFATE 75 MILLIGRAM(S): 75 TABLET, FILM COATED ORAL at 11:28

## 2024-03-01 RX ADMIN — PANTOPRAZOLE SODIUM 40 MILLIGRAM(S): 20 TABLET, DELAYED RELEASE ORAL at 17:24

## 2024-03-01 RX ADMIN — Medication 1 TABLET(S): at 03:51

## 2024-03-01 RX ADMIN — LISINOPRIL 10 MILLIGRAM(S): 2.5 TABLET ORAL at 05:29

## 2024-03-01 RX ADMIN — AMLODIPINE BESYLATE 10 MILLIGRAM(S): 2.5 TABLET ORAL at 05:26

## 2024-03-01 RX ADMIN — ENOXAPARIN SODIUM 40 MILLIGRAM(S): 100 INJECTION SUBCUTANEOUS at 11:28

## 2024-03-01 RX ADMIN — CARVEDILOL PHOSPHATE 6.25 MILLIGRAM(S): 80 CAPSULE, EXTENDED RELEASE ORAL at 17:24

## 2024-03-01 RX ADMIN — HYDROMORPHONE HYDROCHLORIDE 0.5 MILLIGRAM(S): 2 INJECTION INTRAMUSCULAR; INTRAVENOUS; SUBCUTANEOUS at 16:15

## 2024-03-01 RX ADMIN — Medication 81 MILLIGRAM(S): at 11:28

## 2024-03-01 RX ADMIN — PIPERACILLIN AND TAZOBACTAM 25 GRAM(S): 4; .5 INJECTION, POWDER, LYOPHILIZED, FOR SOLUTION INTRAVENOUS at 05:21

## 2024-03-01 NOTE — DISCHARGE NOTE PROVIDER - HOSPITAL COURSE
HPI:  73-year-old female with past medical history of type 2 diabetes not on any medication diet controlled, high blood pressure, CAD status post CABG, vasculopath, sent in by Dr. Garcia her podiatrist to be admitted for scheduled right third toe amputation on Friday to be admitted to medicine and for Dr. Guerrero of podiatry to see while here.  Patient denies any fevers or chills, any chest pain shortness of breath, lightheadedness, palpitations, abdominal pain, nausea or vomiting, diarrhea.  Patient has intermittently been on Augmentin recently. (28 Feb 2024 18:29)    Hospital Course:  1 R 3rd toe OM  - podiatry and vascular cards fu appreciated  - pain control  - sp OR   - ID fu   - awaiting cultures     2 HTN- cw home meds  - DASH diet     3 CAD  - sp CABG  - cards following  - cw asa, plavix    4 DVT prophylaxis         Important Medication Changes and Reason:    Active or Pending Issues Requiring Follow-up:    Advanced Directives:   [x] Full code  [ ] DNR  [ ] Hospice    Discharge Diagnoses:

## 2024-03-01 NOTE — PROGRESS NOTE ADULT - SUBJECTIVE AND OBJECTIVE BOX
Patient seen and examined at bedside.    Overnight Events:   S/p right foot digit resection. Endorsing mild to moderate pain in foot area (to be expected). Denies any CP, SOB, palpitations.     REVIEW OF SYSTEMS:  Constitutional:     [x ] negative [ ] fevers [ ] chills [ ] weight loss [ ] weight gain  HEENT:                  [x ] negative [ ] dry eyes [ ] eye irritation [ ] postnasal drip [ ] nasal congestion  CV:                         [ x] negative  [ ] chest pain [ ] orthopnea [ ] palpitations [ ] murmur  Resp:                     [x ] negative [ ] cough [ ] shortness of breath [ ] dyspnea [ ] wheezing [ ] sputum [ ]hemoptysis  GI:                          [x ] negative [ ] nausea [ ] vomiting [ ] diarrhea [ ] constipation [ ] abd pain [ ] dysphagia   :                        [ x] negative [ ] dysuria [ ] nocturia [ ] hematuria [ ] increased urinary frequency  Musculoskeletal: [x ] negative [ ] back pain [ ] myalgias [ ] arthralgias [ ] fracture  Skin:                       [ x] negative [ ] rash [ ] itch  Neurological:        [ x] negative [ ] headache [ ] dizziness [ ] syncope [ ] weakness [ ] numbness  Psychiatric:           [ x] negative [ ] anxiety [ ] depression  Endocrine:            [ x] negative [ ] diabetes [ ] thyroid problem  Heme/Lymph:      [ x] negative [ ] anemia [ ] bleeding problem  Allergic/Immune: [ x] negative [ ] itchy eyes [ ] nasal discharge [ ] hives [ ] angioedema    [ x] All other systems negative          Current Meds:  acetaminophen     Tablet .. 650 milliGRAM(s) Oral every 6 hours PRN  acetaminophen  300 mG/codeine 30 mG 1 Tablet(s) Oral every 6 hours PRN  amLODIPine   Tablet 10 milliGRAM(s) Oral daily  ascorbic acid 500 milliGRAM(s) Oral daily  aspirin enteric coated 81 milliGRAM(s) Oral daily  carvedilol 6.25 milliGRAM(s) Oral every 12 hours  clopidogrel Tablet 75 milliGRAM(s) Oral daily  enoxaparin Injectable 40 milliGRAM(s) SubCutaneous every 24 hours  hydrochlorothiazide 25 milliGRAM(s) Oral daily  HYDROmorphone  Injectable 0.5 milliGRAM(s) IV Push every 4 hours PRN  lisinopril 10 milliGRAM(s) Oral daily  multivitamin/minerals 1 Tablet(s) Oral daily  pantoprazole    Tablet 40 milliGRAM(s) Oral before breakfast      Vitals:  T(F): 97.2 (03-01), Max: 98.4 (02-29)  HR: 56 (03-01) (54 - 70)  BP: 157/67 (03-01) (142/63 - 185/67)  RR: 18 (03-01)  SpO2: 94% (03-01)  I&O's Summary    PHYSICAL EXAM:  Appearance: No acute distress; well appearing  Eyes: EOMI, no scleral icterus   HEENT: Normal oral mucosa  Cardiovascular: RRR, S1, S2, no murmurs, rubs, or gallops; no edema; no JVD  Respiratory: Clear to auscultation bilaterally, no auditory stridor   Gastrointestinal: soft, non-tender, non-distended with normal bowel sounds  Musculoskeletal: R foot wrapped post digit resection  Neurologic: Moving all 4 extremities spontaneously, sensation grossly intact  Psychiatry: AAOx3, mood & affect appropriate  Skin: No rashes, ecchymoses, or cyanosis                          8.8    5.12  )-----------( 299      ( 01 Mar 2024 04:24 )             27.9     03-01    140  |  105  |  14  ----------------------------<  98  3.9   |  26  |  1.20    Ca    9.1      01 Mar 2024 04:24    TPro  7.5  /  Alb  3.9  /  TBili  0.6  /  DBili  x   /  AST  20  /  ALT  9<L>  /  AlkPhos  156<H>  02-28    PT/INR - ( 01 Mar 2024 04:24 )   PT: 12.1 sec;   INR: 1.10 ratio         PTT - ( 01 Mar 2024 04:24 )  PTT:35.0 sec              DIAGNOSTIC/IMAGING:  INTERPRETATION:    At the third digit, soft tissue gas was evident on radiographs of   2/28/2024 surrounding the middle and distal phalanges. On the current   exam there is rim-enhancing fluid/phlegmon surrounding the blunted tip of   the third digit both dorsally and volarly, measuring up to 2 cm proximal   to distal and in total 1 cm anteroposteriorly and 1 cm transversely.   There is confluent T1 hypointense/STIR hyperintense signal abnormality   throughout the proximal, middle, and distal phalanges with abnormal   enhancement and partial bony destruction of the proximal phalanx,   consistent with osteomyelitis.

## 2024-03-01 NOTE — PHYSICAL THERAPY INITIAL EVALUATION ADULT - PLANNED THERAPY INTERVENTIONS, PT EVAL
Stairs Goal: Pt will go up/down 10 steps  with L handrail independently in 2 weeks./balance training/gait training/transfer training

## 2024-03-01 NOTE — DIETITIAN INITIAL EVALUATION ADULT - ORAL INTAKE PTA/DIET HISTORY
Patient visited at beside, her  present. Pt s/p toe amputation, c/o pain, RN aware. Pt describes loss of appetite over past 6 months with weight loss.   Patient denies nausea, vomiting, diarrhea and constipation. A home her  orders food to be delivered. Patient also describes weakness, discussed decreased intake as impacting muscle tone and energy. Pt is diabetic but not on medication or diabetic diet. Patient takes Vit C at home.

## 2024-03-01 NOTE — BRIEF OPERATIVE NOTE - NSICDXBRIEFPREOP_GEN_ALL_CORE_FT
PRE-OP DIAGNOSIS:  Wet gangrene 01-Mar-2024 12:13:53  Funmilayo Lewis  Osteomyelitis 01-Mar-2024 12:14:00  Funmilayo Lewis

## 2024-03-01 NOTE — PHYSICAL THERAPY INITIAL EVALUATION ADULT - NSPTDMEREC_GEN_A_CORE
Pt will require a rolling walker at home due to their Dx of                    to help perform mobility related activities for daily living. Pt has cane and a bariatric RW which is too big for her (family member bought)/rolling walker Pt will require a rolling walker at home due to their Dx of s/p R foot 3rd ray resection to help perform mobility related activities for daily living. Pt has cane and a bariatric RW which is too big for her (family member bought)/rolling walker

## 2024-03-01 NOTE — PHYSICAL THERAPY INITIAL EVALUATION ADULT - PERTINENT HX OF CURRENT PROBLEM, REHAB EVAL
73-y/o F with PMH: type 2 diabetes not on any medication diet controlled, high blood pressure, CAD status post CABG, vasculopath, sent in by Dr. Garcia her podiatrist to be admitted for scheduled right third toe amputation on Friday to be admitted to medicine and for Dr. Guerrero of podiatry to see while here.  Patient denies any fevers or chills, any chest pain shortness of breath, lightheadedness, palpitations, abdominal pain, nausea or vomiting, diarrhea.  Patient has intermittently been on Augmentin recently. R Foot, R toes, and R ankle  XRays: Generalized soft tissue swelling with shrunken atrophied 3rd toe tip soft tissues with underlying mottled gas collections over middle and distal phalanges consistent with history of gangrene. Mottled osteopenia with cortical erosive changes involving medial aspect of 3rd proximal phalanx consistent with stigmata of osteomyelitis. No proximally tracking gas collections beyond this region and no additional focal areas of osteomyelitis. 2nd toe amputation defect through DIP joint level again noted with smooth corticated stump margin and preserved overlying soft tissue coverage. No acute fractures or dislocations. Tarsometatarsal alignment maintained without evidence for a Lisfranc injury. Congenitally fused 5th DIP joint preserved remaining joint spaces and no joint margin erosions. Mild plantar calcaneal enthesopathic changes. CXR: Left basilar patchy opacity, likely atelectasis. MR R FOOT: Extensive findings of acute osteomyelitis involving the third toe as detailed in report. R Foot X Ray: Status post partial 3rd ray resection through mid to distal 3rd metatarsal shaft with sharp smooth osseous stump margin and with postsurgical changes in the overlying soft tissues. No proximally tracking gas collections beyond the amputation margins and no focal areas of osteomyelitis. Remainder of foot unchanged. Also correlate with intraoperative findings. 73-y/o F with PMH: type 2 diabetes not on any medication diet controlled, high blood pressure, CAD status post CABG, vasculopath, sent in by Dr. Garcia her podiatrist to be admitted for scheduled right third toe amputation on Friday to be admitted to medicine and for Dr. Guerrero of podiatry to see while here.  Patient denies any fevers or chills, any chest pain shortness of breath, lightheadedness, palpitations, abdominal pain, nausea or vomiting, diarrhea.  Patient has intermittently been on Augmentin recently. R Foot, R toes, and R ankle  XRays: Generalized soft tissue swelling with shrunken atrophied 3rd toe tip soft tissues with underlying mottled gas collections over middle and distal phalanges consistent with history of gangrene. Mottled osteopenia with cortical erosive changes involving medial aspect of 3rd proximal phalanx consistent with stigmata of osteomyelitis. No proximally tracking gas collections beyond this region and no additional focal areas of osteomyelitis. 2nd toe amputation defect through DIP joint level again noted with smooth corticated stump margin and preserved overlying soft tissue coverage. No acute fractures or dislocations. Tarsometatarsal alignment maintained without evidence for a Lisfranc injury. Congenitally fused 5th DIP joint preserved remaining joint spaces and no joint margin erosions. Mild plantar calcaneal enthesopathic changes. CXR: Left basilar patchy opacity, likely atelectasis. MR R FOOT: Extensive findings of acute osteomyelitis involving the third toe as detailed in report. R Foot X Ray: Status post partial 3rd ray resection through mid to distal 3rd metatarsal shaft with sharp smooth osseous stump margin and with postsurgical changes in the overlying soft tissues. No proximally tracking gas collections beyond the amputation margins and no focal areas of osteomyelitis. Remainder of foot unchanged. Also correlate with intraoperative findings. R foot 2/28 s/p bedside 3rd DIPJ disarticulation,  wound bed fibronecrotic , skin flap appears warm and viable, scant purulence. Found + gram neg rods from sample of abscess. Pt scheduled for OR with Podiatry.

## 2024-03-01 NOTE — PHYSICAL THERAPY INITIAL EVALUATION ADULT - GAIT TRAINING, PT EVAL
Goal: Pt will amb 150 ft independently with least restrictive device, Heel WBAT RLE in [place] shoe in 2  weeks.

## 2024-03-01 NOTE — PROGRESS NOTE ADULT - ASSESSMENT
Patient is a 73 year old female with PMH of DM2 not on any medication diet controlled, HTN, CAD s/p CABG, vasculopath, sent in by Dr. Garcia her podiatrist to be admitted for scheduled right third toe amputation on Friday.     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 Flushed wound with copious amount of normal saline and packed with 1/4 inch iodoform packing   - 2/28 WCx M. morganii and P. mirabilis  - admission BCx NGTD  Recommendations:  OR today 3/1 for R foot partial 3rd ray resection   - please send bone biopsy and culturesI  Follow up path from 2/28, in process   Continue on zosyn pending above   Monitor temps/WBC  Further recs to follow pending above    Dr. Edgardo Allen covering weekend service 3/2-3/3  Dr. Tanya Das to resume care 3/4  Infectious Diseases will continue to follow. Please call with any questions.   Renetta Paige M.D.  [facility] Division of Infectious Diseases [phone]  For after 5 P.M. and weekends, please call [phone]   Patient is a 73 year old female with PMH of DM2 not on any medication diet controlled, HTN, CAD s/p CABG, vasculopath, sent in by Dr. Garcia her podiatrist to be admitted for scheduled right third toe amputation on Friday.     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 Flushed wound with copious amount of normal saline and packed with 1/4 inch iodoform packing   - 2/28 WCx M. morganii and P. mirabilis  - admission BCx NGTD  Recommendations:  OR today 3/1 for R foot partial 3rd ray resection   - please send bone biopsy and cultures  Follow up path from 2/28, in process   Continue on zosyn pending above   Monitor temps/WBC  Further recs to follow pending above    Dr. Edgardo Allen covering weekend service 3/2-3/3  Dr. Tnaya Das to resume care 3/4  Infectious Diseases will continue to follow. Please call with any questions.   Renetta Paige M.D.  [facility] Division of Infectious Diseases [phone]  For after 5 P.M. and weekends, please call [phone]

## 2024-03-01 NOTE — DISCHARGE NOTE PROVIDER - NSDCCPCAREPLAN_GEN_ALL_CORE_FT
PRINCIPAL DISCHARGE DIAGNOSIS  Diagnosis: Gangrene of toe of right foot  Assessment and Plan of Treatment: You had surgery by Podiatrist.  Take antibiotics as prescribed.  Follow up with Dr Young in 1 week.  Call to schedule appointment.      SECONDARY DISCHARGE DIAGNOSES  Diagnosis: Cellulitis  Assessment and Plan of Treatment:     Diagnosis: DM (diabetes mellitus)  Assessment and Plan of Treatment: Follow up with your PMD for management of Diabetes. cad    Diagnosis: CAD (coronary artery disease)  Assessment and Plan of Treatment: Coronary artery disease is a condition where the arteries the supply the heart muscle get clogged with fatty deposits & puts you at risk for a heart attack  Call your doctor if you have any new pain, pressure, or discomfort in the center of your chest, pain, tingling or discomfort in arms, back, neck, jaw, or stomach, shortness of breath, nausea, vomiting, burping or heartburn, sweating, cold and clammy skin, racing or abnormal heartbeat for more than 10 minutes or if they keep coming & going.  Call 911 and do not tr to get to hospital by care  You can help yourself with lefestyle changes (quitting smoking if you smoke), eat lots of fruits & vegetables & low fat dairy products, not a lot of meat & fatty foods, walk or some form of physical activity most days of the week, lose weight if you are overweight  Take your cardiac medication as prescribed to lower cholesterol, to lower blood pressure, aspirin to prevent blood clots, and diabetes control  Make sure to keep appointments with doctor for cardiac follow up care

## 2024-03-01 NOTE — PRE-OP CHECKLIST - LOOSE TEETH
CT ANGIOGRAM THORAX WITH IV CONTRAST AND 3D RECONSTRUCTIONS:

 

Date: 4-26-18 

 

History: 

Abnormal CTA chest. Follow up evaluation. 

 

Comparison: 

1-11-18

 

FINDINGS: 

Again noted is what appears to be a left atrial occlusion device. There are vascular calcifications s
een in the thoracic aorta and coronary arteries. 

 

No filling defects are seen in the pulmonary arteries to suggest a pulmonary embolus. 

 

Again noted are scattered pulmonary nodules within the lungs bilaterally with largest pulmonary nodul
e again seen on the right which is adjacent to the minor fissure measuring approximately 8 mm. These 
nodular densities are pleural based aside from a single pulmonary nodule within the right middle lobe
 which measures approximately 5 mm. 

 

Again noted are increased interstitial densities predominately at the periphery of the lungs bilatera
lly, stable from prior study, likely related to chronic interstitial fibrotic lung changes. 

 

There is no evidence of lymphadenopathy. Visualized upper abdomen has a normal CT appearance. There h
as been no interval change compared to the prior study. 

 

IMPRESSION: 

1. Stable multiple pleural based pulmonary nodules measuring less than 1 cm with a parenchymal pulmon
jenna nodule in the right middle lobe measuring 5 mm, stable from prior exam. Continued follow up evalu
ation is recommended. Additional follow up CT scan of the thorax in 6 months is recommended. 

2. Chronic interstitial lung changes. 

3. No CT evidence of a pulmonary embolus. 

4. Vascular calcifications. 

 

POS: MARLENE no

## 2024-03-01 NOTE — DIETITIAN INITIAL EVALUATION ADULT - REASON INDICATOR FOR ASSESSMENT
MST>2   73-year-old female with past medical history of type 2 diabetes not on any medication diet controlled, high blood pressure, CAD status post CABG, vasculopath, sent in by Dr. Garcia her podiatrist to be admitted for scheduled right third toe amputation on Friday.

## 2024-03-01 NOTE — DISCHARGE NOTE PROVIDER - PROVIDER TOKENS
PROVIDER:[TOKEN:[[id number]],FOLLOWUP:[1 week]],PROVIDER:[TOKEN:[[id number]],FOLLOWUP:[1 week],ESTABLISHEDPATIENT:[T]]

## 2024-03-01 NOTE — BRIEF OPERATIVE NOTE - OPERATION/FINDINGS
Pt is s/p right foot partial 2nd ray resection, closed   - Bone at proximal resection was hard and of good quality   - Adequate intraop bleeding   - Incision primarily closed with 3.0 polysorb and 3.0 nylon Pt is s/p right foot partial 3rd ray resection, closed   - Bone at proximal resection was hard and of good quality   - Adequate intraop bleeding   - Incision primarily closed with 3.0 polysorb and 3.0 nylon

## 2024-03-01 NOTE — PROGRESS NOTE ADULT - ATTENDING COMMENTS
Appreciate podiatry care  no additional vascular or cardiac testing    Jose
Optimized for toe amp  palp JUDE Garcia
Litfulo Pregnancy And Lactation Text: Based on animal studies, Lifulo may cause embryo-fetal harm when administered to pregnant women.  The medication should not be used in pregnancy.  Breastfeeding is not recommended during treatment.

## 2024-03-01 NOTE — PRE-OP CHECKLIST - NS PREOP CHK MONITOR ANESTHESIA CONSENT
LVM with patient regarding appointment for 05/04/2023 being canceled due to provider being out of the office. Phone number was left to get appointment rescheduled.   
done

## 2024-03-01 NOTE — PHYSICAL THERAPY INITIAL EVALUATION ADULT - ADDITIONAL COMMENTS
Pt lives in a private house with 3 steps to enter, + L handrail and 11 steps to bedroom, L handrail. DME: straight cane (used in bathroom) and RW.

## 2024-03-01 NOTE — DIETITIAN NUTRITION RISK NOTIFICATION - TREATMENT: THE FOLLOWING DIET HAS BEEN RECOMMENDED
Diet, DASH/TLC:   Sodium & Cholesterol Restricted  Supplement Feeding Modality:  Oral  Ensure Plus High Protein Cans or Servings Per Day:  1       Frequency:  Daily (03-01-24 @ 13:40) [Active]

## 2024-03-01 NOTE — BRIEF OPERATIVE NOTE - NSICDXBRIEFPROCEDURE_GEN_ALL_CORE_FT
PROCEDURES:  Amputation, foot, ray 01-Mar-2024 12:13:28  Funmilayo Lewis  Delayed primary closure of foot 01-Mar-2024 12:13:36  Funmilayo Lewis

## 2024-03-01 NOTE — PHYSICAL THERAPY INITIAL EVALUATION ADULT - ACTIVE RANGE OF MOTION EXAMINATION, REHAB EVAL
R ankle kept in neutral/bilateral upper extremity Active ROM was WFL (within functional limits)/bilateral  lower extremity Active ROM was WFL (within functional limits)

## 2024-03-01 NOTE — DISCHARGE NOTE PROVIDER - NSDCMRMEDTOKEN_GEN_ALL_CORE_FT
aspirin 81 mg oral delayed release tablet: 1 tab(s) orally once a day  Coreg 6.25 mg oral tablet: 1 tab(s) orally 2 times a day NOTE: LAST FILL 10/23 X 90 DAY SUPPLY  diclofenac sodium 100 mg oral tablet, extended release: 1 tab(s) orally once a day NOTE: LAST FILL 10/23 X 90 DAY SUPPLY  hydroCHLOROthiazide 25 mg oral tablet: 1 tab(s) orally once a day NOTE: LAST FILL 10/23 X 90 DAY SUPPLY  Norvasc 10 mg oral tablet: 1 tab(s) orally once a day NOTE: LAST FILL 10/23 X 90 DAY SUPPLY  omeprazole 40 mg oral delayed release capsule: 1 cap(s) orally once a day  Plavix 75 mg oral tablet: 1 tab(s) orally once a day  ramipril 10 mg oral capsule: 1 cap(s) orally once a day NOTE: LAST FILL 10/23 X 90 DAY SUPPLY  Tylenol with Codeine #3 oral tablet: 1 tab(s) orally every 6 hours as needed   aspirin 81 mg oral delayed release tablet: 1 tab(s) orally once a day  Coreg 6.25 mg oral tablet: 1 tab(s) orally 2 times a day NOTE: LAST FILL 10/23 X 90 DAY SUPPLY  diclofenac sodium 100 mg oral tablet, extended release: 1 tab(s) orally once a day NOTE: LAST FILL 10/23 X 90 DAY SUPPLY  hydroCHLOROthiazide 25 mg oral tablet: 1 tab(s) orally once a day NOTE: LAST FILL 10/23 X 90 DAY SUPPLY  Norvasc 10 mg oral tablet: 1 tab(s) orally once a day NOTE: LAST FILL 10/23 X 90 DAY SUPPLY  omeprazole 40 mg oral delayed release capsule: 1 cap(s) orally once a day  Plavix 75 mg oral tablet: 1 tab(s) orally once a day  ramipril 10 mg oral capsule: 1 cap(s) orally once a day NOTE: LAST FILL 10/23 X 90 DAY SUPPLY  Russel Walker: Use as needed  Tylenol with Codeine #3 oral tablet: 1 tab(s) orally every 6 hours as needed   acetaminophen 325 mg oral tablet: 2 tab(s) orally every 6 hours As needed Mild Pain (1 - 3)  amLODIPine 10 mg oral tablet: 1 tab(s) orally once a day  ascorbic acid 500 mg oral tablet: 1 tab(s) orally once a day  aspirin 81 mg oral delayed release tablet: 1 tab(s) orally once a day  carvedilol 6.25 mg oral tablet: 1 tab(s) orally every 12 hours  clopidogrel 75 mg oral tablet: 1 tab(s) orally once a day  codeine-acetaminophen 30 mg-300 mg oral tablet: 1 tab(s) orally every 6 hours as needed for Moderate Pain (4 - 6) MDD: 4  hydrALAZINE 10 mg oral tablet: 1 tab(s) orally 3 times a day  hydroCHLOROthiazide 25 mg oral tablet: 1 tab(s) orally once a day  levoFLOXacin 750 mg oral tablet: 1 tab(s) orally every 48 hours START ON 3/6 AT 5 PM.  Multiple Vitamins with Minerals oral tablet: 1 tab(s) orally once a day  omeprazole 40 mg oral delayed release capsule: 1 cap(s) orally once a day  ramipril 10 mg oral capsule: 1 cap(s) orally once a day NOTE: LAST FILL 10/23 X 90 DAY SUPPLY  Rolling Walker: Use as needed

## 2024-03-01 NOTE — BRIEF OPERATIVE NOTE - NSICDXBRIEFPOSTOP_GEN_ALL_CORE_FT
POST-OP DIAGNOSIS:  Wet gangrene 01-Mar-2024 12:14:12  Funmilayo Lewis  Osteomyelitis 01-Mar-2024 12:14:20  Funmilayo Lewis

## 2024-03-01 NOTE — PROGRESS NOTE ADULT - SUBJECTIVE AND OBJECTIVE BOX
PAST MEDICAL & SURGICAL HISTORY:  HTN (hypertension)      Type 2 diabetes mellitus      CAD (coronary artery disease)

## 2024-03-01 NOTE — DIETITIAN INITIAL EVALUATION ADULT - PERTINENT MEDS FT
MEDICATIONS  (STANDING):  amLODIPine   Tablet 10 milliGRAM(s) Oral daily  aspirin enteric coated 81 milliGRAM(s) Oral daily  carvedilol 6.25 milliGRAM(s) Oral every 12 hours  clopidogrel Tablet 75 milliGRAM(s) Oral daily  enoxaparin Injectable 40 milliGRAM(s) SubCutaneous every 24 hours  hydrochlorothiazide 25 milliGRAM(s) Oral daily  lisinopril 10 milliGRAM(s) Oral daily  pantoprazole    Tablet 40 milliGRAM(s) Oral before breakfast    MEDICATIONS  (PRN):  acetaminophen     Tablet .. 650 milliGRAM(s) Oral every 6 hours PRN Mild Pain (1 - 3)  acetaminophen  300 mG/codeine 30 mG 1 Tablet(s) Oral every 6 hours PRN Moderate Pain (4 - 6)  HYDROmorphone  Injectable 0.5 milliGRAM(s) IV Push every 4 hours PRN Severe Pain (7 - 10)

## 2024-03-01 NOTE — DISCHARGE NOTE PROVIDER - CARE PROVIDER_API CALL
Wilfred Young  Podiatric Medicine and Surgery  [address] [facility], [address]  [place] [address]  Phone: [phone]  Fax: [phone]  Follow Up Time: 1 week    DANIAT BROTHERS [place] [address] [place] [address]  Phone: ([phone]  Fax: ([phone]  Established Patient  Follow Up Time: 1 week

## 2024-03-01 NOTE — BRIEF OPERATIVE NOTE - SPECIMENS
Pathology: 1) right foot 2nd digit soft tissue and bone 2) Clean bone margin; Culture 1) Clean bone margin 2) Deep wound culture

## 2024-03-01 NOTE — PROGRESS NOTE ADULT - ASSESSMENT
73-year-old female with past medical history of type 2 diabetes not on any medication diet controlled, high blood pressure, CAD status post CABG, vasculopath, sent in by Dr. Garcia her podiatrist to be admitted for scheduled right third toe amputation on Friday to be admitted to medicine and for Dr. Guerrero of podiatry to see while here.  Patient denies any fevers or chills, any chest pain shortness of breath, lightheadedness, palpitations, abdominal pain, nausea or vomiting, diarrhea.  Patient has intermittently been on Augmentin recently.    1 R 3rd toe OM  - here for amputation  - podiatry and vascular cards fu appreciated  - pain control  - OR today   - ID fu     2 HTN- cw home meds  - DASH diet     3 CAD  - sp CABG  - cards following  - cw asa, plavix    4 DVT prophylaxis

## 2024-03-01 NOTE — PROGRESS NOTE ADULT - SUBJECTIVE AND OBJECTIVE BOX
Patient is a 73y old  Female who presents with a chief complaint of toe OM (01 Mar 2024 14:29)    Date of servie : 03-01-24 @ 16:21  INTERVAL HPI/OVERNIGHT EVENTS:  T(C): 36.2 (03-01-24 @ 10:53), Max: 36.9 (02-29-24 @ 20:43)  HR: 73 (03-01-24 @ 15:50) (54 - 73)  BP: 156/82 (03-01-24 @ 15:50) (142/63 - 185/67)  RR: 18 (03-01-24 @ 10:53) (16 - 18)  SpO2: 97% (03-01-24 @ 15:50) (94% - 100%)  Wt(kg): --  I&O's Summary      LABS:                        8.8    5.12  )-----------( 299      ( 01 Mar 2024 04:24 )             27.9     03-01    140  |  105  |  14  ----------------------------<  98  3.9   |  26  |  1.20    Ca    9.1      01 Mar 2024 04:24      PT/INR - ( 01 Mar 2024 04:24 )   PT: 12.1 sec;   INR: 1.10 ratio         PTT - ( 01 Mar 2024 04:24 )  PTT:35.0 sec  Urinalysis Basic - ( 01 Mar 2024 04:24 )    Color: x / Appearance: x / SG: x / pH: x  Gluc: 98 mg/dL / Ketone: x  / Bili: x / Urobili: x   Blood: x / Protein: x / Nitrite: x   Leuk Esterase: x / RBC: x / WBC x   Sq Epi: x / Non Sq Epi: x / Bacteria: x      CAPILLARY BLOOD GLUCOSE      POCT Blood Glucose.: 104 mg/dL (01 Mar 2024 09:58)        Urinalysis Basic - ( 01 Mar 2024 04:24 )    Color: x / Appearance: x / SG: x / pH: x  Gluc: 98 mg/dL / Ketone: x  / Bili: x / Urobili: x   Blood: x / Protein: x / Nitrite: x   Leuk Esterase: x / RBC: x / WBC x   Sq Epi: x / Non Sq Epi: x / Bacteria: x        MEDICATIONS  (STANDING):  amLODIPine   Tablet 10 milliGRAM(s) Oral daily  ascorbic acid 500 milliGRAM(s) Oral daily  aspirin enteric coated 81 milliGRAM(s) Oral daily  carvedilol 6.25 milliGRAM(s) Oral every 12 hours  clopidogrel Tablet 75 milliGRAM(s) Oral daily  enoxaparin Injectable 40 milliGRAM(s) SubCutaneous every 24 hours  hydrochlorothiazide 25 milliGRAM(s) Oral daily  lisinopril 10 milliGRAM(s) Oral daily  multivitamin/minerals 1 Tablet(s) Oral daily  pantoprazole    Tablet 40 milliGRAM(s) Oral before breakfast    MEDICATIONS  (PRN):  acetaminophen     Tablet .. 650 milliGRAM(s) Oral every 6 hours PRN Mild Pain (1 - 3)  acetaminophen  300 mG/codeine 30 mG 1 Tablet(s) Oral every 6 hours PRN Moderate Pain (4 - 6)  HYDROmorphone  Injectable 0.5 milliGRAM(s) IV Push every 4 hours PRN Severe Pain (7 - 10)          PHYSICAL EXAM:  GENERAL: NAD, well-groomed, well-developed  HEAD:  Atraumatic, Normocephalic  CHEST/LUNG: Clear to percussion bilaterally; No rales, rhonchi, wheezing, or rubs  HEART: Regular rate and rhythm; No murmurs, rubs, or gallops  ABDOMEN: Soft, Nontender, Nondistended; Bowel sounds present  EXTREMITIES:  dressing +    Care Discussed with Consultants/Other Providers [ ] YES  [ ] NO

## 2024-03-01 NOTE — DIETITIAN INITIAL EVALUATION ADULT - ADD RECOMMEND
add ensure plus high protein x2 daily; add MVI with minerals, Vitamin C daily; monitor/encourage PO intake; assist with tray prep/menus   add ensure plus high protein x1 daily; add MVI with minerals, Vitamin C daily; monitor/encourage PO intake; assist with tray prep/menus

## 2024-03-01 NOTE — PROGRESS NOTE ADULT - ASSESSMENT
73F history of DM2, HTN, CAD s/p CABG who presents for third toe amputation.     Assessment:  1. PAD with multiple peripheral interventions      Most recent intervention this year with successful laser atherectomy and drug coated balloon of right common femoral artery. Successful  laser atherectomy drug coated balloon and Rosa M stenting of right superficial femoral artery. Successful laser atherectomy and drug coated balloon of R popliteal artery. Successful laser atherectomy and drug coated balloon of Right TPT.   2. R 3rd Digit Foot Gangrene - now s/p digit resection   3. HTN  4. HLD  5. DM  6. CAD s/p CABG       Plan:  1. Continue ASA, Plavix and Statin.   2. Appreciate excellent Podiatry care.   3. Will follow up with on outpatient basis

## 2024-03-01 NOTE — PROGRESS NOTE ADULT - ASSESSMENT
To OR today at 7:30 am with Dr. Young for R foot Partial Third Ray Resection .   CXR on sunrise.  EKG on sunrise.  Medical/Cardiac clearance since 2/29 and documented in chart.  Consent signed and in chart.  Procedure was explained to patient in detail. All alternatives, risks and complications were discussed. All questions answered.

## 2024-03-01 NOTE — DISCHARGE NOTE PROVIDER - CARE PROVIDERS DIRECT ADDRESSES
[email or web address]

## 2024-03-01 NOTE — DISCHARGE NOTE PROVIDER - NSDCFUADDAPPT_GEN_ALL_CORE_FT
Podiatry Discharge Instructions:  Follow up: Please follow up with Dr. Young within 1 week of discharge from the hospital, please call [phone] for appointment and discuss that you recently were seen in the hospital.  Wound Care: Please leave your dressing clean dry intact until your follow up appointment  Weight bearing: Please weight bear as tolerated in a surgical shoe to right heel.  Antibiotics: Please continue as instructed.

## 2024-03-01 NOTE — PRE-OP CHECKLIST - SITE MARKED BY SURGEON
Speech Note:   Orders received.  Pt currently having testing.  Per discussion with RN, no swallow or speech deficits identified.  PT and OT signed off case.  Speech will sign off case at this time.  Please re-consult if need arises   yes

## 2024-03-01 NOTE — BRIEF OPERATIVE NOTE - COMMENTS
Pt is s/p right foot partial 2nd ray resection, closed   - Low concern for residual infection   - Low concern for viability   - Pod stable for discharge pending clean bone margin culture no growth x 48 hours/ final vasc recs/ final ID recs

## 2024-03-01 NOTE — PHYSICAL THERAPY INITIAL EVALUATION ADULT - NS ASR WT BEARING DETAIL RLE
To  R HEEL in Darco shoe/weight-bearing as tolerated WBAT to  R HEEL in Dar[place] shoe/weight-bearing as tolerated

## 2024-03-01 NOTE — PROGRESS NOTE ADULT - SUBJECTIVE AND OBJECTIVE BOX
Podiatry Pager #: [phone]    Patient is a 73y old  Female who presents with a chief complaint of toe OM (29 Feb 2024 14:48)      INTERVAL HPI/OVERNIGHT EVENTS:   Pt is scheduled for R foot Partial Third Ray Resection with Dr. Young at 7:30 am. Patient is aware of procedure and is NPO since midnight.    MEDICATIONS  (STANDING):  amLODIPine   Tablet 10 milliGRAM(s) Oral daily  aspirin enteric coated 81 milliGRAM(s) Oral daily  carvedilol 6.25 milliGRAM(s) Oral every 12 hours  clopidogrel Tablet 75 milliGRAM(s) Oral daily  enoxaparin Injectable 40 milliGRAM(s) SubCutaneous every 24 hours  hydrochlorothiazide 25 milliGRAM(s) Oral daily  lisinopril 10 milliGRAM(s) Oral daily  pantoprazole    Tablet 40 milliGRAM(s) Oral before breakfast  piperacillin/tazobactam IVPB.. 3.375 Gram(s) IV Intermittent every 8 hours    MEDICATIONS  (PRN):  acetaminophen     Tablet .. 650 milliGRAM(s) Oral every 6 hours PRN Temp greater or equal to 38C (100.4F), Mild Pain (1 - 3)  acetaminophen  300 mG/codeine 30 mG 1 Tablet(s) Oral every 6 hours PRN Moderate Pain (4 - 6)      Allergies    Cipro (Headache)    Intolerances        Vital Signs Last 24 Hrs  T(C): 36.6 (01 Mar 2024 06:46), Max: 36.9 (29 Feb 2024 20:43)  T(F): 97.9 (01 Mar 2024 06:46), Max: 98.4 (29 Feb 2024 20:43)  HR: 59 (01 Mar 2024 06:46) (59 - 70)  BP: 163/69 (01 Mar 2024 06:46) (152/60 - 175/69)  BP(mean): --  RR: 18 (01 Mar 2024 06:46) (18 - 18)  SpO2: 98% (01 Mar 2024 06:46) (95% - 98%)    Parameters below as of 01 Mar 2024 04:57  Patient On (Oxygen Delivery Method): room air        LABS:                        8.8    5.12  )-----------( 299      ( 01 Mar 2024 04:24 )             27.9     03-01    140  |  105  |  14  ----------------------------<  98  3.9   |  26  |  1.20    Ca    9.1      01 Mar 2024 04:24    TPro  7.5  /  Alb  3.9  /  TBili  0.6  /  DBili  x   /  AST  20  /  ALT  9<L>  /  AlkPhos  156<H>  02-28    PT/INR - ( 01 Mar 2024 04:24 )   PT: 12.1 sec;   INR: 1.10 ratio         PTT - ( 01 Mar 2024 04:24 )  PTT:35.0 sec  Urinalysis Basic - ( 01 Mar 2024 04:24 )    Color: x / Appearance: x / SG: x / pH: x  Gluc: 98 mg/dL / Ketone: x  / Bili: x / Urobili: x   Blood: x / Protein: x / Nitrite: x   Leuk Esterase: x / RBC: x / WBC x   Sq Epi: x / Non Sq Epi: x / Bacteria: x      CAPILLARY BLOOD GLUCOSE          RADIOLOGY & ADDITIONAL TESTS:    Plan:   To OR today at 7:30 am with Dr. Young for R foot Partial Third Ray Resection .   CXR on sunrise.  EKG on sunrise.  Medical/Cardiac clearance since 2/29 and documented in chart.  Consent signed and in chart.  Procedure was explained to patient in detail. All alternatives, risks and complications were discussed. All questions answered.

## 2024-03-01 NOTE — DIETITIAN INITIAL EVALUATION ADULT - PERTINENT LABORATORY DATA
03-01    140  |  105  |  14  ----------------------------<  98  3.9   |  26  |  1.20    Ca    9.1      01 Mar 2024 04:24    TPro  7.5  /  Alb  3.9  /  TBili  0.6  /  DBili  x   /  AST  20  /  ALT  9<L>  /  AlkPhos  156<H>  02-28  POCT Blood Glucose.: 104 mg/dL (03-01-24 @ 09:58)

## 2024-03-01 NOTE — PROGRESS NOTE ADULT - SUBJECTIVE AND OBJECTIVE BOX
[facility], Division of Infectious Diseases  PAUL Calderon Y. Patel, S. Shah, G. [facility]  [phone]    Name: HARVEY RIZZO  Age: 73y  Gender: Female  MRN: [id number]    Interval History:  No acute overnight events.   Pt in pre-op  Notes reviewed.    Antibiotics:  piperacillin/tazobactam IVPB.. 3.375 Gram(s) IV Intermittent every 8 hours      Medications:  acetaminophen     Tablet .. 650 milliGRAM(s) Oral every 6 hours PRN  acetaminophen  300 mG/codeine 30 mG 1 Tablet(s) Oral every 6 hours PRN  amLODIPine   Tablet 10 milliGRAM(s) Oral daily  aspirin enteric coated 81 milliGRAM(s) Oral daily  carvedilol 6.25 milliGRAM(s) Oral every 12 hours  clopidogrel Tablet 75 milliGRAM(s) Oral daily  enoxaparin Injectable 40 milliGRAM(s) SubCutaneous every 24 hours  hydrochlorothiazide 25 milliGRAM(s) Oral daily  lisinopril 10 milliGRAM(s) Oral daily  pantoprazole    Tablet 40 milliGRAM(s) Oral before breakfast  piperacillin/tazobactam IVPB.. 3.375 Gram(s) IV Intermittent every 8 hours      Review of Systems:  unable to obtain    Allergies: Cipro (Headache)    For details regarding the patient's past medical history, social history, family history, and other miscellaneous elements, please refer the initial infectious diseases consultation and/or the admitting history and physical examination for this admission.    Objective:  Vitals:   T(C): 36.7 (03-01-24 @ 07:19), Max: 36.9 (02-29-24 @ 20:43)  HR: 59 (03-01-24 @ 07:19) (59 - 70)  BP: 185/67 (03-01-24 @ 07:19) (152/60 - 185/67)  RR: 18 (03-01-24 @ 07:19) (18 - 18)  SpO2: 98% (03-01-24 @ 07:19) (95% - 98%)    Physical Examination:  General: no acute distress  HEENT: NC/AT, EOMI,  Cardio: RRR  Resp: symmetric chest rise  Abd: appears soft  Neuro: no obvious focal deficits  Ext: no edema or cyanosis  Skin: warm, dry, no visible rash      Laboratory Studies:  CBC:                       8.8    5.12  )-----------( 299      ( 01 Mar 2024 04:24 )             27.9     CMP: 03-01    140  |  105  |  14  ----------------------------<  98  3.9   |  26  |  1.20    Ca    9.1      01 Mar 2024 04:24    TPro  7.5  /  Alb  3.9  /  TBili  0.6  /  DBili  x   /  AST  20  /  ALT  9<L>  /  AlkPhos  156<H>  02-28    LIVER FUNCTIONS - ( 28 Feb 2024 14:46 )  Alb: 3.9 g/dL / Pro: 7.5 g/dL / ALK PHOS: 156 U/L / ALT: 9 U/L / AST: 20 U/L / GGT: x           Urinalysis Basic - ( 01 Mar 2024 04:24 )    Color: x / Appearance: x / SG: x / pH: x  Gluc: 98 mg/dL / Ketone: x  / Bili: x / Urobili: x   Blood: x / Protein: x / Nitrite: x   Leuk Esterase: x / RBC: x / WBC x   Sq Epi: x / Non Sq Epi: x / Bacteria: x        Microbiology: reviewed    Culture - Abscess with Gram Stain (collected 02-28-24 @ 17:20)  Source: .Abscess R foot wound  Gram Stain (02-29-24 @ 02:41):    No polymorphonuclear cells seen per low power field    Rare Gram Negative Rods per oil power field  Preliminary Report (02-29-24 @ 18:47):    Moderate Morganella morganii    Few Proteus mirabilis    Culture - Blood (collected 02-28-24 @ 13:30)  Source: .Blood Blood-Peripheral  Preliminary Report (02-29-24 @ 20:02):    No growth at 24 hours    Culture - Blood (collected 02-28-24 @ 13:15)  Source: .Blood Blood-Peripheral  Preliminary Report (02-29-24 @ 20:02):    No growth at 24 hours          Radiology: reviewed

## 2024-03-02 LAB
-  AMOXICILLIN/CLAVULANIC ACID: SIGNIFICANT CHANGE UP
-  AMPICILLIN/SULBACTAM: SIGNIFICANT CHANGE UP
-  AMPICILLIN: SIGNIFICANT CHANGE UP
-  AZTREONAM: SIGNIFICANT CHANGE UP
-  CEFAZOLIN: SIGNIFICANT CHANGE UP
-  CEFEPIME: SIGNIFICANT CHANGE UP
-  CEFOXITIN: SIGNIFICANT CHANGE UP
-  CEFTRIAXONE: SIGNIFICANT CHANGE UP
-  CIPROFLOXACIN: SIGNIFICANT CHANGE UP
-  ERTAPENEM: SIGNIFICANT CHANGE UP
-  GENTAMICIN: SIGNIFICANT CHANGE UP
-  LEVOFLOXACIN: SIGNIFICANT CHANGE UP
-  MEROPENEM: SIGNIFICANT CHANGE UP
-  PIPERACILLIN/TAZOBACTAM: SIGNIFICANT CHANGE UP
-  TOBRAMYCIN: SIGNIFICANT CHANGE UP
-  TRIMETHOPRIM/SULFAMETHOXAZOLE: SIGNIFICANT CHANGE UP
ANION GAP SERPL CALC-SCNC: 10 MMOL/L — SIGNIFICANT CHANGE UP (ref 5–17)
BASOPHILS # BLD AUTO: 0.03 K/UL — SIGNIFICANT CHANGE UP (ref 0–0.2)
BASOPHILS NFR BLD AUTO: 0.4 % — SIGNIFICANT CHANGE UP (ref 0–2)
BUN SERPL-MCNC: 16 MG/DL — SIGNIFICANT CHANGE UP (ref 7–23)
CALCIUM SERPL-MCNC: 8.8 MG/DL — SIGNIFICANT CHANGE UP (ref 8.4–10.5)
CHLORIDE SERPL-SCNC: 105 MMOL/L — SIGNIFICANT CHANGE UP (ref 96–108)
CO2 SERPL-SCNC: 25 MMOL/L — SIGNIFICANT CHANGE UP (ref 22–31)
CREAT SERPL-MCNC: 1.1 MG/DL — SIGNIFICANT CHANGE UP (ref 0.5–1.3)
EGFR: 52 ML/MIN/1.73M2 — LOW
EOSINOPHIL # BLD AUTO: 0.04 K/UL — SIGNIFICANT CHANGE UP (ref 0–0.5)
EOSINOPHIL NFR BLD AUTO: 0.5 % — SIGNIFICANT CHANGE UP (ref 0–6)
GLUCOSE BLDC GLUCOMTR-MCNC: 104 MG/DL — HIGH (ref 70–99)
GLUCOSE BLDC GLUCOMTR-MCNC: 178 MG/DL — HIGH (ref 70–99)
GLUCOSE SERPL-MCNC: 93 MG/DL — SIGNIFICANT CHANGE UP (ref 70–99)
HCT VFR BLD CALC: 25.1 % — LOW (ref 34.5–45)
HGB BLD-MCNC: 7.9 G/DL — LOW (ref 11.5–15.5)
IMM GRANULOCYTES NFR BLD AUTO: 0.3 % — SIGNIFICANT CHANGE UP (ref 0–0.9)
LYMPHOCYTES # BLD AUTO: 1.77 K/UL — SIGNIFICANT CHANGE UP (ref 1–3.3)
LYMPHOCYTES # BLD AUTO: 23.5 % — SIGNIFICANT CHANGE UP (ref 13–44)
MCHC RBC-ENTMCNC: 24.5 PG — LOW (ref 27–34)
MCHC RBC-ENTMCNC: 31.5 GM/DL — LOW (ref 32–36)
MCV RBC AUTO: 78 FL — LOW (ref 80–100)
METHOD TYPE: SIGNIFICANT CHANGE UP
MONOCYTES # BLD AUTO: 0.51 K/UL — SIGNIFICANT CHANGE UP (ref 0–0.9)
MONOCYTES NFR BLD AUTO: 6.8 % — SIGNIFICANT CHANGE UP (ref 2–14)
NEUTROPHILS # BLD AUTO: 5.16 K/UL — SIGNIFICANT CHANGE UP (ref 1.8–7.4)
NEUTROPHILS NFR BLD AUTO: 68.5 % — SIGNIFICANT CHANGE UP (ref 43–77)
NRBC # BLD: 0 /100 WBCS — SIGNIFICANT CHANGE UP (ref 0–0)
PLATELET # BLD AUTO: 306 K/UL — SIGNIFICANT CHANGE UP (ref 150–400)
POTASSIUM SERPL-MCNC: 3.8 MMOL/L — SIGNIFICANT CHANGE UP (ref 3.5–5.3)
POTASSIUM SERPL-SCNC: 3.8 MMOL/L — SIGNIFICANT CHANGE UP (ref 3.5–5.3)
RBC # BLD: 3.22 M/UL — LOW (ref 3.8–5.2)
RBC # FLD: 17 % — HIGH (ref 10.3–14.5)
SODIUM SERPL-SCNC: 140 MMOL/L — SIGNIFICANT CHANGE UP (ref 135–145)
WBC # BLD: 7.53 K/UL — SIGNIFICANT CHANGE UP (ref 3.8–10.5)
WBC # FLD AUTO: 7.53 K/UL — SIGNIFICANT CHANGE UP (ref 3.8–10.5)

## 2024-03-02 RX ADMIN — HYDROMORPHONE HYDROCHLORIDE 0.5 MILLIGRAM(S): 2 INJECTION INTRAMUSCULAR; INTRAVENOUS; SUBCUTANEOUS at 21:04

## 2024-03-02 RX ADMIN — Medication 81 MILLIGRAM(S): at 09:50

## 2024-03-02 RX ADMIN — PANTOPRAZOLE SODIUM 40 MILLIGRAM(S): 20 TABLET, DELAYED RELEASE ORAL at 09:49

## 2024-03-02 RX ADMIN — Medication 1 TABLET(S): at 06:20

## 2024-03-02 RX ADMIN — Medication 500 MILLIGRAM(S): at 09:49

## 2024-03-02 RX ADMIN — HYDROMORPHONE HYDROCHLORIDE 0.5 MILLIGRAM(S): 2 INJECTION INTRAMUSCULAR; INTRAVENOUS; SUBCUTANEOUS at 13:36

## 2024-03-02 RX ADMIN — HYDROMORPHONE HYDROCHLORIDE 0.5 MILLIGRAM(S): 2 INJECTION INTRAMUSCULAR; INTRAVENOUS; SUBCUTANEOUS at 13:06

## 2024-03-02 RX ADMIN — CARVEDILOL PHOSPHATE 6.25 MILLIGRAM(S): 80 CAPSULE, EXTENDED RELEASE ORAL at 09:52

## 2024-03-02 RX ADMIN — AMLODIPINE BESYLATE 10 MILLIGRAM(S): 2.5 TABLET ORAL at 09:50

## 2024-03-02 RX ADMIN — CLOPIDOGREL BISULFATE 75 MILLIGRAM(S): 75 TABLET, FILM COATED ORAL at 09:50

## 2024-03-02 RX ADMIN — CARVEDILOL PHOSPHATE 6.25 MILLIGRAM(S): 80 CAPSULE, EXTENDED RELEASE ORAL at 21:07

## 2024-03-02 RX ADMIN — Medication 1 TABLET(S): at 09:49

## 2024-03-02 RX ADMIN — ENOXAPARIN SODIUM 40 MILLIGRAM(S): 100 INJECTION SUBCUTANEOUS at 09:50

## 2024-03-02 NOTE — PROGRESS NOTE ADULT - SUBJECTIVE AND OBJECTIVE BOX
Podiatry pager #: [phone] ([place])/ [phone] ([facility])    Patient is a 73y old  Female who presents with a chief complaint of toe OM (01 Mar 2024 16:20)       INTERVAL HPI/OVERNIGHT EVENTS:  Patient seen and evaluated at bedside.  Pt is resting comfortable in NAD. Denies N/V/F/C.     Allergies    Cipro (Headache)    Intolerances        Vital Signs Last 24 Hrs  T(C): 36.7 (02 Mar 2024 08:00), Max: 37 (02 Mar 2024 05:02)  T(F): 98.1 (02 Mar 2024 08:00), Max: 98.6 (02 Mar 2024 05:02)  HR: 66 (02 Mar 2024 08:00) (54 - 73)  BP: 161/59 (02 Mar 2024 08:00) (142/63 - 161/59)  BP(mean): 100 (01 Mar 2024 10:15) (91 - 101)  RR: 18 (02 Mar 2024 08:00) (16 - 18)  SpO2: 94% (02 Mar 2024 08:00) (94% - 100%)    Parameters below as of 02 Mar 2024 08:00  Patient On (Oxygen Delivery Method): room air        LABS:                        7.9    7.53  )-----------( 306      ( 02 Mar 2024 07:11 )             25.1     03-02    140  |  105  |  16  ----------------------------<  93  3.8   |  25  |  1.10    Ca    8.8      02 Mar 2024 07:08      PT/INR - ( 01 Mar 2024 04:24 )   PT: 12.1 sec;   INR: 1.10 ratio         PTT - ( 01 Mar 2024 04:24 )  PTT:35.0 sec  Urinalysis Basic - ( 02 Mar 2024 07:08 )    Color: x / Appearance: x / SG: x / pH: x  Gluc: 93 mg/dL / Ketone: x  / Bili: x / Urobili: x   Blood: x / Protein: x / Nitrite: x   Leuk Esterase: x / RBC: x / WBC x   Sq Epi: x / Non Sq Epi: x / Bacteria: x      CAPILLARY BLOOD GLUCOSE      POCT Blood Glucose.: 104 mg/dL (02 Mar 2024 07:58)  POCT Blood Glucose.: 104 mg/dL (01 Mar 2024 09:58)      Lower Extremity Physical Exam:    Vascular: DP/PT 0/4, B/L, CFT <3 seconds B/L, Temperature gradient warm to cool, B/L.   Neuro: Epicritic sensation diminished to the level of digits, B/L.  Musculoskeletal/Ortho: unremarkable   Skin: 3/1 s/p right [id number] ray resection closed: dressing kept CDI 2/2 to pt refusal for dressing change  RADIOLOGY & ADDITIONAL TESTS:

## 2024-03-02 NOTE — PROGRESS NOTE ADULT - ASSESSMENT
73-year-old female with past medical history of type 2 diabetes not on any medication diet controlled, high blood pressure, CAD status post CABG, vasculopath, sent in by Dr. Garcia her podiatrist to be admitted for scheduled right third toe amputation on Friday to be admitted to medicine and for Dr. Guerrero of podiatry to see while here.  Patient denies any fevers or chills, any chest pain shortness of breath, lightheadedness, palpitations, abdominal pain, nausea or vomiting, diarrhea.  Patient has intermittently been on Augmentin recently.    1 R 3rd toe OM  - here for amputation  - podiatry and vascular cards fu appreciated  - pain control  - sp OR   - ID fu     2 HTN- cw home meds  - DASH diet     3 CAD  - sp CABG  - cards following  - cw asa, plavix    4 DVT prophylaxis

## 2024-03-02 NOTE — PROGRESS NOTE ADULT - ASSESSMENT
73F s/p right foot partial 3rd ray resection, closed (DOS 3/1/24)  - Patient seen and evaluated   - Afebrile, no leukocytosis  - 3/1 s/p right [id number] ray resection closed: dressing kept CDI 2/2 to pt refusal for dressing change  - Intra-op findings: low concern for residual infection, low concern for viability  - OR data pending  - Stability for discharge pending OR data x 48 hrs no growth/final ID and vasc recs  - Discussed with attending

## 2024-03-03 LAB
-  AMOXICILLIN/CLAVULANIC ACID: SIGNIFICANT CHANGE UP
-  AMOXICILLIN/CLAVULANIC ACID: SIGNIFICANT CHANGE UP
-  AMPICILLIN/SULBACTAM: SIGNIFICANT CHANGE UP
-  AMPICILLIN/SULBACTAM: SIGNIFICANT CHANGE UP
-  AMPICILLIN: SIGNIFICANT CHANGE UP
-  AMPICILLIN: SIGNIFICANT CHANGE UP
-  AZTREONAM: SIGNIFICANT CHANGE UP
-  AZTREONAM: SIGNIFICANT CHANGE UP
-  CEFAZOLIN: SIGNIFICANT CHANGE UP
-  CEFAZOLIN: SIGNIFICANT CHANGE UP
-  CEFEPIME: SIGNIFICANT CHANGE UP
-  CEFEPIME: SIGNIFICANT CHANGE UP
-  CEFOXITIN: SIGNIFICANT CHANGE UP
-  CEFOXITIN: SIGNIFICANT CHANGE UP
-  CEFTRIAXONE: SIGNIFICANT CHANGE UP
-  CEFTRIAXONE: SIGNIFICANT CHANGE UP
-  CIPROFLOXACIN: SIGNIFICANT CHANGE UP
-  CIPROFLOXACIN: SIGNIFICANT CHANGE UP
-  ERTAPENEM: SIGNIFICANT CHANGE UP
-  ERTAPENEM: SIGNIFICANT CHANGE UP
-  GENTAMICIN: SIGNIFICANT CHANGE UP
-  GENTAMICIN: SIGNIFICANT CHANGE UP
-  LEVOFLOXACIN: SIGNIFICANT CHANGE UP
-  LEVOFLOXACIN: SIGNIFICANT CHANGE UP
-  MEROPENEM: SIGNIFICANT CHANGE UP
-  MEROPENEM: SIGNIFICANT CHANGE UP
-  PIPERACILLIN/TAZOBACTAM: SIGNIFICANT CHANGE UP
-  PIPERACILLIN/TAZOBACTAM: SIGNIFICANT CHANGE UP
-  TOBRAMYCIN: SIGNIFICANT CHANGE UP
-  TOBRAMYCIN: SIGNIFICANT CHANGE UP
-  TRIMETHOPRIM/SULFAMETHOXAZOLE: SIGNIFICANT CHANGE UP
-  TRIMETHOPRIM/SULFAMETHOXAZOLE: SIGNIFICANT CHANGE UP
GLUCOSE BLDC GLUCOMTR-MCNC: 158 MG/DL — HIGH (ref 70–99)
HCT VFR BLD CALC: 26.8 % — LOW (ref 34.5–45)
HGB BLD-MCNC: 8.8 G/DL — LOW (ref 11.5–15.5)
MCHC RBC-ENTMCNC: 25.1 PG — LOW (ref 27–34)
MCHC RBC-ENTMCNC: 32.8 GM/DL — SIGNIFICANT CHANGE UP (ref 32–36)
MCV RBC AUTO: 76.6 FL — LOW (ref 80–100)
METHOD TYPE: SIGNIFICANT CHANGE UP
METHOD TYPE: SIGNIFICANT CHANGE UP
NRBC # BLD: 0 /100 WBCS — SIGNIFICANT CHANGE UP (ref 0–0)
PLATELET # BLD AUTO: 296 K/UL — SIGNIFICANT CHANGE UP (ref 150–400)
RBC # BLD: 3.5 M/UL — LOW (ref 3.8–5.2)
RBC # FLD: 17.3 % — HIGH (ref 10.3–14.5)
WBC # BLD: 6.2 K/UL — SIGNIFICANT CHANGE UP (ref 3.8–10.5)
WBC # FLD AUTO: 6.2 K/UL — SIGNIFICANT CHANGE UP (ref 3.8–10.5)

## 2024-03-03 RX ADMIN — HYDROMORPHONE HYDROCHLORIDE 0.5 MILLIGRAM(S): 2 INJECTION INTRAMUSCULAR; INTRAVENOUS; SUBCUTANEOUS at 22:47

## 2024-03-03 RX ADMIN — LISINOPRIL 10 MILLIGRAM(S): 2.5 TABLET ORAL at 05:45

## 2024-03-03 RX ADMIN — HYDROMORPHONE HYDROCHLORIDE 0.5 MILLIGRAM(S): 2 INJECTION INTRAMUSCULAR; INTRAVENOUS; SUBCUTANEOUS at 05:42

## 2024-03-03 RX ADMIN — AMLODIPINE BESYLATE 10 MILLIGRAM(S): 2.5 TABLET ORAL at 05:45

## 2024-03-03 RX ADMIN — ENOXAPARIN SODIUM 40 MILLIGRAM(S): 100 INJECTION SUBCUTANEOUS at 12:40

## 2024-03-03 RX ADMIN — CARVEDILOL PHOSPHATE 6.25 MILLIGRAM(S): 80 CAPSULE, EXTENDED RELEASE ORAL at 18:21

## 2024-03-03 RX ADMIN — CARVEDILOL PHOSPHATE 6.25 MILLIGRAM(S): 80 CAPSULE, EXTENDED RELEASE ORAL at 05:45

## 2024-03-03 RX ADMIN — CLOPIDOGREL BISULFATE 75 MILLIGRAM(S): 75 TABLET, FILM COATED ORAL at 12:40

## 2024-03-03 RX ADMIN — Medication 500 MILLIGRAM(S): at 12:40

## 2024-03-03 RX ADMIN — PANTOPRAZOLE SODIUM 40 MILLIGRAM(S): 20 TABLET, DELAYED RELEASE ORAL at 05:53

## 2024-03-03 RX ADMIN — Medication 1 TABLET(S): at 12:40

## 2024-03-03 RX ADMIN — HYDROMORPHONE HYDROCHLORIDE 0.5 MILLIGRAM(S): 2 INJECTION INTRAMUSCULAR; INTRAVENOUS; SUBCUTANEOUS at 21:47

## 2024-03-03 RX ADMIN — Medication 81 MILLIGRAM(S): at 12:40

## 2024-03-03 NOTE — PROGRESS NOTE ADULT - ASSESSMENT
73F s/p right foot partial 3rd ray resection, closed (DOS 3/1/24)  - Patient seen and evaluated   - Afebrile, no leukocytosis  - 3/1 s/p right [id number] ray resection closed:  sutures intact, no hematoma formation, no drainage  - Intra-op findings: low concern for residual infection, low concern for viability  - OR wound culture: proteus mirabilis, morganella morganii (prelim)  - OR clean bone margin: no growth  - Patient stable for discharge from a podiatry standpoint  - Stability for discharge pending final ID and vasc recs  - Seen with attending

## 2024-03-03 NOTE — PROGRESS NOTE ADULT - SUBJECTIVE AND OBJECTIVE BOX
Podiatry pager #: [phone] ([place])/ [phone] ([facility])    Patient is a 73y old  Female who presents with a chief complaint of toe OM (03 Mar 2024 10:46)       INTERVAL HPI/OVERNIGHT EVENTS:  Patient seen and evaluated at bedside.  Pt is resting comfortable in NAD. Denies N/V/F/C.     Allergies    Cipro (Headache)    Intolerances        Vital Signs Last 24 Hrs  T(C): 37.1 (03 Mar 2024 11:32), Max: 37.5 (02 Mar 2024 20:15)  T(F): 98.8 (03 Mar 2024 11:32), Max: 99.5 (02 Mar 2024 20:15)  HR: 73 (03 Mar 2024 11:32) (61 - 86)  BP: 135/60 (03 Mar 2024 11:32) (135/60 - 150/61)  BP(mean): --  RR: 18 (03 Mar 2024 11:32) (18 - 18)  SpO2: 96% (03 Mar 2024 11:32) (93% - 96%)    Parameters below as of 03 Mar 2024 11:32  Patient On (Oxygen Delivery Method): room air        LABS:                        8.8    6.20  )-----------( 296      ( 03 Mar 2024 07:14 )             26.8     03-02    140  |  105  |  16  ----------------------------<  93  3.8   |  25  |  1.10    Ca    8.8      02 Mar 2024 07:08        Urinalysis Basic - ( 02 Mar 2024 07:08 )    Color: x / Appearance: x / SG: x / pH: x  Gluc: 93 mg/dL / Ketone: x  / Bili: x / Urobili: x   Blood: x / Protein: x / Nitrite: x   Leuk Esterase: x / RBC: x / WBC x   Sq Epi: x / Non Sq Epi: x / Bacteria: x      CAPILLARY BLOOD GLUCOSE      POCT Blood Glucose.: 158 mg/dL (03 Mar 2024 08:00)  POCT Blood Glucose.: 178 mg/dL (02 Mar 2024 17:00)      Lower Extremity Physical Exam:  Vascular: DP/PT 0/4, B/L, CFT <3 seconds B/L, Temperature gradient warm to cool, B/L.   Neuro: Epicritic sensation diminished to the level of digits, B/L.  Musculoskeletal/Ortho: unremarkable   Skin:  3/1 s/p right [id number] ray resection closed:  sutures intact, no hematoma formation, no drainage  RADIOLOGY & ADDITIONAL TESTS:   37

## 2024-03-03 NOTE — PROGRESS NOTE ADULT - ASSESSMENT
73-year-old female with past medical history of type 2 diabetes not on any medication diet controlled, high blood pressure, CAD status post CABG, vasculopath, sent in by Dr. Garcia her podiatrist to be admitted for scheduled right third toe amputation on Friday to be admitted to medicine and for Dr. Guerrero of podiatry to see while here.  Patient denies any fevers or chills, any chest pain shortness of breath, lightheadedness, palpitations, abdominal pain, nausea or vomiting, diarrhea.  Patient has intermittently been on Augmentin recently.    1 R 3rd toe OM  - podiatry and vascular cards fu appreciated  - pain control  - sp OR   - ID fu   - awaiting cultures     2 HTN- cw home meds  - DASH diet     3 CAD  - sp CABG  - cards following  - cw asa, plavix    4 DVT prophylaxis

## 2024-03-03 NOTE — PROGRESS NOTE ADULT - SUBJECTIVE AND OBJECTIVE BOX
Patient is a 73y old  Female who presents with a chief complaint of toe OM (02 Mar 2024 13:08)    Date of servie : 03-03-24 @ 10:47  INTERVAL HPI/OVERNIGHT EVENTS:  T(C): 37.1 (03-03-24 @ 07:59), Max: 37.5 (03-02-24 @ 20:15)  HR: 86 (03-03-24 @ 07:59) (61 - 86)  BP: 147/63 (03-03-24 @ 07:59) (135/62 - 150/61)  RR: 18 (03-03-24 @ 07:59) (18 - 18)  SpO2: 95% (03-03-24 @ 07:59) (93% - 96%)  Wt(kg): --  I&O's Summary    02 Mar 2024 07:01  -  03 Mar 2024 07:00  --------------------------------------------------------  IN: 480 mL / OUT: 0 mL / NET: 480 mL        LABS:                        8.8    6.20  )-----------( 296      ( 03 Mar 2024 07:14 )             26.8     03-02    140  |  105  |  16  ----------------------------<  93  3.8   |  25  |  1.10    Ca    8.8      02 Mar 2024 07:08        Urinalysis Basic - ( 02 Mar 2024 07:08 )    Color: x / Appearance: x / SG: x / pH: x  Gluc: 93 mg/dL / Ketone: x  / Bili: x / Urobili: x   Blood: x / Protein: x / Nitrite: x   Leuk Esterase: x / RBC: x / WBC x   Sq Epi: x / Non Sq Epi: x / Bacteria: x      CAPILLARY BLOOD GLUCOSE      POCT Blood Glucose.: 158 mg/dL (03 Mar 2024 08:00)  POCT Blood Glucose.: 178 mg/dL (02 Mar 2024 17:00)        Urinalysis Basic - ( 02 Mar 2024 07:08 )    Color: x / Appearance: x / SG: x / pH: x  Gluc: 93 mg/dL / Ketone: x  / Bili: x / Urobili: x   Blood: x / Protein: x / Nitrite: x   Leuk Esterase: x / RBC: x / WBC x   Sq Epi: x / Non Sq Epi: x / Bacteria: x        MEDICATIONS  (STANDING):  amLODIPine   Tablet 10 milliGRAM(s) Oral daily  ascorbic acid 500 milliGRAM(s) Oral daily  aspirin enteric coated 81 milliGRAM(s) Oral daily  carvedilol 6.25 milliGRAM(s) Oral every 12 hours  clopidogrel Tablet 75 milliGRAM(s) Oral daily  enoxaparin Injectable 40 milliGRAM(s) SubCutaneous every 24 hours  hydrochlorothiazide 25 milliGRAM(s) Oral daily  lisinopril 10 milliGRAM(s) Oral daily  multivitamin/minerals 1 Tablet(s) Oral daily  pantoprazole    Tablet 40 milliGRAM(s) Oral before breakfast    MEDICATIONS  (PRN):  acetaminophen     Tablet .. 650 milliGRAM(s) Oral every 6 hours PRN Mild Pain (1 - 3)  acetaminophen  300 mG/codeine 30 mG 1 Tablet(s) Oral every 6 hours PRN Moderate Pain (4 - 6)  HYDROmorphone  Injectable 0.5 milliGRAM(s) IV Push every 4 hours PRN Severe Pain (7 - 10)          PHYSICAL EXAM:  GENERAL: NAD, well-groomed, well-developed  HEAD:  Atraumatic, Normocephalic  CHEST/LUNG: Clear to percussion bilaterally; No rales, rhonchi, wheezing, or rubs  HEART: Regular rate and rhythm; No murmurs, rubs, or gallops  ABDOMEN: Soft, Nontender, Nondistended; Bowel sounds present  EXTREMITIES: edema +, foot dressing +    Care Discussed with Consultants/Other Providers [ x] YES  [ ] NO

## 2024-03-04 LAB
CULTURE RESULTS: ABNORMAL
CULTURE RESULTS: SIGNIFICANT CHANGE UP
CULTURE RESULTS: SIGNIFICANT CHANGE UP
GLUCOSE BLDC GLUCOMTR-MCNC: 103 MG/DL — HIGH (ref 70–99)
GLUCOSE BLDC GLUCOMTR-MCNC: 171 MG/DL — HIGH (ref 70–99)
ORGANISM # SPEC MICROSCOPIC CNT: ABNORMAL
SPECIMEN SOURCE: SIGNIFICANT CHANGE UP

## 2024-03-04 PROCEDURE — 99232 SBSQ HOSP IP/OBS MODERATE 35: CPT

## 2024-03-04 RX ORDER — LEVOFLOXACIN 5 MG/ML
750 INJECTION, SOLUTION INTRAVENOUS
Refills: 0 | Status: DISCONTINUED | OUTPATIENT
Start: 2024-03-04 | End: 2024-03-05

## 2024-03-04 RX ORDER — HYDRALAZINE HCL 50 MG
10 TABLET ORAL THREE TIMES A DAY
Refills: 0 | Status: DISCONTINUED | OUTPATIENT
Start: 2024-03-04 | End: 2024-03-05

## 2024-03-04 RX ADMIN — Medication 1 TABLET(S): at 11:54

## 2024-03-04 RX ADMIN — Medication 1 TABLET(S): at 09:12

## 2024-03-04 RX ADMIN — CARVEDILOL PHOSPHATE 6.25 MILLIGRAM(S): 80 CAPSULE, EXTENDED RELEASE ORAL at 17:37

## 2024-03-04 RX ADMIN — LEVOFLOXACIN 750 MILLIGRAM(S): 5 INJECTION, SOLUTION INTRAVENOUS at 17:37

## 2024-03-04 RX ADMIN — Medication 500 MILLIGRAM(S): at 11:54

## 2024-03-04 RX ADMIN — PANTOPRAZOLE SODIUM 40 MILLIGRAM(S): 20 TABLET, DELAYED RELEASE ORAL at 08:36

## 2024-03-04 RX ADMIN — Medication 10 MILLIGRAM(S): at 18:43

## 2024-03-04 RX ADMIN — ENOXAPARIN SODIUM 40 MILLIGRAM(S): 100 INJECTION SUBCUTANEOUS at 11:54

## 2024-03-04 RX ADMIN — Medication 1 TABLET(S): at 10:12

## 2024-03-04 RX ADMIN — AMLODIPINE BESYLATE 10 MILLIGRAM(S): 2.5 TABLET ORAL at 08:36

## 2024-03-04 RX ADMIN — Medication 81 MILLIGRAM(S): at 11:54

## 2024-03-04 RX ADMIN — LISINOPRIL 10 MILLIGRAM(S): 2.5 TABLET ORAL at 08:36

## 2024-03-04 RX ADMIN — CARVEDILOL PHOSPHATE 6.25 MILLIGRAM(S): 80 CAPSULE, EXTENDED RELEASE ORAL at 08:36

## 2024-03-04 RX ADMIN — CLOPIDOGREL BISULFATE 75 MILLIGRAM(S): 75 TABLET, FILM COATED ORAL at 11:54

## 2024-03-04 NOTE — PROGRESS NOTE ADULT - SUBJECTIVE AND OBJECTIVE BOX
Podiatry pager #: [phone] ([place])/ [phone] ([facility])    Patient is a 73y old  Female who presents with a chief complaint of toe OM (03 Mar 2024 12:49)       INTERVAL HPI/OVERNIGHT EVENTS:  Patient seen and evaluated at bedside.  Pt is resting comfortable in NAD. Denies N/V/F/C.     Allergies    Cipro (Headache)    Intolerances        Vital Signs Last 24 Hrs  T(C): 37.1 (04 Mar 2024 06:14), Max: 37.2 (03 Mar 2024 16:02)  T(F): 98.8 (04 Mar 2024 06:14), Max: 99 (03 Mar 2024 19:38)  HR: 66 (04 Mar 2024 06:14) (59 - 73)  BP: 174/65 (04 Mar 2024 06:14) (135/60 - 175/53)  BP(mean): --  RR: 19 (04 Mar 2024 06:14) (18 - 19)  SpO2: 99% (04 Mar 2024 06:14) (96% - 99%)    Parameters below as of 04 Mar 2024 06:14  Patient On (Oxygen Delivery Method): room air        LABS:                        8.8    6.20  )-----------( 296      ( 03 Mar 2024 07:14 )             26.8               CAPILLARY BLOOD GLUCOSE      POCT Blood Glucose.: 103 mg/dL (04 Mar 2024 08:33)      Lower Extremity Physical Exam:    Vascular: DP/PT 0/4, B/L, CFT <3 seconds B/L, Temperature gradient warm to cool, B/L.   Neuro: Epicritic sensation diminished to the level of digits, B/L.  Musculoskeletal/Ortho: unremarkable   Skin:  3/1 s/p right [id number] ray resection closed:  sutures intact, no hematoma formation, no drainage, slight jorge-incision maceration, flap warm and viable    RADIOLOGY & ADDITIONAL TESTS:   Podiatry pager #: [phone] ([place])/ [phone] ([facility])    Patient is a 73y old  Female who presents with a chief complaint of toe OM (03 Mar 2024 12:49)       INTERVAL HPI/OVERNIGHT EVENTS:  Patient seen and evaluated at bedside.  Pt is resting comfortable in NAD. Denies N/V/F/C.     Allergies    Cipro (Headache)    Intolerances        Vital Signs Last 24 Hrs  T(C): 37.1 (04 Mar 2024 06:14), Max: 37.2 (03 Mar 2024 16:02)  T(F): 98.8 (04 Mar 2024 06:14), Max: 99 (03 Mar 2024 19:38)  HR: 66 (04 Mar 2024 06:14) (59 - 73)  BP: 174/65 (04 Mar 2024 06:14) (135/60 - 175/53)  BP(mean): --  RR: 19 (04 Mar 2024 06:14) (18 - 19)  SpO2: 99% (04 Mar 2024 06:14) (96% - 99%)    Parameters below as of 04 Mar 2024 06:14  Patient On (Oxygen Delivery Method): room air        LABS:                        8.8    6.20  )-----------( 296      ( 03 Mar 2024 07:14 )             26.8               CAPILLARY BLOOD GLUCOSE      POCT Blood Glucose.: 103 mg/dL (04 Mar 2024 08:33)      Lower Extremity Physical Exam:    Vascular: DP/PT 0/4, B/L, CFT <3 seconds B/L, Temperature gradient warm to cool, B/L.   Neuro: Epicritic sensation diminished to the level of digits, B/L.  Musculoskeletal/Ortho: unremarkable   Skin:  3/1 s/p right foot partial 3rd ray resection closed:  sutures intact, no hematoma formation, no drainage, slight jorge-incision maceration, flap warm and viable    RADIOLOGY & ADDITIONAL TESTS:

## 2024-03-04 NOTE — PROGRESS NOTE ADULT - ASSESSMENT
73F s/p right foot partial 3rd ray resection, closed (DOS 3/1/24)  - Patient seen and evaluated   - Afebrile, no leukocytosis  - 3/1 s/p right [id number] ray resection closed:  sutures intact, no hematoma formation, no drainage, slight jorge-incision maceration, flap warm and viable  - Intra-op findings: low concern for residual infection, low concern for viability  - OR wound culture: proteus mirabilis, morganella morganii (prelim)  - OR clean bone margin: no growth  - Patient stable for discharge from a podiatry standpoint  - Follow up information can be found in discharge note provider under follow up   - Stability for discharge pending final ID and vasc recs  - Discussed with attending  73F s/p right foot partial 3rd ray resection, closed (DOS 3/1/24)  - Patient seen and evaluated   - Afebrile, no leukocytosis  - 3/1 s/p right foot partial 3rd ray resection closed:  sutures intact, no hematoma formation, no drainage, slight jorge-incision maceration, flap warm and viable  - Intra-op findings: low concern for residual infection, low concern for viability  - OR wound culture: proteus mirabilis, morganella morganii (prelim)  - OR clean bone margin: no growth  - Patient stable for discharge from a podiatry standpoint  - Follow up information can be found in discharge note provider under follow up   - Stability for discharge pending final ID and vasc recs  - Discussed with attending

## 2024-03-04 NOTE — PROGRESS NOTE ADULT - SUBJECTIVE AND OBJECTIVE BOX
Vascular Cardiology Progress Note     DIRECT PROVIDER NUMBER: [phone]  / Available on TEAMS    CC:  R 3rd Digit Wound, Dry Gangrene    Interval Events:  S/p right foot partial 3rd ray resection on 3/1.  Reports pain to procedure site improving.    Allergies  Cipro (Headache)	    MEDICATIONS  (STANDING):  amLODIPine   Tablet 10 milliGRAM(s) Oral daily  ascorbic acid 500 milliGRAM(s) Oral daily  aspirin enteric coated 81 milliGRAM(s) Oral daily  carvedilol 6.25 milliGRAM(s) Oral every 12 hours  clopidogrel Tablet 75 milliGRAM(s) Oral daily  enoxaparin Injectable 40 milliGRAM(s) SubCutaneous every 24 hours  hydrochlorothiazide 25 milliGRAM(s) Oral daily  lisinopril 10 milliGRAM(s) Oral daily  multivitamin/minerals 1 Tablet(s) Oral daily  pantoprazole    Tablet 40 milliGRAM(s) Oral before breakfast      PAST MEDICAL & SURGICAL HISTORY:  HTN (hypertension)  Type 2 diabetes mellitus  CAD (coronary artery disease)    FAMILY HISTORY: see HPI    SOCIAL HISTORY:  unchanged    REVIEW OF SYSTEMS:  CONSTITUTIONAL: No fever  EYES: No eye pain  ENT:  No throat pain  NECK: No pain   RESPIRATORY: No SOB  CARDIOVASCULAR: No C/P  GASTROINTESTINAL: No abdominal pain  GENITOURINARY: No hematuria  NEUROLOGICAL: No memory loss  LYMPH Nodes: No enlarged glands noted  ENDOCRINE: No heat or cold intolerance noted  MUSCULOSKELETAL: R foot post procedure discomfort improved   PSYCHIATRIC: No depression, anxiety  HEME/LYMPH: No bleeding gums  ALLERGY AND IMMUNOLOGIC: No hives    [ x] All others negative	    PHYSICAL EXAM:  Vital Signs Last 24 Hrs  T(C): 36.7 (04 Mar 2024 11:01), Max: 37.2 (03 Mar 2024 16:02)  T(F): 98 (04 Mar 2024 11:01), Max: 99 (03 Mar 2024 19:38)  HR: 59 (04 Mar 2024 11:01) (59 - 73)  BP: 146/65 (04 Mar 2024 11:01) (146/65 - 175/53)  RR: 18 (04 Mar 2024 11:01) (18 - 19)  SpO2: 97% (04 Mar 2024 11:01) (97% - 99%)    Parameters below as of 04 Mar 2024 11:01  Patient On (Oxygen Delivery Method): room air      Appearance: NAD  	  HEENT:  NC/AT  Cardiovascular: RRR, S1 and S2  Respiratory: CTA B/L  Psychiatry:  AAO x 3  Gastrointestinal:  Soft, Non-tender, + BS	  Skin: No cyanosis	  Neurologic: No focal deficit  Extremities: No LE edema  R foot dressing clean, dry and intact     LABS:	 	                          8.8    6.20  )-----------( 296      ( 03 Mar 2024 07:14 )             26.8       Assessment:  1. PAD with multiple peripheral interventions      Most recent intervention this year with successful laser atherectomy and drug coated balloon of right common femoral artery. Successful  laser atherectomy drug coated balloon and Rosa M stenting of right superficial femoral artery. Successful laser atherectomy and drug coated balloon of R popliteal artery. Successful laser atherectomy and drug coated balloon of Right TPT.   2. R 3rd Digit Foot Gangrene      S/p right foot partial 3rd ray resection on 3/1.  3. HTN  4. HLD  5. DM  6. CAD s/p CABG       Plan:  1. Patient doing well post recent right foot partial 3rd ray resection.      Pictures of amputation site reviewed, site healing well.  2. Patient with a palpable DP and PT post recent peripheral intervention this year.  3. Continue ASA, Plavix and Statin.   4. Continue anti-hypertensive regimen.       Continue pain control. This will assist in BP regulation.   5. Appreciate excellent Podiatry care.        Thank you  CLINTON Dover, MS, [facility]  Vascular Cardiology Service    Please call with any questions:   DIRECT SERVICE NUMBER: [phone] / Available on TEAMS

## 2024-03-04 NOTE — PROGRESS NOTE ADULT - SUBJECTIVE AND OBJECTIVE BOX
Patient is a 73y old  Female who presents with a chief complaint of Toe OM (04 Mar 2024 11:58)    Date of servie : 03-04-24 @ 15:29  INTERVAL HPI/OVERNIGHT EVENTS:  T(C): 36.7 (03-04-24 @ 11:01), Max: 37.2 (03-03-24 @ 16:02)  HR: 91 (03-04-24 @ 12:58) (59 - 91)  BP: 160/55 (03-04-24 @ 12:58) (146/65 - 175/53)  RR: 18 (03-04-24 @ 11:01) (18 - 19)  SpO2: 95% (03-04-24 @ 12:58) (95% - 99%)  Wt(kg): --  I&O's Summary    03 Mar 2024 07:01  -  04 Mar 2024 07:00  --------------------------------------------------------  IN: 480 mL / OUT: 0 mL / NET: 480 mL        LABS:                        8.8    6.20  )-----------( 296      ( 03 Mar 2024 07:14 )             26.8               CAPILLARY BLOOD GLUCOSE      POCT Blood Glucose.: 103 mg/dL (04 Mar 2024 08:33)            MEDICATIONS  (STANDING):  amLODIPine   Tablet 10 milliGRAM(s) Oral daily  ascorbic acid 500 milliGRAM(s) Oral daily  aspirin enteric coated 81 milliGRAM(s) Oral daily  carvedilol 6.25 milliGRAM(s) Oral every 12 hours  clopidogrel Tablet 75 milliGRAM(s) Oral daily  enoxaparin Injectable 40 milliGRAM(s) SubCutaneous every 24 hours  hydrochlorothiazide 25 milliGRAM(s) Oral daily  levoFLOXacin  Tablet 750 milliGRAM(s) Oral every 48 hours  lisinopril 10 milliGRAM(s) Oral daily  multivitamin/minerals 1 Tablet(s) Oral daily  pantoprazole    Tablet 40 milliGRAM(s) Oral before breakfast    MEDICATIONS  (PRN):  acetaminophen     Tablet .. 650 milliGRAM(s) Oral every 6 hours PRN Mild Pain (1 - 3)  acetaminophen  300 mG/codeine 30 mG 1 Tablet(s) Oral every 6 hours PRN Moderate Pain (4 - 6)  HYDROmorphone  Injectable 0.5 milliGRAM(s) IV Push every 4 hours PRN Severe Pain (7 - 10)          PHYSICAL EXAM:  GENERAL: NAD, well-groomed, well-developed  HEAD:  Atraumatic, Normocephalic  CHEST/LUNG: Clear to percussion bilaterally; No rales, rhonchi, wheezing, or rubs  HEART: Regular rate and rhythm; No murmurs, rubs, or gallops  ABDOMEN: Soft, Nontender, Nondistended; Bowel sounds present  EXTREMITIES:  dressing +    Care Discussed with Consultants/Other Providers [ ] YES  [ ] NO

## 2024-03-04 NOTE — PROVIDER CONTACT NOTE (OTHER) - REASON
Subjective   History was provided by the mother.  Kofi Davidson is a 1 y.o. male who is brought in by his mother for this 24 month well child visit.    Current Issues:  Sees eye MD for eye issues  Help me grow -expressive speech- speech therapy  Current concerns on the part of Kofi's mother include speech and maybe adaptive dev like drawing lines.  Hearing or vision concerns? No  No significant medical issues since last well visit.  Specialist visits: none    Review of Nutrition, Elimination, and Sleep:  Dietary: table food, low-fat/skim milk/appropriate calcium and vitamin D, 3 meals/day, well balanced diet with fruits and/or vegetables at each meal, fast food <1 time per week,  limited juice intake and no other sweetened beverages  Elimination: wet diapers 7-10/day, normal bowel movements , formed soft stools, starting to toilet train  Sleep: sleeps through the night, naps once daily, regular sleep routine, sleeps in crib    Screening Questions:  Risk factors for lead toxicity: no  Risk factors for anemia: no  Primary water source has adequate fluoride: yes    Social Screening:  Current child-care arrangements: in home: primary caregiver is mother  Autism screening: Autism screening completed today, is normal, and results were discussed with family.    Development:  Social/emotional: notices peer's emotions, looks at caregiver on how to react to new situation, plays alongside others, verbalizes wants  Language: using about 10 words and no putting 2-3 words together, knows 2 body parts, 25% understandable to a stranger, does not says own name (speech therapy)  Cognitive: manipulates toys, uses buttons on toys, mimics kitchen play, points to named pictures in a book  Physical: Fine Motor: uses fork and spoon, solves single piece puzzle, turns book pages; Gross Motor: runs, trying to jump, walking up and down steps with help    Objective   Growth parameters are noted and are appropriate for age.  General:  
alert and oriented, in no acute distress   Gait:  normal   Skin:  normal   Oral cavity:  lips, mucosa, and tongue normal; teeth and gums normal   Eyes:  sclerae white, pupils equal and reactive, red reflex normal bilaterally   Ears:  normal bilaterally   Neck:  no adenopathy   Lungs: clear to auscultation bilaterally   Heart:  regular rate and rhythm, S1, S2 normal, no murmur   Abdomen: soft, non-tender; no masses, no organomegaly   : normal male - testes descended bilaterally   Extremities:  extremities normal, warm and well-perfused; no cyanosis, clubbing, or edema   Neuro: normal without focal findings; muscle tone and strength normal and symmetric     No diagnosis found.    Assessment/Plan   Healthy 1 year old child.  1. Anticipatory guidance: Safety: car seat - 5 point harness facing forward, no smokers in home/smoke outside, smoke and CO detectors in home, supervision at all times, safe practices around pools & water, understands sun protection, understands tick and mosquito avoidance, understands fire safety, has poison control number. Behavior: minimal screen time, no electronics in room, daily reading, physical activity.  2. Normal growth and development for age.  3. All vaccines given at today's visit were reviewed with the family and patient. Risks/benefits/side effects discussed and VIS sheet provided. All questions answered. Given with consent. Family declined all or some vaccines - flu and covid.  5. Check screening lead and Hg as indicated.  6. Fluoride applied.  7. Return in 6 months for next well child exam or sooner with concerns.   Ct help me grow and eye MD follow up    
Patient complaining of blurred vision and eye pressure
/76

## 2024-03-04 NOTE — PROGRESS NOTE ADULT - ASSESSMENT
Patient is a 73 year old female with PMH of DM2 not on any medication diet controlled, HTN, CAD s/p CABG, vasculopath, sent in by Dr. Garcia her podiatrist to be admitted for scheduled right third toe amputation on Friday.     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 Flushed wound with copious amount of normal saline and packed with 1/4 inch iodoform packing   - 2/28 WCx M. morganii and P. mirabilis  - admission BCx NGTD  - 3/1 s/p OR for R foot partial 3rd ray resection   - brief op note reviewed - noted with low concern for residual infection    - OR culture with M. morganii and P. mirabilis  S/p zosyn 2/28-3/1   noted ciprofloxacin reaction is headache    Recommendations:  Follow surg path report from 2/28 and 3/1   Start on levofloxacin 750mg PO Q48h (renally adjusted) x10d with outpatient ID follow up for final surg path results to determine final duration -- pt prefers to follow up with her Podiatrist first, [facility] ID office info provided     Stable from ID standpoint at this time.  Discharge planning per primary team.     Tanya Das M.D.  [facility], Division of Infectious Diseases  [phone]  After 5pm on weekdays and all day on weekends - please call [phone]

## 2024-03-04 NOTE — PROVIDER CONTACT NOTE (OTHER) - BACKGROUND
Patient with history of HTN.  Patient is due for 6.25mg of Coreg.
Patient with an episode of HTN at 1730

## 2024-03-04 NOTE — PROGRESS NOTE ADULT - SUBJECTIVE AND OBJECTIVE BOX
[facility] DIV[facility] OF INFECTIOUS DISEASES  PAUL Calderon Y. Patel, S. Shah, G. Casimir  [phone]  ([phone] - weekdays after 5pm and weekends)    Name: HARVEY RIZZO  Age/Gender: 73y Female  MRN: [id number]    Interval History:  Patient seen and examined this morning.   Has intermittent foot pain, no other new complaints.   Notes reviewed. No concerning overnight events  Afebrile   Allergies: Cipro (Headache)      Objective:  Vitals:   T(F): 98 (03-04-24 @ 11:01), Max: 99 (03-03-24 @ 19:38)  HR: 91 (03-04-24 @ 12:58) (59 - 91)  BP: 160/55 (03-04-24 @ 12:58) (146/65 - 175/53)  RR: 18 (03-04-24 @ 11:01) (18 - 19)  SpO2: 95% (03-04-24 @ 12:58) (95% - 99%)  Physical Examination:  General: no acute distress, nontoxic appearing   HEENT: NC/AT, anicteric, neck supple  Respiratory: no acc muscle use, breathing comfortably  Cardiovascular: S1 and S2 present  Gastrointestinal: normal appearing, nondistended  Extremities: R foot dressing     Laboratory Studies:  CBC:                       8.8    6.20  )-----------( 296      ( 03 Mar 2024 07:14 )             26.8     WBC Trend:  6.20 03-03-24 @ 07:14  7.53 03-02-24 @ 07:11  5.12 03-01-24 @ 04:24  6.51 02-28-24 @ 14:46    CMP:       Creatinine: 1.10 mg/dL (03-02-24 @ 07:08)  Creatinine: 1.20 mg/dL (03-01-24 @ 04:24)  Creatinine: 0.99 mg/dL (02-28-24 @ 14:46)      Microbiology: reviewed   Culture - Surgical Swab (collected 03-01-24 @ 14:19)  Source: .Surgical Swab  Preliminary Report (03-02-24 @ 18:41):    Few Morganella morganii    Rare Proteus mirabilis  Organism: Morganella morganii  Proteus mirabilis (03-03-24 @ 15:54)  Organism: Morganella morganii (03-03-24 @ 15:54)      Method Type: CHRISTA      -  Amoxicillin/Clavulanic Acid: R >16/8      -  Ampicillin: R >16 These ampicillin results predict results for amoxicillin      -  Ampicillin/Sulbactam: I 16/8      -  Aztreonam: S <=4      -  Cefazolin: R >16      -  Cefepime: S <=2      -  Cefoxitin: S <=8      -  Ceftriaxone: S <=1      -  Ciprofloxacin: S <=0.25      -  Ertapenem: S <=0.5      -  Gentamicin: S <=2      -  Levofloxacin: S <=0.5      -  Meropenem: S <=1      -  Piperacillin/Tazobactam: S <=8      -  Tobramycin: S <=2      -  Trimethoprim/Sulfamethoxazole: R >2/38  Organism: Proteus mirabilis (03-03-24 @ 15:54)      Method Type: CHRISTA      -  Amoxicillin/Clavulanic Acid: S <=8/4      -  Ampicillin: S <=8 These ampicillin results predict results for amoxicillin      -  Ampicillin/Sulbactam: S <=4/2      -  Aztreonam: S <=4      -  Cefazolin: S <=2      -  Cefepime: S <=2      -  Cefoxitin: S <=8      -  Ceftriaxone: S <=1      -  Ciprofloxacin: S <=0.25      -  Ertapenem: S <=0.5      -  Gentamicin: S <=2      -  Levofloxacin: S <=0.5      -  Meropenem: S <=1      -  Piperacillin/Tazobactam: S <=8      -  Tobramycin: S <=2      -  Trimethoprim/Sulfamethoxazole: S <=0.5/9.5    Culture - Tissue with Gram Stain (collected 03-01-24 @ 14:10)  Source: .Tissue  Gram Stain (03-01-24 @ 22:18):    No polymorphonuclear cells seen per low power field    No organisms seen per oil power field  Preliminary Report (03-02-24 @ 16:25):    No growth to date.    Culture - Abscess with Gram Stain (collected 02-28-24 @ 17:20)  Source: .Abscess R foot wound  Gram Stain (02-29-24 @ 02:41):    No polymorphonuclear cells seen per low power field    Rare Gram Negative Rods per oil power field  Preliminary Report (02-29-24 @ 18:47):    Moderate Morganella morganii    Few Proteus mirabilis  Organism: Morganella morganii  Proteus mirabilis (03-02-24 @ 16:44)  Organism: Morganella morganii (03-02-24 @ 16:44)      Method Type: CHRISTA      -  Amoxicillin/Clavulanic Acid: R >16/8      -  Ampicillin: R >16 These ampicillin results predict results for amoxicillin      -  Ampicillin/Sulbactam: I 16/8      -  Aztreonam: S <=4      -  Cefazolin: R >16      -  Cefepime: S <=2      -  Cefoxitin: S <=8      -  Ceftriaxone: S <=1      -  Ciprofloxacin: S <=0.25      -  Ertapenem: S <=0.5      -  Gentamicin: S <=2      -  Levofloxacin: S <=0.5      -  Meropenem: S <=1      -  Piperacillin/Tazobactam: S <=8      -  Tobramycin: S <=2      -  Trimethoprim/Sulfamethoxazole: R >2/38  Organism: Proteus mirabilis (03-01-24 @ 17:05)      Method Type: CHRISTA      -  Amoxicillin/Clavulanic Acid: S <=8/4      -  Ampicillin: S <=8 These ampicillin results predict results for amoxicillin      -  Ampicillin/Sulbactam: S <=4/2      -  Aztreonam: S <=4      -  Cefazolin: S <=2      -  Cefepime: S <=2      -  Cefoxitin: S <=8      -  Ceftriaxone: S <=1      -  Ciprofloxacin: S <=0.25      -  Ertapenem: S <=0.5      -  Gentamicin: S <=2      -  Levofloxacin: S <=0.5      -  Meropenem: S <=1      -  Piperacillin/Tazobactam: S <=8      -  Tobramycin: S <=2      -  Trimethoprim/Sulfamethoxazole: S <=0.5/9.5    Culture - Blood (collected 02-28-24 @ 13:30)  Source: .Blood Blood-Peripheral  Preliminary Report (03-03-24 @ 20:01):    No growth at 4 days    Culture - Blood (collected 02-28-24 @ 13:15)  Source: .Blood Blood-Peripheral  Preliminary Report (03-03-24 @ 20:01):    No growth at 4 days    Radiology: reviewed     Medications:  acetaminophen     Tablet .. 650 milliGRAM(s) Oral every 6 hours PRN  acetaminophen  300 mG/codeine 30 mG 1 Tablet(s) Oral every 6 hours PRN  amLODIPine   Tablet 10 milliGRAM(s) Oral daily  ascorbic acid 500 milliGRAM(s) Oral daily  aspirin enteric coated 81 milliGRAM(s) Oral daily  carvedilol 6.25 milliGRAM(s) Oral every 12 hours  clopidogrel Tablet 75 milliGRAM(s) Oral daily  enoxaparin Injectable 40 milliGRAM(s) SubCutaneous every 24 hours  hydrochlorothiazide 25 milliGRAM(s) Oral daily  HYDROmorphone  Injectable 0.5 milliGRAM(s) IV Push every 4 hours PRN  lisinopril 10 milliGRAM(s) Oral daily  multivitamin/minerals 1 Tablet(s) Oral daily  pantoprazole    Tablet 40 milliGRAM(s) Oral before breakfast    Current Antimicrobials:    Prior/Completed Antimicrobials:  piperacillin/tazobactam IVPB.  piperacillin/tazobactam IVPB.-  piperacillin/tazobactam IVPB...  vancomycin  IVPB.

## 2024-03-05 ENCOUNTER — TRANSCRIPTION ENCOUNTER (OUTPATIENT)
Age: 74
End: 2024-03-05

## 2024-03-05 VITALS — DIASTOLIC BLOOD PRESSURE: 71 MMHG | SYSTOLIC BLOOD PRESSURE: 131 MMHG | HEART RATE: 71 BPM

## 2024-03-05 LAB
ANION GAP SERPL CALC-SCNC: 14 MMOL/L — SIGNIFICANT CHANGE UP (ref 5–17)
BUN SERPL-MCNC: 16 MG/DL — SIGNIFICANT CHANGE UP (ref 7–23)
CALCIUM SERPL-MCNC: 9.6 MG/DL — SIGNIFICANT CHANGE UP (ref 8.4–10.5)
CHLORIDE SERPL-SCNC: 103 MMOL/L — SIGNIFICANT CHANGE UP (ref 96–108)
CO2 SERPL-SCNC: 24 MMOL/L — SIGNIFICANT CHANGE UP (ref 22–31)
CREAT SERPL-MCNC: 0.99 MG/DL — SIGNIFICANT CHANGE UP (ref 0.5–1.3)
EGFR: 59 ML/MIN/1.73M2 — LOW
GLUCOSE BLDC GLUCOMTR-MCNC: 118 MG/DL — HIGH (ref 70–99)
GLUCOSE SERPL-MCNC: 105 MG/DL — HIGH (ref 70–99)
HCT VFR BLD CALC: 27.7 % — LOW (ref 34.5–45)
HGB BLD-MCNC: 8.8 G/DL — LOW (ref 11.5–15.5)
MCHC RBC-ENTMCNC: 24.7 PG — LOW (ref 27–34)
MCHC RBC-ENTMCNC: 31.8 GM/DL — LOW (ref 32–36)
MCV RBC AUTO: 77.8 FL — LOW (ref 80–100)
NRBC # BLD: 0 /100 WBCS — SIGNIFICANT CHANGE UP (ref 0–0)
PLATELET # BLD AUTO: 294 K/UL — SIGNIFICANT CHANGE UP (ref 150–400)
POTASSIUM SERPL-MCNC: 3.7 MMOL/L — SIGNIFICANT CHANGE UP (ref 3.5–5.3)
POTASSIUM SERPL-SCNC: 3.7 MMOL/L — SIGNIFICANT CHANGE UP (ref 3.5–5.3)
RBC # BLD: 3.56 M/UL — LOW (ref 3.8–5.2)
RBC # FLD: 17.3 % — HIGH (ref 10.3–14.5)
SODIUM SERPL-SCNC: 141 MMOL/L — SIGNIFICANT CHANGE UP (ref 135–145)
WBC # BLD: 7.3 K/UL — SIGNIFICANT CHANGE UP (ref 3.8–10.5)
WBC # FLD AUTO: 7.3 K/UL — SIGNIFICANT CHANGE UP (ref 3.8–10.5)

## 2024-03-05 PROCEDURE — 99232 SBSQ HOSP IP/OBS MODERATE 35: CPT

## 2024-03-05 RX ORDER — HYDRALAZINE HCL 50 MG
1 TABLET ORAL
Qty: 90 | Refills: 0
Start: 2024-03-05 | End: 2024-04-03

## 2024-03-05 RX ORDER — ACETAMINOPHEN WITH CODEINE 300MG-30MG
1 TABLET ORAL
Qty: 20 | Refills: 0
Start: 2024-03-05 | End: 2024-03-09

## 2024-03-05 RX ORDER — AMLODIPINE BESYLATE 2.5 MG/1
1 TABLET ORAL
Qty: 0 | Refills: 0 | DISCHARGE
Start: 2024-03-05

## 2024-03-05 RX ORDER — CARVEDILOL PHOSPHATE 80 MG/1
1 CAPSULE, EXTENDED RELEASE ORAL
Qty: 0 | Refills: 0 | DISCHARGE
Start: 2024-03-05

## 2024-03-05 RX ORDER — ASCORBIC ACID 60 MG
1 TABLET,CHEWABLE ORAL
Qty: 0 | Refills: 0 | DISCHARGE
Start: 2024-03-05

## 2024-03-05 RX ORDER — ACETAMINOPHEN 500 MG
2 TABLET ORAL
Qty: 0 | Refills: 0 | DISCHARGE
Start: 2024-03-05

## 2024-03-05 RX ORDER — ASPIRIN/CALCIUM CARB/MAGNESIUM 324 MG
1 TABLET ORAL
Qty: 0 | Refills: 0 | DISCHARGE
Start: 2024-03-05

## 2024-03-05 RX ORDER — HYDROCHLOROTHIAZIDE 25 MG
1 TABLET ORAL
Qty: 0 | Refills: 0 | DISCHARGE
Start: 2024-03-05

## 2024-03-05 RX ORDER — MULTIVIT-MIN/FERROUS GLUCONATE 9 MG/15 ML
1 LIQUID (ML) ORAL
Qty: 0 | Refills: 0 | DISCHARGE
Start: 2024-03-05

## 2024-03-05 RX ORDER — CLOPIDOGREL BISULFATE 75 MG/1
1 TABLET, FILM COATED ORAL
Qty: 0 | Refills: 0 | DISCHARGE
Start: 2024-03-05

## 2024-03-05 RX ADMIN — HYDROMORPHONE HYDROCHLORIDE 0.5 MILLIGRAM(S): 2 INJECTION INTRAMUSCULAR; INTRAVENOUS; SUBCUTANEOUS at 10:07

## 2024-03-05 RX ADMIN — Medication 10 MILLIGRAM(S): at 14:14

## 2024-03-05 RX ADMIN — LISINOPRIL 10 MILLIGRAM(S): 2.5 TABLET ORAL at 08:48

## 2024-03-05 RX ADMIN — HYDROMORPHONE HYDROCHLORIDE 0.5 MILLIGRAM(S): 2 INJECTION INTRAMUSCULAR; INTRAVENOUS; SUBCUTANEOUS at 11:12

## 2024-03-05 RX ADMIN — PANTOPRAZOLE SODIUM 40 MILLIGRAM(S): 20 TABLET, DELAYED RELEASE ORAL at 08:48

## 2024-03-05 RX ADMIN — Medication 81 MILLIGRAM(S): at 12:10

## 2024-03-05 RX ADMIN — AMLODIPINE BESYLATE 10 MILLIGRAM(S): 2.5 TABLET ORAL at 08:48

## 2024-03-05 RX ADMIN — Medication 10 MILLIGRAM(S): at 08:49

## 2024-03-05 RX ADMIN — CARVEDILOL PHOSPHATE 6.25 MILLIGRAM(S): 80 CAPSULE, EXTENDED RELEASE ORAL at 08:48

## 2024-03-05 RX ADMIN — Medication 500 MILLIGRAM(S): at 12:10

## 2024-03-05 RX ADMIN — HYDROMORPHONE HYDROCHLORIDE 0.5 MILLIGRAM(S): 2 INJECTION INTRAMUSCULAR; INTRAVENOUS; SUBCUTANEOUS at 01:39

## 2024-03-05 RX ADMIN — Medication 1 TABLET(S): at 12:09

## 2024-03-05 RX ADMIN — CLOPIDOGREL BISULFATE 75 MILLIGRAM(S): 75 TABLET, FILM COATED ORAL at 12:09

## 2024-03-05 RX ADMIN — HYDROMORPHONE HYDROCHLORIDE 0.5 MILLIGRAM(S): 2 INJECTION INTRAMUSCULAR; INTRAVENOUS; SUBCUTANEOUS at 00:37

## 2024-03-05 NOTE — CHART NOTE - NSCHARTNOTEFT_GEN_A_CORE
Rolling Walker:  Patient requires rolling walker due to  Right Foot Gangrene and recent amputation. Pt needs Rolling Walker  for safe ambulation to prevent falls after  discharge.
PA MEDICINE NOTE:    RN notified patient with elevated BP of 200/78, manual systolic in 190's. Got PM dose of coreg, BP dropped to 164/73, now complaining of blurry vision to RN. Went to asses  patient at bedside, feels more of pressure in eyes rather than blurry vision. Neurological exam was normal.   She says usually she tends to have pressure in eyes sensation when BP is high.   Spoke with , added hydralazine 10mg po TID with a STAT dose now and ordered CT head non contrast. Will have to recheck BP after hydralazine is given.   If pressure not resolved, will need to call ophthalmology consult.   Will have night shift to follow up.

## 2024-03-05 NOTE — DISCHARGE NOTE NURSING/CASE MANAGEMENT/SOCIAL WORK - NSDCPEFALRISK_GEN_ALL_CORE
For information on Fall & Injury Prevention, visit: [email or web address] OR  [email or web address] OR  https://www.cdc.gov/steadi/patient.html

## 2024-03-05 NOTE — PROGRESS NOTE ADULT - ASSESSMENT
73F s/p right foot partial 3rd ray resection, closed (DOS 3/1/24)  - Patient seen and evaluated   - Afebrile, no leukocytosis  - 3/1 s/p right foot partial 3rd ray resection closed:  sutures intact, no hematoma formation, no drainage, flap warm and viable  - Intra-op findings: low concern for residual infection, low concern for viability  - OR wound culture: proteus mirabilis, morganella morganii (prelim)  - OR clean bone margin: no growth  - Patient stable for discharge from a podiatry standpoint  - Follow up information can be found in discharge note provider under follow up   - Seen with attending

## 2024-03-05 NOTE — DISCHARGE NOTE NURSING/CASE MANAGEMENT/SOCIAL WORK - NSSCTYPOFSERV_GEN_ALL_CORE
RN will call prior to visit, services to start day following discharge,  physical therapy to follow.

## 2024-03-05 NOTE — PROGRESS NOTE ADULT - SUBJECTIVE AND OBJECTIVE BOX
Vascular Cardiology Progress Note     DIRECT PROVIDER NUMBER: [phone]  / Available on TEAMS    CC:  R 3rd Digit Wound, Dry Gangrene    Interval Events:  Patient was noted to be hypertensive, yesterday afternoon.  BP has improved with the addition of Hydralazine.  She no longer complains of blurry vision.  Reports pain to her amputation of R 3rd digit.     Allergies  Cipro (Headache)	    MEDICATIONS  (STANDING):  amLODIPine   Tablet 10 milliGRAM(s) Oral daily  ascorbic acid 500 milliGRAM(s) Oral daily  aspirin enteric coated 81 milliGRAM(s) Oral daily  carvedilol 6.25 milliGRAM(s) Oral every 12 hours  clopidogrel Tablet 75 milliGRAM(s) Oral daily  enoxaparin Injectable 40 milliGRAM(s) SubCutaneous every 24 hours  hydrALAZINE 10 milliGRAM(s) Oral three times a day  hydrochlorothiazide 25 milliGRAM(s) Oral daily  levoFLOXacin  Tablet 750 milliGRAM(s) Oral every 48 hours  lisinopril 10 milliGRAM(s) Oral daily  multivitamin/minerals 1 Tablet(s) Oral daily  pantoprazole    Tablet 40 milliGRAM(s) Oral before breakfast      PAST MEDICAL & SURGICAL HISTORY:  HTN (hypertension)  Type 2 diabetes mellitus  CAD (coronary artery disease)    FAMILY HISTORY: see HPI    SOCIAL HISTORY:  unchanged    REVIEW OF SYSTEMS:  CONSTITUTIONAL: No fever  EYES: No eye pain  ENT:  No throat pain  NECK: No pain   RESPIRATORY: No SOB  CARDIOVASCULAR: No C/P  GASTROINTESTINAL: No abdominal pain  GENITOURINARY: No hematuria  NEUROLOGICAL: No memory loss  LYMPH Nodes: No enlarged glands noted  ENDOCRINE: No heat or cold intolerance noted  MUSCULOSKELETAL: R foot post procedure discomfort   PSYCHIATRIC: No depression, anxiety  HEME/LYMPH: No bleeding gums  ALLERGY AND IMMUNOLOGIC: No hives    [ x] All others negative	    PHYSICAL EXAM:  Vital Signs Last 24 Hrs  T(C): 36.6 (05 Mar 2024 06:00), Max: 37.1 (04 Mar 2024 17:32)  T(F): 97.8 (05 Mar 2024 06:00), Max: 98.7 (04 Mar 2024 17:32)  HR: 73 (05 Mar 2024 06:00) (59 - 92)  BP: 147/72 (05 Mar 2024 06:00) (123/53 - 207/68)  RR: 18 (05 Mar 2024 06:00) (18 - 18)  SpO2: 96% (05 Mar 2024 06:00) (95% - 97%)    Parameters below as of 05 Mar 2024 06:00  Patient On (Oxygen Delivery Method): room air      Appearance: NAD  	  HEENT:  NC/AT  Cardiovascular: RRR, S1 and S2  Respiratory: CTA B/L  Psychiatry:  AAO x 3  Gastrointestinal:  Soft, Non-tender, + BS	  Skin: No cyanosis	  Neurologic: No focal deficit  Extremities: No LE edema  R foot dressing clean, dry and intact     LABS:	 	                   8.8    7.30  )-----------( 294      ( 05 Mar 2024 07:09 )             27.7     03-05    141  |  103  |  16  ----------------------------<  105<H>  3.7   |  24  |  0.99    Ca    9.6      05 Mar 2024 07:13      Urinalysis Basic - ( 05 Mar 2024 07:13 )    Color: x / Appearance: x / SG: x / pH: x  Gluc: 105 mg/dL / Ketone: x  / Bili: x / Urobili: x   Blood: x / Protein: x / Nitrite: x   Leuk Esterase: x / RBC: x / WBC x   Sq Epi: x / Non Sq Epi: x / Bacteria: x                 Assessment:  1. PAD with multiple peripheral interventions      Most recent intervention this year with successful laser atherectomy and drug coated balloon of right common femoral artery. Successful  laser atherectomy drug coated balloon and Rosa M stenting of right superficial femoral artery. Successful laser atherectomy and drug coated balloon of R popliteal artery. Successful laser atherectomy and drug coated balloon of Right TPT.   2. R 3rd Digit Foot Gangrene      S/p right foot partial 3rd ray resection on 3/1.  3. HTN  4. HLD  5. DM  6. CAD s/p CABG       Plan:  1. Patient doing well post recent right foot partial 3rd ray resection.      Pictures of amputation site reviewed, site healing well.  2. Patient with a palpable DP and PT post recent peripheral intervention this year.  3. Continue ASA, Plavix and Statin.   4. BP improved with the addition of Hydralazine Continue to monitor BP.      Continue Coreg, Norvasc, HCTZ.      Continue pain control. This will assist in BP regulation.   5. Appreciate excellent Podiatry care.        Thank you  CLINTON Dover, MS, [facility]  Vascular Cardiology Service    Please call with any questions:   DIRECT SERVICE NUMBER: [phone] / Available on TEAMS

## 2024-03-05 NOTE — PROGRESS NOTE ADULT - SUBJECTIVE AND OBJECTIVE BOX
[facility] DIVISION OF INFECTIOUS DISEASES  PAUL Calderon Y. Patel, S. Shah, G. [facility]  [phone]  ([phone] - weekdays after 5pm and weekends)    Name: HARVEY RIZZO  Age/Gender: 73y Female  MRN: [id number]    Interval History:  Patient seen and examined this morning.   States unable to sleep due to foot pain.  Denies fever, chills, or any other new complaints  Notes reviewed. No concerning overnight events  Afebrile   Allergies: Cipro (Headache)      Objective:  Vitals:   T(F): 97.8 (03-05-24 @ 06:00), Max: 98.7 (03-04-24 @ 17:32)  HR: 73 (03-05-24 @ 06:00) (59 - 92)  BP: 147/72 (03-05-24 @ 06:00) (123/53 - 207/68)  RR: 18 (03-05-24 @ 06:00) (18 - 18)  SpO2: 96% (03-05-24 @ 06:00) (95% - 97%)  Physical Examination:  General: no acute distress  HEENT: NC/AT, anicteric, neck supple  Respiratory: no acc muscle use, breathing comfortably  Cardiovascular: S1 and S2 present  Gastrointestinal: normal appearing, nondistended  Extremities: L foot dressing, no cyanosis  Skin: no visible rash, L foot with no erythema or increased warmth     Laboratory Studies:  CBC:                       8.8    7.30  )-----------( 294      ( 05 Mar 2024 07:09 )             27.7     WBC Trend:  7.30 03-05-24 @ 07:09  6.20 03-03-24 @ 07:14  7.53 03-02-24 @ 07:11  5.12 03-01-24 @ 04:24  6.51 02-28-24 @ 14:46    CMP: 03-05    141  |  103  |  16  ----------------------------<  105<H>  3.7   |  24  |  0.99    Ca    9.6      05 Mar 2024 07:13      Creatinine: 0.99 mg/dL (03-05-24 @ 07:13)  Creatinine: 1.10 mg/dL (03-02-24 @ 07:08)  Creatinine: 1.20 mg/dL (03-01-24 @ 04:24)  Creatinine: 0.99 mg/dL (02-28-24 @ 14:46)    Microbiology: reviewed   Culture - Surgical Swab (collected 03-01-24 @ 14:19)  Source: .Surgical Swab  Preliminary Report (03-02-24 @ 18:41):    Few Morganella morganii    Rare Proteus mirabilis  Organism: Morganella morganii  Proteus mirabilis (03-03-24 @ 15:54)  Organism: Morganella morganii (03-03-24 @ 15:54)      Method Type: CHRISTA      -  Amoxicillin/Clavulanic Acid: R >16/8      -  Ampicillin: R >16 These ampicillin results predict results for amoxicillin      -  Ampicillin/Sulbactam: I 16/8      -  Aztreonam: S <=4      -  Cefazolin: R >16      -  Cefepime: S <=2      -  Cefoxitin: S <=8      -  Ceftriaxone: S <=1      -  Ciprofloxacin: S <=0.25      -  Ertapenem: S <=0.5      -  Gentamicin: S <=2      -  Levofloxacin: S <=0.5      -  Meropenem: S <=1      -  Piperacillin/Tazobactam: S <=8      -  Tobramycin: S <=2      -  Trimethoprim/Sulfamethoxazole: R >2/38  Organism: Proteus mirabilis (03-03-24 @ 15:54)      Method Type: CHRISTA      -  Amoxicillin/Clavulanic Acid: S <=8/4      -  Ampicillin: S <=8 These ampicillin results predict results for amoxicillin      -  Ampicillin/Sulbactam: S <=4/2      -  Aztreonam: S <=4      -  Cefazolin: S <=2      -  Cefepime: S <=2      -  Cefoxitin: S <=8      -  Ceftriaxone: S <=1      -  Ciprofloxacin: S <=0.25      -  Ertapenem: S <=0.5      -  Gentamicin: S <=2      -  Levofloxacin: S <=0.5      -  Meropenem: S <=1      -  Piperacillin/Tazobactam: S <=8      -  Tobramycin: S <=2      -  Trimethoprim/Sulfamethoxazole: S <=0.5/9.5    Culture - Tissue with Gram Stain (collected 03-01-24 @ 14:10)  Source: .Tissue  Gram Stain (03-01-24 @ 22:18):    No polymorphonuclear cells seen per low power field    No organisms seen per oil power field  Preliminary Report (03-02-24 @ 16:25):    No growth to date.    Culture - Abscess with Gram Stain (collected 02-28-24 @ 17:20)  Source: .Abscess R foot wound  Gram Stain (02-29-24 @ 02:41):    No polymorphonuclear cells seen per low power field    Rare Gram Negative Rods per oil power field  Final Report (03-04-24 @ 17:01):    Moderate Morganella morganii    Few Proteus mirabilis  Organism: Morganella morganii  Proteus mirabilis (03-04-24 @ 17:01)  Organism: Proteus mirabilis (03-04-24 @ 17:01)      Method Type: CHRISTA      -  Amoxicillin/Clavulanic Acid: S <=8/4      -  Ampicillin: S <=8 These ampicillin results predict results for amoxicillin      -  Ampicillin/Sulbactam: S <=4/2      -  Aztreonam: S <=4      -  Cefazolin: S <=2      -  Cefepime: S <=2      -  Cefoxitin: S <=8      -  Ceftriaxone: S <=1      -  Ciprofloxacin: S <=0.25      -  Ertapenem: S <=0.5      -  Gentamicin: S <=2      -  Levofloxacin: S <=0.5      -  Meropenem: S <=1      -  Piperacillin/Tazobactam: S <=8      -  Tobramycin: S <=2      -  Trimethoprim/Sulfamethoxazole: S <=0.5/9.5  Organism: Morganella morganii (03-04-24 @ 17:01)      Method Type: CHRISTA      -  Amoxicillin/Clavulanic Acid: R >16/8      -  Ampicillin: R >16 These ampicillin results predict results for amoxicillin      -  Ampicillin/Sulbactam: I 16/8      -  Aztreonam: S <=4      -  Cefazolin: R >16      -  Cefepime: S <=2      -  Cefoxitin: S <=8      -  Ceftriaxone: S <=1      -  Ciprofloxacin: S <=0.25      -  Ertapenem: S <=0.5      -  Gentamicin: S <=2      -  Levofloxacin: S <=0.5      -  Meropenem: S <=1      -  Piperacillin/Tazobactam: S <=8      -  Tobramycin: S <=2      -  Trimethoprim/Sulfamethoxazole: R >2/38    Culture - Blood (collected 02-28-24 @ 13:30)  Source: .Blood Blood-Peripheral  Final Report (03-04-24 @ 20:01):    No growth at 5 days    Culture - Blood (collected 02-28-24 @ 13:15)  Source: .Blood Blood-Peripheral  Final Report (03-04-24 @ 20:01):    No growth at 5 days    Radiology: reviewed     Medications:  acetaminophen     Tablet .. 650 milliGRAM(s) Oral every 6 hours PRN  acetaminophen  300 mG/codeine 30 mG 1 Tablet(s) Oral every 6 hours PRN  amLODIPine   Tablet 10 milliGRAM(s) Oral daily  ascorbic acid 500 milliGRAM(s) Oral daily  aspirin enteric coated 81 milliGRAM(s) Oral daily  carvedilol 6.25 milliGRAM(s) Oral every 12 hours  clopidogrel Tablet 75 milliGRAM(s) Oral daily  enoxaparin Injectable 40 milliGRAM(s) SubCutaneous every 24 hours  hydrALAZINE 10 milliGRAM(s) Oral three times a day  hydrochlorothiazide 25 milliGRAM(s) Oral daily  HYDROmorphone  Injectable 0.5 milliGRAM(s) IV Push every 4 hours PRN  levoFLOXacin  Tablet 750 milliGRAM(s) Oral every 48 hours  lisinopril 10 milliGRAM(s) Oral daily  multivitamin/minerals 1 Tablet(s) Oral daily  pantoprazole    Tablet 40 milliGRAM(s) Oral before breakfast    Current Antimicrobials:  levoFLOXacin  Tablet 750 milliGRAM(s) Oral every 48 hours    Prior/Completed Antimicrobials:  piperacillin/tazobactam IVPB.  piperacillin/tazobactam IVPB.-  piperacillin/tazobactam IVPB...  vancomycin  IVPB.

## 2024-03-05 NOTE — DISCHARGE NOTE NURSING/CASE MANAGEMENT/SOCIAL WORK - PATIENT PORTAL LINK FT
You can access the FollowMyHealth Patient Portal offered by [facility] by registering at the following website: [email or web address]. By joining [facility]’s FollowMyHealth portal, you will also be able to view your health information using other applications (apps) compatible with our system.

## 2024-03-05 NOTE — DISCHARGE NOTE NURSING/CASE MANAGEMENT/SOCIAL WORK - NSDCFUADDAPPT_GEN_ALL_CORE_FT
Podiatry Discharge Instructions:  Follow up: Please follow up with Dr. Young within 1 week of discharge from the hospital, please call [phone] for appointment and discuss that you recently were seen in the hospital.  Wound Care: Please leave your dressing clean dry intact until your follow up appointment  Weight bearing: Please weight bear as tolerated in a surgical shoe to right heel.  Antibiotics: Please continue as instructed.

## 2024-03-05 NOTE — PROGRESS NOTE ADULT - NUTRITIONAL ASSESSMENT
This patient has been assessed with a concern for Malnutrition and has been determined to have a diagnosis/diagnoses of Severe protein-calorie malnutrition.    This patient is being managed with:   Diet DASH/TLC-  Sodium & Cholesterol Restricted  Supplement Feeding Modality:  Oral  Ensure Plus High Protein Cans or Servings Per Day:  1       Frequency:  Daily  Entered: Mar  1 2024  1:40PM  
This patient has been assessed with a concern for Malnutrition and has been determined to have a diagnosis/diagnoses of Severe protein-calorie malnutrition.    This patient is being managed with:   Diet DASH/TLC-  Sodium & Cholesterol Restricted  Supplement Feeding Modality:  Oral  Ensure Plus High Protein Cans or Servings Per Day:  1       Frequency:  Daily  Entered: Mar  1 2024  1:40PM    This patient has been assessed with a concern for Malnutrition and has been determined to have a diagnosis/diagnoses of Severe protein-calorie malnutrition.    This patient is being managed with:   Diet DASH/TLC-  Sodium & Cholesterol Restricted  Supplement Feeding Modality:  Oral  Ensure Plus High Protein Cans or Servings Per Day:  1       Frequency:  Daily  Entered: Mar  1 2024  1:40PM  
This patient has been assessed with a concern for Malnutrition and has been determined to have a diagnosis/diagnoses of Severe protein-calorie malnutrition.    This patient is being managed with:   Diet DASH/TLC-  Sodium & Cholesterol Restricted  Supplement Feeding Modality:  Oral  Ensure Plus High Protein Cans or Servings Per Day:  1       Frequency:  Daily  Entered: Mar  1 2024  1:40PM  

## 2024-03-05 NOTE — PROGRESS NOTE ADULT - SUBJECTIVE AND OBJECTIVE BOX
Patient is a 73y old  Female who presents with a chief complaint of toe OM (05 Mar 2024 10:44)    Date of servie : 03-05-24 @ 14:08  INTERVAL HPI/OVERNIGHT EVENTS:  T(C): 36.8 (03-05-24 @ 11:56), Max: 37.1 (03-04-24 @ 17:32)  HR: 59 (03-05-24 @ 11:56) (59 - 92)  BP: 107/63 (03-05-24 @ 11:56) (107/63 - 207/68)  RR: 18 (03-05-24 @ 11:56) (18 - 18)  SpO2: 98% (03-05-24 @ 11:56) (96% - 98%)  Wt(kg): --  I&O's Summary      LABS:                        8.8    7.30  )-----------( 294      ( 05 Mar 2024 07:09 )             27.7     03-05    141  |  103  |  16  ----------------------------<  105<H>  3.7   |  24  |  0.99    Ca    9.6      05 Mar 2024 07:13        Urinalysis Basic - ( 05 Mar 2024 07:13 )    Color: x / Appearance: x / SG: x / pH: x  Gluc: 105 mg/dL / Ketone: x  / Bili: x / Urobili: x   Blood: x / Protein: x / Nitrite: x   Leuk Esterase: x / RBC: x / WBC x   Sq Epi: x / Non Sq Epi: x / Bacteria: x      CAPILLARY BLOOD GLUCOSE      POCT Blood Glucose.: 118 mg/dL (05 Mar 2024 07:57)  POCT Blood Glucose.: 171 mg/dL (04 Mar 2024 20:01)        Urinalysis Basic - ( 05 Mar 2024 07:13 )    Color: x / Appearance: x / SG: x / pH: x  Gluc: 105 mg/dL / Ketone: x  / Bili: x / Urobili: x   Blood: x / Protein: x / Nitrite: x   Leuk Esterase: x / RBC: x / WBC x   Sq Epi: x / Non Sq Epi: x / Bacteria: x        MEDICATIONS  (STANDING):  amLODIPine   Tablet 10 milliGRAM(s) Oral daily  ascorbic acid 500 milliGRAM(s) Oral daily  aspirin enteric coated 81 milliGRAM(s) Oral daily  carvedilol 6.25 milliGRAM(s) Oral every 12 hours  clopidogrel Tablet 75 milliGRAM(s) Oral daily  enoxaparin Injectable 40 milliGRAM(s) SubCutaneous every 24 hours  hydrALAZINE 10 milliGRAM(s) Oral three times a day  hydrochlorothiazide 25 milliGRAM(s) Oral daily  levoFLOXacin  Tablet 750 milliGRAM(s) Oral every 48 hours  lisinopril 10 milliGRAM(s) Oral daily  multivitamin/minerals 1 Tablet(s) Oral daily  pantoprazole    Tablet 40 milliGRAM(s) Oral before breakfast    MEDICATIONS  (PRN):  acetaminophen     Tablet .. 650 milliGRAM(s) Oral every 6 hours PRN Mild Pain (1 - 3)  acetaminophen  300 mG/codeine 30 mG 1 Tablet(s) Oral every 6 hours PRN Moderate Pain (4 - 6)  HYDROmorphone  Injectable 0.5 milliGRAM(s) IV Push every 4 hours PRN Severe Pain (7 - 10)          PHYSICAL EXAM:  GENERAL: NAD, well-groomed, well-developed  HEAD:  Atraumatic, Normocephalic  CHEST/LUNG: Clear to percussion bilaterally; No rales, rhonchi, wheezing, or rubs  HEART: Regular rate and rhythm; No murmurs, rubs, or gallops  ABDOMEN: Soft, Nontender, Nondistended; Bowel sounds present  EXTREMITIES:  2+ Peripheral Pulses, No clubbing, cyanosis, or edema  LYMPH: No lymphadenopathy noted  SKIN: No rashes or lesions    Care Discussed with Consultants/Other Providers [ ] YES  [ ] NO

## 2024-03-05 NOTE — PROGRESS NOTE ADULT - NS ATTEND AMEND GEN_ALL_CORE FT
Having pain at the amp site  Podiatry followup  Palp DP on exam    BP noted, improved w hydralazine (likely 2/2 pain)    Jose
Doing well  Appreciate podiatry  BP noted, monitor for now, pain control    Jose

## 2024-03-05 NOTE — PROGRESS NOTE ADULT - SUBJECTIVE AND OBJECTIVE BOX
Podiatry pager #: [phone] ([place])/ [phone] ([facility])    Patient is a 73y old  Female who presents with a chief complaint of toe OM (05 Mar 2024 14:08)       INTERVAL HPI/OVERNIGHT EVENTS:  Patient seen and evaluated at bedside.  Pt is resting comfortable in NAD. Denies N/V/F/C.     Allergies    Cipro (Headache)    Intolerances        Vital Signs Last 24 Hrs  T(C): 36.8 (05 Mar 2024 11:56), Max: 37.1 (04 Mar 2024 17:32)  T(F): 98.2 (05 Mar 2024 11:56), Max: 98.7 (04 Mar 2024 17:32)  HR: 71 (05 Mar 2024 14:10) (59 - 92)  BP: 131/71 (05 Mar 2024 14:10) (107/63 - 207/68)  BP(mean): --  RR: 18 (05 Mar 2024 11:56) (18 - 18)  SpO2: 98% (05 Mar 2024 11:56) (96% - 98%)    Parameters below as of 05 Mar 2024 11:56  Patient On (Oxygen Delivery Method): room air        LABS:                        8.8    7.30  )-----------( 294      ( 05 Mar 2024 07:09 )             27.7     03-05    141  |  103  |  16  ----------------------------<  105<H>  3.7   |  24  |  0.99    Ca    9.6      05 Mar 2024 07:13        Urinalysis Basic - ( 05 Mar 2024 07:13 )    Color: x / Appearance: x / SG: x / pH: x  Gluc: 105 mg/dL / Ketone: x  / Bili: x / Urobili: x   Blood: x / Protein: x / Nitrite: x   Leuk Esterase: x / RBC: x / WBC x   Sq Epi: x / Non Sq Epi: x / Bacteria: x      CAPILLARY BLOOD GLUCOSE      POCT Blood Glucose.: 118 mg/dL (05 Mar 2024 07:57)  POCT Blood Glucose.: 171 mg/dL (04 Mar 2024 20:01)      Lower Extremity Physical Exam:  Vascular: DP/PT 0/4, B/L, CFT <3 seconds B/L, Temperature gradient warm to cool, B/L.   Neuro: Epicritic sensation diminished to the level of digits, B/L.  Musculoskeletal/Ortho: unremarkable   Skin:  3/1 s/p right foot partial 3rd ray resection closed:  sutures intact, no hematoma formation, no drainage,  flap warm and viable  RADIOLOGY & ADDITIONAL TESTS:

## 2024-03-05 NOTE — PROGRESS NOTE ADULT - ASSESSMENT
Patient is a 73 year old female with PMH of DM2 not on any medication diet controlled, HTN, CAD s/p CABG, vasculopath, sent in by Dr. Garcia her podiatrist to be admitted for scheduled right third toe amputation on Friday.     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 Flushed wound with copious amount of normal saline and packed with 1/4 inch iodoform packing   - 2/28 WCx M. morganii and P. mirabilis  - admission BCx NGTD  - 3/1 s/p OR for R foot partial 3rd ray resection   - brief op note reviewed - noted with low concern for residual infection    - OR culture with M. morganii and P. mirabilis  S/p zosyn 2/28-3/1   noted ciprofloxacin reaction is headache    Recommendations:  Follow surg path report from 2/28 and 3/1   Continue Levofloxacin 750mg PO Q48h (renally adjusted) x10d until 3/13/24 with outpatient ID follow up for final surg path results to determine final duration   -- pt prefers to follow up with her Podiatrist first, [facility] ID office info provided     Stable from ID standpoint at this time.  Discharge planning per primary team.     Tanya Das M.D.  [facility], Division of Infectious Diseases  [phone]  After 5pm on weekdays and all day on weekends - please call [phone]     Patient is a 73 year old female with PMH of DM2 not on any medication diet controlled, HTN, CAD s/p CABG, vasculopath, sent in by Dr. Garcia her podiatrist to be admitted for scheduled right third toe amputation on Friday.     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 Flushed wound with copious amount of normal saline and packed with 1/4 inch iodoform packing   - 2/28 WCx M. morganii and P. mirabilis   - 2/28 path noted with bone with necrosis and acute OM  - admission BCx NGTD  - 3/1 s/p OR for R foot partial 3rd ray resection   - brief op note reviewed - noted with low concern for residual infection    - OR culture with M. morganii and P. mirabilis  S/p zosyn 2/28-3/1   noted ciprofloxacin reaction is headache    Recommendations:  Follow surg path report from OR 3/1   Continue Levofloxacin 750mg PO Q48h (renally adjusted) x10d until 3/13/24 with outpatient ID follow up for final surg path results to determine final duration   -- pt prefers to follow up with her Podiatrist first, [facility] ID office info provided     Stable from ID standpoint at this time.  Discharge planning per primary team.     Tanya Das M.D.  [facility], Division of Infectious Diseases  [phone]  After 5pm on weekdays and all day on weekends - please call [phone]

## 2024-03-05 NOTE — PROGRESS NOTE ADULT - PROVIDER SPECIALTY LIST ADULT
Hospitalist
Podiatry
Podiatry
Vascular Cardiology
Vascular Cardiology
Hospitalist
Podiatry
Podiatry
Vascular Cardiology
Anesthesia
Hospitalist
Hospitalist
Infectious Disease
Infectious Disease
Vascular Cardiology
Infectious Disease
Podiatry
Podiatry
Hospitalist
Hospitalist

## 2024-03-05 NOTE — PROGRESS NOTE ADULT - REASON FOR ADMISSION
toe OM

## 2024-03-06 LAB
CULTURE RESULTS: ABNORMAL
CULTURE RESULTS: SIGNIFICANT CHANGE UP
ORGANISM # SPEC MICROSCOPIC CNT: ABNORMAL
SPECIMEN SOURCE: SIGNIFICANT CHANGE UP
SPECIMEN SOURCE: SIGNIFICANT CHANGE UP

## 2024-03-06 RX ORDER — LEVOFLOXACIN 5 MG/ML
1 INJECTION, SOLUTION INTRAVENOUS
Qty: 4 | Refills: 0
Start: 2024-03-06 | End: 2024-03-13

## 2024-04-21 RX ORDER — AMLODIPINE BESYLATE 2.5 MG/1
1 TABLET ORAL
Refills: 0 | DISCHARGE

## 2024-04-21 RX ORDER — DICLOFENAC SODIUM 75 MG/1
1 TABLET, DELAYED RELEASE ORAL
Refills: 0 | DISCHARGE

## 2024-04-21 RX ORDER — ASPIRIN/CALCIUM CARB/MAGNESIUM 324 MG
1 TABLET ORAL
Refills: 0 | DISCHARGE

## 2024-04-21 RX ORDER — CARVEDILOL PHOSPHATE 80 MG/1
1 CAPSULE, EXTENDED RELEASE ORAL
Refills: 0 | DISCHARGE

## 2024-04-21 RX ORDER — CLOPIDOGREL BISULFATE 75 MG/1
1 TABLET, FILM COATED ORAL
Refills: 0 | DISCHARGE

## 2024-04-21 RX ORDER — OMEPRAZOLE 10 MG/1
1 CAPSULE, DELAYED RELEASE ORAL
Refills: 0 | DISCHARGE

## 2024-04-21 RX ORDER — HYDROCHLOROTHIAZIDE 25 MG
1 TABLET ORAL
Refills: 0 | DISCHARGE

## 2024-04-21 RX ORDER — RAMIPRIL 5 MG
1 CAPSULE ORAL
Refills: 0 | DISCHARGE

## 2024-04-21 RX ORDER — ACETAMINOPHEN WITH CODEINE 300MG-30MG
1 TABLET ORAL
Refills: 0 | DISCHARGE

## 2024-04-24 NOTE — DISCHARGE NOTE PROVIDER - NSRESEARCHGRANT_OVERRIDEREC_GEN_A_CORE
Called daughterKaren at [phone] to remind her of patients upcoming surgery scheduled on April 25 with Dr. Ricky Martinez at 1045am with a check in time of 1015am. NPO. Check in at [facility], heart center. Waiting for daughter to call back.   
IMPROVE-DD Application Not Available

## 2024-05-08 ENCOUNTER — APPOINTMENT (OUTPATIENT)
Dept: CARDIOLOGY | Facility: CLINIC | Age: 74
End: 2024-05-08

## 2024-05-08 NOTE — DISCUSSION/SUMMARY
[FreeTextEntry1] : Assessment: 73 y/o F with h/o DM, HTN, HLD, CAD s/p CABG PAD s/p bilateral interventions complicated by acute limb ischemia April 2018 of left leg s/p CDT  Peripheral angiogram Dec 2018 for lifestyle limiting claudication:  INTERVENTION -  LEFT: SFA PANTERA, prox SFA SAMUEL, popliteal SAMUEL R CFA, SFA, pop intervention Summer 2022 Peripheral Angiogram 3/2023: L SFA, popliteal and TP-Trunk intervention  History of hospitalization for GI bleed with elevated Troponin thought due to demand ischemia   Plan:  1. Continue Plavix.  2. Transthoracic Echocardiogram was ok , mild to moderate MR, LVH 3. Daily walking. Daily feet checks. Moisturize daily.  4.  Low salt diet. Continue BP regimen. 5. Follow-up in 6 months.

## 2024-05-08 NOTE — HISTORY OF PRESENT ILLNESS
[FreeTextEntry1] : 5/8  Since last visit patient reports...   1/2024 CLI and rest pain with this worsening R 3rd digit ulcer.  She just underwent LE arterial duplex with severe R CFA stenosis and severe R SFA ISR. R AT occluded of note. Images in PACS.  Plan for peripheral angiogram within the next 7 days.  CTA Ao-run-off prior to angiogram to assess inflow PAD.  12/8/2023  She is walking Slowly  no wounds breathing ok Echo edember 5th:  LV normal EF 67%.  Severe LVH, mild to moderate MR, LE moderately diltated.   was cutting her toes , has small nick on the right 2nd digit.  no wound  Medications	 amLODIPine 10 mg oral tablet: 1 tab(s) orally once a day atorvastatin 40 mg oral tablet: 1 tab(s) orally once a day (at bedtime) **(reports not taking) Coreg 6.25 mg oral tablet: 1 tab(s) orally 2 times a day hydroCHLOROthiazide 25 mg oral tablet: 1 tab(s) orally once a day omeprazole PRN Plavix 75 mg oral tablet: 1 tab(s) orally once a day ramipril 10 mg oral capsule: 1 cap(s) orally once a day Vitamin B12 1000 mcg/mL injectable solution: 1 dose(s) injectable once a month Diclofenac PRN daily  6/2/23  Denies C/P or SOB. Reports left leg discomfort is improved post intervention. Reports L hallux wound healed..   Was on DAPT for 2 weeks post procedure. Currently on Plavix. No bleeding issues.   6/24 Peripheral Angiogram: PERIPHERAL ANGIOGRAM SUMMARY:  - 100% stenosis of left Superficial Femoral Artery, thrombotic.  - 100% stenosis of left Popliteal Artery, thrombotic  - 90% stenosis of left Tibio-Peroneal Trunk, thormbotic.  - 100% chronic total occlusions of left Anterior Tibial Artery, and left Posterior Tibial Artery - Severe disease with 90% stenosis of left Deep Femoral Artery  - 75% stenosis of right Common Femoral Artery   - Successful PTA, rheolytic and mechanical thrombectomy, and stenting of left Superficial Femoral Artery with Angiojet thrombectomy, Penumbra CAT 6 Thrombectomy, balloon angioplasty using 4.0 mm, and 5.0 mm Esopus balloons, drug coated balloon angioplasty using 5.0 x 200 mm Peggs Drug Coated Balloon, and placement of one 6.0 mm Rosa M Drug Eluting Stent.  - Successful rheolytic thrombectomy, mechanical thormbectomy and PTA of left Popliteal Artery with Angiojet thrombectomy, Penumbra CAT 6 Thrombectomy, balloon angioplasty using 4.0 mm, and 5.0 mm Esopus balloons, and drug coated balloon angioplasty using 5.0 x 200 mm Peggs Drug Coated Balloon.  - Successful rheolytic thrombectomy, mechanical thrombectomy and PTA of left Tibio-Peroneal Trunk with Angiojet thrombectomy, Penumbra CAT 6 Thrombectomy, balloon angioplasty using a 4.0 mm Esopus balloon  Medications	 amLODIPine 10 mg oral tablet: 1 tab(s) orally once a day atorvastatin 40 mg oral tablet: 1 tab(s) orally once a day (at bedtime) (reports not taking) Coreg 6.25 mg oral tablet: 1 tab(s) orally 2 times a day hydroCHLOROthiazide 25 mg oral tablet: 1 tab(s) orally once a day omeprazole PRN Plavix 75 mg oral tablet: 1 tab(s) orally once a day ramipril 10 mg oral capsule: 1 cap(s) orally once a day Vitamin B12 1000 mcg/mL injectable solution: 1 dose(s) injectable once a month Diclofenac PRN daily  3/17  Patient reports new R hallux wound and rest pain. Adair classification 5. Arterial duplex showed: R CFA severe stenosis, R SFA severe ISR, R popliteal severe stenosis. Plan for R LE peripheral angiogram.  1/31/23  Denies C/P or SOB. Reports back pain may radiate to her legs. More in R LE than in L. Reports L LE numbness. Reports these symptoms are chronic. No LE wounds.  Reports home BP 140s-150s/70s.  Recently admitted this month to [facility] for GI bleeding noted to have elevated Troponin.  CTA Abd/Pelvis showed acute diverticulitis, L ovarian cyst (pelvic US recommended), R breast nodule (correlate with mammogram), short stenosis of celiac artery.    Recieved IV ABX and IV Iron. Troponin I was 426 to 397 to 428. Recieved IV Magnesium for low Mg.  Hgb on 9.3 on 1/14.  Held Plavix in the hospital and then resumed.  Currently not taking ASA.  TTE was done. Not in chart with patient. Denies SOB on climbing 1 flight of stairs. Denies any bleeding since discharge.   Medications: Plavix Amlodipine Ramipril  Diclofenac Pantoprazole  Coreg  Atorvastatin (not taking)  10/31  Hgb normal, above 11.5. Resume Plavix. Repeat CBC in 1 week.  10/28/22  US LE b/l 9/12/22: Improved appearance of the right femoral artery and popliteal artery with stents in place with decreasing velocities status post intervention. Improved visualization of flow is seen within the posterior tibial artery and peroneal/anterior tibial artery Left lower extremity shows patent stent with monophasic waveforms without elevated within the femoral artery, popliteal artery. Posterior tibial artery appears be occluded. The peroneal artery is identified.  US renal 9/12/22: No evidence of a significant renal artery stenosis.  Endorses L lower back pain. No neurologic deficits. No calf pain with exertion. No rest pain. No non-healing LE ulcers.  Of note, was hospitalized early October with GIB s/p EGD and colonoscopy, no active bleeding was seen. Plavix was discontinued at that time and not resumed. No further complaint of GIB including hematochezia or melena. On protonix.   ROS otherwise normal.   Medications: Aspirin 81 Amlodipine 10mg daily  Ramipril 10mg daily  Diclofenac Pantoprazole 40mg daily Coreg 6.25 mg BID   Atorvastatin  BP well controlled at home.   7/15/2022  Had intevention with Dr. Edwardo HAZEL CFA, SFA, POP and TPT also found  to have renal artery stenosis The wound on the right 3rd digit is healing/healed.   She still has calf pains bilaterally No blood in the urine or stool She has audible signal bilaterally, monophasic.  Pain in the r toes is better now She feels tired and fatigued  3/16/2022  ECHO:   11/1/2021 - mild concentric LVH, normal LV function  Carotid 10/4/2021:  L ICA :  50-79%.  Right ICA :  16-49% RIGHT subclavian stenosis  Coreg stopped 1 month ago - ran out of meds.    Complains of left sided foot numbness and claudication.  R leg also with claudication.   She can walk 2 blocks.  After 2 blocks she starts having pain in the calf.  Resolves with rest.  Resolves after 1 minute.  She is not walking much because its cold.     PCP:  Dr. Ruby ([place]   68 y/o F with h/o DM, HTN, HLD PAD with RT SFA stenting in 2016. Initially presented with worsening left claudication. Post left le intervention with SAMUEL /stent of left SFA, PTA of left pop. PTA and stent left tibioperoneal and prox peroneal.  Presented back to the hospital April 2018 with acute limb underwent catheter directed lysis and PTA / stent of left SFA, Popliteal, peroneal.    Pt with continued severe disabling claudication. Went for angiogram 12/13 found to have 90% ISR of L SFA and pop s/p L SFA stenting, SAMUEL to SFA and pop. Noted to have 80% stenosis in RIGHT CFA  H/o CABG in reports frequent palpitations and occasional 'pain everywhere' unrelated to physical activity.    Cath 12/13/18 - intervention on LEFT SFA and POP ISR.  s/p stent to sfa and SAMUEL to prox SFA and pop  For f/u today reports left foot/toes pain at night now for 2 weeks occasional dangles it of the bed for relief. Reports exercising in the gym for 30 mins on the stationary bicycle with no Sx.

## 2024-05-09 PROBLEM — I10 ESSENTIAL (PRIMARY) HYPERTENSION: Chronic | Status: ACTIVE | Noted: 2024-02-28

## 2024-05-09 PROBLEM — E11.9 TYPE 2 DIABETES MELLITUS WITHOUT COMPLICATIONS: Chronic | Status: ACTIVE | Noted: 2024-02-28

## 2024-05-09 PROBLEM — I25.10 ATHEROSCLEROTIC HEART DISEASE OF NATIVE CORONARY ARTERY WITHOUT ANGINA PECTORIS: Chronic | Status: ACTIVE | Noted: 2024-02-28

## 2024-06-07 NOTE — ED ADULT NURSE NOTE - CAS TRG GENERAL NORM CIRC DET
Patient ID: Susan is a 47 year old female.    Chief Complaint   Patient presents with    Sore Throat    Office Visit    Video Visit     Sore throat x 1 day with mild allergy symptoms intermittently. Took OTC allergy support without relief. Denies n/v, chills, fever, abd pain, dysuria, diarrhea, dysphagia, swollen glands, sinus pain, sinus headache. Plan to travel by air in few days.    This visit is being performed virtually via Non-integrated Video Visit. Consent to treat includes permission to submit charges to the patient's insurance. It was shared that without being seen and evaluated in person, there is a risk that the information and/or assessment may be incomplete or inaccurate. This video visit may be discontinued by patient or clinician, if it is felt that the videoconferencing connections are not adequate for her situation.   Clinical Location: [facility]  Susan's location [facility]- [facility] and is physically present in   the [facility] at the time of this visit.    Sore Throat  This is a new problem. The current episode started yesterday. The problem has been gradually worsening. Associated symptoms include congestion and a sore throat. Pertinent negatives include no abdominal pain, anorexia, change in bowel habit, chest pain, chills, coughing, diaphoresis, fatigue, fever, headaches, joint swelling, myalgias, nausea, neck pain, numbness, rash, swollen glands, urinary symptoms, vertigo, visual change, vomiting or weakness. The symptoms are aggravated by swallowing. She has tried drinking and rest (Alergy medication) for the symptoms. The treatment provided mild relief.       Susan has no past medical history on file.  Susan has no past surgical history on file.  Susan Davis has a current medication list which includes the following prescription(s): amoxicillin and mometasone.  Susan   Current Outpatient  Medications   Medication Sig Dispense Refill    amoxicillin (AMOXIL) 875 MG tablet Take 1 tablet by mouth every 12 hours for 10 days. With food. 20 tablet 0    mometasone (NASONEX) 50 MCG/ACT nasal spray Spray 2 sprays in each nostril daily. For 3 weeks then prn. 17 g 11     No current facility-administered medications for this visit.     Susan has No Known Allergies.    Review of Systems   Constitutional: Negative.  Negative for chills, diaphoresis, fatigue and fever.   HENT:  Positive for congestion and sore throat. Negative for ear discharge, ear pain, facial swelling, postnasal drip, rhinorrhea, sinus pressure, sinus pain, sneezing, tinnitus and trouble swallowing.    Eyes: Negative.    Respiratory: Negative.  Negative for cough.    Cardiovascular: Negative.  Negative for chest pain.   Gastrointestinal: Negative.  Negative for abdominal pain, anorexia, change in bowel habit, nausea and vomiting.   Endocrine: Negative.    Genitourinary: Negative.    Musculoskeletal: Negative.  Negative for joint swelling, myalgias and neck pain.   Skin: Negative.  Negative for rash.   Allergic/Immunologic: Negative.    Neurological: Negative.  Negative for vertigo, weakness, numbness and headaches.   Hematological: Negative.    Psychiatric/Behavioral: Negative.           Physical Exam  Vitals and nursing note reviewed.   Constitutional:       General: She is not in acute distress.     Appearance: Normal appearance. She is well-developed. She is not ill-appearing, toxic-appearing or diaphoretic.   HENT:      Head: Normocephalic and atraumatic.      Right Ear: Hearing and external ear normal. No drainage, swelling or tenderness. No middle ear effusion. There is no impacted cerumen. Tympanic membrane is erythematous and bulging.      Left Ear: Hearing and external ear normal. No drainage, swelling or tenderness.  No middle ear effusion. There is no impacted cerumen. Tympanic membrane is not erythematous or bulging.      Nose:  Mucosal edema, congestion and rhinorrhea present. Rhinorrhea is clear and purulent.      Right Nostril: No foreign body, epistaxis, septal hematoma or occlusion.      Left Nostril: No foreign body, epistaxis, septal hematoma or occlusion.      Right Turbinates: Swollen.      Left Turbinates: Swollen.      Mouth/Throat:      Lips: Pink.      Mouth: Mucous membranes are moist.      Pharynx: Oropharynx is clear. Uvula midline. Posterior oropharyngeal erythema present. No pharyngeal swelling, oropharyngeal exudate or uvula swelling.   Cardiovascular:      Rate and Rhythm: Normal rate and regular rhythm.      Heart sounds: Normal heart sounds.   Pulmonary:      Effort: Pulmonary effort is normal. No respiratory distress.      Breath sounds: Normal breath sounds. No stridor. No wheezing, rhonchi or rales.   Chest:      Chest wall: No tenderness.   Musculoskeletal:         General: Normal range of motion.      Cervical back: Normal range of motion.   Skin:     General: Skin is warm and dry.   Neurological:      General: No focal deficit present.      Mental Status: She is alert and oriented to person, place, and time.      Cranial Nerves: No cranial nerve deficit.      Sensory: No sensory deficit.      Motor: No weakness.      Coordination: Coordination normal.      Gait: Gait normal.   Psychiatric:         Mood and Affect: Mood normal.         Behavior: Behavior normal.         Thought Content: Thought content normal.         Judgment: Judgment normal.           Assessment   Problem List Items Addressed This Visit          ENT    Right non-suppurative otitis media - Primary    Pharyngitis with viral syndrome         5 min HPI/History    8 min Assessment/Discussed diagnosis, medication/benefits/side effects, questions answered, teach back     7 min Discussed follow up care/care plan/charting/when to call 911    Strong peripheral pulses

## 2024-06-27 ENCOUNTER — APPOINTMENT (OUTPATIENT)
Dept: CARDIOLOGY | Facility: CLINIC | Age: 74
End: 2024-06-27
Payer: MEDICARE

## 2024-06-27 VITALS
OXYGEN SATURATION: 99 % | HEIGHT: 65 IN | SYSTOLIC BLOOD PRESSURE: 154 MMHG | HEART RATE: 64 BPM | BODY MASS INDEX: 22.49 KG/M2 | DIASTOLIC BLOOD PRESSURE: 76 MMHG | WEIGHT: 135 LBS

## 2024-06-27 DIAGNOSIS — I73.9 PERIPHERAL VASCULAR DISEASE, UNSPECIFIED: ICD-10-CM

## 2024-06-27 PROCEDURE — G2211 COMPLEX E/M VISIT ADD ON: CPT

## 2024-06-27 PROCEDURE — 99214 OFFICE O/P EST MOD 30 MIN: CPT

## 2024-09-04 NOTE — PATIENT PROFILE ADULT. - FUNCTIONAL LEVEL PRIOR: AMBULATION
Detail Level: Detailed Quality 226: Preventive Care And Screening: Tobacco Use: Screening And Cessation Intervention: Patient screened for tobacco use and is an ex/non-smoker Quality 47: Advance Care Plan: Advance Care Planning discussed and documented; advance care plan or surrogate decision maker documented in the medical record. Quality 137: Melanoma: Continuity Of Care - Recall System: Patient information entered into a recall system that includes: target date for the next exam specified AND a process to follow up with patients regarding missed or unscheduled appointments (0) independent

## 2024-09-28 NOTE — ED PROVIDER NOTE - CADM POA URETHRAL CATHETER
[facility]  [address] [address]  Phone: [phone]  [facility] [employer]: [phone]    Pt Name: Buster Pineda  Injury Date: 09/18/2024   Employee ID:  Date of First Treatment: 09/28/2024   Company: Networked reference to record [id number] [place]      Appointment Time: 11:15 AM Arrived: 11:30am   Provider: Concha Chowdary NP Time Out:1:19pm     Office Treatment:   1. Encounter related to worker's compensation claim    2. Encounter for removal of sutures          Patient Instructions: Keep dressing clean/dry/covered, Attention not to aggravate affected area          Return Appointment: 10/05/23          No

## 2024-11-05 NOTE — CONSULT NOTE ADULT - NSHPATTENDINGPLANDISCUSS_GEN_ALL_CORE
Left message for patient to call back/schedule transfer of care appointment with Dr Mccartney in 6 months.   Dr Wolff Dr Brooke

## 2024-11-11 NOTE — PHYSICAL THERAPY INITIAL EVALUATION ADULT - LONG TERM MEMORY, REHAB EVAL
intact You can access the FollowMyHealth Patient Portal offered by [facility] by registering at the following website: [email or web address]. By joining [employer]’s FollowMyHealth portal, you will also be able to view your health information using other applications (apps) compatible with our system.

## 2024-11-20 NOTE — PATIENT PROFILE ADULT. - FUNCTIONAL SCREEN CURRENT LEVEL: SWALLOWING (IF SCORE 2 OR MORE FOR ANY ITEM, CONSULT REHAB SERVICES), MLM)
11/20/2024  Khoa Phan is a 89 y.o., male.      Pre-op Assessment    I have reviewed the Patient Summary Reports.     I have reviewed the Nursing Notes. I have reviewed the NPO Status.   I have reviewed the Medications.   Steroids Taken In Past Year:     Review of Systems  Anesthesia Hx:  No problems with previous Anesthesia                Social:  Non-Smoker, No Alcohol Use       Hematology/Oncology:  Hematology Normal   Oncology Normal                                   EENT/Dental:  EENT/Dental Normal           Cardiovascular:     Hypertension                                          Pulmonary:    Asthma                    Renal/:  Renal/ Normal                 Hepatic/GI:  Hepatic/GI Normal                    Musculoskeletal:  Musculoskeletal Normal                Neurological:  Neurology Normal                                      Endocrine:  Diabetes, poorly controlled, type 2           Dermatological:  Skin Normal    Psych:  Psychiatric Normal                    Physical Exam  General: Well nourished    Airway:  Mallampati: II / II  Mouth Opening: Normal  TM Distance: > 6 cm  Tongue: Normal  Neck ROM: Normal ROM    Chest/Lungs:  Clear to auscultation, Normal Respiratory Rate    Heart:  Rate: Normal  Rhythm: Regular Rhythm        Anesthesia Plan  Type of Anesthesia, risks & benefits discussed:    Anesthesia Type: Gen Supraglottic Airway  Intra-op Monitoring Plan: Standard ASA Monitors  Post Op Pain Control Plan: multimodal analgesia  Induction:  IV  Informed Consent: Informed consent signed with the Patient and all parties understand the risks and agree with anesthesia plan.  All questions answered. Patient consented to blood products? Yes  ASA Score: 2  Day of Surgery Review of History & Physical: H&P Update referred to the surgeon/provider.I have interviewed and examined the patient. I have  reviewed the patient's H&P dated:     Ready For Surgery From Anesthesia Perspective.     .       (0) swallows foods/liquids without difficulty

## 2024-12-23 NOTE — PATIENT PROFILE ADULT - NSTRANSFEREYEGLASSESPAIRS_GEN_A_NUR
----- Message from Cirilo FLORES sent at 12/23/2024  1:53 PM CST -----  Regarding: Reorder  Hi,    The cortisol saliva tests were canceled due to improper specimen collection. She would need to recollect the sample, and orders would need to be placed again.    Thank you    --------------------------------------------------------    RN called patient to inform of need for repeat MN salivary cortisol testing. No answer, LVM requesting return call. Will attempt to reach via [employer].   1 pair

## 2025-02-18 ENCOUNTER — EMERGENCY (EMERGENCY)
Facility: HOSPITAL | Age: 75
LOS: 1 days | Discharge: ANOTHER HOSPITAL | End: 2025-02-18
Attending: STUDENT IN AN ORGANIZED HEALTH CARE EDUCATION/TRAINING PROGRAM
Payer: COMMERCIAL

## 2025-02-18 VITALS
TEMPERATURE: 98 F | OXYGEN SATURATION: 99 % | HEART RATE: 71 BPM | SYSTOLIC BLOOD PRESSURE: 201 MMHG | DIASTOLIC BLOOD PRESSURE: 107 MMHG | WEIGHT: 138.01 LBS | HEIGHT: 65 IN

## 2025-02-18 DIAGNOSIS — Z98.890 OTHER SPECIFIED POSTPROCEDURAL STATES: Chronic | ICD-10-CM

## 2025-02-18 DIAGNOSIS — Z98.89 OTHER SPECIFIED POSTPROCEDURAL STATES: Chronic | ICD-10-CM

## 2025-02-18 DIAGNOSIS — Z95.828 PRESENCE OF OTHER VASCULAR IMPLANTS AND GRAFTS: Chronic | ICD-10-CM

## 2025-02-18 DIAGNOSIS — Z98.891 HISTORY OF UTERINE SCAR FROM PREVIOUS SURGERY: Chronic | ICD-10-CM

## 2025-02-18 DIAGNOSIS — Z95.1 PRESENCE OF AORTOCORONARY BYPASS GRAFT: Chronic | ICD-10-CM

## 2025-02-18 DIAGNOSIS — I73.9 PERIPHERAL VASCULAR DISEASE, UNSPECIFIED: Chronic | ICD-10-CM

## 2025-02-18 DIAGNOSIS — Z98.84 BARIATRIC SURGERY STATUS: Chronic | ICD-10-CM

## 2025-02-18 LAB
ALBUMIN SERPL ELPH-MCNC: 4.2 G/DL — SIGNIFICANT CHANGE UP (ref 3.3–5)
ALP SERPL-CCNC: 173 U/L — HIGH (ref 40–120)
ALT FLD-CCNC: 13 U/L — SIGNIFICANT CHANGE UP (ref 10–45)
ANION GAP SERPL CALC-SCNC: 14 MMOL/L — SIGNIFICANT CHANGE UP (ref 5–17)
APTT BLD: 33.6 SEC — SIGNIFICANT CHANGE UP (ref 24.5–35.6)
AST SERPL-CCNC: 22 U/L — SIGNIFICANT CHANGE UP (ref 10–40)
BASOPHILS # BLD AUTO: 0.05 K/UL — SIGNIFICANT CHANGE UP (ref 0–0.2)
BASOPHILS NFR BLD AUTO: 0.7 % — SIGNIFICANT CHANGE UP (ref 0–2)
BILIRUB SERPL-MCNC: 0.7 MG/DL — SIGNIFICANT CHANGE UP (ref 0.2–1.2)
BLD GP AB SCN SERPL QL: NEGATIVE — SIGNIFICANT CHANGE UP
BUN SERPL-MCNC: 19 MG/DL — SIGNIFICANT CHANGE UP (ref 7–23)
CALCIUM SERPL-MCNC: 9.8 MG/DL — SIGNIFICANT CHANGE UP (ref 8.4–10.5)
CHLORIDE SERPL-SCNC: 105 MMOL/L — SIGNIFICANT CHANGE UP (ref 96–108)
CO2 SERPL-SCNC: 25 MMOL/L — SIGNIFICANT CHANGE UP (ref 22–31)
CREAT SERPL-MCNC: 0.93 MG/DL — SIGNIFICANT CHANGE UP (ref 0.5–1.3)
EGFR: 64 ML/MIN/1.73M2 — SIGNIFICANT CHANGE UP
EOSINOPHIL # BLD AUTO: 0.26 K/UL — SIGNIFICANT CHANGE UP (ref 0–0.5)
EOSINOPHIL NFR BLD AUTO: 3.7 % — SIGNIFICANT CHANGE UP (ref 0–6)
GLUCOSE SERPL-MCNC: 79 MG/DL — SIGNIFICANT CHANGE UP (ref 70–99)
HCT VFR BLD CALC: 36.3 % — SIGNIFICANT CHANGE UP (ref 34.5–45)
HGB BLD-MCNC: 11.2 G/DL — LOW (ref 11.5–15.5)
IMM GRANULOCYTES NFR BLD AUTO: 0.3 % — SIGNIFICANT CHANGE UP (ref 0–0.9)
INR BLD: 1.03 RATIO — SIGNIFICANT CHANGE UP (ref 0.85–1.16)
LYMPHOCYTES # BLD AUTO: 2 K/UL — SIGNIFICANT CHANGE UP (ref 1–3.3)
LYMPHOCYTES # BLD AUTO: 28.7 % — SIGNIFICANT CHANGE UP (ref 13–44)
MAGNESIUM SERPL-MCNC: 1.9 MG/DL — SIGNIFICANT CHANGE UP (ref 1.6–2.6)
MCHC RBC-ENTMCNC: 24.1 PG — LOW (ref 27–34)
MCHC RBC-ENTMCNC: 30.9 G/DL — LOW (ref 32–36)
MCV RBC AUTO: 78.2 FL — LOW (ref 80–100)
MONOCYTES # BLD AUTO: 0.58 K/UL — SIGNIFICANT CHANGE UP (ref 0–0.9)
MONOCYTES NFR BLD AUTO: 8.3 % — SIGNIFICANT CHANGE UP (ref 2–14)
NEUTROPHILS # BLD AUTO: 4.06 K/UL — SIGNIFICANT CHANGE UP (ref 1.8–7.4)
NEUTROPHILS NFR BLD AUTO: 58.3 % — SIGNIFICANT CHANGE UP (ref 43–77)
NRBC BLD AUTO-RTO: 0 /100 WBCS — SIGNIFICANT CHANGE UP (ref 0–0)
PLATELET # BLD AUTO: 302 K/UL — SIGNIFICANT CHANGE UP (ref 150–400)
POTASSIUM SERPL-MCNC: 4.4 MMOL/L — SIGNIFICANT CHANGE UP (ref 3.5–5.3)
POTASSIUM SERPL-SCNC: 4.4 MMOL/L — SIGNIFICANT CHANGE UP (ref 3.5–5.3)
PROT SERPL-MCNC: 8.1 G/DL — SIGNIFICANT CHANGE UP (ref 6–8.3)
PROTHROM AB SERPL-ACNC: 11.7 SEC — SIGNIFICANT CHANGE UP (ref 9.9–13.4)
RBC # BLD: 4.64 M/UL — SIGNIFICANT CHANGE UP (ref 3.8–5.2)
RBC # FLD: 17.7 % — HIGH (ref 10.3–14.5)
RH IG SCN BLD-IMP: POSITIVE — SIGNIFICANT CHANGE UP
SODIUM SERPL-SCNC: 144 MMOL/L — SIGNIFICANT CHANGE UP (ref 135–145)
WBC # BLD: 6.97 K/UL — SIGNIFICANT CHANGE UP (ref 3.8–10.5)
WBC # FLD AUTO: 6.97 K/UL — SIGNIFICANT CHANGE UP (ref 3.8–10.5)

## 2025-02-18 PROCEDURE — 93926 LOWER EXTREMITY STUDY: CPT | Mod: 26,LT

## 2025-02-18 PROCEDURE — 99291 CRITICAL CARE FIRST HOUR: CPT

## 2025-02-18 PROCEDURE — 71045 X-RAY EXAM CHEST 1 VIEW: CPT | Mod: 26

## 2025-02-18 PROCEDURE — 93971 EXTREMITY STUDY: CPT | Mod: 26,LT

## 2025-02-18 PROCEDURE — 75635 CT ANGIO ABDOMINAL ARTERIES: CPT | Mod: 26

## 2025-02-18 PROCEDURE — 99284 EMERGENCY DEPT VISIT MOD MDM: CPT | Mod: GC

## 2025-02-18 RX ORDER — HEPARIN SODIUM,PORCINE 10000/ML
5000 VIAL (ML) INJECTION EVERY 6 HOURS
Refills: 0 | Status: ACTIVE | OUTPATIENT
Start: 2025-02-18 | End: 2026-01-17

## 2025-02-18 RX ORDER — HEPARIN SODIUM,PORCINE 10000/ML
VIAL (ML) INJECTION
Qty: 25000 | Refills: 0 | Status: ACTIVE | OUTPATIENT
Start: 2025-02-18 | End: 2026-01-17

## 2025-02-18 RX ORDER — HEPARIN SODIUM,PORCINE 10000/ML
2500 VIAL (ML) INJECTION EVERY 6 HOURS
Refills: 0 | Status: ACTIVE | OUTPATIENT
Start: 2025-02-18 | End: 2026-01-17

## 2025-02-18 RX ORDER — HEPARIN SODIUM,PORCINE 10000/ML
5000 VIAL (ML) INJECTION ONCE
Refills: 0 | Status: COMPLETED | OUTPATIENT
Start: 2025-02-18 | End: 2025-02-18

## 2025-02-18 RX ORDER — ACETAMINOPHEN 160 MG/5ML
1000 SUSPENSION ORAL ONCE
Refills: 0 | Status: COMPLETED | OUTPATIENT
Start: 2025-02-18 | End: 2025-02-18

## 2025-02-18 RX ORDER — AMLODIPINE BESYLATE 5 MG
10 TABLET ORAL ONCE
Refills: 0 | Status: COMPLETED | OUTPATIENT
Start: 2025-02-18 | End: 2025-02-18

## 2025-02-18 RX ADMIN — ACETAMINOPHEN 400 MILLIGRAM(S): 160 SUSPENSION ORAL at 18:29

## 2025-02-18 RX ADMIN — Medication 10 MILLIGRAM(S): at 18:29

## 2025-02-18 RX ADMIN — Medication 5000 UNIT(S): at 20:45

## 2025-02-18 RX ADMIN — Medication 1100 UNIT(S)/HR: at 20:43

## 2025-02-18 NOTE — CONSULT NOTE ADULT - ASSESSMENT
Assessment: 74yr F with medical history of T2DM, HTN, HLD, PAD and multiple bilateral lower extremity vascular interventions including bilateral SFA stenting (2016, 2018) complicated by LLE acute limb ischemia requiring catheter directed thrombolysis (2018), most recently s/p R CFA, SFA, and popliteal atherectomy and angioplasty with R SFA stenting (2/2024) and subsequent R 3rd ray resection (3/2024) presenting to the ED with L calf and foot pain since the weekend that worsened today.    Plan:  - Recommendations pending further work-up    Vascular Surgery  [id number]   Assessment: 74yr F with medical history of T2DM, HTN, HLD, PAD and multiple bilateral lower extremity interventions by vascular cardiology including bilateral SFA, L popliteal stenting (2016, 2018) complicated by LLE acute limb ischemia requiring catheter directed thrombolysis (2018), most recently s/p R CFA, SFA, and popliteal atherectomy and angioplasty with R SFA stenting (2/2024) and subsequent R 3rd ray resection (3/2024) presenting to the ED with L calf and foot pain since the weekend that worsened today. Vascular surgery consulted due to concerns for acute on chronic limb ischemia.    Plan:  - LLE arterial duplex with concern for SFA in stent occlusion  - Obtain CTA A/P with bilateral LE runoff  - Start heparin gtt  - Bilateral DAVONTE/PVRs  - Recommend vascular cardiology consult given hx of previous interventions  - Will follow work-up    Discussed with Dr. Lopez and on-call fellow Dr. Alonso   Vascular Surgery  [id number]

## 2025-02-18 NOTE — CONSULT NOTE ADULT - ATTENDING COMMENTS
Seen ~19:00  Daughter present  L LE acute on ch isch (Calf/foot cooler/paler than R)  Mult b/l interventions.    L LE Pain x few days, worse w WB'ing.  Mot ok.  Sens grossly ok.  +L fem.  Dist NP  R LE ok, +DP    Hep drip/IVF/CTA rec'ed earlier.  Cond, risks/implications dw'ed pt/daughter.  Also DW'ed Dr. Desai by tel.  Pt/daughter request for Dr. Desai to resume vasc care in [facility]- ER arranging transfer.

## 2025-02-18 NOTE — ED PROVIDER NOTE - NSPREEXISTRELATION_ED_A_ED_FT
Patient has 4 lower extremity vascular stents all placed by Dr. Iker Brooke now at [facility].  Patient will need evaluation of lower extremity stents and possible additional placement.  Patient to be transferred for continuity of care.

## 2025-02-18 NOTE — ED PROVIDER NOTE - ATTENDING CONTRIBUTION TO CARE
74-year-old female with a history of HTN, HLD, DM, CABG, sickle cell trait, PAD s/p LLE stents in 2022 with 2023 revision, RLE stents placed February 2024 presenting with 2 to 3 days of increasing left lower extremity pain with associated numbness Pain worse with ambulation, improved with rest.  She otherwise has no significant headache, chest pain, shortness of breath, N/V/D/abdominal pain, dysuria, peripheral edema, fever/chills.  Allergic to ciprofloxacin.    see Physical Exam above.    pt presents with LLE pain and numbness x2-3 days. suspecting acute on chronic vascular disease. less likely CVA/TIA as symptoms are very localized to LLE, mostly pain and without obvious sensation discrepancy.   CBC, CMP, coags, T&S, CTA aorta/LLE, VA arterial duplex.  Will reassess the need for additional interventions as clinically warranted. Refer to any progress notes for updates on clinical course and as a continuation of this MDM. 74-year-old female with a history of HTN, HLD, DM, CABG, sickle cell trait, PAD s/p LLE stents in 2022 with 2023 revision, RLE stents placed February 2024 presenting with 2 to 3 days of increasing left lower extremity pain with associated numbness Pain worse with ambulation, improved with rest.  She otherwise has no significant headache, chest pain, shortness of breath, N/V/D/abdominal pain, dysuria, peripheral edema, fever/chills.  Allergic to ciprofloxacin.    Physical Exam:  Gen: NAD, awake and alert,  HEENT: Atraumatic, oropharynx clear, moist mucous membranes, normal conjunctiva  Cardio: RRR, no murmurs, rubs or gallops  Lung: CTAB, no respiratory distress, no wheezes/rhonchi/rales B/L  MSK: no visible deformities, ROMx4   LLE: 1+ DP pulses. sensory intact throughout.  4/5 strength  RLE: 2+ DP pulses. sensory intact throughout.  4/5 strength  Neuro: No focal sensory or motor deficits  Skin: Warm, well perfused, no rash, no leg swelling     pt presents with LLE pain and numbness x2-3 days. suspecting acute on chronic vascular disease. less likely CVA/TIA as symptoms are very localized to LLE, mostly pain and without obvious sensation discrepancy.   CBC, CMP, coags, T&S, CTA aorta/LLE, VA arterial duplex.  Will reassess the need for additional interventions as clinically warranted. Refer to any progress notes for updates on clinical course and as a continuation of this MDM.

## 2025-02-18 NOTE — ED PROVIDER NOTE - CLINICAL SUMMARY MEDICAL DECISION MAKING FREE TEXT BOX
74-year-old female with a history of HTN, HLD, DM, CABG, sickle cell trait, PAD s/p LLE stents in 2022 with 2023 revision, RLE stents placed February 2024 presenting with 2 to 3 days of increasing left lower extremity pain and concern for acute on chronic vascular disease. VA duplex left lower extremity ordered.  Vascular surgery consulted.

## 2025-02-18 NOTE — ED PROVIDER NOTE - PROGRESS NOTE DETAILS
Antonina PGY3 - Discussed with vascular surgery.  Recommending CTA abdomen pelvis with lower runoff to bilateral lower extremities.  Also recommending involvement of vascular cardiology.  Vascular cardiology consulted due to patient having received multiple lower extremity vascular stents from vascular cardiology in the past.  Disposition pending recommendations from both vascular surgery and vascular cardiology. Antonina PGY3 - Discussed with vascular surgery and vascular cardiology.  Patient is to be transferred to [facility] for continuity of care with Dr. Iker Brooke.  He can be reached at [phone].  Transfer center contacted.  Patient will be sent to [facility] for continuity of care.  CTA completed at [facility] with results pending.  Patient currently HDS on heparin gtt.

## 2025-02-18 NOTE — CONSULT NOTE ADULT - SUBJECTIVE AND OBJECTIVE BOX
[facility] Vascular Surgery Consult Note:    HPI: 74yr F with medical history of T2DM, HTN, HLD, PAD and multiple bilateral lower extremity vascular interventions including bilateral SFA stenting (2016, ) complicated by LLE acute limb ischemia requiring catheter directed thrombolysis (2018), most recently s/p R CFA, SFA, and popliteal angioplasty with R SFA stenting (2024) and subsequent R 3rd ray resection (3/2024) presenting to the ED with L calf and foot pain since the weekend that worsened today. Patient ambulatory at baseline with claudication now with difficulty ambulating today due to the pain. Denies wounds of the bilateral lower extremities. In the ED she is hypertensive to 200/100. She is pending arterial duplexes and DAVONTE/PVRs.    PAST MEDICAL & SURGICAL HISTORY:  DM (diabetes mellitus)    Ulcer of toe of right foot  3rd    Diabetes    HTN (hypertension)    High cholesterol    PAD (peripheral artery disease)    GI bleed    HTN (hypertension)    Type 2 diabetes mellitus    CAD (coronary artery disease)    S/P gastric bypass    H/O bilateral breast reduction surgery    H/O abdominoplasty    S/P  section    History of intravascular stent placement    H/O  section    H/O gastric bypass    Peripheral arterial disease  s/p Right leg stent x 1 , left leg stent x 3    S/P CABG x 3      Allergies  Cipro (Headache)    FAMILY HISTORY:  No pertinent family history in first degree relatives    Vital Signs Last 24 Hrs  T(C): 36.4 (2025 16:40), Max: 36.4 (2025 13:18)  T(F): 97.5 (2025 16:40), Max: 97.6 (2025 13:18)  HR: 75 (2025 16:40) (71 - 75)  BP: 200/95 (2025 16:40) (200/95 - 201/107)  BP(mean): --  RR: 18 (2025 16:40) (18 - 18)  SpO2: 99% (2025 16:40) (99% - 99%)    Parameters below as of 2025 16:40  Patient On (Oxygen Delivery Method): room air    PHYSICAL EXAM:  General: NAD  Pulmonary: Normal resp effort, on room air  Cardiovascular: Regular rate, hypertensive to   Abdominal: Soft, non-tender, non-distended  Extremities: Symmetric temperature bilateral feet, motor intact bilaterally, L foot sensation decreased compared to R  Pulses: Palpable femoral pulses bilaterally, palpable R popliteal, dopplerable L popliteal, dopplerable bilateral DP/PT  Neuro: A/O x 3    RADIOLOGY & ADDITIONAL STUDIES: [facility] Vascular Surgery Consult Note:    HPI: 74yr F with medical history of T2DM, HTN, HLD, PAD (on ASA/Plavix) and multiple bilateral lower extremity vascular interventions including bilateral SFA stenting (, ) complicated by LLE acute limb ischemia requiring catheter directed thrombolysis (2018), most recently s/p R CFA, SFA, and popliteal angioplasty with R SFA stenting (2024) and subsequent R 3rd ray resection (3/2024) presenting to the ED with L calf and foot pain since the weekend that worsened today. Patient ambulatory at baseline with claudication now with difficulty ambulating today due to the pain. Denies wounds of the bilateral lower extremities. In the ED she is hypertensive to 200/100. She is pending arterial duplexes and DAVONTE/PVRs.    PAST MEDICAL & SURGICAL HISTORY:  DM (diabetes mellitus)    Ulcer of toe of right foot  3rd    Diabetes    HTN (hypertension)    High cholesterol    PAD (peripheral artery disease)    GI bleed    HTN (hypertension)    Type 2 diabetes mellitus    CAD (coronary artery disease)    S/P gastric bypass    H/O bilateral breast reduction surgery    H/O abdominoplasty    S/P  section    History of intravascular stent placement    H/O  section    H/O gastric bypass    Peripheral arterial disease  s/p Right leg stent x 1 , left leg stent x 3    S/P CABG x 3      Allergies  Cipro (Headache)    FAMILY HISTORY:  No pertinent family history in first degree relatives    Vital Signs Last 24 Hrs  T(C): 36.4 (2025 16:40), Max: 36.4 (2025 13:18)  T(F): 97.5 (2025 16:40), Max: 97.6 (2025 13:18)  HR: 75 (2025 16:40) (71 - 75)  BP: 200/95 (2025 16:40) (200/95 - 201/107)  BP(mean): --  RR: 18 (2025 16:40) (18 - 18)  SpO2: 99% (2025 16:40) (99% - 99%)    Parameters below as of 2025 16:40  Patient On (Oxygen Delivery Method): room air    PHYSICAL EXAM:  General: NAD  Pulmonary: Normal resp effort, on room air  Cardiovascular: Regular rate, hypertensive to   Abdominal: Soft, non-tender, non-distended  Extremities: Symmetric temperature bilateral feet, motor intact bilaterally, L foot sensation decreased compared to R  Pulses: Palpable femoral pulses bilaterally, palpable R popliteal, dopplerable L popliteal, dopplerable bilateral DP/PT R>L  Neuro: A/O x 3    RADIOLOGY & ADDITIONAL STUDIES: [facility] Vascular Surgery Consult Note:    HPI: 74yr F with medical history of T2DM, HTN, HLD, PAD (on ASA/Plavix) and multiple bilateral lower extremity vascular interventions including bilateral SFA stenting (, ) complicated by LLE acute limb ischemia requiring catheter directed thrombolysis (2018), most recently s/p R CFA, SFA, and popliteal angioplasty/atherectomy with R SFA stenting (2024) and subsequent R 3rd ray resection (3/2024) presenting to the ED with L calf and foot pain since the weekend that worsened today. Patient ambulatory at baseline with claudication now with difficulty ambulating today due to the pain. Denies wounds of the bilateral lower extremities. In the ED she is hypertensive to 200/100. She is pending arterial duplexes and DAVONTE/PVRs.    PAST MEDICAL & SURGICAL HISTORY:  DM (diabetes mellitus)    Ulcer of toe of right foot  3rd    Diabetes    HTN (hypertension)    High cholesterol    PAD (peripheral artery disease)    GI bleed    HTN (hypertension)    Type 2 diabetes mellitus    CAD (coronary artery disease)    S/P gastric bypass    H/O bilateral breast reduction surgery    H/O abdominoplasty    S/P  section    History of intravascular stent placement    H/O  section    H/O gastric bypass    Peripheral arterial disease  s/p Right leg stent x 1 , left leg stent x 3    S/P CABG x 3      Allergies  Cipro (Headache)    FAMILY HISTORY:  No pertinent family history in first degree relatives    Vital Signs Last 24 Hrs  T(C): 36.4 (2025 16:40), Max: 36.4 (2025 13:18)  T(F): 97.5 (2025 16:40), Max: 97.6 (2025 13:18)  HR: 75 (2025 16:40) (71 - 75)  BP: 200/95 (2025 16:40) (200/95 - 201/107)  BP(mean): --  RR: 18 (2025 16:40) (18 - 18)  SpO2: 99% (2025 16:40) (99% - 99%)    Parameters below as of 2025 16:40  Patient On (Oxygen Delivery Method): room air    PHYSICAL EXAM:  General: NAD  Pulmonary: Normal resp effort, on room air  Cardiovascular: Regular rate, hypertensive to   Abdominal: Soft, non-tender, non-distended  Extremities: Symmetric temperature bilateral feet, motor intact bilaterally, L foot sensation decreased compared to R  Pulses: Palpable femoral pulses bilaterally, palpable R popliteal, dopplerable L popliteal, dopplerable bilateral DP/PT R>L  Neuro: A/O x 3    RADIOLOGY & ADDITIONAL STUDIES: [facility] Vascular Surgery Consult Note:    HPI: 74yr F with medical history of T2DM, HTN, HLD, PAD (on ASA/Plavix) and multiple bilateral lower extremity interventions by vascular cardiology including bilateral SFA stenting (, ) complicated by LLE acute limb ischemia requiring catheter directed thrombolysis (2018), most recently s/p R CFA, SFA, and popliteal angioplasty/atherectomy with R SFA stenting (2024) and subsequent R 3rd ray resection (3/2024) presenting to the ED with L calf and foot pain since the weekend that worsened today. Patient ambulatory at baseline with claudication now with difficulty ambulating today due to the pain. Denies wounds of the bilateral lower extremities. In the ED she is hypertensive to 200/100. Labs and further work-up pending.    PAST MEDICAL & SURGICAL HISTORY:  DM (diabetes mellitus)    Ulcer of toe of right foot  3rd    Diabetes    HTN (hypertension)    High cholesterol    PAD (peripheral artery disease)    GI bleed    HTN (hypertension)    Type 2 diabetes mellitus    CAD (coronary artery disease)    S/P gastric bypass    H/O bilateral breast reduction surgery    H/O abdominoplasty    S/P  section    History of intravascular stent placement    H/O  section    H/O gastric bypass    Peripheral arterial disease  s/p Right leg stent x 1 , left leg stent x 3    S/P CABG x 3      Allergies  Cipro (Headache)    FAMILY HISTORY:  No pertinent family history in first degree relatives    Vital Signs Last 24 Hrs  T(C): 36.4 (2025 16:40), Max: 36.4 (2025 13:18)  T(F): 97.5 (2025 16:40), Max: 97.6 (2025 13:18)  HR: 75 (2025 16:40) (71 - 75)  BP: 200/95 (2025 16:40) (200/95 - 201/107)  BP(mean): --  RR: 18 (2025 16:40) (18 - 18)  SpO2: 99% (2025 16:40) (99% - 99%)    Parameters below as of 2025 16:40  Patient On (Oxygen Delivery Method): room air    PHYSICAL EXAM:  General: NAD  Pulmonary: Normal resp effort, on room air  Cardiovascular: Regular rate, hypertensive to   Abdominal: Soft, non-tender, non-distended  Extremities: Symmetric temperature bilateral feet, motor intact bilaterally, L foot sensation decreased compared to R  Pulses: Palpable femoral pulses bilaterally, palpable R popliteal, dopplerable L popliteal, dopplerable R DP/PT, dopplerable L AT/PT  Neuro: A/O x 3    RADIOLOGY & ADDITIONAL STUDIES: [facility] Vascular Surgery Consult Note:    HPI: 74yr F with medical history of T2DM, HTN, HLD, PAD (on ASA/Plavix) and multiple bilateral lower extremity interventions by vascular cardiology including bilateral SFA, L SFA stenting (, ) complicated by LLE acute limb ischemia requiring catheter directed thrombolysis (2018), most recently s/p R CFA, SFA, and popliteal angioplasty/atherectomy with R SFA stenting (2024) and subsequent R 3rd ray resection (3/2024) presenting to the ED with L calf and foot pain since the weekend that worsened today. Patient ambulatory at baseline with claudication now with difficulty ambulating today due to the pain. Denies wounds of the bilateral lower extremities. In the ED she is hypertensive to 200/100. Labs and further work-up pending.    PAST MEDICAL & SURGICAL HISTORY:  DM (diabetes mellitus)    Ulcer of toe of right foot  3rd    Diabetes    HTN (hypertension)    High cholesterol    PAD (peripheral artery disease)    GI bleed    HTN (hypertension)    Type 2 diabetes mellitus    CAD (coronary artery disease)    S/P gastric bypass    H/O bilateral breast reduction surgery    H/O abdominoplasty    S/P  section    History of intravascular stent placement    H/O  section    H/O gastric bypass    Peripheral arterial disease  s/p Right leg stent x 1 , left leg stent x 3    S/P CABG x 3      Allergies  Cipro (Headache)    FAMILY HISTORY:  No pertinent family history in first degree relatives    Vital Signs Last 24 Hrs  T(C): 36.4 (2025 16:40), Max: 36.4 (2025 13:18)  T(F): 97.5 (2025 16:40), Max: 97.6 (2025 13:18)  HR: 75 (2025 16:40) (71 - 75)  BP: 200/95 (2025 16:40) (200/95 - 201/107)  BP(mean): --  RR: 18 (2025 16:40) (18 - 18)  SpO2: 99% (2025 16:40) (99% - 99%)    Parameters below as of 2025 16:40  Patient On (Oxygen Delivery Method): room air    PHYSICAL EXAM:  General: NAD  Pulmonary: Normal resp effort, on room air  Cardiovascular: Regular rate, hypertensive to   Abdominal: Soft, non-tender, non-distended  Extremities: Symmetric temperature bilateral feet, motor intact bilaterally, L foot sensation decreased compared to R  Pulses: Palpable femoral pulses bilaterally, palpable R popliteal, dopplerable L popliteal, dopplerable R DP/PT, dopplerable L AT/PT  Neuro: A/O x 3    RADIOLOGY & ADDITIONAL STUDIES: [facility] Vascular Surgery Consult Note:    HPI: 74yr F with medical history of T2DM, HTN, HLD, PAD (on ASA/Plavix) and multiple bilateral lower extremity interventions by vascular cardiology including bilateral SFA, L popliteal stenting (, ) complicated by LLE acute limb ischemia requiring catheter directed thrombolysis (2018), most recently s/p R CFA, SFA, and popliteal angioplasty/atherectomy with R SFA stenting (2024) and subsequent R 3rd ray resection (3/2024) presenting to the ED with L calf and foot pain since the weekend that worsened today. Patient ambulatory at baseline with claudication now with difficulty ambulating today due to the pain. Denies wounds of the bilateral lower extremities. In the ED she is hypertensive to 200/100. Labs and further work-up pending.    PAST MEDICAL & SURGICAL HISTORY:  DM (diabetes mellitus)    Ulcer of toe of right foot  3rd    Diabetes    HTN (hypertension)    High cholesterol    PAD (peripheral artery disease)    GI bleed    HTN (hypertension)    Type 2 diabetes mellitus    CAD (coronary artery disease)    S/P gastric bypass    H/O bilateral breast reduction surgery    H/O abdominoplasty    S/P  section    History of intravascular stent placement    H/O  section    H/O gastric bypass    Peripheral arterial disease  s/p Right leg stent x 1 , left leg stent x 3    S/P CABG x 3      Allergies  Cipro (Headache)    FAMILY HISTORY:  No pertinent family history in first degree relatives    Vital Signs Last 24 Hrs  T(C): 36.4 (2025 16:40), Max: 36.4 (2025 13:18)  T(F): 97.5 (2025 16:40), Max: 97.6 (2025 13:18)  HR: 75 (2025 16:40) (71 - 75)  BP: 200/95 (2025 16:40) (200/95 - 201/107)  BP(mean): --  RR: 18 (2025 16:40) (18 - 18)  SpO2: 99% (2025 16:40) (99% - 99%)    Parameters below as of 2025 16:40  Patient On (Oxygen Delivery Method): room air    PHYSICAL EXAM:  General: NAD  Pulmonary: Normal resp effort, on room air  Cardiovascular: Regular rate, hypertensive to   Abdominal: Soft, non-tender, non-distended  Extremities: Symmetric temperature bilateral feet, motor intact bilaterally, L foot sensation decreased compared to R  Pulses: Palpable femoral pulses bilaterally, palpable R popliteal, dopplerable L popliteal, dopplerable R DP/PT, dopplerable L AT/PT  Neuro: A/O x 3    RADIOLOGY & ADDITIONAL STUDIES:

## 2025-02-18 NOTE — ED PROVIDER NOTE - OBJECTIVE STATEMENT
74-year-old female with a history of HTN, HLD, DM, CABG, sickle cell trait, PAD s/p LLE stents in 2022 with 2023 revision, RLE stents placed February 2024 presenting with 2 to 3 days of increasing left lower extremity pain and concern for acute on chronic vascular disease.  Per patient about 2 days ago she started noticing increasing pain with ambulation.  Earlier today she felt that she had increasing numbness in her left foot and was concerned that she could not feel her pulse.  She otherwise has no significant headache, chest pain, shortness of breath, N/V/D/abdominal pain, dysuria, peripheral edema, fever/chills.  Allergic to ciprofloxacin.

## 2025-02-18 NOTE — ED PROVIDER NOTE - NSICDXPASTMEDICALHX_GEN_ALL_CORE_FT
PAST MEDICAL HISTORY:  CAD (coronary artery disease)     Diabetes     DM (diabetes mellitus)     GI bleed     High cholesterol     HTN (hypertension)     HTN (hypertension)     PAD (peripheral artery disease)     Type 2 diabetes mellitus     Ulcer of toe of right foot 3rd

## 2025-02-18 NOTE — ED ADULT TRIAGE NOTE - CHIEF COMPLAINT QUOTE
L leg pain since this weekend, endorsing coldness and states she cannot palpate pedal pulse in L foot since symptoms began.   2018 angiogram in L leg

## 2025-02-18 NOTE — ED PROVIDER NOTE - CCCP TRG CHIEF CMPLNT
pain, lower leg FAMILY HISTORY:  Father  Still living? Unknown  Family history of early CAD, Age at diagnosis: Age Unknown

## 2025-02-18 NOTE — CONSULT NOTE ADULT - SUBJECTIVE AND OBJECTIVE BOX
Vascular Cardiology Consult Note  ------------------------------------------------------------------------------------------  SUBJECTIVE: Patient is a 74-year-old female with a history of HTN, HLD, DM, CABG, sickle cell trait, PAD s/p multiple bilateral lower extremity interventions including bilateral SFA, L popliteal stenting (2016, 2018) complicated by LLE acute limb ischemia requiring catheter directed thrombolysis (2018), most recently s/p R CFA, SFA, and popliteal angioplasty/atherectomy with R SFA stenting (2/2024) who presents to the ED with 2 to 3 days of increasing left lower extremity pain w/ concern for acute on chronic vascular disease.  Patient states that ~2 days preceding admission, she started noticing worsening LLE claudication with ambulation.  Earlier in day on day of presentation she felt that she had increasing numbness in her left foot and was concerned that she could not feel her pulse, prompting presentation to the ED.  She otherwise has no significant headache, chest pain, shortness of breath, N/V/D/abdominal pain, dysuria, peripheral edema, fever/chills.  Allergic to ciprofloxacin.    On arrival to the ED, vitals noted at T 97.6 | HR 71 | /107 | SpO2 99% ORA. Initial CBC WNL. Initial coags WNL. Initial CMP WNL. LLE arterial duplex noted with concern for SFA in-stent occlusion. Vascular surgery consulted in the ED, heparin ggt started. CTA of the LE w/ runoff is presently pending.    Regarding peripheral vascular history, patient with history of multiple peripheral interventions as follows:    6/2022  INTERVENTIONS:  - Successful PTA of of right CFA with 7.0 x 60 mm [place] DCB   - successful PTA of right SFA and Popliteal Artery with 4.0 x 220 mm Franko OTW balloon, 5.0 x 200 mm [place] Drug Coated Balloon, 6.0 x 80 mm Epic stent   - Successful PTA of right Popliteal Artery with 4.0 x 220 mm Franko OTW balloon, and 5.0 x 200 mm [place] Drug Coated Balloon.  - Successful PTA of of right Tibioperoneal Trunk with Two 3.5 - 6.0 mm x 6 mm Tack Stents     PERIPHERAL DIAGNOSTIC ANGIOGRAM/INTERVENTION SUMMARY:  - severe right CFA, SFA, Popliteal, and TP trunk disease with one vessel distal run off, s/p successful PTA of CFA, SFA, Popliteal Artery and TP trunk as above.    Peri classification 5.     from: VA Duplex Lower Extrem Arterial, Bilat (03.10.23 @ 14:26) >    IMPRESSION:    Diffuse calcified plaque with progressive high-grade stenosis in the   right common femoral artery.    Patency of bilateral arterial stents but with new high-grade in-stent   stenoses in both superficial femoral arteries and the right popliteal   artery    Occlusion of the right anterior tibial and left posterior tibial arteries.    Hemodynamically significant stenosis in the right posterior tibial artery.    3/21/23   PERIPHERAL ANGIOGRAM SUMMARY:  - 100% stenosis of left Superficial Femoral Artery  - 100% stenosis of left Popliteal Artery  - 90% stenosis of left Tibio-Peroneal Trunk  - 100% chronic total occlusions of left Anterior Tibial Artery, and left Posterior Tibial Artery  - Severe disease with 90% stenosis of left Deep Femoral Artery  - 75% stenosis of right Common Femoral Artery- Tele:  - No events overnight. Denies CP, SOB or Palpitations.     -------------------------------------------------------------------------------------------  VS:  T(F): 97.5 (02-18), Max: 97.6 (02-18)  HR: 75 (02-18) (71 - 75)  BP: 200/95 (02-18) (200/95 - 201/107)  RR: 18 (02-18)  SpO2: 99% (02-18)  I&O's Summary    PHYSICAL EXAM:  GENERAL: NAD  HEAD: Atraumatic, Normocephalic.  ENT: Moist mucous membranes.  NECK: Supple, No JVD.  CHEST/LUNG: Clear to auscultation bilaterally; No rales, rhonchi, wheezing, or rubs. Unlabored respirations.  HEART: Regular rate and rhythm; No murmurs, rubs, or gallops.  ABDOMEN: Bowel sounds present; Soft, Nontender, Nondistended.   EXTREMITIES: No edema. 2+ Peripheral Pulses, brisk capillary refill. No clubbing or cyanosis.     -------------------------------------------------------------------------------------------  LABS:                          11.2   6.97  )-----------( 302      ( 18 Feb 2025 17:29 )             36.3     02-18    144  |  105  |  19  ----------------------------<  79  4.4   |  25  |  0.93    Ca    9.8      18 Feb 2025 17:29  Mg     1.9     02-18    TPro  8.1  /  Alb  4.2  /  TBili  0.7  /  DBili  x   /  AST  22  /  ALT  13  /  AlkPhos  173[H]  02-18    PT/INR - ( 18 Feb 2025 17:29 )   PT: 11.7 sec;   INR: 1.03 ratio         PTT - ( 18 Feb 2025 17:29 )  PTT:33.6 sec            -------------------------------------------------------------------------------------------  Meds:    -------------------------------------------------------------------------------------------  Cardiovascular Diagnostic Testing:    ECG:     Echo:     Stress Testing:    Cath:    -------------------------------------------------------------------------------------------

## 2025-02-19 ENCOUNTER — INPATIENT (INPATIENT)
Facility: HOSPITAL | Age: 75
LOS: 0 days | Discharge: HOME | DRG: 301 | End: 2025-02-20
Attending: STUDENT IN AN ORGANIZED HEALTH CARE EDUCATION/TRAINING PROGRAM | Admitting: STUDENT IN AN ORGANIZED HEALTH CARE EDUCATION/TRAINING PROGRAM
Payer: MEDICARE

## 2025-02-19 VITALS
RESPIRATION RATE: 18 BRPM | WEIGHT: 147.93 LBS | TEMPERATURE: 99 F | DIASTOLIC BLOOD PRESSURE: 79 MMHG | HEIGHT: 65 IN | SYSTOLIC BLOOD PRESSURE: 148 MMHG | HEART RATE: 77 BPM | OXYGEN SATURATION: 96 %

## 2025-02-19 VITALS
OXYGEN SATURATION: 98 % | HEART RATE: 69 BPM | RESPIRATION RATE: 18 BRPM | DIASTOLIC BLOOD PRESSURE: 79 MMHG | SYSTOLIC BLOOD PRESSURE: 185 MMHG | TEMPERATURE: 98 F

## 2025-02-19 DIAGNOSIS — Z98.84 BARIATRIC SURGERY STATUS: Chronic | ICD-10-CM

## 2025-02-19 DIAGNOSIS — Z98.891 HISTORY OF UTERINE SCAR FROM PREVIOUS SURGERY: Chronic | ICD-10-CM

## 2025-02-19 DIAGNOSIS — Z98.890 OTHER SPECIFIED POSTPROCEDURAL STATES: Chronic | ICD-10-CM

## 2025-02-19 DIAGNOSIS — I70.90 UNSPECIFIED ATHEROSCLEROSIS: ICD-10-CM

## 2025-02-19 DIAGNOSIS — I73.9 PERIPHERAL VASCULAR DISEASE, UNSPECIFIED: Chronic | ICD-10-CM

## 2025-02-19 DIAGNOSIS — Z95.1 PRESENCE OF AORTOCORONARY BYPASS GRAFT: Chronic | ICD-10-CM

## 2025-02-19 DIAGNOSIS — Z98.89 OTHER SPECIFIED POSTPROCEDURAL STATES: Chronic | ICD-10-CM

## 2025-02-19 DIAGNOSIS — Z95.828 PRESENCE OF OTHER VASCULAR IMPLANTS AND GRAFTS: Chronic | ICD-10-CM

## 2025-02-19 LAB
A1C WITH ESTIMATED AVERAGE GLUCOSE RESULT: 6.5 % — HIGH (ref 4–5.6)
ANION GAP SERPL CALC-SCNC: 12 MMOL/L — SIGNIFICANT CHANGE UP (ref 7–14)
APTT BLD: 197.2 SEC — CRITICAL HIGH (ref 27–39.2)
APTT BLD: >200 SEC — CRITICAL HIGH (ref 27–39.2)
BUN SERPL-MCNC: 14 MG/DL — SIGNIFICANT CHANGE UP (ref 10–20)
CALCIUM SERPL-MCNC: 9 MG/DL — SIGNIFICANT CHANGE UP (ref 8.4–10.4)
CHLORIDE SERPL-SCNC: 107 MMOL/L — SIGNIFICANT CHANGE UP (ref 98–110)
CHOLEST SERPL-MCNC: 245 MG/DL — HIGH
CO2 SERPL-SCNC: 25 MMOL/L — SIGNIFICANT CHANGE UP (ref 17–32)
CREAT SERPL-MCNC: 0.8 MG/DL — SIGNIFICANT CHANGE UP (ref 0.7–1.5)
EGFR: 76 ML/MIN/1.73M2 — SIGNIFICANT CHANGE UP
ESTIMATED AVERAGE GLUCOSE: 140 MG/DL — HIGH (ref 68–114)
GLUCOSE BLDC GLUCOMTR-MCNC: 126 MG/DL — HIGH (ref 70–99)
GLUCOSE BLDC GLUCOMTR-MCNC: 130 MG/DL — HIGH (ref 70–99)
GLUCOSE BLDC GLUCOMTR-MCNC: 140 MG/DL — HIGH (ref 70–99)
GLUCOSE BLDC GLUCOMTR-MCNC: 154 MG/DL — HIGH (ref 70–99)
GLUCOSE BLDC GLUCOMTR-MCNC: 164 MG/DL — HIGH (ref 70–99)
GLUCOSE BLDC GLUCOMTR-MCNC: 173 MG/DL — HIGH (ref 70–99)
GLUCOSE BLDC GLUCOMTR-MCNC: 227 MG/DL — HIGH (ref 70–99)
GLUCOSE BLDC GLUCOMTR-MCNC: 23 MG/DL — CRITICAL LOW (ref 70–99)
GLUCOSE BLDC GLUCOMTR-MCNC: 280 MG/DL — HIGH (ref 70–99)
GLUCOSE BLDC GLUCOMTR-MCNC: 305 MG/DL — HIGH (ref 70–99)
GLUCOSE BLDC GLUCOMTR-MCNC: 34 MG/DL — CRITICAL LOW (ref 70–99)
GLUCOSE BLDC GLUCOMTR-MCNC: 69 MG/DL — LOW (ref 70–99)
GLUCOSE BLDC GLUCOMTR-MCNC: 69 MG/DL — LOW (ref 70–99)
GLUCOSE BLDC GLUCOMTR-MCNC: 81 MG/DL — SIGNIFICANT CHANGE UP (ref 70–99)
GLUCOSE BLDC GLUCOMTR-MCNC: 85 MG/DL — SIGNIFICANT CHANGE UP (ref 70–99)
GLUCOSE BLDC GLUCOMTR-MCNC: 86 MG/DL — SIGNIFICANT CHANGE UP (ref 70–99)
GLUCOSE SERPL-MCNC: 88 MG/DL — SIGNIFICANT CHANGE UP (ref 70–99)
HCT VFR BLD CALC: 29.9 % — LOW (ref 37–47)
HCT VFR BLD CALC: 30.1 % — LOW (ref 37–47)
HDLC SERPL-MCNC: 63 MG/DL — SIGNIFICANT CHANGE UP
HGB BLD-MCNC: 9.4 G/DL — LOW (ref 12–16)
HGB BLD-MCNC: 9.6 G/DL — LOW (ref 12–16)
INR BLD: 1.02 RATIO — SIGNIFICANT CHANGE UP (ref 0.65–1.3)
LIPID PNL WITH DIRECT LDL SERPL: 170 MG/DL — HIGH
MCHC RBC-ENTMCNC: 24.5 PG — LOW (ref 27–31)
MCHC RBC-ENTMCNC: 24.9 PG — LOW (ref 27–31)
MCHC RBC-ENTMCNC: 31.4 G/DL — LOW (ref 32–37)
MCHC RBC-ENTMCNC: 31.9 G/DL — LOW (ref 32–37)
MCV RBC AUTO: 77.9 FL — LOW (ref 81–99)
MCV RBC AUTO: 78.2 FL — LOW (ref 81–99)
NON HDL CHOLESTEROL: 182 MG/DL — HIGH
NRBC BLD AUTO-RTO: 0 /100 WBCS — SIGNIFICANT CHANGE UP (ref 0–0)
NRBC BLD AUTO-RTO: 0 /100 WBCS — SIGNIFICANT CHANGE UP (ref 0–0)
PLATELET # BLD AUTO: 258 K/UL — SIGNIFICANT CHANGE UP (ref 130–400)
PLATELET # BLD AUTO: 261 K/UL — SIGNIFICANT CHANGE UP (ref 130–400)
PMV BLD: 9.5 FL — SIGNIFICANT CHANGE UP (ref 7.4–10.4)
PMV BLD: 9.9 FL — SIGNIFICANT CHANGE UP (ref 7.4–10.4)
POTASSIUM SERPL-MCNC: 3.9 MMOL/L — SIGNIFICANT CHANGE UP (ref 3.5–5)
POTASSIUM SERPL-SCNC: 3.9 MMOL/L — SIGNIFICANT CHANGE UP (ref 3.5–5)
PROTHROM AB SERPL-ACNC: 12 SEC — SIGNIFICANT CHANGE UP (ref 9.95–12.87)
RBC # BLD: 3.84 M/UL — LOW (ref 4.2–5.4)
RBC # BLD: 3.85 M/UL — LOW (ref 4.2–5.4)
RBC # FLD: 17.6 % — HIGH (ref 11.5–14.5)
RBC # FLD: 17.7 % — HIGH (ref 11.5–14.5)
SODIUM SERPL-SCNC: 144 MMOL/L — SIGNIFICANT CHANGE UP (ref 135–146)
TRIGL SERPL-MCNC: 69 MG/DL — SIGNIFICANT CHANGE UP
WBC # BLD: 6.08 K/UL — SIGNIFICANT CHANGE UP (ref 4.8–10.8)
WBC # BLD: 6.16 K/UL — SIGNIFICANT CHANGE UP (ref 4.8–10.8)
WBC # FLD AUTO: 6.08 K/UL — SIGNIFICANT CHANGE UP (ref 4.8–10.8)
WBC # FLD AUTO: 6.16 K/UL — SIGNIFICANT CHANGE UP (ref 4.8–10.8)

## 2025-02-19 PROCEDURE — 37227: CPT | Mod: LT

## 2025-02-19 PROCEDURE — 83735 ASSAY OF MAGNESIUM: CPT

## 2025-02-19 PROCEDURE — 85730 THROMBOPLASTIN TIME PARTIAL: CPT

## 2025-02-19 PROCEDURE — C1874: CPT

## 2025-02-19 PROCEDURE — 75716 ARTERY X-RAYS ARMS/LEGS: CPT | Mod: 26,XU

## 2025-02-19 PROCEDURE — 82962 GLUCOSE BLOOD TEST: CPT

## 2025-02-19 PROCEDURE — 86850 RBC ANTIBODY SCREEN: CPT

## 2025-02-19 PROCEDURE — 99285 EMERGENCY DEPT VISIT HI MDM: CPT | Mod: 25

## 2025-02-19 PROCEDURE — 37231: CPT | Mod: LT

## 2025-02-19 PROCEDURE — 80053 COMPREHEN METABOLIC PANEL: CPT

## 2025-02-19 PROCEDURE — 37233: CPT | Mod: LT

## 2025-02-19 PROCEDURE — 36415 COLL VENOUS BLD VENIPUNCTURE: CPT

## 2025-02-19 PROCEDURE — 85025 COMPLETE CBC W/AUTO DIFF WBC: CPT

## 2025-02-19 PROCEDURE — 86901 BLOOD TYPING SEROLOGIC RH(D): CPT

## 2025-02-19 PROCEDURE — 75625 CONTRAST EXAM ABDOMINL AORTA: CPT | Mod: 26,XU

## 2025-02-19 PROCEDURE — 85027 COMPLETE CBC AUTOMATED: CPT

## 2025-02-19 PROCEDURE — 75710 ARTERY X-RAYS ARM/LEG: CPT | Mod: 59

## 2025-02-19 PROCEDURE — C1769: CPT

## 2025-02-19 PROCEDURE — C1894: CPT

## 2025-02-19 PROCEDURE — C1725: CPT

## 2025-02-19 PROCEDURE — 80048 BASIC METABOLIC PNL TOTAL CA: CPT

## 2025-02-19 PROCEDURE — C1760: CPT

## 2025-02-19 PROCEDURE — 75635 CT ANGIO ABDOMINAL ARTERIES: CPT | Mod: MC

## 2025-02-19 PROCEDURE — C1885: CPT

## 2025-02-19 PROCEDURE — 99222 1ST HOSP IP/OBS MODERATE 55: CPT

## 2025-02-19 PROCEDURE — 86900 BLOOD TYPING SEROLOGIC ABO: CPT

## 2025-02-19 PROCEDURE — 80061 LIPID PANEL: CPT

## 2025-02-19 PROCEDURE — 71045 X-RAY EXAM CHEST 1 VIEW: CPT

## 2025-02-19 PROCEDURE — 93971 EXTREMITY STUDY: CPT

## 2025-02-19 PROCEDURE — 85610 PROTHROMBIN TIME: CPT

## 2025-02-19 PROCEDURE — 93926 LOWER EXTREMITY STUDY: CPT

## 2025-02-19 PROCEDURE — C1887: CPT

## 2025-02-19 PROCEDURE — 96374 THER/PROPH/DIAG INJ IV PUSH: CPT | Mod: XU

## 2025-02-19 PROCEDURE — 75625 CONTRAST EXAM ABDOMINL AORTA: CPT

## 2025-02-19 PROCEDURE — 83036 HEMOGLOBIN GLYCOSYLATED A1C: CPT

## 2025-02-19 PROCEDURE — 99285 EMERGENCY DEPT VISIT HI MDM: CPT

## 2025-02-19 PROCEDURE — 96375 TX/PRO/DX INJ NEW DRUG ADDON: CPT | Mod: XU

## 2025-02-19 PROCEDURE — 36246 INS CATH ABD/L-EXT ART 2ND: CPT | Mod: LT,59

## 2025-02-19 PROCEDURE — C2623: CPT

## 2025-02-19 RX ORDER — ASPIRIN 325 MG
81 TABLET ORAL DAILY
Refills: 0 | Status: DISCONTINUED | OUTPATIENT
Start: 2025-02-19 | End: 2025-02-20

## 2025-02-19 RX ORDER — DEXTROSE 50 % IN WATER 50 %
50 SYRINGE (ML) INTRAVENOUS ONCE
Refills: 0 | Status: COMPLETED | OUTPATIENT
Start: 2025-02-19 | End: 2025-02-19

## 2025-02-19 RX ORDER — GLUCAGON 3 MG/1
1 POWDER NASAL ONCE
Refills: 0 | Status: DISCONTINUED | OUTPATIENT
Start: 2025-02-19 | End: 2025-02-20

## 2025-02-19 RX ORDER — HEPARIN SODIUM 1000 [USP'U]/ML
5500 INJECTION INTRAVENOUS; SUBCUTANEOUS ONCE
Refills: 0 | Status: COMPLETED | OUTPATIENT
Start: 2025-02-19 | End: 2025-02-19

## 2025-02-19 RX ORDER — DICLOFENAC SODIUM 75 MG/1
1 TABLET, DELAYED RELEASE ORAL
Refills: 0 | DISCHARGE

## 2025-02-19 RX ORDER — HEPARIN SODIUM 1000 [USP'U]/ML
5500 INJECTION INTRAVENOUS; SUBCUTANEOUS EVERY 6 HOURS
Refills: 0 | Status: DISCONTINUED | OUTPATIENT
Start: 2025-02-19 | End: 2025-02-19

## 2025-02-19 RX ORDER — CARVEDILOL 3.12 MG/1
6.25 TABLET, FILM COATED ORAL EVERY 12 HOURS
Refills: 0 | Status: DISCONTINUED | OUTPATIENT
Start: 2025-02-19 | End: 2025-02-20

## 2025-02-19 RX ORDER — LISINOPRIL 5 MG/1
40 TABLET ORAL DAILY
Refills: 0 | Status: DISCONTINUED | OUTPATIENT
Start: 2025-02-19 | End: 2025-02-20

## 2025-02-19 RX ORDER — DEXTROSE 50 % IN WATER 50 %
25 SYRINGE (ML) INTRAVENOUS ONCE
Refills: 0 | Status: DISCONTINUED | OUTPATIENT
Start: 2025-02-19 | End: 2025-02-20

## 2025-02-19 RX ORDER — LISINOPRIL 5 MG/1
40 TABLET ORAL ONCE
Refills: 0 | Status: COMPLETED | OUTPATIENT
Start: 2025-02-19 | End: 2025-02-19

## 2025-02-19 RX ORDER — SODIUM CHLORIDE 9 G/1000ML
1000 INJECTION, SOLUTION INTRAVENOUS
Refills: 0 | Status: DISCONTINUED | OUTPATIENT
Start: 2025-02-19 | End: 2025-02-20

## 2025-02-19 RX ORDER — DEXTROSE 50 % IN WATER 50 %
12.5 SYRINGE (ML) INTRAVENOUS ONCE
Refills: 0 | Status: DISCONTINUED | OUTPATIENT
Start: 2025-02-19 | End: 2025-02-20

## 2025-02-19 RX ORDER — AMLODIPINE BESYLATE 10 MG/1
10 TABLET ORAL DAILY
Refills: 0 | Status: DISCONTINUED | OUTPATIENT
Start: 2025-02-19 | End: 2025-02-20

## 2025-02-19 RX ORDER — SODIUM CHLORIDE 9 G/1000ML
1000 INJECTION, SOLUTION INTRAVENOUS
Refills: 0 | Status: DISCONTINUED | OUTPATIENT
Start: 2025-02-19 | End: 2025-02-19

## 2025-02-19 RX ORDER — CLOPIDOGREL BISULFATE 75 MG/1
300 TABLET, FILM COATED ORAL ONCE
Refills: 0 | Status: COMPLETED | OUTPATIENT
Start: 2025-02-19 | End: 2025-02-19

## 2025-02-19 RX ORDER — HEPARIN SODIUM 1000 [USP'U]/ML
2500 INJECTION INTRAVENOUS; SUBCUTANEOUS EVERY 6 HOURS
Refills: 0 | Status: DISCONTINUED | OUTPATIENT
Start: 2025-02-19 | End: 2025-02-19

## 2025-02-19 RX ORDER — INSULIN LISPRO 100 U/ML
INJECTION, SOLUTION INTRAVENOUS; SUBCUTANEOUS AT BEDTIME
Refills: 0 | Status: DISCONTINUED | OUTPATIENT
Start: 2025-02-19 | End: 2025-02-20

## 2025-02-19 RX ORDER — CLOPIDOGREL BISULFATE 75 MG/1
75 TABLET, FILM COATED ORAL DAILY
Refills: 0 | Status: DISCONTINUED | OUTPATIENT
Start: 2025-02-19 | End: 2025-02-20

## 2025-02-19 RX ORDER — ONDANSETRON HCL/PF 4 MG/2 ML
4 VIAL (ML) INJECTION ONCE
Refills: 0 | Status: COMPLETED | OUTPATIENT
Start: 2025-02-19 | End: 2025-02-19

## 2025-02-19 RX ORDER — HEPARIN SODIUM 1000 [USP'U]/ML
1100 INJECTION INTRAVENOUS; SUBCUTANEOUS
Qty: 25000 | Refills: 0 | Status: DISCONTINUED | OUTPATIENT
Start: 2025-02-19 | End: 2025-02-19

## 2025-02-19 RX ORDER — INSULIN LISPRO 100 U/ML
INJECTION, SOLUTION INTRAVENOUS; SUBCUTANEOUS
Refills: 0 | Status: DISCONTINUED | OUTPATIENT
Start: 2025-02-19 | End: 2025-02-20

## 2025-02-19 RX ORDER — OXYCODONE HYDROCHLORIDE AND ACETAMINOPHEN 10; 325 MG/1; MG/1
1 TABLET ORAL EVERY 6 HOURS
Refills: 0 | Status: DISCONTINUED | OUTPATIENT
Start: 2025-02-19 | End: 2025-02-20

## 2025-02-19 RX ORDER — DEXTROSE 50 % IN WATER 50 %
15 SYRINGE (ML) INTRAVENOUS ONCE
Refills: 0 | Status: DISCONTINUED | OUTPATIENT
Start: 2025-02-19 | End: 2025-02-20

## 2025-02-19 RX ORDER — AMLODIPINE BESYLATE 10 MG/1
10 TABLET ORAL ONCE
Refills: 0 | Status: COMPLETED | OUTPATIENT
Start: 2025-02-19 | End: 2025-02-19

## 2025-02-19 RX ADMIN — AMLODIPINE BESYLATE 10 MILLIGRAM(S): 10 TABLET ORAL at 15:49

## 2025-02-19 RX ADMIN — Medication 75 MILLILITER(S): at 14:49

## 2025-02-19 RX ADMIN — Medication 50 MILLILITER(S): at 12:00

## 2025-02-19 RX ADMIN — HEPARIN SODIUM 0 UNIT(S)/HR: 1000 INJECTION INTRAVENOUS; SUBCUTANEOUS at 11:25

## 2025-02-19 RX ADMIN — Medication 81 MILLIGRAM(S): at 13:51

## 2025-02-19 RX ADMIN — HEPARIN SODIUM 1100 UNIT(S)/HR: 1000 INJECTION INTRAVENOUS; SUBCUTANEOUS at 02:58

## 2025-02-19 RX ADMIN — Medication 75 MILLILITER(S): at 03:13

## 2025-02-19 RX ADMIN — CLOPIDOGREL BISULFATE 75 MILLIGRAM(S): 75 TABLET, FILM COATED ORAL at 13:51

## 2025-02-19 RX ADMIN — Medication 75 MILLILITER(S): at 19:56

## 2025-02-19 RX ADMIN — Medication 50 MILLILITER(S): at 19:56

## 2025-02-19 RX ADMIN — HEPARIN SODIUM 700 UNIT(S)/HR: 1000 INJECTION INTRAVENOUS; SUBCUTANEOUS at 13:44

## 2025-02-19 RX ADMIN — Medication 4 MILLIGRAM(S): at 21:34

## 2025-02-19 RX ADMIN — Medication 50 MILLILITER(S): at 16:42

## 2025-02-19 RX ADMIN — Medication 250 MILLILITER(S): at 16:32

## 2025-02-19 RX ADMIN — CLOPIDOGREL BISULFATE 300 MILLIGRAM(S): 75 TABLET, FILM COATED ORAL at 16:30

## 2025-02-19 RX ADMIN — SODIUM CHLORIDE 75 MILLILITER(S): 9 INJECTION, SOLUTION INTRAVENOUS at 12:44

## 2025-02-19 RX ADMIN — Medication 50 MILLILITER(S): at 12:03

## 2025-02-19 RX ADMIN — HEPARIN SODIUM 900 UNIT(S)/HR: 1000 INJECTION INTRAVENOUS; SUBCUTANEOUS at 05:05

## 2025-02-19 RX ADMIN — CARVEDILOL 6.25 MILLIGRAM(S): 3.12 TABLET, FILM COATED ORAL at 20:00

## 2025-02-19 RX ADMIN — Medication 50 MILLILITER(S): at 12:31

## 2025-02-19 RX ADMIN — LISINOPRIL 40 MILLIGRAM(S): 5 TABLET ORAL at 19:56

## 2025-02-19 RX ADMIN — HEPARIN SODIUM 0 UNIT(S)/HR: 1000 INJECTION INTRAVENOUS; SUBCUTANEOUS at 03:59

## 2025-02-19 NOTE — ED ADULT NURSE NOTE - NSFALLHARMRISKINTERV_ED_ALL_ED
Assistance OOB with selected safe patient handling equipment if applicable/Communicate risk of Fall with Harm to all staff, patient, and family/Encourage patient to sit up slowly, dangle for a short time, stand at bedside before walking/Orthostatic vital signs/Provide visual cue: red socks, yellow wristband, yellow gown, etc/Reinforce activity limits and safety measures with patient and family/Review medications for side effects contributing to fall risk/Toileting schedule using arm’s reach rule for commode and bathroom/Bed in lowest position, wheels locked, appropriate side rails in place/Call bell, personal items and telephone in reach/Instruct patient to call for assistance before getting out of bed/chair/stretcher/Non-slip footwear applied when patient is off stretcher/[place] to call system/Physically safe environment - no spills, clutter or unnecessary equipment/Purposeful Proactive Rounding/Room/bathroom lighting operational, light cord in reach

## 2025-02-19 NOTE — H&P ADULT - HISTORY OF PRESENT ILLNESS
74-year-old female with a history of HTN, HLD, DM, CAD s/ p CABG x3 ( LIMA- LAD, SVG-OM1, SVG -RCA), sickle cell trait, PAD s/p multiple bilateral lower extremity interventions including bilateral SFA, L popliteal stenting (2016, 2018) complicated by LLE acute limb ischemia requiring catheter directed thrombolysis (2018), most recently s/p R CFA, SFA, and popliteal angioplasty/atherectomy with R SFA stenting (2/2024) who presents to the ED with 2 to 3 days of increasing left lower extremity pain w/ concern for acute on chronic vascular disease. Patient was presented to [facility] with left leg claudication was started on hep gtt due to acute on chronic limb ischemia now transferred to [facility] for further evaluation and management.    Patient states that on Saturday, she woke up and noted she was uable to walk, had weakness, tingling, numbness and pain in the left leg. she started noticing worsening LLE claudication with ambulation.  Earlier in day on day of presentation she felt that she had increasing numbness in her left foot and was concerned that she could not feel her pulse, prompting presentation to the ED.  She otherwise has no significant headache, chest pain, shortness of breath, N/V/D/abdominal pain, dysuria, peripheral edema, fever/chills.     On arrival to the ED, vitals noted at /79, HR 77, RR 18, Temp 98.8, O2sat 96% RA   Lab notable wbc 6.97, h/h 11.2/36.3, k 4.4, bun/cr 19/0.93, ast/alt 22/13, mg 1.9  CXR with no acute cardiopulmonary disease   Left lower extremity duplex ( 2/18/2025) no evidence of DVT   LLE arterial and CT angio abd/oarta w/ run-off w/. Con- pending result   Patient started on hep gtt     CXR ( 2/18/25) :  No acute cardiopulmonary disease     Left lower extremity duplex ( 2/18/2025) no evidence of DVT     Left lower extremity arterial duplex ( 2/18/25) : Pending result     CT angio abd oarta w/ run-off w/ IV con ( 2/18/25) : Pending result    74-year-old female with a history of HTN, HLD, DM, CAD s/ p CABG x3 ( LIMA- LAD, SVG-OM1, SVG -RCA), sickle cell trait, PAD s/p multiple bilateral lower extremity interventions including bilateral SFA, L popliteal stenting (2016, 2018) complicated by LLE acute limb ischemia requiring catheter directed thrombolysis (2018), most recently s/p R CFA, SFA, and popliteal angioplasty/atherectomy with R SFA stenting (2/2024) who presents to the ED with 2 to 3 days of increasing left lower extremity pain w/ concern for acute on chronic vascular disease. Patient was presented to [facility] with left leg claudication was started on hep gtt due to acute on chronic limb ischemia now transferred to [facility] for further evaluation and management.    Patient states that on Saturday, she woke up and noted she was uable to walk, had weakness, tingling, numbness and pain in the left leg. she started noticing worsening LLE claudication with ambulation.  Earlier in day on day of presentation she felt that she had increasing numbness in her left foot and was concerned that she could not feel her pulse, prompting presentation to the ED.  She otherwise has no significant headache, chest pain, shortness of breath, N/V/D/abdominal pain, dysuria, peripheral edema, fever/chills.     On arrival to the ED, vitals noted at /79, HR 77, RR 18, Temp 98.8, O2sat 96% RA   Lab notable wbc 6.97, h/h 11.2/36.3, k 4.4, bun/cr 19/0.93, ast/alt 22/13, mg 1.9  CXR with no acute cardiopulmonary disease   Left lower extremity duplex ( 2/18/2025) no evidence of DVT   LLE arterial and CT angio abd/oarta w/ run-off w/. Con- pending result   Patient started on hep gtt     CXR ( 2/18/25) :  No acute cardiopulmonary disease     Left lower extremity duplex ( 2/18/2025) no evidence of DVT     Left lower extremity arterial duplex ( 2/18/25) : Pending result     CT angio abd oarta w/ run-off w/ IV con ( 2/18/25) :  New occlusion of the left SFA stent. Left popliteal portion of the   stent demonstrates heterogeneous contrast opacification with multifocal severe  stenoses/occlusions.

## 2025-02-19 NOTE — CHART NOTE - NSCHARTNOTEFT_GEN_A_CORE
74-year-old female with a history of HTN, HLD, DM, CAD s/ p CABG x3 ( LIMA- LAD, SVG-OM1, SVG -RCA), sickle cell trait, PAD s/p multiple bilateral lower extremity interventions including bilateral SFA, L popliteal stenting (2016, 2018) complicated by LLE acute limb ischemia requiring catheter directed thrombolysis (2018), most recently s/p R CFA, SFA, and popliteal angioplasty/atherectomy with R SFA stenting (2/2024) who presents to the ED with 2 to 3 days of increasing left lower extremity pain w/ concern for acute on chronic vascular disease. Patient was presented to [facility] with left leg claudication was started on hep gtt due to acute on chronic limb ischemia now transferred to [facility] for further evaluation and management.    Patient states that on Saturday, she woke up and noted she was uable to walk, had weakness, tingling, numbness and pain in the left leg. she started noticing worsening LLE claudication with ambulation.  Earlier in day on day of presentation she felt that she had increasing numbness in her left foot and was concerned that she could not feel her pulse, prompting presentation to the ED.  She otherwise has no significant headache, chest pain, shortness of breath, N/V/D/abdominal pain, dysuria, peripheral edema, fever/chills.     On arrival to the ED, vitals noted at /79, HR 77, RR 18, Temp 98.8, O2sat 96% RA   Lab notable wbc 6.97, h/h 11.2/36.3, k 4.4, bun/cr 19/0.93, ast/alt 22/13, mg 1.9  CXR with no acute cardiopulmonary disease   Left lower extremity duplex ( 2/18/2025) no evidence of DVT   LLE arterial and CT angio abd/oarta w/ run-off w/. Con- pending result                                            PREOPERATIVE DAY OF PROCEDURE EVALUATION:  I have personally seen and examined the patient. I agree with the history and physical which I have reviewed and noted any changes below. ( to be signed electronically by MD ) 02-19-25 @ 16:02    IV NS given prior Cardiac Cath [ x]  CathPCI Bleeding Risk score is: 4.9%       Plavix 300 mg PO x1 given prior PTA    RIGHT RADIAL ARTERY EVALUATION:  DARLING TEST: [ ] Negative          [x ] Positive    Anti- Anginal medications:                        [ ] not used                       [x ] used:  [x]CCB  [x ] BB  [ ] Nitrate [ ] Ranexa         [ ] not used but strong indication to use

## 2025-02-19 NOTE — H&P ADULT - NSHPLABSRESULTS_GEN_ALL_CORE
- Telemetry:  - ECG (date***):  - Echo (date***):  - Radiology:    - Labs:                        11.2   6.97  )-----------( 302      ( 18 Feb 2025 17:29 )             36.3     02-18    144  |  105  |  19  ----------------------------<  79  4.4   |  25  |  0.93    Ca    9.8      18 Feb 2025 17:29  Mg     1.9     02-18    TPro  8.1  /  Alb  4.2  /  TBili  0.7  /  DBili  x   /  AST  22  /  ALT  13  /  AlkPhos  173[H]  02-18    LIVER FUNCTIONS - ( 18 Feb 2025 17:29 )  Alb: 4.2 g/dL / Pro: 8.1 g/dL / ALK PHOS: 173 U/L / ALT: 13 U/L / AST: 22 U/L / GGT: x           PT/INR - ( 18 Feb 2025 17:29 )   PT: 11.7 sec;   INR: 1.03 ratio         PTT - ( 18 Feb 2025 17:29 )  PTT:33.6 sec          Lactate Trend    Urinalysis Basic - ( 18 Feb 2025 17:29 )    Color: x / Appearance: x / SG: x / pH: x  Gluc: 79 mg/dL / Ketone: x  / Bili: x / Urobili: x   Blood: x / Protein: x / Nitrite: x   Leuk Esterase: x / RBC: x / WBC x   Sq Epi: x / Non Sq Epi: x / Bacteria: x - Echo ( 12/05/23) :  CONCLUSIONS:    1. Left ventricular cavityis normal. Left ventricular wall thickness is normal. Left ventricular systolic function is normal with an ejection fraction of 67 % by 3D. There are no regional wall motion abnormalities seen.   2. Severe left ventricular hypertrophy.   3. There is mild (grade 1) left ventricular diastolic dysfunction.   4. Normal right ventricular cavity size, wall thickness, and systolic function.   5. The left atrium is moderately dilated.   6. Mild to moderate mitral regurgitation.   7. Mild tricuspid regurgitation.   8. Estimated pulmonary artery systolic pressure is 36 mmHg.   9. No pericardial effusion seen.      - Radiology: CXR ( 2/18/25) :  No acute cardiopulmonary disease     Left lower extremity duplex ( 2/18/2025) no evidence of DVT     Left lower extremity arterial duplex ( 2/18/25) : Pending result     CT angio abd oarta w/ run-off w/ IV con ( 2/18/25) : Pending result       peripheral angiogram (2/6/2024) : Right Common Femoral Artery: 99% stenosis -->s/p laser and drug coated balloon of RCFA  Right SFA Artery: 99% stenosis -->s/p laser and drug coated balloon of distal RSFA and Rosa M stent [id number]  Right Popliteal Artery: 80% stenosis --> s/p laser and drug coated balloon of R popliteal  Right Tibial Peroneal Trunk Artery: s/p laser and drug coated balloon of Right Tibial Peroneal Trunk    Left SFA Artery: patent stent site  Left Peroneal Artery: 95% stenosis --> medical therapy unless symptomatic    Peripheral angiogram (3/23/23):   - Successful PTA, rheolytic and mechanical thrombectomy, and stenting  of left Superficial Femoral Artery   - Successful rheolytic thrombectomy, mechanical thrombectomy and PTA  of left Popliteal Artery   - Successful rheolytic thrombectomy, mechanical thrombectomy and PTA  of left Tibio-Peroneal Trunk     peripheral angiogram (6/2022)   INTERVENTIONS:  - Successful PTA of of right CFA with 7.0 x 60 mm [place] DCB  - successful PTA of right SFA and Popliteal Artery with 4.0 x 220 mm [place] OTW balloon, 5.0 x 200 mm [place] Drug Coated Balloon, 6.0 x 80 mm Epic stent  - Successful PTA of right Popliteal Artery with 4.0 x 220 mm [place] OTW balloon, and 5.0 x 200 mm [place] Drug Coated Balloon.  - Successful PTA of of right Tibioperoneal Trunk with Two 3.5 - 6.0 mm x 6 mm Tack Stents    - Labs:                        11.2   6.97  )-----------( 302      ( 18 Feb 2025 17:29 )             36.3     02-18    144  |  105  |  19  ----------------------------<  79  4.4   |  25  |  0.93    Ca    9.8      18 Feb 2025 17:29  Mg     1.9     02-18    TPro  8.1  /  Alb  4.2  /  TBili  0.7  /  DBili  x   /  AST  22  /  ALT  13  /  AlkPhos  173[H]  02-18    LIVER FUNCTIONS - ( 18 Feb 2025 17:29 )  Alb: 4.2 g/dL / Pro: 8.1 g/dL / ALK PHOS: 173 U/L / ALT: 13 U/L / AST: 22 U/L / GGT: x           PT/INR - ( 18 Feb 2025 17:29 )   PT: 11.7 sec;   INR: 1.03 ratio         PTT - ( 18 Feb 2025 17:29 )  PTT:33.6 sec    Lactate Trend    Urinalysis Basic - ( 18 Feb 2025 17:29 )    Color: x / Appearance: x / SG: x / pH: x  Gluc: 79 mg/dL / Ketone: x  / Bili: x / Urobili: x   Blood: x / Protein: x / Nitrite: x   Leuk Esterase: x / RBC: x / WBC x   Sq Epi: x / Non Sq Epi: x / Bacteria: x - Echo ( 12/05/23) :  CONCLUSIONS:    1. Left ventricular cavityis normal. Left ventricular wall thickness is normal. Left ventricular systolic function is normal with an ejection fraction of 67 % by 3D. There are no regional wall motion abnormalities seen.   2. Severe left ventricular hypertrophy.   3. There is mild (grade 1) left ventricular diastolic dysfunction.   4. Normal right ventricular cavity size, wall thickness, and systolic function.   5. The left atrium is moderately dilated.   6. Mild to moderate mitral regurgitation.   7. Mild tricuspid regurgitation.   8. Estimated pulmonary artery systolic pressure is 36 mmHg.   9. No pericardial effusion seen.      - Radiology: CXR ( 2/18/25) :  No acute cardiopulmonary disease     Left lower extremity duplex ( 2/18/2025) no evidence of DVT     Left lower extremity arterial duplex ( 2/18/25) : Pending result     CT angio abd oarta w/ run-off w/ IV con ( 2/18/25) :   New occlusion of the left SFA stent. Left popliteal portion of the   stent  demonstrates heterogeneous contrast opacification with multifocal severe  stenoses/occlusions.  2.   Single-vessel runoff bilaterally via the peronealartery, unchanged.  3.   Incompletely characterized right renal lesion, may represent solid   lesion  or hemorrhagic/proteinaceous cyst. Consider nonemergent characterization   with  renal protocol MRI, if not previously performed.  4.   Unchanged additional nonacute findings as above.        peripheral angiogram (2/6/2024) : Right Common Femoral Artery: 99% stenosis -->s/p laser and drug coated balloon of RCFA  Right SFA Artery: 99% stenosis -->s/p laser and drug coated balloon of distal RSFA and Rosa M stent [id number]  Right Popliteal Artery: 80% stenosis --> s/p laser and drug coated balloon of R popliteal  Right Tibial Peroneal Trunk Artery: s/p laser and drug coated balloon of Right Tibial Peroneal Trunk    Left SFA Artery: patent stent site  Left Peroneal Artery: 95% stenosis --> medical therapy unless symptomatic    Peripheral angiogram (3/23/23):   - Successful PTA, rheolytic and mechanical thrombectomy, and stenting  of left Superficial Femoral Artery   - Successful rheolytic thrombectomy, mechanical thrombectomy and PTA  of left Popliteal Artery   - Successful rheolytic thrombectomy, mechanical thrombectomy and PTA  of left Tibio-Peroneal Trunk     peripheral angiogram (6/2022)   INTERVENTIONS:  - Successful PTA of of right CFA with 7.0 x 60 mm [place] DCB  - successful PTA of right SFA and Popliteal Artery with 4.0 x 220 mm [place] OTW balloon, 5.0 x 200 mm [place] Drug Coated Balloon, 6.0 x 80 mm Epic stent  - Successful PTA of right Popliteal Artery with 4.0 x 220 mm [place] OTW balloon, and 5.0 x 200 mm [place] Drug Coated Balloon.  - Successful PTA of of right Tibioperoneal Trunk with Two 3.5 - 6.0 mm x 6 mm Tack Stents    - Labs:                        11.2   6.97  )-----------( 302      ( 18 Feb 2025 17:29 )             36.3     02-18    144  |  105  |  19  ----------------------------<  79  4.4   |  25  |  0.93    Ca    9.8      18 Feb 2025 17:29  Mg     1.9     02-18    TPro  8.1  /  Alb  4.2  /  TBili  0.7  /  DBili  x   /  AST  22  /  ALT  13  /  AlkPhos  173[H]  02-18    LIVER FUNCTIONS - ( 18 Feb 2025 17:29 )  Alb: 4.2 g/dL / Pro: 8.1 g/dL / ALK PHOS: 173 U/L / ALT: 13 U/L / AST: 22 U/L / GGT: x           PT/INR - ( 18 Feb 2025 17:29 )   PT: 11.7 sec;   INR: 1.03 ratio         PTT - ( 18 Feb 2025 17:29 )  PTT:33.6 sec    Lactate Trend    Urinalysis Basic - ( 18 Feb 2025 17:29 )    Color: x / Appearance: x / SG: x / pH: x  Gluc: 79 mg/dL / Ketone: x  / Bili: x / Urobili: x   Blood: x / Protein: x / Nitrite: x   Leuk Esterase: x / RBC: x / WBC x   Sq Epi: x / Non Sq Epi: x / Bacteria: x

## 2025-02-19 NOTE — PROGRESS NOTE ADULT - ASSESSMENT
Assessment:  74yr F with medical history of T2DM, HTN, HLD, PAD and multiple bilateral lower extremity interventions by vascular cardiology including bilateral SFA, L popliteal stenting (2016, 2018) complicated by LLE acute limb ischemia requiring catheter directed thrombolysis (2018), most recently s/p R CFA, SFA, and popliteal atherectomy and angioplasty with R SFA stenting (2/2024) and subsequent R 3rd ray resection (3/2024) with acute on chronic limb ischemia now admitted for further evaluation.     # PAD s/p multiple intervention now with acute on chronic L limb ischemia   -admit to T   -LLE venous duplex with no evidence  DVT   - LLE arterial duplex, done, pending result   - CT angio abd/ aorta w/ run-off w/ IV con   New occlusion of the left SFA stent. Left popliteal portion of the   stent  - Continue heparin gtt   -Continue DAPT, asa 81 mg / plavix 75 mg daily   -NPO   - for peripheral angiogram w/ Dr. Brooke afternoon  -prehydration as per protocol     #CAD s/p CABG   -Continue risk modification   -Recommended  lipitor 40 mg daily ( pt refused )   -  Tri 69 HDL 63  -continue coreg 6.25 mg q12, amlodipine 10 mg daily    # HTN   -continue ramipril 10 mg daily,  =will resume hctz 25 mg daily post angiogram     #HLD   -Continue lipitor 40 mg daily   -  Tri 69 HDL 63    #DM   -FS   -ISS  -A1C 6.5     # Hypoglycemia   - BGM 23 while NPO  - D5O x3 amps given   - close monitoring of BGMs while pt NPO    FULL CODE   DVT ppx , on hep gtt   NPO  Please contact me with any questions or concerns at [phone].

## 2025-02-19 NOTE — ED ADULT NURSE REASSESSMENT NOTE - NS ED NURSE REASSESS COMMENT FT1
pt aptt resulted 197.2. received pt with heparin infusing at 1100 units/hr. as per full anticoagulation titration, heparin to be held for 1 hr and decreased by 200 units after 1 hr. CLINTON Bunn aware of aptt result.

## 2025-02-19 NOTE — H&P ADULT - NSHPPHYSICALEXAM_GEN_ALL_CORE
VITALS:   T(C): 37.1 (02-19-25 @ 01:05), Max: 37.1 (02-19-25 @ 01:05)  HR: 77 (02-19-25 @ 01:05) (69 - 90)  BP: 148/79 (02-19-25 @ 01:05) (148/79 - 207/86)  RR: 18 (02-19-25 @ 01:05) (17 - 18)  SpO2: 96% (02-19-25 @ 01:05) (96% - 99%)    GENERAL: NAD, lying in bed comfortably  HEAD:  Atraumatic, normocephalic  EYES: EOMI, PERRLA, conjunctiva and sclera clear  ENT: Moist mucous membranes  NECK: Supple, no JVD  HEART: Regular rate and rhythm, no murmurs, rubs, or gallops  LUNGS: Unlabored respirations.  Clear to auscultation bilaterally, no crackles, wheezing, or rhonchi  ABDOMEN: Soft, nontender, nondistended, +BS  EXTREMITIES: 2+ peripheral pulses bilaterally. No clubbing, cyanosis, or edema  NERVOUS SYSTEM:  A&Ox3, no focal deficits   SKIN: No rashes or lesions VITALS:   T(C): 37.1 (02-19-25 @ 01:05), Max: 37.1 (02-19-25 @ 01:05)  HR: 77 (02-19-25 @ 01:05) (69 - 90)  BP: 148/79 (02-19-25 @ 01:05) (148/79 - 207/86)  RR: 18 (02-19-25 @ 01:05) (17 - 18)  SpO2: 96% (02-19-25 @ 01:05) (96% - 99%)    GENERAL: NAD  HEAD: Atraumatic, Normocephalic.  ENT: Moist mucous membranes.  NECK: Supple, No JVD.  CHEST/LUNG: Clear to auscultation bilaterally; No rales, rhonchi, wheezing, or rubs. Unlabored respirations.  HEART: Regular rate and rhythm; No murmurs, rubs, or gallops.  ABDOMEN: Bowel sounds present; Soft, Nontender, Nondistended.  EXTREMITIES: No edema bilaterally, L LE : + tenderness on Calf/foot cooler/paler compared to the right, non palpable pulse, RLE + palpable DP, no calf tenderness VITALS:   T(C): 37.1 (02-19-25 @ 01:05), Max: 37.1 (02-19-25 @ 01:05)  HR: 77 (02-19-25 @ 01:05) (69 - 90)  BP: 148/79 (02-19-25 @ 01:05) (148/79 - 207/86)  RR: 18 (02-19-25 @ 01:05) (17 - 18)  SpO2: 96% (02-19-25 @ 01:05) (96% - 99%)    GENERAL: NAD  HEAD: Atraumatic, Normocephalic.  ENT: Moist mucous membranes.  NECK: Supple, No JVD.  CHEST/LUNG: Clear to auscultation bilaterally; No rales, rhonchi, wheezing, or rubs. Unlabored respirations.  HEART: Regular rate and rhythm; No murmurs, rubs, or gallops.  ABDOMEN: Bowel sounds present; Soft, Nontender, Nondistended.  EXTREMITIES: No edema bilaterally, L LE : + tenderness on Calf/foot cooler/paler compared to the right, non palpable pulse, decrease sensation compare to the right RLE + palpable DP, no calf tenderness

## 2025-02-19 NOTE — H&P ADULT - NS ATTEND AMEND GEN_ALL_CORE FT
74F, hx of CAD s/p CABG, PAD with multiple prior interventions (most recently 2/2024 R CFA, SFA, and popliteal angioplasty/atherectomy with R SFA stenting) now admitted with critical limb ischemia with imaging confirming occlusion of the SFA stent.    Course complicated by hypoglycemia and difficult IV access.  Hypoglycemia did not adequately respond to pushes of dextrose, prompting initiation of an IV drip.  LDL remains significantly elevated (170). She is non adherent with statin therapy and would benefit from Repatha.  Planned for midline insertion.   Planned for angiography today with Dr. Brooke.

## 2025-02-19 NOTE — ED PROVIDER NOTE - PROGRESS NOTE DETAILS
KA - cardio tele informed of patient arrival and evaluated patient bedside. Orders discontinued due to transfer.

## 2025-02-19 NOTE — ED ADULT NURSE REASSESSMENT NOTE - NS ED NURSE REASSESS COMMENT FT1
ED MD tried US guided IV x2, pt refused additional attempts. MD Glass made aware and will call IV team

## 2025-02-19 NOTE — ED ADULT NURSE NOTE - OBJECTIVE STATEMENT
pt presented to ED transfer from [facility] for tele admission. received pt on heparin infusing at 1100 units. received pt with R forearm 20G in place. pt denies any cp, sob, n/v/d.

## 2025-02-19 NOTE — ED ADULT NURSE REASSESSMENT NOTE - NS ED NURSE REASSESS COMMENT FT1
pt taken to cath lab, Report given to [facility]. pt !&Ox4. No acute distress. Heparin gtt. BP treated. IV intact. belongings with

## 2025-02-19 NOTE — ED PROVIDER NOTE - ATTENDING APP SHARED VISIT CONTRIBUTION OF CARE
74-year-old female with a history of HTN, HLD, DM, CAD s/ p CABG x3 ( LIMA- LAD, SVG-OM1, SVG -RCA), sickle cell trait, PAD s/p multiple bilateral lower extremity interventions including bilateral SFA, L popliteal stenting (2016, 2018) complicated by LLE acute limb ischemia requiring catheter directed thrombolysis (2018), most recently s/p R CFA, SFA, and popliteal angioplasty/atherectomy with R SFA stenting (2/2024) who presents to the ED with 2 to 3 days of increasing left lower extremity pain w/ concern for acute on chronic vascular disease. Patient was presented to [facility] with left leg claudication was started on hep gtt due to acute on chronic limb ischemia now transferred to [facility] for further evaluation and management.   Patient states that on Saturday, she woke up and noted she was unable to walk, had weakness, tingling, numbness and pain in the left leg. She started noticing worsening LLE claudication with ambulation.  Earlier in day on day of presentation she felt that she had increasing numbness in her left foot and was concerned that she could not feel her pulse, prompting presentation to the ED.  She otherwise has no significant headache, chest pain, shortness of breath, N/V/D/abdominal pain, dysuria, peripheral edema, fever/chills. On exam, pt in NAD, AAOx3, head NC/AT, CN II-XII intact, PEERL, EOMi, neck (-) midline tenderness, lungs CTA B/L, CV S1S2 regular, abdomen soft/NT/ND/(+)BS, ext (-) edema bilaterally, L LE (+) tenderness over Calf. Foot cooler/paler compared to the right, non palpable pulse, RLE + palpable DP, no calf tenderness. [facility] Vascular sx aware.

## 2025-02-19 NOTE — ED ADULT NURSE NOTE - CHIEF COMPLAINT QUOTE
BIBA as a transfer from [facility] for occlusion to L leg. Patient complaining of L leg pain weakness numbness and pain x3days. Patient with 3stents placed to L leg at [facility] in the past  - currently on heparin gtt@1100u.

## 2025-02-19 NOTE — PROCEDURE NOTE - SUPERVISORY STATEMENT
Update TX Plan   Update Consent Form     Procedure team was consulted to perform midline insertion urgent procedure for antibiotic/ medication infusion. I was present for the key critical aspects of the procedure performed during the care of the patient.

## 2025-02-19 NOTE — H&P ADULT - TIME BILLING
Chart review, bedside evaluation, coordination of care with vascular cardiology, discussion of care with the patient

## 2025-02-19 NOTE — PATIENT PROFILE ADULT - FALL HARM RISK - HARM RISK INTERVENTIONS
Assistance with ambulation/Assistance OOB with selected safe patient handling equipment/Communicate Risk of Fall with Harm to all staff/Discuss with provider need for PT consult/Monitor gait and stability/Provide patient with walking aids - walker, cane, crutches/Reinforce activity limits and safety measures with patient and family/Sit up slowly, dangle for a short time, stand at bedside before walking/Tailored Fall Risk Interventions/Use of alarms - bed, chair and/or voice tab/Visual Cue: Yellow wristband and red socks/Bed in lowest position, wheels locked, appropriate side rails in place/Call bell, personal items and telephone in reach/Instruct patient to call for assistance before getting out of bed or chair/Non-slip footwear when patient is out of bed/[place] to call system/Physically safe environment - no spills, clutter or unnecessary equipment/Purposeful Proactive Rounding/Room/bathroom lighting operational, light cord in reach

## 2025-02-19 NOTE — CHART NOTE - NSCHARTNOTEFT_GEN_A_CORE
INDICATION: ALI    PHYSICIAN: Dr. Brooke  ASSISTANT/FELLOW: Dr. Jade    ACCESS: Ultrasound Guided 5Fr R femoral artery access    VASCULAR CLOSURE DEVICE (if applicable): Perclose    ANGIOGRAM:  Selective Abdominal Aorta, Right Iliac, Right SFA, Left Iliac, Left SFA, Right and Left Lower Extremity DSA angiography     DETAILED PROCEDURE SUMMARY:  After obtaining informed consent, the patient was brought to the catheterization suite in a post- absorptive and non-sedated state. After this, a timeout was performed and I administered moderate sedation throughout this 45-minute procedure. An independent trained observer pushed medications at my direction, and monitored the patient’s level of consciousness and physiological status throughout. The patient was prepped and draped in the usual sterile manner. 1% lidocaine was used for local anesthesia and the patient was sedated as per endovascular lab protocol. Access was obtained in the R Femoral Artery using the modified Seldinger technique 5 Russian Sheath was placed in the artery under fluoroscopy and ultrasound guidance which was utilized for assessment of arterial patency and actual real-time visualization of needle passage to the arterial lumen. The sheath was exchanged for 6 Fr 45 cm destination sheath prior to the interventional part of the procedure.      CONTRAST: 140 ml    DIAGNOSTIC FINDINGS    Right Common Iliac: The segment is patent. Angiography shows mild atherosclerosis.   Right External Iliac: The segment is patent. Angiography shows mild atherosclerosis.   Right Internal Iliac: The segment is patent. Angiography shows mild atherosclerosis.   Right Common Femoral: The segment is patent. There is a 50 % stenosis.   Right Superficial Femoral: The segment is patent. There is a 50 % in-stent restenosis.   Right Deep Femoral: The segment is patent. Angiography shows mild atherosclerosis.   Right Popliteal: The segment is patent. Angiography shows mild atherosclerosis.   Right Anterior Tibial: Angiography shows complete occlusion.   Right Tibio-Peroneal Trunk: The segment is patent. Angiography shows mild atherosclerosis.   Right Posterior Tibial: Angiography shows complete occlusion.   Right Peroneal: The segment is patent. Angiography shows mild atherosclerosis.   Left Common iliac: The segment is patent. Angiography shows mild atherosclerosis.   Left External Iliac: The segment is patent. Angiography shows mild atherosclerosis.   Left Internal Iliac: Angiography shows complete occlusion.   Left Common Femoral: The segment is patent. Angiography shows mild atherosclerosis.   Left Superficial Femoral: Angiography shows complete occlusion.   Left Deep Femoral: Angiography shows severe atherosclerosis.   Left Popliteal: Angiography shows complete occlusion.   Left Anterior Tibial: Angiography shows complete occlusion.   Left Tibio-Peroneal Trunk: Angiography shows complete occlusion.   Left Posterior Tibial: Angiography shows complete occlusion.   Left Peroneal: Angiography shows complete occlusion.     PERIPHERAL ANGIOGRAM SUMMARY: - Right Anterior Tibial complete occlusion.   - Right Posterior Tibial complete occlusion.   - Left Internal Iliac complete occlusion.   - Left Superficial Femoral complete occlusion.   - Left Deep Femoral severe atherosclerosis.   - Left Popliteal complete occlusion.   - Left Anterior Tibial complete occlusion.   - Left Tibio-Peroneal Trunk  complete occlusion.   - Left Posterior Tibial complete occlusion.   - Left Peroneal complete occlusion.      PERIPHERAL INTERVENTION SUMMARY: - 100% L SFA s/p Successful PTA using laser arthrectomy, Balloon angioplasty, DCB, PANTERA  - 100% L Popliteal s/p Successful PTA using laser arthrectomy, Balloon angioplasty, DCB  - 100% L TP trunk s/p Successful PTA using laser arthrectomy, Balloon angioplasty, DCB, PANTERA  - 100% L Peroneal s/p Successful PTA using laser arthrectomy, Balloon angioplasty, DCB, PANTERA        COMPLICATIONS:None        RECOMMENDATIONS:  IV fluids: NS 75cc/hr x 6hrs   Antiplatelets: ASA, and Plavix   Aggressive risk factor modification   Disposition: 4T

## 2025-02-19 NOTE — ED PROVIDER NOTE - CLINICAL SUMMARY MEDICAL DECISION MAKING FREE TEXT BOX
74-year-old female with a history of HTN, HLD, DM, CAD s/ p CABG x3 ( LIMA- LAD, SVG-OM1, SVG -RCA), sickle cell trait, PAD s/p multiple bilateral lower extremity interventions including bilateral SFA, L popliteal stenting (2016, 2018) complicated by LLE acute limb ischemia requiring catheter directed thrombolysis (2018), most recently s/p R CFA, SFA, and popliteal angioplasty/atherectomy with R SFA stenting (2/2024) who presents to the ED with 2 to 3 days of increasing left lower extremity pain w/ concern for acute on chronic vascular disease. Patient was presented to [facility] with left leg claudication was started on hep gtt due to acute on chronic limb ischemia now transferred to [facility] for further evaluation and management.   Patient states that on Saturday, she woke up and noted she was unable to walk, had weakness, tingling, numbness and pain in the left leg. She started noticing worsening LLE claudication with ambulation.  Earlier in day on day of presentation she felt that she had increasing numbness in her left foot and was concerned that she could not feel her pulse, prompting presentation to the ED.  She otherwise has no significant headache, chest pain, shortness of breath, N/V/D/abdominal pain, dysuria, peripheral edema, fever/chills. On exam, pt in NAD, AAOx3, head NC/AT, CN II-XII intact, PEERL, EOMi, neck (-) midline tenderness, lungs CTA B/L, CV S1S2 regular, abdomen soft/NT/ND/(+)BS, ext (-) edema bilaterally, L LE (+) tenderness over Calf. Foot cooler/paler compared to the right, non palpable pulse, RLE + palpable DP, no calf tenderness. [facility]. Vascular sx aware.

## 2025-02-19 NOTE — ED ADULT NURSE REASSESSMENT NOTE - NS ED NURSE REASSESS COMMENT FT1
pt NPO for angiogram. offered patient her morning medications with water however, pt states she can only take her medication with apple sauce or pudding. education provided regarding NPO status however, pt still refusing morning medications without food. Kirsten Pham aware.

## 2025-02-19 NOTE — ED PROVIDER NOTE - OBJECTIVE STATEMENT
Patient is a 74 year old female with pmhx of htn, hld, dm, cabg, sickle clel trait, PAD s/p LLE stents persents for increasing left leg pain and coolness to her foot. She could not feel her pulse. She lives in [place] and presented to [facility]. Her Vascular Cardiologist is Dr Minor who now practices here at [facility] so she requested transfer. She denies any worsening pain, complaints, or problems between [facility] and [facility] arrival.

## 2025-02-19 NOTE — PATIENT PROFILE ADULT - FUNCTIONAL ASSESSMENT - DAILY ACTIVITY SCORE.
[FreeTextEntry1] : well woman\par \par likely mittelschmerz\par labs and sono\par pt will call for teleheath to review and plan\par \par irration on left, hygiene and short course steroid\par \par sexual lubricant stressed 
24

## 2025-02-19 NOTE — PROGRESS NOTE ADULT - SUBJECTIVE AND OBJECTIVE BOX
Chief complaint: Patient is a 74y old  Female who presents with a chief complaint of left leg weakness and pain (19 Feb 2025 13:08)    Interval history:  pt seen and examined at bedside, L leg pain improved   CT angio abd/ aorta w/ run-off w/ IV con   New occlusion of the left SFA stent. Left popliteal portion of the stent  On V heparin drip  Patient also hypoglycemic now (symptomatic) req 3 amps of D50 with improvement in finger stick       Review of systems: A complete 10-point review of systems was obtained and is negative except as stated in the interval history.    Vitals:  T(F): 97.9, Max: 98.8 (02-19 @ 01:05)  HR: 73 (69 - 90)  BP: 157/69 (146/78 - 207/86)  RR: 17 (16 - 18)  SpO2: 100% (96% - 100%)    Ins & outs:     Weight trend:  Weight (kg): 67.1 (02-19), 63.4 (02-18)    Physical exam:  GENERAL: NAD  HEAD: Atraumatic, Normocephalic.  ENT: Moist mucous membranes.  NECK: Supple, No JVD.  CHEST/LUNG: Clear to auscultation bilaterally; No rales, rhonchi, wheezing, or rubs. Unlabored respirations.  HEART: Regular rate and rhythm; No murmurs, rubs, or gallops.  ABDOMEN: Bowel sounds present; Soft, Nontender, Nondistended.  EXTREMITIES: No edema bilaterally, L LE : + tenderness on Calf/foot cooler/paler compared to the right, non palpable pulse, decrease sensation compare to the right RLE + palpable DP, no calf tenderness    Data reviewed:  - Telemetry: NSR 60's  - ECG   - TTE (date***):   - Chest x-ray < from: Xray Chest 1 View AP/PA (02.18.25 @ 18:07) >  IMPRESSION:    Clear lungs.    < end of copied text >    < from: CT Angio Abd Aorta w/run-off w/ IV Cont (02.18.25 @ 20:51) >  IMPRESSION:    *  Occluded left SFA-popliteal stent, new since 2/3/2024. Reconstitution   of the left peroneal artery. Single left lower extremity vessel runoff   via the peroneal artery, similar to the prior CT.  *  Moderate in-stent stenosis in thedistal right SFA.  *  Stented right popliteal artery with moderate stenoses.  *  Single right lower extremity vessel runoff with patent right peroneal   artery, unchanged.  *  New occlusion of a branch of the right deep femoral artery.  *  Bilaterally stenotic internal iliac arteries with similar stenosis on   the right and increased stenosis on the left since the prior CT. Again   seen are occluded branches of the bilateral internal iliac arteries.  *  Severe proximal celiac artery stenosis, high-grade stenosis versus   occlusion with reconstitution of the proximal inferior mesenteric artery,   and severe proximal left renal artery stenosis, similar to 2/3/2024.  *  Indeterminate hypodense right renal lesion measuring 2.6 cm, unchanged   from 2/3/2024.  *  Left adnexal cystic lesion measuring 4.5 cm, unchanged from 2/3/2024.    < end of copied text >      - Stress test:   - CCTA:  - Cardiac catheterization:   - Cardiac MRI:    - Labs:                        9.4    6.08  )-----------( 261      ( 19 Feb 2025 09:37 )             29.9     02-19    144  |  107  |  14  ----------------------------<  88  3.9   |  25  |  0.8    Ca    9.0      19 Feb 2025 06:20  Mg     1.9     02-18    TPro  8.1  /  Alb  4.2  /  TBili  0.7  /  DBili  x   /  AST  22  /  ALT  13  /  AlkPhos  173[H]  02-18    PT/INR - ( 19 Feb 2025 02:23 )   PT: 12.00 sec;   INR: 1.02 ratio         PTT - ( 19 Feb 2025 09:37 )  PTT:>200.0 sec        Triglycerides, Serum: 69 mg/dL (02-19-25 @ 06:20)  LDL Cholesterol Calculated: 170 mg/dL (02-19-25 @ 06:20)      Urinalysis Basic - ( 19 Feb 2025 06:20 )    Color: x / Appearance: x / SG: x / pH: x  Gluc: 88 mg/dL / Ketone: x  / Bili: x / Urobili: x   Blood: x / Protein: x / Nitrite: x   Leuk Esterase: x / RBC: x / WBC x   Sq Epi: x / Non Sq Epi: x / Bacteria: x        Medications:  amLODIPine   Tablet 10 milliGRAM(s) Oral daily  aspirin enteric coated 81 milliGRAM(s) Oral daily  carvedilol 6.25 milliGRAM(s) Oral every 12 hours  clopidogrel Tablet 75 milliGRAM(s) Oral daily  dextrose 50% Injectable 25 Gram(s) IV Push once  dextrose 50% Injectable 12.5 Gram(s) IV Push once  dextrose 50% Injectable 25 Gram(s) IV Push once  glucagon  Injectable 1 milliGRAM(s) IntraMuscular once  insulin lispro (ADMELOG) corrective regimen sliding scale   SubCutaneous three times a day before meals  insulin lispro (ADMELOG) corrective regimen sliding scale   SubCutaneous at bedtime  lisinopril 40 milliGRAM(s) Oral daily    Drips:  dextrose 5%. 1000 milliLiter(s) (50 mL/Hr) IV Continuous <Continuous>  dextrose 5%. 1000 milliLiter(s) (100 mL/Hr) IV Continuous <Continuous>  dextrose 5%. 1000 milliLiter(s) (75 mL/Hr) IV Continuous <Continuous>  heparin  Infusion. 1100 Unit(s)/Hr (11 mL/Hr) IV Continuous <Continuous>  sodium chloride 0.9%. 1000 milliLiter(s) (75 mL/Hr) IV Continuous <Continuous>    PRN:     Allergies    Cipro (Headache)    Intolerances

## 2025-02-19 NOTE — H&P ADULT - ASSESSMENT
74yr F with medical history of T2DM, HTN, HLD, PAD and multiple bilateral lower extremity interventions by vascular cardiology including bilateral SFA, L popliteal stenting (2016, 2018) complicated by LLE acute limb ischemia requiring catheter directed thrombolysis (2018), most recently s/p R CFA, SFA, and popliteal atherectomy and angioplasty with R SFA stenting (2/2024) and subsequent R 3rd ray resection (3/2024) with acute on chronic limb ischemia now admitted for further evaluation.     # PAD s/p multiple intervention now with acute on chronic limb ischemia   -admit to 4T   -LLE venous duplex with no evidence  DVT   - LLE arterial duplex, done, pending result   - CT angio abd/ aorta w/ run-off w/ IV con   - Continue heparin gtt   -Continue DAPT, asa 81 mg / plavix 75 mg daily   -NPO   -plan for peripheral angiogram w/ Dr. Brooke in the morning   -prehydration as per protocol     #CAD s/p CABG   -Continue risk modification   -Recommended  lipitor 40 mg daily ( pt refused )   -check ldl, goal ldl <70   -continue coreg 6.25 mg q12, amlodipine 10 mg daily    # HTN   -continue ramipril 10 mg daily,  -=will resume hctz 25 mg daily post angiogram     #HLD   -Continue lipitor 40 mg daily   -check lipid panel     #DM   -FS   -ISS  -check A1C     FULL CODE   DVT ppx , on hep gtt   NPO MN  74yr F with medical history of T2DM, HTN, HLD, PAD and multiple bilateral lower extremity interventions by vascular cardiology including bilateral SFA, L popliteal stenting (2016, 2018) complicated by LLE acute limb ischemia requiring catheter directed thrombolysis (2018), most recently s/p R CFA, SFA, and popliteal atherectomy and angioplasty with R SFA stenting (2/2024) and subsequent R 3rd ray resection (3/2024) with acute on chronic limb ischemia now admitted for further evaluation.     # PAD s/p multiple intervention now with acute on chronic limb ischemia   -admit to T   -LLE venous duplex with no evidence  DVT   - LLE arterial duplex, done, pending result   - CT angio abd/ aorta w/ run-off w/ IV con   New occlusion of the left SFA stent. Left popliteal portion of the   stent  - Continue heparin gtt   -Continue DAPT, asa 81 mg / plavix 75 mg daily   -NPO   -plan for peripheral angiogram w/ Dr. Brooke in the morning   -prehydration as per protocol     #CAD s/p CABG   -Continue risk modification   -Recommended  lipitor 40 mg daily ( pt refused )   -check ldl, goal ldl <70   -continue coreg 6.25 mg q12, amlodipine 10 mg daily    # HTN   -continue ramipril 10 mg daily,  -=will resume hctz 25 mg daily post angiogram     #HLD   -Continue lipitor 40 mg daily   -check lipid panel     #DM   -FS   -ISS  -check A1C     FULL CODE   DVT ppx , on hep gtt   NPO MN

## 2025-02-19 NOTE — ED ADULT TRIAGE NOTE - CHIEF COMPLAINT QUOTE
Transition of Care
BIBA as a transfer from [facility] for occlusion to L leg. Patient complaining of L leg pain weakness numbness and pain x3days. Patient with 3stents placed to L leg at [facility] in the past  - currently on heparin gtt@1100u.

## 2025-02-19 NOTE — ED PROVIDER NOTE - PHYSICAL EXAMINATION
As Follows:  CONST: Well appearing in NAD  EYES: PERRL, EOMI, Sclera and conjunctiva clear.   CARD: No murmurs, rubs, or gallops; Normal rate and rhythm  RESP: BS Equal B/L, No wheezes, rhonchi or rales. No distress or accessory breathing  VASC: No palpable pulses.   SKIN: Cool left foot compared to right. dry, no acute rashes. MMM  NEURO: Alert and Oriented, No focal deficits.

## 2025-02-20 ENCOUNTER — TRANSCRIPTION ENCOUNTER (OUTPATIENT)
Age: 75
End: 2025-02-20

## 2025-02-20 VITALS
TEMPERATURE: 98 F | HEART RATE: 67 BPM | SYSTOLIC BLOOD PRESSURE: 151 MMHG | DIASTOLIC BLOOD PRESSURE: 65 MMHG | OXYGEN SATURATION: 100 % | RESPIRATION RATE: 20 BRPM

## 2025-02-20 LAB
ANION GAP SERPL CALC-SCNC: 13 MMOL/L — SIGNIFICANT CHANGE UP (ref 7–14)
BASOPHILS # BLD AUTO: 0.05 K/UL — SIGNIFICANT CHANGE UP (ref 0–0.2)
BASOPHILS NFR BLD AUTO: 0.7 % — SIGNIFICANT CHANGE UP (ref 0–1)
BUN SERPL-MCNC: 10 MG/DL — SIGNIFICANT CHANGE UP (ref 10–20)
CALCIUM SERPL-MCNC: 8.5 MG/DL — SIGNIFICANT CHANGE UP (ref 8.4–10.4)
CHLORIDE SERPL-SCNC: 106 MMOL/L — SIGNIFICANT CHANGE UP (ref 98–110)
CO2 SERPL-SCNC: 21 MMOL/L — SIGNIFICANT CHANGE UP (ref 17–32)
CREAT SERPL-MCNC: 0.9 MG/DL — SIGNIFICANT CHANGE UP (ref 0.7–1.5)
EGFR: 66 ML/MIN/1.73M2 — SIGNIFICANT CHANGE UP
EOSINOPHIL # BLD AUTO: 0.25 K/UL — SIGNIFICANT CHANGE UP (ref 0–0.7)
EOSINOPHIL NFR BLD AUTO: 3.4 % — SIGNIFICANT CHANGE UP (ref 0–8)
GLUCOSE BLDC GLUCOMTR-MCNC: 105 MG/DL — HIGH (ref 70–99)
GLUCOSE BLDC GLUCOMTR-MCNC: 116 MG/DL — HIGH (ref 70–99)
GLUCOSE BLDC GLUCOMTR-MCNC: 134 MG/DL — HIGH (ref 70–99)
GLUCOSE BLDC GLUCOMTR-MCNC: 147 MG/DL — HIGH (ref 70–99)
GLUCOSE BLDC GLUCOMTR-MCNC: 168 MG/DL — HIGH (ref 70–99)
GLUCOSE BLDC GLUCOMTR-MCNC: 80 MG/DL — SIGNIFICANT CHANGE UP (ref 70–99)
GLUCOSE SERPL-MCNC: 96 MG/DL — SIGNIFICANT CHANGE UP (ref 70–99)
HCT VFR BLD CALC: 30.7 % — LOW (ref 37–47)
HGB BLD-MCNC: 9.6 G/DL — LOW (ref 12–16)
IMM GRANULOCYTES NFR BLD AUTO: 0.3 % — SIGNIFICANT CHANGE UP (ref 0.1–0.3)
LYMPHOCYTES # BLD AUTO: 0.74 K/UL — LOW (ref 1.2–3.4)
LYMPHOCYTES # BLD AUTO: 10.1 % — LOW (ref 20.5–51.1)
MAGNESIUM SERPL-MCNC: 1.4 MG/DL — LOW (ref 1.8–2.4)
MCHC RBC-ENTMCNC: 24.5 PG — LOW (ref 27–31)
MCHC RBC-ENTMCNC: 31.3 G/DL — LOW (ref 32–37)
MCV RBC AUTO: 78.3 FL — LOW (ref 81–99)
MONOCYTES # BLD AUTO: 0.43 K/UL — SIGNIFICANT CHANGE UP (ref 0.1–0.6)
MONOCYTES NFR BLD AUTO: 5.9 % — SIGNIFICANT CHANGE UP (ref 1.7–9.3)
NEUTROPHILS # BLD AUTO: 5.86 K/UL — SIGNIFICANT CHANGE UP (ref 1.4–6.5)
NEUTROPHILS NFR BLD AUTO: 79.6 % — HIGH (ref 42.2–75.2)
NRBC BLD AUTO-RTO: 0 /100 WBCS — SIGNIFICANT CHANGE UP (ref 0–0)
PLATELET # BLD AUTO: 199 K/UL — SIGNIFICANT CHANGE UP (ref 130–400)
PMV BLD: 10.8 FL — HIGH (ref 7.4–10.4)
POTASSIUM SERPL-MCNC: 4.9 MMOL/L — SIGNIFICANT CHANGE UP (ref 3.5–5)
POTASSIUM SERPL-SCNC: 4.9 MMOL/L — SIGNIFICANT CHANGE UP (ref 3.5–5)
RBC # BLD: 3.92 M/UL — LOW (ref 4.2–5.4)
RBC # FLD: 17.8 % — HIGH (ref 11.5–14.5)
SODIUM SERPL-SCNC: 140 MMOL/L — SIGNIFICANT CHANGE UP (ref 135–146)
WBC # BLD: 7.35 K/UL — SIGNIFICANT CHANGE UP (ref 4.8–10.8)
WBC # FLD AUTO: 7.35 K/UL — SIGNIFICANT CHANGE UP (ref 4.8–10.8)

## 2025-02-20 PROCEDURE — 99239 HOSP IP/OBS DSCHRG MGMT >30: CPT

## 2025-02-20 RX ORDER — CLOPIDOGREL BISULFATE 75 MG/1
1 TABLET, FILM COATED ORAL
Qty: 30 | Refills: 0
Start: 2025-02-20 | End: 2025-03-21

## 2025-02-20 RX ORDER — ASPIRIN 325 MG
1 TABLET ORAL
Qty: 30 | Refills: 1
Start: 2025-02-20 | End: 2025-04-20

## 2025-02-20 RX ORDER — MAGNESIUM OXIDE 400 MG
400 TABLET ORAL ONCE
Refills: 0 | Status: COMPLETED | OUTPATIENT
Start: 2025-02-20 | End: 2025-02-20

## 2025-02-20 RX ADMIN — LISINOPRIL 40 MILLIGRAM(S): 5 TABLET ORAL at 05:51

## 2025-02-20 RX ADMIN — AMLODIPINE BESYLATE 10 MILLIGRAM(S): 10 TABLET ORAL at 05:51

## 2025-02-20 RX ADMIN — Medication 81 MILLIGRAM(S): at 11:15

## 2025-02-20 RX ADMIN — INSULIN LISPRO 1: 100 INJECTION, SOLUTION INTRAVENOUS; SUBCUTANEOUS at 11:49

## 2025-02-20 RX ADMIN — CARVEDILOL 6.25 MILLIGRAM(S): 3.12 TABLET, FILM COATED ORAL at 05:51

## 2025-02-20 RX ADMIN — Medication 400 MILLIGRAM(S): at 09:52

## 2025-02-20 RX ADMIN — CLOPIDOGREL BISULFATE 75 MILLIGRAM(S): 75 TABLET, FILM COATED ORAL at 11:16

## 2025-02-20 NOTE — DISCHARGE NOTE PROVIDER - NSDCCPTREATMENT_GEN_ALL_CORE_FT
PRINCIPAL PROCEDURE  Procedure: Angiogram, peripheral, with PTA if indicated  Findings and Treatment: Medications:  - DO NOT stop your dual antiplatelet medication (ie: Plavix/clopidogrel, Aspirin), unless directed by your Cardiologist  - Soreness or tenderness at the site is possible, it will diminish over time. You may take Tylenol every 4-6 hours as needed. Nothing stronger should be required.   Activity:  - Do not drive for 3 days.  - Support the groin site with your hand when you  sneeze or cough. No heavy lifting (objects more than 30 pounds) x 2 weeks.  - May resume routine walking today and increase activity as tolerated on Monday.  Hygiene:  - you may shower. Do not tub bathe for one week. Do not rub or apply lotion, cream, powder to the affected site. Leave it open to air.   Diet:   - You may resume your regular diet.   - Drink extra fluid unless othrwise advised.   Special Instructions:  - Bruising or black and blue at the puncture site is possible.  - If there is bleeding from the puncture site apply direct, firm pressure on the site and call 911.  - Any sudden swelling, redness, fever, discharge or severe pain, call your physician or call the cath lab.  - If you notice any scab formation in the area avoid touching the site and allow it to heal.  - If Numbness or "pins and needle" sensation occurs in the affected leg; or if the affected site becomes cool to touch or pale that persists for an extended period of time, call your physician immediately to be checked.   - Some swelling to the leg is expected.    - Inform your Dentist or Surgeon if you are taking Aspirin or any antiplatelet medications. Report any bleeding in your urine or stool.   Follow-up:  Please follow up with your Cardiologist in 1 weeks after discharge. Please call and make an appointment.

## 2025-02-20 NOTE — DISCHARGE NOTE NURSING/CASE MANAGEMENT/SOCIAL WORK - FINANCIAL ASSISTANCE
[facility] provides services at a reduced cost to those who are determined to be eligible through [facility]’s financial assistance program. Information regarding [facility]’s financial assistance program can be found by going to [email or web address] or by calling [phone].

## 2025-02-20 NOTE — DISCHARGE NOTE NURSING/CASE MANAGEMENT/SOCIAL WORK - PATIENT PORTAL LINK FT
You can access the FollowMyHealth Patient Portal offered by [facility] by registering at the following website: [email or web address]. By joining [employer]’s FollowMyHealth portal, you will also be able to view your health information using other applications (apps) compatible with our system.

## 2025-02-20 NOTE — DISCHARGE NOTE PROVIDER - ATTENDING DISCHARGE PHYSICAL EXAMINATION:
I saw and evaluated the patient the day of discharge.  They are s/p peripheral intervention of the L SFA (DCB, PANTERA), L popliteal artery (DCB), L TP trunk (DCB), and L peroneal artery (DCB) without untoward events.  Lipids remain poorly controlled due to statin nonadherence. Discussed the importance of lipid lowering in detail with the patient. Recommended initiation of Repatha or Leqvio as outpatient. Patient will discuss this further with her primary cardiologist.  Stable for discharge with close cardiology/vascular follow up.

## 2025-02-20 NOTE — DISCHARGE NOTE PROVIDER - CARE PROVIDER_API CALL
Mark Anthony Brooke.  Interventional Cardiology  [address] [place] [id number]  Phone: [phone]  Fax: [phone]  Follow Up Time: 1 week

## 2025-02-20 NOTE — DISCHARGE NOTE PROVIDER - CARE PROVIDERS DIRECT ADDRESSES
[email or web address]

## 2025-02-20 NOTE — DISCHARGE NOTE PROVIDER - NSDCMRMEDTOKEN_GEN_ALL_CORE_FT
amLODIPine 10 mg oral tablet: 1 tab(s) orally once a day  aspirin 81 mg oral delayed release tablet: 1 tab(s) orally once a day  carvedilol 6.25 mg oral tablet: 1 tab(s) orally every 12 hours  clopidogrel 75 mg oral tablet: 1 tab(s) orally once a day  diclofenac sodium 75 mg oral delayed release tablet: 1 tab(s) orally once a day  hydroCHLOROthiazide 25 mg oral tablet: 1 tab(s) orally once a day  ramipril 10 mg oral capsule: 1 cap(s) orally once a day   amLODIPine 10 mg oral tablet: 1 tab(s) orally once a day  aspirin 81 mg oral delayed release tablet: 1 tab(s) orally once a day  aspirin 81 mg oral delayed release tablet: 1 tab(s) orally once a day  carvedilol 6.25 mg oral tablet: 1 tab(s) orally every 12 hours  clopidogrel 75 mg oral tablet: 1 tab(s) orally once a day  diclofenac sodium 75 mg oral delayed release tablet: 1 tab(s) orally once a day  hydroCHLOROthiazide 25 mg oral tablet: 1 tab(s) orally once a day  ramipril 10 mg oral capsule: 1 cap(s) orally once a day

## 2025-02-20 NOTE — DISCHARGE NOTE PROVIDER - HOSPITAL COURSE
74-year-old female with a history of HTN, HLD, DM, CAD s/ p CABG x3 ( LIMA- LAD, SVG-OM1, SVG -RCA), sickle cell trait, PAD s/p multiple bilateral lower extremity interventions including bilateral SFA, L popliteal stenting (2016, 2018) complicated by LLE acute limb ischemia requiring catheter directed thrombolysis (2018), most recently s/p R CFA, SFA, and popliteal angioplasty/atherectomy with R SFA stenting (2/2024) who presents to the ED with 2 to 3 days of increasing left lower extremity pain w/ concern for acute on chronic vascular disease. Patient was presented to [facility] with left leg claudication was started on hep gtt due to acute on chronic limb ischemia now transferred to [facility] for further evaluation and management.    Patient states that on Saturday, she woke up and noted she was uable to walk, had weakness, tingling, numbness and pain in the left leg. she started noticing worsening LLE claudication with ambulation.  Earlier in day on day of presentation she felt that she had increasing numbness in her left foot and was concerned that she could not feel her pulse, prompting presentation to the ED.  She otherwise has no significant headache, chest pain, shortness of breath, N/V/D/abdominal pain, dysuria, peripheral edema, fever/chills.     On arrival to the ED, vitals noted at /79, HR 77, RR 18, Temp 98.8, O2sat 96% RA   Lab notable wbc 6.97, h/h 11.2/36.3, k 4.4, bun/cr 19/0.93, ast/alt 22/13, mg 1.9  CXR with no acute cardiopulmonary disease   Left lower extremity duplex ( 2/18/2025) no evidence of DVT   LLE arterial and CT angio abd/oarta w/ run-off w/. Con- pending result   Patient started on hep gtt     CXR ( 2/18/25) :  No acute cardiopulmonary disease     Left lower extremity duplex ( 2/18/2025) no evidence of DVT     Left lower extremity arterial duplex ( 2/18/25) : Occlusion of the stent within the mid to distal left SFA.  Occlusion of the proximal, mid and distal left PTA  Occlusion of the mid left DALTON.      CT angio abd oarta w/ run-off w/ IV con ( 2/18/25) :  New occlusion of the left SFA stent. Left popliteal portion of the   stent demonstrates heterogeneous contrast opacification with multifocal severe  stenoses/occlusions.    patient was admitted to [facility] for further evaluation and management      On 02/19/2025 patient peripheral angiogram    which revealed:   PERIPHERAL ANGIOGRAM SUMMARY:  - Right Anterior Tibial complete occlusion.   - Right Posterior Tibial complete occlusion.   - Left Internal Iliac complete occlusion.   - Left Superficial Femoral complete occlusion.   - Left Deep Femoral severe atherosclerosis.   - Left Popliteal complete occlusion.   - Left Anterior Tibial complete occlusion.   - Left Tibio-Peroneal Trunk  complete occlusion.   - Left Posterior Tibial complete occlusion.   - Left Peroneal complete occlusion.       PERIPHERAL INTERVENTION SUMMARY:  - 100% L SFA s/p Successful PTA using laser arthrectomy, Balloon angioplasty, DCB, PANTERA  - 100% L Popliteal s/p Successful PTA using laser arthrectomy, Balloon angioplasty, DCB  - 100% L TP trunk s/p Successful PTA using laser arthrectomy, Balloon angioplasty, DCB, PANTERA  - 100% L Peroneal s/p Successful PTA using laser arthrectomy, Balloon angioplasty, DCB, PANTERA        Patient was monitored overnight. On POD 1 patient remains HD stable with no complaints. Patient remains in SR with no arrhythmias noted on tele. EKG performed showed no acute ST changes. Examination of (right radial artery/right common femoral artery) showed a C/DI site with no hematoma, erythema or bruit. Distal pulses are 2+ bilaterally. Renal function remains stable post cath. Patient will be discharged home on DAPT with ASA and (Plavix/Effient/Brilinta).  Patient is being DC home in stable condition.     74-year-old female with a history of HTN, HLD, DM, CAD s/ p CABG x3 ( LIMA- LAD, SVG-OM1, SVG -RCA), sickle cell trait, PAD s/p multiple bilateral lower extremity interventions including bilateral SFA, L popliteal stenting (2016, 2018) complicated by LLE acute limb ischemia requiring catheter directed thrombolysis (2018), most recently s/p R CFA, SFA, and popliteal angioplasty/atherectomy with R SFA stenting (2/2024) who presents to the ED with 2 to 3 days of increasing left lower extremity pain w/ concern for acute on chronic vascular disease. Patient was presented to [facility] with left leg claudication was started on hep gtt due to acute on chronic limb ischemia now transferred to [facility] for further evaluation and management.    Patient states that on Saturday, she woke up and noted she was uable to walk, had weakness, tingling, numbness and pain in the left leg. she started noticing worsening LLE claudication with ambulation.  Earlier in day on day of presentation she felt that she had increasing numbness in her left foot and was concerned that she could not feel her pulse, prompting presentation to the ED.  She otherwise has no significant headache, chest pain, shortness of breath, N/V/D/abdominal pain, dysuria, peripheral edema, fever/chills.     On arrival to the ED, vitals noted at /79, HR 77, RR 18, Temp 98.8, O2sat 96% RA   Lab notable wbc 6.97, h/h 11.2/36.3, k 4.4, bun/cr 19/0.93, ast/alt 22/13, mg 1.9  CXR with no acute cardiopulmonary disease   Left lower extremity duplex ( 2/18/2025) no evidence of DVT   LLE arterial and CT angio abd/oarta w/ run-off w/.     CXR ( 2/18/25) :  No acute cardiopulmonary disease   Left lower extremity duplex ( 2/18/2025) no evidence of DVT   Left lower extremity arterial duplex ( 2/18/25) : Occlusion of the stent within the mid to distal left SFA. Occlusion of the proximal, mid and distal left PTA. Occlusion of the mid left DALTON.  CT angio abd oarta w/ run-off w/ IV con ( 2/18/25) :  New occlusion of the left SFA stent. Left popliteal portion of the stent demonstrates heterogeneous contrast opacification with multifocal severe stenoses/occlusions. Patient started on hep gtt .  Patient was admitted to [facility] for further evaluation and management      On 02/19/2025 patient underwent peripheral angiogram which revealed:   PERIPHERAL ANGIOGRAM SUMMARY:  - Right Anterior Tibial complete occlusion.   - Right Posterior Tibial complete occlusion.   - Left Internal Iliac complete occlusion.   - Left Superficial Femoral complete occlusion.   - Left Deep Femoral severe atherosclerosis.   - Left Popliteal complete occlusion.   - Left Anterior Tibial complete occlusion.   - Left Tibio-Peroneal Trunk  complete occlusion.   - Left Posterior Tibial complete occlusion.   - Left Peroneal complete occlusion.     PERIPHERAL INTERVENTION SUMMARY:  - 100% L SFA s/p Successful PTA using laser arthrectomy, Balloon angioplasty, DCB, PANTERA  - 100% L Popliteal s/p Successful PTA using laser arthrectomy, Balloon angioplasty, DCB  - 100% L TP trunk s/p Successful PTA using laser arthrectomy, Balloon angioplasty, DCB, PANTERA  - 100% L Peroneal s/p Successful PTA using laser arthrectomy, Balloon angioplasty, DCB, PANTERA    Patient was monitored overnight. On POD 1 patient remains HD stable with no complaints. Patient remains in SR with no arrhythmias noted on tele. EKG performed showed no acute ST changes. Examination of right common femoral artery showed a C/D/I site with no hematoma, erythema or bruit. Distal pulses are 2+ bilaterally. Renal function remains stable post cath. Patient will be discharged home on DAPT with ASA and Plavix.  Patient is being DC home in stable condition.     74-year-old female with a history of HTN, HLD, DM, CAD s/ p CABG x3 ( LIMA- LAD, SVG-OM1, SVG -RCA), sickle cell trait, PAD s/p multiple bilateral lower extremity interventions including bilateral SFA, L popliteal stenting (2016, 2018) complicated by LLE acute limb ischemia requiring catheter directed thrombolysis (2018), most recently s/p R CFA, SFA, and popliteal angioplasty/atherectomy with R SFA stenting (2/2024) who presents to the ED with 2 to 3 days of increasing left lower extremity pain w/ concern for acute on chronic vascular disease. Patient was presented to [facility] with left leg claudication was started on hep gtt due to acute on chronic limb ischemia now transferred to [facility] for further evaluation and management.    Patient states that on Saturday, she woke up and noted she was unable to walk, had weakness, tingling, numbness and pain in the left leg. she started noticing worsening LLE claudication with ambulation.  Earlier in day on day of presentation she felt that she had increasing numbness in her left foot and was concerned that she could not feel her pulse, prompting presentation to the ED.  She otherwise has no significant headache, chest pain, shortness of breath, N/V/D/abdominal pain, dysuria, peripheral edema, fever/chills.     On arrival to the ED, vitals noted at /79, HR 77, RR 18, Temp 98.8, O2sat 96% RA   Lab notable wbc 6.97, h/h 11.2/36.3, k 4.4, bun/cr 19/0.93, ast/alt 22/13, mg 1.9  CXR with no acute cardiopulmonary disease   Left lower extremity duplex ( 2/18/2025) no evidence of DVT   LLE arterial and CT angio abd/oarta w/ run-off w/.     CXR ( 2/18/25) :  No acute cardiopulmonary disease   Left lower extremity duplex ( 2/18/2025) no evidence of DVT   Left lower extremity arterial duplex ( 2/18/25) : Occlusion of the stent within the mid to distal left SFA. Occlusion of the proximal, mid and distal left PTA. Occlusion of the mid left DALTON.  CT angio abd oarta w/ run-off w/ IV con ( 2/18/25) :  New occlusion of the left SFA stent. Left popliteal portion of the stent demonstrates heterogeneous contrast opacification with multifocal severe stenoses/occlusions. Patient started on hep gtt .  Patient was admitted to [facility] for further evaluation and management      On 02/19/2025 patient underwent peripheral angiogram which revealed:   PERIPHERAL ANGIOGRAM SUMMARY:  - Right Anterior Tibial complete occlusion.   - Right Posterior Tibial complete occlusion.   - Left Internal Iliac complete occlusion.   - Left Superficial Femoral complete occlusion.   - Left Deep Femoral severe atherosclerosis.   - Left Popliteal complete occlusion.   - Left Anterior Tibial complete occlusion.   - Left Tibio-Peroneal Trunk  complete occlusion.   - Left Posterior Tibial complete occlusion.   - Left Peroneal complete occlusion.     PERIPHERAL INTERVENTION SUMMARY:  - 100% L SFA s/p Successful PTA using laser arthrectomy, Balloon angioplasty, DCB, PANTERA  - 100% L Popliteal s/p Successful PTA using laser arthrectomy, Balloon angioplasty, DCB  - 100% L TP trunk s/p Successful PTA using laser arthrectomy, Balloon angioplasty, DCB, PANTERA  - 100% L Peroneal s/p Successful PTA using laser arthrectomy, Balloon angioplasty, DCB, PANTERA    Patient was monitored overnight. On POD 1 patient remains HD stable with no complaints. Patient remains in SR with no arrhythmias noted on tele. EKG performed showed no acute ST changes. Examination of right common femoral artery showed a C/D/I site with no hematoma, erythema or bruit. Distal pulses are 2+ bilaterally. Renal function remains stable post cath. Patient will be discharged home on DAPT with ASA and Plavix.  Patient is being DC home in stable condition.

## 2025-02-20 NOTE — DISCHARGE NOTE PROVIDER - NSDCCPCAREPLAN_GEN_ALL_CORE_FT
PRINCIPAL DISCHARGE DIAGNOSIS  Diagnosis: Arterial occlusion  Assessment and Plan of Treatment:       SECONDARY DISCHARGE DIAGNOSES  Diagnosis: PAD (peripheral artery disease)  Assessment and Plan of Treatment:

## 2025-02-23 DIAGNOSIS — T82.856A STENOSIS OF PERIPHERAL VASCULAR STENT, INITIAL ENCOUNTER: ICD-10-CM

## 2025-02-23 DIAGNOSIS — I25.10 ATHEROSCLEROTIC HEART DISEASE OF NATIVE CORONARY ARTERY WITHOUT ANGINA PECTORIS: ICD-10-CM
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
## 2025-02-23 DIAGNOSIS — E11.649 TYPE 2 DIABETES MELLITUS WITH HYPOGLYCEMIA WITHOUT COMA: ICD-10-CM
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
## 2025-02-23 DIAGNOSIS — I70.223 ATHEROSCLEROSIS OF NATIVE ARTERIES OF EXTREMITIES WITH REST PAIN, BILATERAL LEGS: ICD-10-CM

## 2025-02-23 DIAGNOSIS — Z79.82 LONG TERM (CURRENT) USE OF ASPIRIN: ICD-10-CM

## 2025-02-23 DIAGNOSIS — E83.42 HYPOMAGNESEMIA: ICD-10-CM

## 2025-02-23 DIAGNOSIS — I10 ESSENTIAL (PRIMARY) HYPERTENSION: ICD-10-CM

## 2025-02-23 DIAGNOSIS — E78.5 HYPERLIPIDEMIA, UNSPECIFIED: ICD-10-CM

## 2025-02-23 DIAGNOSIS — Z98.84 BARIATRIC SURGERY STATUS: ICD-10-CM

## 2025-02-23 DIAGNOSIS — Z95.1 PRESENCE OF AORTOCORONARY BYPASS GRAFT: ICD-10-CM

## 2025-02-23 DIAGNOSIS — Z79.02 LONG TERM (CURRENT) USE OF ANTITHROMBOTICS/ANTIPLATELETS: ICD-10-CM

## 2025-03-05 ENCOUNTER — APPOINTMENT (OUTPATIENT)
Dept: CARDIOLOGY | Facility: CLINIC | Age: 75
End: 2025-03-05
Payer: MEDICARE

## 2025-03-05 ENCOUNTER — NON-APPOINTMENT (OUTPATIENT)
Age: 75
End: 2025-03-05

## 2025-03-05 VITALS
BODY MASS INDEX: 23.16 KG/M2 | WEIGHT: 139 LBS | SYSTOLIC BLOOD PRESSURE: 132 MMHG | HEIGHT: 65 IN | OXYGEN SATURATION: 99 % | DIASTOLIC BLOOD PRESSURE: 81 MMHG | HEART RATE: 69 BPM

## 2025-03-05 DIAGNOSIS — I73.9 PERIPHERAL VASCULAR DISEASE, UNSPECIFIED: ICD-10-CM

## 2025-03-05 PROCEDURE — G2211 COMPLEX E/M VISIT ADD ON: CPT

## 2025-03-05 PROCEDURE — 99214 OFFICE O/P EST MOD 30 MIN: CPT

## 2025-03-12 ENCOUNTER — RESULT REVIEW (OUTPATIENT)
Age: 75
End: 2025-03-12

## 2025-03-18 ENCOUNTER — APPOINTMENT (OUTPATIENT)
Dept: ULTRASOUND IMAGING | Facility: HOSPITAL | Age: 75
End: 2025-03-18
Payer: MEDICARE

## 2025-03-18 ENCOUNTER — OUTPATIENT (OUTPATIENT)
Dept: OUTPATIENT SERVICES | Facility: HOSPITAL | Age: 75
LOS: 1 days | End: 2025-03-18
Payer: COMMERCIAL

## 2025-03-18 DIAGNOSIS — Z98.89 OTHER SPECIFIED POSTPROCEDURAL STATES: Chronic | ICD-10-CM

## 2025-03-18 DIAGNOSIS — Z95.828 PRESENCE OF OTHER VASCULAR IMPLANTS AND GRAFTS: Chronic | ICD-10-CM

## 2025-03-18 DIAGNOSIS — Z98.890 OTHER SPECIFIED POSTPROCEDURAL STATES: Chronic | ICD-10-CM

## 2025-03-18 DIAGNOSIS — I73.9 PERIPHERAL VASCULAR DISEASE, UNSPECIFIED: ICD-10-CM

## 2025-03-18 DIAGNOSIS — Z98.84 BARIATRIC SURGERY STATUS: Chronic | ICD-10-CM

## 2025-03-18 DIAGNOSIS — Z95.1 PRESENCE OF AORTOCORONARY BYPASS GRAFT: Chronic | ICD-10-CM

## 2025-03-18 DIAGNOSIS — Z98.891 HISTORY OF UTERINE SCAR FROM PREVIOUS SURGERY: Chronic | ICD-10-CM

## 2025-03-18 DIAGNOSIS — I73.9 PERIPHERAL VASCULAR DISEASE, UNSPECIFIED: Chronic | ICD-10-CM

## 2025-03-18 PROCEDURE — 93925 LOWER EXTREMITY STUDY: CPT | Mod: 26

## 2025-03-18 PROCEDURE — 93925 LOWER EXTREMITY STUDY: CPT

## 2025-03-18 NOTE — ED ADULT NURSE NOTE - [facility] OUTPATIENT ENCOUNTER  CONSULT NOTE    ADMISSION DATE:  3/18/2025  CONSULTING PHYSICIAN:  Micheal Rosado MD  ATTENDING PHYSICIAN:  Micheal Raymundo MD   CODE STATUS:  No Order      CHIEF COMPLAINT:  Patient presents for GI Endoscopy      HISTORIES:    Past Medical History:   Diagnosis Date    Acute non intractable tension-type headache     Benign paroxysmal positional vertigo, unspecified laterality     Cervical high risk human papillomavirus (HPV) DNA test positive     Diabetes mellitus  (CMD)     Essential (primary) hypertension     Long-term insulin use  (CMD)     Mixed hyperlipidemia     Other chronic pain     UTI symptoms     Vitamin D deficiency       Past Surgical History:   Procedure Laterality Date    No past surgeries Bilateral     Tendon release      hand, dupeytren      Social History     Socioeconomic History    Marital status: /Civil Union     Spouse name: Not on file    Number of children: Not on file    Years of education: Not on file    Highest education level: Not on file   Occupational History    Not on file   Tobacco Use    Smoking status: Never    Smokeless tobacco: Never   Vaping Use    Vaping status: never used   Substance and Sexual Activity    Alcohol use: Never    Drug use: Never    Sexual activity: Not on file   Other Topics Concern    Not on file   Social History Narrative    Not on file     Social Determinants of Health     Financial Resource Strain: Not on file   Food Insecurity: Not on file   Transportation Needs: Not on file   Physical Activity: Not on file   Stress: Not on file   Social Connections: Low Risk  (3/18/2025)    Social Connections     Social Connectivity: 5 or more times a week   Interpersonal Safety: Not At Risk (8/18/2023)    Received from [employer], [employer], [employer]    [facility]     Threatened: Not on file     Insulted: Not on file     Physically Hurt : Not on file     Scream: Not on file      Family History   Problem Relation Age of  Onset    Diabetes Mother     Diabetes Father     Heart Father     Diabetes Brother       ALLERGIES:  Banana   (food and med)           MEDICATIONS:    (Not in a hospital admission)     Current Outpatient Medications   Medication Sig Dispense Refill    insulin aspart (NovoLOG) 100 UNIT/ML injectable solution Inject 4 Units into the skin 1 time. Pt took once this morning at 0530      Blood Glucose Monitoring Suppl (OneTouch Verio Flex System) w/Device Kit USE AS DIRECTED 3-4 TIMES A DAY 1 kit 0    insulin glargine 100 UNIT/ML pen-injector 10 units ONCE A DAY AT NIGHT 15 mL 11    empagliflozin (Jardiance) 25 MG tablet Take 1 tablet by mouth every 24 hours. 90 tablet 0    Insulin Lispro, 1 Unit Dial, (HumaLOG KwikPen) 100 UNIT/ML pen-injector Inject 20 Units into the skin in the morning and 20 Units at noon and 20 Units in the evening. Inject before meals. 20 units three times daily 15 mL 5    lisinopril (ZESTRIL) 2.5 MG tablet Take 1 tablet by mouth daily. 90 tablet 3    dulaglutide (Trulicity) 3 MG/0.5ML pen-injector Inject 3 mg into the skin every 7 days. Indications: Type 2 Diabetes 0.5 mL 3    Glucagon (Gvoke HypoPen 2-Pack) 0.5 MG/0.1ML Solution Auto-injector Inject 1 mg into the skin as needed (as needed for low sugar). 1 each 1    metoPROLOL tartrate (LOPRESSOR) 25 MG tablet Take 1/2 tab two times daily po 60 tablet 2    Continuous Glucose Sensor (FreeStyle Lien 14 Day Sensor) Misc 1 each daily for 14 days. 6 each 0    Blood Pressure Monitoring (Blood Pressure Monitor Automat) Device 1 each daily. Check BP daily in the morning 1 each 1    Continuous Blood Gluc Sensor (FreeStyle Lien 2 Sensor) Misc Apply 1 each topically every 14 days. 2 each 5    Continuous Blood Gluc  (FreeStyle Lien 2 Tridell) Device 1 each daily. 1 each 0    Glucose Blood (BLOOD GLUCOSE TEST STRIPS) Strip as directed In Vitro 3-4 TIMES A DAY for 90 days       Current Facility-Administered Medications   Medication Dose Route  Frequency Provider Last Rate Last Admin    sodium chloride 0.9% infusion   Intravenous Continuous Micheal Raymundo MD 50 mL/hr at 03/18/25 0658 New Bag at 03/18/25 0658        VITAL SIGNS:    Visit Vitals  /73 (BP Location: LUE - Left upper extremity, Patient Position: Semi-Ram's)   Pulse 83   Temp 97.5 °F (36.4 °C) (Temporal)   Resp 15   Ht 5' 4\" (1.626 m)   Wt 62.6 kg (138 lb 0.1 oz)   SpO2 98%   BMI 23.69 kg/m²        No intake or output data in the 24 hours ending 03/18/25 0711     PHYSICAL EXAM:    General: The patient is well developed, well nourished, in no acute distress, appears stated age.   Neurologic: Alert. Normal mood and affect, normal speech, no gross tremor.  Skin: Warm and dry, normal turgor.  Head: Normocephalic.  Eyes: Normal conjunctivae and sclerae.  Pupils equal, round, reactive to light.  Extraocular movements intact.  HEENT: Oral pharynx clear, moist mucous membranes, external nose is normal to inspection.  Neck: Symmetric without swelling or tenderness.   Respiratory: Respiratory effort normal.   Cardiovascular: Regular rate and rhythm.  Extremities: No swelling or tenderness.  Gastrointestinal:  Soft and nontender.  Normal bowel sounds.  No hepatomegaly or splenomegaly. No clinical evidence of ascitis. Bowel sounds normpactive. No appreciable Hernia.    LABORATORY DATA:    Hospital Outpatient Visit on 03/18/2025   Component Date Value Ref Range Status    GLUCOSE, BEDSIDE - POINT OF CARE 03/18/2025 232 (H)  70 - 99 mg/dL Final        IMAGING STUDIES:    Esophagogastroduodenoscopy (EGD)    (Results Pending)   Colonoscopy    (Results Pending)          ASSESSMENT:     Patient for GI endoscopy AND COLONOSCOPY FOR GERD AND COLON CANCER SCREENING.      PLAN:    GI endoscopy to be undertaken.        Thank you for allowing us the opportunity of participating in your patient's care.    Micheal Rosado MD   3/18/2025        Diabetes    DM (diabetes mellitus)    High cholesterol    HTN (hypertension)    HTN (hypertension)    LEILANI (obstructive sleep apnea)  does not use CPAP since gastric bypass surgery  Ulcer of toe of right foot  2nd

## 2025-03-19 ENCOUNTER — NON-APPOINTMENT (OUTPATIENT)
Age: 75
End: 2025-03-19

## 2025-03-19 DIAGNOSIS — I73.9 PERIPHERAL VASCULAR DISEASE, UNSPECIFIED: ICD-10-CM

## 2025-04-28 NOTE — H&P ADULT. - NEUROLOGICAL DETAILS
Patient is not diabetic   Monitor skin for new or worsening wounds    responds to verbal commands/sensation intact/alert and oriented x 3/cranial nerves intact

## 2025-06-11 ENCOUNTER — APPOINTMENT (OUTPATIENT)
Dept: CARDIOLOGY | Facility: CLINIC | Age: 75
End: 2025-06-11
Payer: MEDICARE

## 2025-06-11 VITALS
DIASTOLIC BLOOD PRESSURE: 81 MMHG | OXYGEN SATURATION: 100 % | HEART RATE: 61 BPM | BODY MASS INDEX: 22.99 KG/M2 | HEIGHT: 65 IN | SYSTOLIC BLOOD PRESSURE: 146 MMHG | WEIGHT: 138 LBS

## 2025-06-11 PROCEDURE — 99214 OFFICE O/P EST MOD 30 MIN: CPT

## 2025-06-11 PROCEDURE — G2211 COMPLEX E/M VISIT ADD ON: CPT

## 2025-06-17 ENCOUNTER — NON-APPOINTMENT (OUTPATIENT)
Age: 75
End: 2025-06-17

## 2025-06-26 ENCOUNTER — APPOINTMENT (OUTPATIENT)
Dept: CARDIOLOGY | Facility: CLINIC | Age: 75
End: 2025-06-26

## 2025-07-17 ENCOUNTER — NON-APPOINTMENT (OUTPATIENT)
Age: 75
End: 2025-07-17

## 2025-07-22 ENCOUNTER — APPOINTMENT (OUTPATIENT)
Dept: CARDIOLOGY | Facility: CLINIC | Age: 75
End: 2025-07-22

## 2025-07-28 NOTE — PATIENT PROFILE ADULT - FUNCTIONAL ASSESSMENT - BASIC MOBILITY SECTION LABEL
Referring provider:    Skye Rowe.    Reason for consultation:    Leukocytosis and Iron deficiency due to menorrhagia    Diagnosis:    Anemia with chronic blood loss from menorrhagia  Iron deficiency anemia    HPI:    Ms. Rios is a 46-year-old female who is referred to our office for evaluation of leukocytosis.  She has a history of anemia, currently on Ferrous Sulfate 325 mg PO QD, menorrhagia, depression, Bipolar disorder, cognitive deficits, low back pain, hyperlipidemia,  smokes 4 cigarettes a day, and morbid obesity.  She presented to her PCP for follow-up after her hospital admission on 10/7/2023 for weakness and vaginal bleeding x 3 weeks.  She was given 3 units of PRBCs and was seen by Dr. Gaucher GYN during her hospital admission.  Pelvic US on 10/8/2023 revealed:  Significant amount of complex fluid likely blood with debris distending the endometrial cavity.  Evaluation of CBCs: and CMPs:  11/1/2023:  WBC 12.1, Hgb 9.5, Hct 30.7, MCV 92, RDW 15.7, Plt 399,000, ANC 8.3 and CMP WNL.   CBC on 10/10/2023:  WBC 12.9, Hgb 11.3, hct 37.4, MCV 94, MCHC 30.2, RDW 16.3, Plts 455,000, ANC 9.8, CMP WNL.   CBC 05/06/2023:  CBC 10/7/2022:  WBC 7.3, Hgb 11.6, hct 37.9, MCV 91, MCHC 30.6, RDW 15.6, Plts 325,000, ANC 4.4, CMP WNL.    Interval history:    7/28/25:  Ms. Rios is here today for discussion of lab results regarding iron deficiency anemia.  She reports feeling well today.  She denies any fever, chills, drenching night sweats, or weight loss.  She denies dizziness, fatigue, cough, SOB, chest pain, confirms severe menorrhagia, denies any epistaxis, hemoptysis, hematochezia, melena, hematuria, no abdominal pain, confirms constipation, no change in bladder habits, no joint/bone/muscle or leg cramping, no lower extremity pain or swelling, and no neurological changes. She has not been compliant with oral iron, will refill today  She continues to follow-up with GYN for menorrhagia.  She reports eating and  drinking well.      Family history:  Father -  from lung cancer.      Social History     Socioeconomic History    Marital status: Single   Tobacco Use    Smoking status: Every Day     Current packs/day: 0.25     Types: Cigarettes    Smokeless tobacco: Never   Substance and Sexual Activity    Alcohol use: Never    Drug use: Never    Sexual activity: Yes       Current Outpatient Medications   Medication Sig Dispense Refill    atorvastatin (LIPITOR) 20 MG tablet Take 20 mg by mouth once daily.      calcium citrate-vitamin D3 315-200 mg (CITRACAL+D) 315 mg-5 mcg (200 unit) per tablet Take 1 tablet by mouth 2 (two) times daily.      docusate sodium (COLACE) 100 MG capsule Take 100 mg by mouth 2 (two) times daily as needed.      DULoxetine (CYMBALTA) 60 MG capsule Take 60 mg by mouth 2 (two) times daily.      ferrous sulfate (FEOSOL) 325 mg (65 mg iron) Tab tablet Take 1 tablet (325 mg total) by mouth once daily. 30 tablet 4    ibuprofen (ADVIL,MOTRIN) 200 MG tablet Take 600 mg by mouth every 6 (six) hours as needed for Pain.      methylcellulose, laxative, 500 mg Tab Take 1 tablet by mouth Daily.      metoprolol succinate (TOPROL-XL) 25 MG 24 hr tablet Take 25 mg by mouth once daily.      multivitamin-iron-folic acid Tab Take 1 tablet by mouth Daily.      polyethylene glycol (GLYCOLAX) 17 gram PwPk Take 17 g by mouth as needed.      QUEtiapine 150 mg Tab Take 150 mg by mouth Daily.      TRILEPTAL 150 mg Tab Take 150 mg by mouth once daily.      TRIVORA, 28, 50-30 (6)/75-40 (5)/125-30(10) per tablet Take 1 tablet by mouth once daily.       No current facility-administered medications for this visit.       Review of patient's allergies indicates:   Allergen Reactions    Lortab [hydrocodone-acetaminophen] Nausea Only     Labs:  25: cr 0.76, iron 64, iron sat 15%, ferritin 31, wbc 9.35, hgb 11.3, plt 439, ANC 6.84    25: cr 0.81, iron 85, iron sat 18%, ferritin 38, wbc 10.37, hgb 12.3, plt 407, ANC  7.18    1/6/25: cr 0.74, iron 92, iron sat 24%, ferritin 24, wbc 9.12, hgb 10.5, plt 399, ANC 5.61    10/9/24: cr 0.74, iron 113, iron sat 30%, ferritin 34, wbc 10.99, hgb 11.2, plt 397, ANC 7.68  06/03/24: cr 0.81, iron 60, iron sat 19%, ferritin 159, wbc 9.49, hgb 11.9, plt 388, ANC 6.91  03/15/2024:  Creatinine 0.68,  liver enzymes WNL, T. Bili WNL, iron saturation 8, ferritin 26, WBC 9.28, Hgb 11.2, Hct 35.6, Plt 435,000, Retic 1.80.  12/12/2023:  Creatinine 0.87,  Liver enzymes WNL, T. Bili WNL, iron saturation 6%, ferritin 27, WBC 11.54, Hgb 9.9, Nct 33.2, and Plt 489,000, Retic 3.80.     Review of systems  CONSTITUTIONAL: no fevers, no chills, no weight loss, no fatigue, no weakness  HEMATOLOGIC: no abnormal bleeding, no abnormal bruising, no drenching night sweats  ONCOLOGIC: no new masses or lumps  HEENT: no vision loss, no tinnitus or hearing loss, no nose bleeding, no dysphagia, no odynophagia  CVS: no chest pain, no palpitations, no dyspnea on exertion  RESP: no shortness of breath, no hemoptysis, no cough  GI: no nausea, no vomiting, no diarrhea, + constipation, no melena, no hematochezia, no hematemesis, no abdominal pain, no increase in abdominal girth  : no dysuria, no hematuria, no hesitancy, no discharge  INTEGUMENT: no rashes, no abnormal bruising, no nail pitting, no hyperpigmentation  NEURO: no falls, no memory loss, no paresthesias or dysesthesias, no urofecal incontinence or retention, no loss of strength on any extremity  MSK: no back pain, no new joint pain, no joint swelling  PSYCH: no suicidal or homicidal ideation, no depression, no insomnia, no anhedonia  ENDOCRINE: no heat or cold intolerance, no polyuria, no polydipsia  GYN:  Menorrhagia    Physical Exam:  Vitals:    07/28/25 1303   BP: 120/86   Pulse: 100   Resp: 12   Temp: 97.9 °F (36.6 °C)             ECOG PS 0  GA: AAOx3, NAD  HEENT: NCAT, PERRLA, EOMI, good dentition, moist oral mucous membranes  LYMPH: no cervical, axillary or  supraclavicular adenopathy  CVS: s1s2 RRR, no M/R/G  RESP: CTA b/l, no crackles, no wheezes or rhonchi  ABD: soft, NT, ND, BS+, no hepatosplenomegaly  EXT: no deformities, no pedal edema  SKIN: no rashes, warm and dry  NEURO: normal mentation, strength 5/5 on all 4 extremities, no sensory deficits        Assessment    Iron deficiency anemia secondary to menorrhagia D 50.0    Patient with history of severe menorrhagia and iron deficiency anemia.  Recently hospitalized for weakness and 3 weeks of heavy menstrual bleeding.  Pelvic US positive for blood and debris in the endometrial cavity.  Takes daily iron without side effects.  Has an appointment with Dr. Alicea, GYN in February.  WBC normal over the last several years.  We discussed sending her for labs today to include CBC with differential, CMP, and iron panel based on the history of severe menorrhagia and recent hospitalization for weakness and anemia.  We discussed possibly moving up her GYN appointment with Dr. Alicea from February to December or January.  We discussed continuing with oral iron daily on empty stomach with a source of Vitamin C, and based on iron panel today, we may need to replete her iron with IV iron infusion.   12/12/2023:  Iron saturation 6%, ferritin 27, WBC 11.54, Hgb 9.9, Hct 33.2, Retic 3.80.  Labs dated 03/15/2024:  Iron saturation 8, ferritin 26, WBC 9.28, Hgb 11.2, Hct 35.6, Retic 1.80    Leukocytosis reactive D72.828    She is probably experiencing reactive leukocytosis from heavy menstrual bleeding.  Resolved 3/15/2024.     Plan   Labs reviewed,  Iron and WBC levels stable  Continue follow-up with GYN for menorrhagia management.  RTC 3 months CMP, CBC, and iron panel.    Restart Ferrous Sulfate 325 mg PO QD on empty stomach with a good source of Vitamin C.      Odalis MIGUEL                 .

## 2025-09-11 ENCOUNTER — APPOINTMENT (OUTPATIENT)
Dept: CARDIOLOGY | Facility: CLINIC | Age: 75
End: 2025-09-11
Payer: MEDICARE

## 2025-09-11 VITALS
BODY MASS INDEX: 22.8 KG/M2 | SYSTOLIC BLOOD PRESSURE: 164 MMHG | HEART RATE: 65 BPM | DIASTOLIC BLOOD PRESSURE: 69 MMHG | WEIGHT: 137 LBS

## 2025-09-11 DIAGNOSIS — I73.9 PERIPHERAL VASCULAR DISEASE, UNSPECIFIED: ICD-10-CM

## 2025-09-11 PROCEDURE — G2211 COMPLEX E/M VISIT ADD ON: CPT

## 2025-09-11 PROCEDURE — 99214 OFFICE O/P EST MOD 30 MIN: CPT

## 2025-09-12 PROBLEM — I51.9 RIGHT VENTRICULAR DYSFUNCTION: Status: ACTIVE | Noted: 2025-09-12

## (undated) DEVICE — DRAPE ISOLATION BAG 20X20"

## (undated) DEVICE — IRRIGATOR BIO SHIELD

## (undated) DEVICE — STAPLER SKIN VISI-STAT 35 WIDE

## (undated) DEVICE — FORCEP RADIAL JAW 4 JUMBO 2.8MM 3.2MM 240CM ORANGE DISP

## (undated) DEVICE — SOL IRR POUR NS 0.9% 500ML

## (undated) DEVICE — POLY TRAP ETRAP

## (undated) DEVICE — DRAPE 1/2 SHEET 40X57"

## (undated) DEVICE — DRSG STOCKINETTE IMPERVIOUS MED

## (undated) DEVICE — DRAPE INSTRUMENT POUCH 6.75" X 11"

## (undated) DEVICE — DRSG KLING 4"

## (undated) DEVICE — STRYKER INTERPULSE HANDPIECE W IRR SUCTION TUBE

## (undated) DEVICE — SYR LUER LOK 10CC

## (undated) DEVICE — CATH IV SAFE BC 20G X 1.16" (PINK)

## (undated) DEVICE — PACKING GAUZE PLAIN 2"

## (undated) DEVICE — GLV 8 PROTEXIS (WHITE)

## (undated) DEVICE — PACK IV START WITH CHG

## (undated) DEVICE — DRAPE LIGHT HANDLE COVER (BLUE)

## (undated) DEVICE — SUCTION YANKAUER NO CONTROL VENT

## (undated) DEVICE — PACK EXTREMITY

## (undated) DEVICE — SUT PLAIN GUT 4-0 18" P-12

## (undated) DEVICE — BLADE SCALPEL SAFETYLOCK #15

## (undated) DEVICE — FOLEY TRAY 16FR 5CC LTX UMETER CLOSED

## (undated) DEVICE — CLAMP BX HOT RAD JAW 3

## (undated) DEVICE — MEDICATION LABELS W MARKER

## (undated) DEVICE — TUBING SUCTION 20FT

## (undated) DEVICE — SOL IRR POUR H2O 250ML

## (undated) DEVICE — WARMING BLANKET UPPER ADULT

## (undated) DEVICE — SAW BLADE MICROAIRE SAGITTAL 5.8X25.4X0.6 MM

## (undated) DEVICE — ELCTR GROUNDING PAD ADULT COVIDIEN

## (undated) DEVICE — DRAPE MAGNETIC INSTRUMENT MEDIUM

## (undated) DEVICE — DRAPE TOWEL BLUE 17" X 24"

## (undated) DEVICE — MARKING PEN W RULER

## (undated) DEVICE — SENSOR O2 FINGER ADULT

## (undated) DEVICE — DRSG ACE BANDAGE 6"

## (undated) DEVICE — CATH IV SAFE BC 22G X 1" (BLUE)

## (undated) DEVICE — DRSG COMBINE 5X9"

## (undated) DEVICE — NDL HYPO REGULAR BEVEL 25G X 1.5" (BLUE)

## (undated) DEVICE — TUBING SUCTION CONN 6FT STERILE

## (undated) DEVICE — DRSG STOCKINETTE IMPERVIOUS XL

## (undated) DEVICE — VENODYNE/SCD SLEEVE CALF LARGE

## (undated) DEVICE — SOL INJ NS 0.9% 500ML 2 PORT

## (undated) DEVICE — LAP PAD 18 X 18"

## (undated) DEVICE — DRAPE 3/4 SHEET W REINFORCEMENT 56X77"

## (undated) DEVICE — SAW BLADE MICROAIRE SAGITTAL 9.4MMX25.4MMX0.6MM

## (undated) DEVICE — POSITIONER FOAM EGG CRATE ULNAR 2PCS (PINK)

## (undated) DEVICE — PACKING GAUZE IODOFORM 2"

## (undated) DEVICE — BRUSH COLONOSCOPY CYTOLOGY

## (undated) DEVICE — SAW BLADE MICROAIRE OSCILATING 25.4MM X 90MM X 1.27MM

## (undated) DEVICE — BUR STRYKER OVAL SOLID CARBIDE MED 4MM

## (undated) DEVICE — DRSG XEROFORM 1 X 8"

## (undated) DEVICE — BIOPSY FORCEP RADIAL JAW 4 STANDARD WITH NEEDLE

## (undated) DEVICE — PACKING GAUZE PLAIN 0.5"

## (undated) DEVICE — SYR LUER LOK 50CC

## (undated) DEVICE — PREP BETADINE KIT

## (undated) DEVICE — SUT POLYSORB 2-0 30" GS-21 UNDYED

## (undated) DEVICE — DRSG STERISTRIPS 0.5 X 4"

## (undated) DEVICE — SPECIMEN CONTAINER 100ML

## (undated) DEVICE — TUBING IV SET GRAVITY 3Y 100" MACRO

## (undated) DEVICE — FOLEY HOLDER STATLOCK 2 WAY ADULT